# Patient Record
Sex: FEMALE | Race: OTHER | NOT HISPANIC OR LATINO | URBAN - METROPOLITAN AREA
[De-identification: names, ages, dates, MRNs, and addresses within clinical notes are randomized per-mention and may not be internally consistent; named-entity substitution may affect disease eponyms.]

---

## 2017-08-02 ENCOUNTER — HISTORICAL (OUTPATIENT)
Dept: ADMINISTRATIVE | Facility: HOSPITAL | Age: 62
End: 2017-08-02

## 2017-08-02 LAB
CHOLEST SERPL-MCNC: 248 MG/DL (ref 0–200)
CHOLEST/HDLC SERPL: 4.1 {RATIO} (ref 0–4)
DEPRECATED CALCIDIOL+CALCIFEROL SERPL-MC: 16.29 NG/ML (ref 30–80)
ERYTHROCYTE [DISTWIDTH] IN BLOOD BY AUTOMATED COUNT: 13.9 % (ref 11.5–17)
EST. AVERAGE GLUCOSE BLD GHB EST-MCNC: 169 MG/DL
HBA1C MFR BLD: 7.5 % (ref 4.2–6.3)
HCT VFR BLD AUTO: 49.4 % (ref 37–47)
HDLC SERPL-MCNC: 60 MG/DL (ref 35–60)
HGB BLD-MCNC: 15.4 GM/DL (ref 12–16)
LDLC SERPL CALC-MCNC: 161 MG/DL (ref 0–129)
MCH RBC QN AUTO: 28.5 PG (ref 27–31)
MCHC RBC AUTO-ENTMCNC: 31.2 GM/DL (ref 33–36)
MCV RBC AUTO: 91.3 FL (ref 80–94)
PLATELET # BLD AUTO: 295 X10(3)/MCL (ref 130–400)
PMV BLD AUTO: 9.1 FL (ref 9.4–12.4)
RBC # BLD AUTO: 5.41 X10(6)/MCL (ref 4.2–5.4)
TRIGL SERPL-MCNC: 137 MG/DL (ref 30–150)
TSH SERPL-ACNC: 0.91 MIU/ML (ref 0.36–3.74)
VLDLC SERPL CALC-MCNC: 27 MG/DL
WBC # SPEC AUTO: 6.2 X10(3)/MCL (ref 4.5–11.5)

## 2017-11-17 ENCOUNTER — HISTORICAL (OUTPATIENT)
Dept: RADIOLOGY | Facility: HOSPITAL | Age: 62
End: 2017-11-17

## 2018-10-04 ENCOUNTER — HISTORICAL (OUTPATIENT)
Dept: ADMINISTRATIVE | Facility: HOSPITAL | Age: 63
End: 2018-10-04

## 2018-10-04 LAB
ABS NEUT (OLG): 3.53 X10(3)/MCL (ref 2.1–9.2)
ALBUMIN SERPL-MCNC: 3.4 GM/DL (ref 3.4–5)
ALBUMIN/GLOB SERPL: 0.8 RATIO (ref 1.1–2)
ALP SERPL-CCNC: 71 UNIT/L (ref 38–126)
ALT SERPL-CCNC: 23 UNIT/L (ref 12–78)
APPEARANCE, UA: CLEAR
AST SERPL-CCNC: 16 UNIT/L (ref 15–37)
BACTERIA SPEC CULT: NORMAL /HPF
BASOPHILS # BLD AUTO: 0 X10(3)/MCL (ref 0–0.2)
BASOPHILS NFR BLD AUTO: 0 %
BILIRUB SERPL-MCNC: 0.2 MG/DL (ref 0.2–1)
BILIRUB UR QL STRIP: NEGATIVE
BILIRUBIN DIRECT+TOT PNL SERPL-MCNC: 0.1 MG/DL (ref 0–0.5)
BILIRUBIN DIRECT+TOT PNL SERPL-MCNC: 0.1 MG/DL (ref 0–0.8)
BUN SERPL-MCNC: 12 MG/DL (ref 7–18)
CALCIUM SERPL-MCNC: 9.4 MG/DL (ref 8.5–10.1)
CHLORIDE SERPL-SCNC: 107 MMOL/L (ref 98–107)
CHOLEST SERPL-MCNC: 203 MG/DL (ref 0–200)
CHOLEST/HDLC SERPL: 3.6 {RATIO} (ref 0–4)
CO2 SERPL-SCNC: 27 MMOL/L (ref 21–32)
COLOR UR: YELLOW
CREAT SERPL-MCNC: 0.81 MG/DL (ref 0.55–1.02)
EOSINOPHIL # BLD AUTO: 0.2 X10(3)/MCL (ref 0–0.9)
EOSINOPHIL NFR BLD AUTO: 2 %
ERYTHROCYTE [DISTWIDTH] IN BLOOD BY AUTOMATED COUNT: 13.8 % (ref 11.5–17)
FERRITIN SERPL-MCNC: 222 NG/ML (ref 8–388)
GLOBULIN SER-MCNC: 4 GM/DL (ref 2.4–3.5)
GLUCOSE (UA): NEGATIVE
GLUCOSE SERPL-MCNC: 97 MG/DL (ref 74–106)
HCT VFR BLD AUTO: 47.4 % (ref 37–47)
HDLC SERPL-MCNC: 57 MG/DL (ref 35–60)
HGB BLD-MCNC: 14.5 GM/DL (ref 12–16)
HGB UR QL STRIP: NEGATIVE
IRON SATN MFR SERPL: 25.3 % (ref 20–50)
IRON SERPL-MCNC: 74 MCG/DL (ref 50–175)
KETONES UR QL STRIP: NEGATIVE
LDLC SERPL CALC-MCNC: 125 MG/DL (ref 0–129)
LEUKOCYTE ESTERASE UR QL STRIP: NEGATIVE
LYMPHOCYTES # BLD AUTO: 2.4 X10(3)/MCL (ref 0.6–4.6)
LYMPHOCYTES NFR BLD AUTO: 36 %
MCH RBC QN AUTO: 28.7 PG (ref 27–31)
MCHC RBC AUTO-ENTMCNC: 30.6 GM/DL (ref 33–36)
MCV RBC AUTO: 93.9 FL (ref 80–94)
MONOCYTES # BLD AUTO: 0.5 X10(3)/MCL (ref 0.1–1.3)
MONOCYTES NFR BLD AUTO: 8 %
NEUTROPHILS # BLD AUTO: 3.53 X10(3)/MCL (ref 2.1–9.2)
NEUTROPHILS NFR BLD AUTO: 53 %
NITRITE UR QL STRIP: NEGATIVE
PH UR STRIP: 5.5 [PH] (ref 5–9)
PLATELET # BLD AUTO: 315 X10(3)/MCL (ref 130–400)
PMV BLD AUTO: 9.5 FL (ref 9.4–12.4)
POTASSIUM SERPL-SCNC: 4.6 MMOL/L (ref 3.5–5.1)
PROT SERPL-MCNC: 7.4 GM/DL (ref 6.4–8.2)
PROT UR QL STRIP: NEGATIVE
RBC # BLD AUTO: 5.05 X10(6)/MCL (ref 4.2–5.4)
RBC #/AREA URNS HPF: NORMAL /[HPF]
RHEUMATOID FACT SERPL-ACNC: <10 IU/ML (ref 0–15)
SODIUM SERPL-SCNC: 143 MMOL/L (ref 136–145)
SP GR UR STRIP: 1.02 (ref 1–1.03)
SQUAMOUS EPITHELIAL, UA: NORMAL
TIBC SERPL-MCNC: 293 MCG/DL (ref 250–450)
TRANSFERRIN SERPL-MCNC: 213 MG/DL (ref 200–360)
TRIGL SERPL-MCNC: 107 MG/DL (ref 30–150)
TSH SERPL-ACNC: 0.95 MIU/L (ref 0.36–3.74)
UROBILINOGEN UR STRIP-ACNC: 0.2
VLDLC SERPL CALC-MCNC: 21 MG/DL
WBC # SPEC AUTO: 6.7 X10(3)/MCL (ref 4.5–11.5)
WBC #/AREA URNS HPF: NORMAL /[HPF]

## 2019-04-05 ENCOUNTER — HISTORICAL (OUTPATIENT)
Dept: ADMINISTRATIVE | Facility: HOSPITAL | Age: 64
End: 2019-04-05

## 2019-04-05 LAB
ALBUMIN SERPL-MCNC: 3.4 GM/DL (ref 3.4–5)
ALBUMIN/GLOB SERPL: 0.8 {RATIO}
ALP SERPL-CCNC: 85 UNIT/L (ref 38–126)
ALT SERPL-CCNC: 17 UNIT/L (ref 12–78)
AST SERPL-CCNC: 13 UNIT/L (ref 15–37)
BILIRUB SERPL-MCNC: 0.2 MG/DL (ref 0.2–1)
BILIRUBIN DIRECT+TOT PNL SERPL-MCNC: 0.1 MG/DL (ref 0–0.2)
BILIRUBIN DIRECT+TOT PNL SERPL-MCNC: 0.1 MG/DL (ref 0–0.8)
BUN SERPL-MCNC: 8 MG/DL (ref 7–18)
CALCIUM SERPL-MCNC: 9.3 MG/DL (ref 8.5–10.1)
CHLORIDE SERPL-SCNC: 106 MMOL/L (ref 98–107)
CO2 SERPL-SCNC: 24 MMOL/L (ref 21–32)
CREAT SERPL-MCNC: 0.83 MG/DL (ref 0.55–1.02)
ERYTHROCYTE [DISTWIDTH] IN BLOOD BY AUTOMATED COUNT: 13.8 % (ref 11.5–17)
GLOBULIN SER-MCNC: 4 GM/DL (ref 2.4–3.5)
GLUCOSE SERPL-MCNC: 115 MG/DL (ref 74–106)
HCT VFR BLD AUTO: 47.4 % (ref 37–47)
HGB BLD-MCNC: 14.6 GM/DL (ref 12–16)
MCH RBC QN AUTO: 29 PG (ref 27–31)
MCHC RBC AUTO-ENTMCNC: 30.8 GM/DL (ref 33–36)
MCV RBC AUTO: 94 FL (ref 80–94)
PLATELET # BLD AUTO: 316 X10(3)/MCL (ref 130–400)
PMV BLD AUTO: 9.2 FL (ref 9.4–12.4)
POTASSIUM SERPL-SCNC: 4.2 MMOL/L (ref 3.5–5.1)
PROT SERPL-MCNC: 7.4 GM/DL (ref 6.4–8.2)
RBC # BLD AUTO: 5.04 X10(6)/MCL (ref 4.2–5.4)
SODIUM SERPL-SCNC: 140 MMOL/L (ref 136–145)
WBC # SPEC AUTO: 6 X10(3)/MCL (ref 4.5–11.5)

## 2019-04-30 ENCOUNTER — HISTORICAL (OUTPATIENT)
Dept: RADIOLOGY | Facility: HOSPITAL | Age: 64
End: 2019-04-30

## 2019-04-30 LAB
ABS NEUT (OLG): 4.65 X10(3)/MCL (ref 2.1–9.2)
ALBUMIN SERPL-MCNC: 3.1 GM/DL (ref 3.4–5)
ALBUMIN/GLOB SERPL: 0.8 RATIO (ref 1.1–2)
ALP SERPL-CCNC: 71 UNIT/L (ref 38–126)
ALT SERPL-CCNC: 16 UNIT/L (ref 12–78)
AST SERPL-CCNC: 10 UNIT/L (ref 15–37)
BASOPHILS # BLD AUTO: 0 X10(3)/MCL (ref 0–0.2)
BASOPHILS NFR BLD AUTO: 0 %
BILIRUB SERPL-MCNC: 0.3 MG/DL (ref 0.2–1)
BILIRUBIN DIRECT+TOT PNL SERPL-MCNC: 0.1 MG/DL (ref 0–0.5)
BILIRUBIN DIRECT+TOT PNL SERPL-MCNC: 0.2 MG/DL (ref 0–0.8)
BUN SERPL-MCNC: 8 MG/DL (ref 7–18)
CALCIUM SERPL-MCNC: 9.5 MG/DL (ref 8.5–10.1)
CHLORIDE SERPL-SCNC: 107 MMOL/L (ref 98–107)
CO2 SERPL-SCNC: 31 MMOL/L (ref 21–32)
CREAT SERPL-MCNC: 0.82 MG/DL (ref 0.55–1.02)
EOSINOPHIL # BLD AUTO: 0.2 X10(3)/MCL (ref 0–0.9)
EOSINOPHIL NFR BLD AUTO: 3 %
ERYTHROCYTE [DISTWIDTH] IN BLOOD BY AUTOMATED COUNT: 13.8 % (ref 11.5–17)
EST. AVERAGE GLUCOSE BLD GHB EST-MCNC: 157 MG/DL
GLOBULIN SER-MCNC: 3.9 GM/DL (ref 2.4–3.5)
GLUCOSE SERPL-MCNC: 113 MG/DL (ref 74–106)
HBA1C MFR BLD: 7.1 % (ref 4.2–6.3)
HCT VFR BLD AUTO: 44.8 % (ref 37–47)
HGB BLD-MCNC: 14 GM/DL (ref 12–16)
LYMPHOCYTES # BLD AUTO: 1.9 X10(3)/MCL (ref 0.6–4.6)
LYMPHOCYTES NFR BLD AUTO: 26 %
MCH RBC QN AUTO: 29 PG (ref 27–31)
MCHC RBC AUTO-ENTMCNC: 31.3 GM/DL (ref 33–36)
MCV RBC AUTO: 92.8 FL (ref 80–94)
MONOCYTES # BLD AUTO: 0.7 X10(3)/MCL (ref 0.1–1.3)
MONOCYTES NFR BLD AUTO: 9 %
NEUTROPHILS # BLD AUTO: 4.65 X10(3)/MCL (ref 2.1–9.2)
NEUTROPHILS NFR BLD AUTO: 62 %
PLATELET # BLD AUTO: 329 X10(3)/MCL (ref 130–400)
PMV BLD AUTO: 9.5 FL (ref 9.4–12.4)
POTASSIUM SERPL-SCNC: 4.5 MMOL/L (ref 3.5–5.1)
PROT SERPL-MCNC: 7 GM/DL (ref 6.4–8.2)
RBC # BLD AUTO: 4.83 X10(6)/MCL (ref 4.2–5.4)
SODIUM SERPL-SCNC: 141 MMOL/L (ref 136–145)
WBC # SPEC AUTO: 7.5 X10(3)/MCL (ref 4.5–11.5)

## 2019-07-05 ENCOUNTER — HISTORICAL (OUTPATIENT)
Dept: ADMINISTRATIVE | Facility: HOSPITAL | Age: 64
End: 2019-07-05

## 2019-07-05 LAB
ABS NEUT (OLG): 4.04 X10(3)/MCL (ref 2.1–9.2)
ALBUMIN SERPL-MCNC: 3.6 GM/DL (ref 3.4–5)
ALBUMIN/GLOB SERPL: 0.8 RATIO (ref 1.1–2)
ALP SERPL-CCNC: 84 UNIT/L (ref 45–117)
ALT SERPL-CCNC: 18 UNIT/L (ref 12–78)
AST SERPL-CCNC: 15 UNIT/L (ref 15–37)
BASOPHILS # BLD AUTO: 0.03 X10(3)/MCL
BASOPHILS NFR BLD AUTO: 0 %
BILIRUB SERPL-MCNC: 0.3 MG/DL (ref 0.2–1)
BILIRUBIN DIRECT+TOT PNL SERPL-MCNC: <0.1 MG/DL
BILIRUBIN DIRECT+TOT PNL SERPL-MCNC: ABNORMAL MG/DL
BUN SERPL-MCNC: 10 MG/DL (ref 7–18)
CALCIUM SERPL-MCNC: 9.7 MG/DL (ref 8.5–10.1)
CHLORIDE SERPL-SCNC: 108 MMOL/L (ref 98–107)
CO2 SERPL-SCNC: 32 MMOL/L (ref 21–32)
CREAT SERPL-MCNC: 0.9 MG/DL (ref 0.6–1.3)
EOSINOPHIL # BLD AUTO: 0.17 X10(3)/MCL
EOSINOPHIL NFR BLD AUTO: 2 %
ERYTHROCYTE [DISTWIDTH] IN BLOOD BY AUTOMATED COUNT: 14.2 % (ref 11.5–14.5)
GLOBULIN SER-MCNC: 4.3 GM/ML (ref 2.3–3.5)
GLUCOSE SERPL-MCNC: 100 MG/DL (ref 74–106)
HCT VFR BLD AUTO: 45.9 % (ref 35–46)
HGB BLD-MCNC: 14.4 GM/DL (ref 12–16)
IMM GRANULOCYTES # BLD AUTO: 0.02 10*3/UL
IMM GRANULOCYTES NFR BLD AUTO: 0 %
LYMPHOCYTES # BLD AUTO: 2.06 X10(3)/MCL
LYMPHOCYTES NFR BLD AUTO: 29 % (ref 13–40)
MCH RBC QN AUTO: 29 PG (ref 26–34)
MCHC RBC AUTO-ENTMCNC: 31.4 GM/DL (ref 31–37)
MCV RBC AUTO: 92.4 FL (ref 80–100)
MONOCYTES # BLD AUTO: 0.81 X10(3)/MCL
MONOCYTES NFR BLD AUTO: 11 % (ref 4–12)
NEUTROPHILS # BLD AUTO: 4.04 X10(3)/MCL
NEUTROPHILS NFR BLD AUTO: 57 X10(3)/MCL
PLATELET # BLD AUTO: 350 X10(3)/MCL (ref 130–400)
PMV BLD AUTO: 9.5 FL (ref 7.4–10.4)
POTASSIUM SERPL-SCNC: 3.9 MMOL/L (ref 3.5–5.1)
PROT SERPL-MCNC: 7.9 GM/DL (ref 6.4–8.2)
RBC # BLD AUTO: 4.97 X10(6)/MCL (ref 4–5.2)
SODIUM SERPL-SCNC: 141 MMOL/L (ref 136–145)
WBC # SPEC AUTO: 7.1 X10(3)/MCL (ref 4.5–11)

## 2019-07-29 ENCOUNTER — HISTORICAL (OUTPATIENT)
Dept: SPEECH THERAPY | Facility: HOSPITAL | Age: 64
End: 2019-07-29

## 2019-08-06 ENCOUNTER — HISTORICAL (OUTPATIENT)
Dept: ADMINISTRATIVE | Facility: HOSPITAL | Age: 64
End: 2019-08-06

## 2019-09-03 ENCOUNTER — HISTORICAL (OUTPATIENT)
Dept: SPEECH THERAPY | Facility: HOSPITAL | Age: 64
End: 2019-09-03

## 2019-10-01 ENCOUNTER — HISTORICAL (OUTPATIENT)
Dept: SPEECH THERAPY | Facility: HOSPITAL | Age: 64
End: 2019-10-01

## 2019-10-11 ENCOUNTER — HOSPITAL ENCOUNTER (OUTPATIENT)
Dept: TELEMEDICINE | Facility: HOSPITAL | Age: 64
Discharge: HOME | End: 2019-10-11
Payer: MEDICARE

## 2019-10-11 ENCOUNTER — HOSPITAL ENCOUNTER (OUTPATIENT)
Dept: MEDSURG UNIT | Facility: HOSPITAL | Age: 64
End: 2019-10-12
Attending: INTERNAL MEDICINE | Admitting: INTERNAL MEDICINE

## 2019-10-11 LAB
ABS NEUT (OLG): 2.62 X10(3)/MCL (ref 2.1–9.2)
ALBUMIN SERPL-MCNC: 2.6 GM/DL (ref 3.4–5)
ALBUMIN/GLOB SERPL: 0.6 RATIO (ref 1.1–2)
ALP SERPL-CCNC: 83 UNIT/L (ref 45–117)
ALT SERPL-CCNC: 13 UNIT/L (ref 12–78)
ANISOCYTOSIS BLD QL SMEAR: NORMAL
APTT PPP: 25.8 SECOND(S) (ref 23.3–37)
AST SERPL-CCNC: 17 UNIT/L (ref 15–37)
BASOPHILS # BLD AUTO: 0 X10(3)/MCL (ref 0–0.2)
BASOPHILS NFR BLD AUTO: 0 %
BILIRUB SERPL-MCNC: 0.2 MG/DL (ref 0.2–1)
BILIRUBIN DIRECT+TOT PNL SERPL-MCNC: <0.1 MG/DL (ref 0–0.2)
BILIRUBIN DIRECT+TOT PNL SERPL-MCNC: ABNORMAL MG/DL
BUN SERPL-MCNC: 10 MG/DL (ref 7–18)
CALCIUM SERPL-MCNC: 8.6 MG/DL (ref 8.5–10.1)
CHLORIDE SERPL-SCNC: 110 MMOL/L (ref 98–107)
CO2 SERPL-SCNC: 24 MMOL/L (ref 21–32)
CREAT SERPL-MCNC: 0.8 MG/DL (ref 0.6–1.3)
EOSINOPHIL # BLD AUTO: 0.2 X10(3)/MCL (ref 0–0.9)
EOSINOPHIL NFR BLD AUTO: 4 %
ERYTHROCYTE [DISTWIDTH] IN BLOOD BY AUTOMATED COUNT: 15.1 % (ref 11.5–14.5)
GLOBULIN SER-MCNC: 4.6 GM/ML (ref 2.3–3.5)
GLUCOSE SERPL-MCNC: 94 MG/DL (ref 74–106)
HCT VFR BLD AUTO: 36.9 % (ref 35–46)
HGB BLD-MCNC: 11.3 GM/DL (ref 12–16)
IMM GRANULOCYTES # BLD AUTO: 0.02 10*3/UL
IMM GRANULOCYTES NFR BLD AUTO: 0 %
LYMPHOCYTES # BLD AUTO: 1.2 X10(3)/MCL (ref 0.6–4.6)
LYMPHOCYTES NFR BLD AUTO: 26 %
MCH RBC QN AUTO: 27.4 PG (ref 26–34)
MCHC RBC AUTO-ENTMCNC: 30.6 GM/DL (ref 31–37)
MCV RBC AUTO: 89.6 FL (ref 80–100)
MONOCYTES # BLD AUTO: 0.6 X10(3)/MCL (ref 0.1–1.3)
MONOCYTES NFR BLD AUTO: 13 %
NEUTROPHILS # BLD AUTO: 2.62 X10(3)/MCL (ref 2.1–9.2)
NEUTROPHILS NFR BLD AUTO: 57 %
PLATELET # BLD AUTO: 338 X10(3)/MCL (ref 130–400)
PLATELET # BLD EST: ADEQUATE 10*3/UL
PMV BLD AUTO: 9.4 FL (ref 7.4–10.4)
POLYCHROMASIA BLD QL SMEAR: NORMAL
POTASSIUM SERPL-SCNC: 4.1 MMOL/L (ref 3.5–5.1)
PROT SERPL-MCNC: 7.2 GM/DL (ref 6.4–8.2)
RBC # BLD AUTO: 4.12 X10(6)/MCL (ref 4–5.2)
RBC MORPH BLD: NORMAL
SODIUM SERPL-SCNC: 141 MMOL/L (ref 136–145)
TROPONIN I SERPL-MCNC: <0.015 NG/ML (ref 0–0.05)
WBC # SPEC AUTO: 4.6 X10(3)/MCL (ref 4.5–11)

## 2019-10-11 PROCEDURE — G0425 PR INPT TELEHEALTH CONSULT 30M: ICD-10-PCS | Mod: GT,,, | Performed by: PSYCHIATRY & NEUROLOGY

## 2019-10-11 PROCEDURE — G0425 INPT/ED TELECONSULT30: HCPCS | Mod: GT,,, | Performed by: PSYCHIATRY & NEUROLOGY

## 2019-10-11 NOTE — SUBJECTIVE & OBJECTIVE
Woke up with symptoms?: no    Recent bleeding noted: no  Does the patient take any Blood Thinners? no  Medications: Antiplatelets:  aspirin      Past Medical History: hypertension, diabetes, hyperlipidemia, stroke, Cancer and remote seiizure    Past Surgical History: no major surgeries within the last 2 weeks    Family History: no relevant history    Social History: no smoking, no drinking, no drugs    Allergies: Allergies have not been reviewed No known drug allergies    Review of Systems   Unable to perform ROS: Other     Objective:   Vitals: There were no vitals taken for this visit. BP: 145/77, Respiratory Rate: 17 and Heart Rate: 82    CT READ: Yes  No hemmorhage. No mass effect. No early infarct signs.     Physical Exam   Constitutional: She is oriented to person, place, and time. She appears well-developed and well-nourished.   HENT:   Head: Normocephalic and atraumatic.   Eyes: Pupils are equal, round, and reactive to light. EOM are normal.   Neck: Normal range of motion. Neck supple.   Cardiovascular: Normal rate and regular rhythm.   Pulmonary/Chest: No respiratory distress.   Genitourinary:   Genitourinary Comments: No tested   Musculoskeletal: Normal range of motion. She exhibits no edema or deformity.   Neurological: She is alert and oriented to person, place, and time. No cranial nerve deficit or sensory deficit. Coordination normal.   Skin: No rash noted. She is not diaphoretic. No erythema.   Psychiatric: She has a normal mood and affect. Her behavior is normal.   Nursing note and vitals reviewed.

## 2019-10-11 NOTE — CONSULTS
[facility]  Vascular Neurology  Comprehensive Stroke Center  Tele-Consultation Note      Consults    Consulting Provider: CISCO SHAW  Current Providers  No providers found    Patient Location: [facility] - [facility] Emergency Department  Spoke hospital nurse at bedside with patient assisting consultant.     Patient information was obtained from patient and ED nurse.         Assessment/Plan:   65 y/o with HTN, DM, HLD, stroke without residual deficit, throat cancer s/p laryngectomy and trach, remote seizure, brought in due to L face weakness and seizures earlier today.  NIHSS 2, CTH without acute abnormality.  Has several risk factors for stroke, but unsure if current presentation is stroke related or result of seizures earlier today. However, minor no disabling deficit and thus no iv alteplase recommended.  MRI brain to better characterize her symptoms.          STROKE DOCUMENTATION     Acute Stroke Times:   Acute Stroke Times   Last Known Normal Date: 10/11/19  Last Known Normal Time: 1600  Symptom Onset Date: 10/11/19  Symptom Onset Time: 1600  Stroke Team Called Date: 10/11/19  Stroke Team Called Time: 1736  Stroke Team Arrival Date: 10/11/19  Stroke Team Arrival Time: 1742  CT Interpretation Time: 1742  Decision to Treat Time for Alteplase: (No iv alteplase)  Decision to Treat Time for IR: (No IR candidate)    NIH Scale:  Interval: baseline  1a. Level of Consciousness: 0-->Alert, keenly responsive  1b. LOC Questions: 0-->Answers both questions correctly  1c. LOC Commands: 0-->Performs both tasks correctly  2. Best Gaze: 0-->Normal  3. Visual: 0-->No visual loss  4. Facial Palsy: 2-->Partial paralysis (total or near-total paralysis of lower face)  5a. Motor Arm, Left: 0-->No drift, limb holds 90 (or 45) degrees for full 10 secs  5b. Motor Arm, Right: 0-->No drift, limb holds 90 (or 45) degrees for full 10 secs  6a. Motor Leg,  Left: 0-->No drift, leg holds 30 degree position for full 5 secs  6b. Motor Leg, Right: 0-->No drift, leg holds 30 degree position for full 5 secs  7. Limb Ataxia: 0-->Absent  8. Sensory: 0-->Normal, no sensory loss  9. Best Language: 0-->No aphasia, normal  10. Dysarthria: (UN) Intubated or other physical barrier  11. Extinction and Inattention (formerly Neglect): 0-->No abnormality  Total (NIH Stroke Scale): 2     Modified McLean Score: 2  [place] Coma Scale:15   ABCD2 Score:    [id number] Score:   HAS -BLED Score:   ICH Score:   Hunt & Moore Classification:       Diagnoses: L face weakness.  No new Assessment & Plan notes have been filed under this hospital service since the last note was generated.  Service: Vascular Neurology      There were no vitals taken for this visit.  Alteplase Eligible?: No  Alteplase Recommendation: Alteplase not recommended due to minimal deficit   Possible Interventional Revascularization Candidate? No; No large vessel occlusion    Disposition Recommendation: admit to inpatient    Subjective:     History of Present Illness: 65 y/o with HTN, DM, HLD, stroke without residual deficit, throat cancer s/p laryngectomy and trach, remote seizure, brought in due to L face weakness and seizures earlier today.  No other associated neurological symptoms.        No notes on file      Woke up with symptoms?: no    Recent bleeding noted: no  Does the patient take any Blood Thinners? no  Medications: Antiplatelets:  aspirin      Past Medical History: hypertension, diabetes, hyperlipidemia, stroke, Cancer and remote seiizure    Past Surgical History: no major surgeries within the last 2 weeks    Family History: no relevant history    Social History: no smoking, no drinking, no drugs    Allergies: Allergies have not been reviewed No known drug allergies    Review of Systems   Unable to perform ROS: Other     Objective:   Vitals: There were no vitals taken for this visit. BP: 145/77, Respiratory Rate:  17 and Heart Rate: 82    CT READ: Yes  No hemmorhage. No mass effect. No early infarct signs.     Physical Exam   Constitutional: She is oriented to person, place, and time. She appears well-developed and well-nourished.   HENT:   Head: Normocephalic and atraumatic.   Eyes: Pupils are equal, round, and reactive to light. EOM are normal.   Neck: Normal range of motion. Neck supple.   Cardiovascular: Normal rate and regular rhythm.   Pulmonary/Chest: No respiratory distress.   Genitourinary:   Genitourinary Comments: No tested   Musculoskeletal: Normal range of motion. She exhibits no edema or deformity.   Neurological: She is alert and oriented to person, place, and time. No cranial nerve deficit or sensory deficit. Coordination normal.   Skin: No rash noted. She is not diaphoretic. No erythema.   Psychiatric: She has a normal mood and affect. Her behavior is normal.   Nursing note and vitals reviewed.            Recommended the emergency room physician to have a brief discussion with the patient and/or family if available regarding the risks and benefits of treatment, and to briefly document the occurrence of that discussion in his clinical encounter note.     The attending portion of this evaluation, treatment, and documentation was performed per Noel Ventura MD via audiovisual.    Billing code:  (non-intervention mild to moderate stroke, TIA, some mimics)    · This patient has a critical neurological condition/illness, with some potential for high morbidity and mortality.  · There is a moderate probability for acute neurological change leading to clinical and possibly life-threatening deterioration requiring highest level of physician preparedness for urgent intervention.  · Care was coordinated with other physicians involved in the patient's care.  · Radiologic studies and laboratory data were reviewed and interpreted, and plan of care was re-assessed based on the results.  · Diagnosis, treatment  options and prognosis may have been discussed with the patient and/or family members or caregiver.      In your opinion, this was a: Tier 1 Van Negative    Consult End Time: 550 pm    Noel Ventura MD  Comprehensive Stroke Center  Vascular Neurology   [facility]

## 2019-10-12 LAB
ABS NEUT (OLG): 2.47 X10(3)/MCL (ref 2.1–9.2)
ALBUMIN SERPL-MCNC: 2.6 GM/DL (ref 3.4–5)
ALBUMIN/GLOB SERPL: 0.6 RATIO (ref 1.1–2)
ALP SERPL-CCNC: 81 UNIT/L (ref 45–117)
ALT SERPL-CCNC: 14 UNIT/L (ref 12–78)
ANISOCYTOSIS BLD QL SMEAR: NORMAL
AST SERPL-CCNC: 12 UNIT/L (ref 15–37)
BASOPHILS NFR BLD MANUAL: 0 %
BILIRUB SERPL-MCNC: 0.2 MG/DL (ref 0.2–1)
BILIRUBIN DIRECT+TOT PNL SERPL-MCNC: <0.1 MG/DL (ref 0–0.2)
BILIRUBIN DIRECT+TOT PNL SERPL-MCNC: ABNORMAL MG/DL
BUN SERPL-MCNC: 9 MG/DL (ref 7–18)
CALCIUM SERPL-MCNC: 8.4 MG/DL (ref 8.5–10.1)
CHLORIDE SERPL-SCNC: 109 MMOL/L (ref 98–107)
CHOLEST SERPL-MCNC: 158 MG/DL
CHOLEST/HDLC SERPL: 2.8 {RATIO} (ref 0–4.4)
CO2 SERPL-SCNC: 26 MMOL/L (ref 21–32)
CREAT SERPL-MCNC: 0.7 MG/DL (ref 0.6–1.3)
CRP SERPL-MCNC: 6.6 MG/DL
EOSINOPHIL NFR BLD MANUAL: 3 %
ERYTHROCYTE [DISTWIDTH] IN BLOOD BY AUTOMATED COUNT: 15.2 % (ref 11.5–14.5)
ERYTHROCYTE [SEDIMENTATION RATE] IN BLOOD: 82 MM/HR (ref 0–20)
EST. AVERAGE GLUCOSE BLD GHB EST-MCNC: 126 MG/DL
GLOBULIN SER-MCNC: 4.6 GM/ML (ref 2.3–3.5)
GLUCOSE SERPL-MCNC: 92 MG/DL (ref 74–106)
GRANULOCYTES NFR BLD MANUAL: 65 % (ref 43–75)
HBA1C MFR BLD: 6 % (ref 4.2–6.3)
HCT VFR BLD AUTO: 33.3 % (ref 35–46)
HDLC SERPL-MCNC: 56 MG/DL (ref 40–59)
HGB BLD-MCNC: 10.3 GM/DL (ref 12–16)
LDLC SERPL CALC-MCNC: 80 MG/DL
LYMPHOCYTES NFR BLD MANUAL: 25 % (ref 20.5–51.1)
MCH RBC QN AUTO: 27.3 PG (ref 26–34)
MCHC RBC AUTO-ENTMCNC: 30.9 GM/DL (ref 31–37)
MCV RBC AUTO: 88.3 FL (ref 80–100)
MONOCYTES NFR BLD MANUAL: 7 % (ref 2–9)
PLATELET # BLD AUTO: 344 X10(3)/MCL (ref 130–400)
PLATELET # BLD EST: NORMAL 10*3/UL
PMV BLD AUTO: 8.9 FL (ref 7.4–10.4)
POLYCHROMASIA BLD QL SMEAR: NORMAL
POTASSIUM SERPL-SCNC: 3.9 MMOL/L (ref 3.5–5.1)
PROT SERPL-MCNC: 7.2 GM/DL (ref 6.4–8.2)
RBC # BLD AUTO: 3.77 X10(6)/MCL (ref 4–5.2)
RBC MORPH BLD: NORMAL
SODIUM SERPL-SCNC: 142 MMOL/L (ref 136–145)
T4 FREE SERPL-MCNC: 0.76 NG/DL (ref 0.76–1.46)
TRIGL SERPL-MCNC: 112 MG/DL
TSH SERPL-ACNC: 1.91 MIU/L (ref 0.36–3.74)
VLDLC SERPL CALC-MCNC: 22 MG/DL
WBC # SPEC AUTO: 4.2 X10(3)/MCL (ref 4.5–11)

## 2019-10-14 ENCOUNTER — HISTORICAL (OUTPATIENT)
Dept: SPEECH THERAPY | Facility: HOSPITAL | Age: 64
End: 2019-10-14

## 2019-11-05 ENCOUNTER — HISTORICAL (OUTPATIENT)
Dept: SPEECH THERAPY | Facility: HOSPITAL | Age: 64
End: 2019-11-05

## 2019-11-21 ENCOUNTER — HISTORICAL (OUTPATIENT)
Dept: SPEECH THERAPY | Facility: HOSPITAL | Age: 64
End: 2019-11-21

## 2019-12-19 ENCOUNTER — HISTORICAL (OUTPATIENT)
Dept: SPEECH THERAPY | Facility: HOSPITAL | Age: 64
End: 2019-12-19

## 2019-12-20 ENCOUNTER — HISTORICAL (OUTPATIENT)
Dept: RADIOLOGY | Facility: HOSPITAL | Age: 64
End: 2019-12-20

## 2020-01-09 ENCOUNTER — HISTORICAL (OUTPATIENT)
Dept: SPEECH THERAPY | Facility: HOSPITAL | Age: 65
End: 2020-01-09

## 2020-01-14 ENCOUNTER — HISTORICAL (OUTPATIENT)
Dept: LAB | Facility: HOSPITAL | Age: 65
End: 2020-01-14

## 2020-01-14 LAB
ABS NEUT (OLG): 2.26 X10(3)/MCL (ref 2.1–9.2)
ALBUMIN SERPL-MCNC: 3.4 GM/DL (ref 3.4–5)
ALBUMIN/GLOB SERPL: 0.8 RATIO (ref 1.1–2)
ALP SERPL-CCNC: 78 UNIT/L (ref 45–117)
ALT SERPL-CCNC: 19 UNIT/L (ref 12–78)
AST SERPL-CCNC: 14 UNIT/L (ref 15–37)
BASOPHILS # BLD AUTO: 0 X10(3)/MCL (ref 0–0.2)
BASOPHILS NFR BLD AUTO: 0 %
BILIRUB SERPL-MCNC: 0.2 MG/DL (ref 0.2–1)
BILIRUBIN DIRECT+TOT PNL SERPL-MCNC: <0.1 MG/DL (ref 0–0.2)
BILIRUBIN DIRECT+TOT PNL SERPL-MCNC: ABNORMAL MG/DL
BUN SERPL-MCNC: 11 MG/DL (ref 7–18)
CALCIUM SERPL-MCNC: 9.1 MG/DL (ref 8.5–10.1)
CHLORIDE SERPL-SCNC: 109 MMOL/L (ref 98–107)
CO2 SERPL-SCNC: 28 MMOL/L (ref 21–32)
CREAT SERPL-MCNC: 0.9 MG/DL (ref 0.6–1.3)
EOSINOPHIL # BLD AUTO: 0.2 X10(3)/MCL (ref 0–0.9)
EOSINOPHIL NFR BLD AUTO: 4 %
ERYTHROCYTE [DISTWIDTH] IN BLOOD BY AUTOMATED COUNT: 16.3 % (ref 11.5–14.5)
FERRITIN SERPL-MCNC: 151.7 NG/ML (ref 8–388)
GLOBULIN SER-MCNC: 4 GM/ML (ref 2.3–3.5)
GLUCOSE SERPL-MCNC: 90 MG/DL (ref 74–106)
HAV AB SER QL IA: NONREACTIVE
HBV SURFACE AG SERPL QL IA: NEGATIVE
HCT VFR BLD AUTO: 41.3 % (ref 35–46)
HGB BLD-MCNC: 12.7 GM/DL (ref 12–16)
HIV 1+2 AB+HIV1 P24 AG SERPL QL IA: NONREACTIVE
IMM GRANULOCYTES # BLD AUTO: 0.01 10*3/UL
IMM GRANULOCYTES NFR BLD AUTO: 0 %
INR PPP: 0.89 (ref 0.9–1.2)
LYMPHOCYTES # BLD AUTO: 0.8 X10(3)/MCL (ref 0.6–4.6)
LYMPHOCYTES NFR BLD AUTO: 21 %
MCH RBC QN AUTO: 27.9 PG (ref 26–34)
MCHC RBC AUTO-ENTMCNC: 30.8 GM/DL (ref 31–37)
MCV RBC AUTO: 90.8 FL (ref 80–100)
MONOCYTES # BLD AUTO: 0.4 X10(3)/MCL (ref 0.1–1.3)
MONOCYTES NFR BLD AUTO: 12 %
NEUTROPHILS # BLD AUTO: 2.26 X10(3)/MCL (ref 2.1–9.2)
NEUTROPHILS NFR BLD AUTO: 62 %
PLATELET # BLD AUTO: 302 X10(3)/MCL (ref 130–400)
PMV BLD AUTO: 8.8 FL (ref 7.4–10.4)
POTASSIUM SERPL-SCNC: 4.4 MMOL/L (ref 3.5–5.1)
PROT SERPL-MCNC: 7.4 GM/DL (ref 6.4–8.2)
PROTHROMBIN TIME: 11.9 SECOND(S) (ref 11.9–14.4)
RBC # BLD AUTO: 4.55 X10(6)/MCL (ref 4–5.2)
RPR SER QL: NORMAL
SODIUM SERPL-SCNC: 141 MMOL/L (ref 136–145)
T PALLIDUM AB SER QL: REACTIVE
WBC # SPEC AUTO: 3.6 X10(3)/MCL (ref 4.5–11)

## 2020-02-06 ENCOUNTER — HISTORICAL (OUTPATIENT)
Dept: RADIOLOGY | Facility: HOSPITAL | Age: 65
End: 2020-02-06

## 2020-03-03 ENCOUNTER — HISTORICAL (OUTPATIENT)
Dept: INTERNAL MEDICINE | Facility: CLINIC | Age: 65
End: 2020-03-03

## 2020-03-05 ENCOUNTER — HISTORICAL (OUTPATIENT)
Dept: RADIOLOGY | Facility: HOSPITAL | Age: 65
End: 2020-03-05

## 2020-03-12 ENCOUNTER — HISTORICAL (OUTPATIENT)
Dept: SPEECH THERAPY | Facility: HOSPITAL | Age: 65
End: 2020-03-12

## 2020-06-15 ENCOUNTER — HISTORICAL (OUTPATIENT)
Dept: ADMINISTRATIVE | Facility: HOSPITAL | Age: 65
End: 2020-06-15

## 2020-06-15 LAB
ABS NEUT (OLG): 2.09 X10(3)/MCL (ref 2.1–9.2)
ALBUMIN SERPL-MCNC: 3.5 GM/DL (ref 3.4–5)
ALBUMIN/GLOB SERPL: 0.8 RATIO (ref 1.1–2)
ALP SERPL-CCNC: 68 UNIT/L (ref 45–117)
ALT SERPL-CCNC: 18 UNIT/L (ref 12–78)
APPEARANCE, UA: CLEAR
AST SERPL-CCNC: 17 UNIT/L (ref 15–37)
BACTERIA #/AREA URNS AUTO: ABNORMAL /HPF
BASOPHILS # BLD AUTO: 0 X10(3)/MCL (ref 0–0.2)
BASOPHILS NFR BLD AUTO: 1 %
BILIRUB SERPL-MCNC: 0.2 MG/DL (ref 0.2–1)
BILIRUB UR QL STRIP: NEGATIVE
BILIRUBIN DIRECT+TOT PNL SERPL-MCNC: <0.1 MG/DL (ref 0–0.2)
BILIRUBIN DIRECT+TOT PNL SERPL-MCNC: ABNORMAL MG/DL
BUN SERPL-MCNC: 13 MG/DL (ref 7–18)
CALCIUM SERPL-MCNC: 9.4 MG/DL (ref 8.5–10.1)
CHLORIDE SERPL-SCNC: 110 MMOL/L (ref 98–107)
CO2 SERPL-SCNC: 23 MMOL/L (ref 21–32)
COLOR UR: NORMAL
CREAT SERPL-MCNC: 0.8 MG/DL (ref 0.6–1.3)
EOSINOPHIL # BLD AUTO: 0.1 X10(3)/MCL (ref 0–0.9)
EOSINOPHIL NFR BLD AUTO: 3 %
ERYTHROCYTE [DISTWIDTH] IN BLOOD BY AUTOMATED COUNT: 13.7 % (ref 11.5–14.5)
EST. AVERAGE GLUCOSE BLD GHB EST-MCNC: 128 MG/DL
GLOBULIN SER-MCNC: 4.3 GM/ML (ref 2.3–3.5)
GLUCOSE (UA): NEGATIVE
GLUCOSE SERPL-MCNC: 100 MG/DL (ref 74–106)
HBA1C MFR BLD: 6.1 % (ref 4.2–6.3)
HCT VFR BLD AUTO: 42 % (ref 35–46)
HGB BLD-MCNC: 13.1 GM/DL (ref 12–16)
HGB UR QL STRIP: NEGATIVE
HYALINE CASTS #/AREA URNS LPF: ABNORMAL /LPF
KETONES UR QL STRIP: NEGATIVE
LEUKOCYTE ESTERASE UR QL STRIP: NEGATIVE
LYMPHOCYTES # BLD AUTO: 0.8 X10(3)/MCL (ref 0.6–4.6)
LYMPHOCYTES NFR BLD AUTO: 23 %
MCH RBC QN AUTO: 28.7 PG (ref 26–34)
MCHC RBC AUTO-ENTMCNC: 31.2 GM/DL (ref 31–37)
MCV RBC AUTO: 92.1 FL (ref 80–100)
MONOCYTES # BLD AUTO: 0.4 X10(3)/MCL (ref 0.1–1.3)
MONOCYTES NFR BLD AUTO: 12 %
NEUTROPHILS # BLD AUTO: 2.09 X10(3)/MCL (ref 2.1–9.2)
NEUTROPHILS NFR BLD AUTO: 61 %
NITRITE UR QL STRIP: NEGATIVE
PH UR STRIP: 6 [PH] (ref 4.5–8)
PLATELET # BLD AUTO: 314 X10(3)/MCL (ref 130–400)
PMV BLD AUTO: 9.1 FL (ref 7.4–10.4)
POTASSIUM SERPL-SCNC: 4.4 MMOL/L (ref 3.5–5.1)
PROT SERPL-MCNC: 7.8 GM/DL (ref 6.4–8.2)
PROT UR QL STRIP: NEGATIVE
RBC # BLD AUTO: 4.56 X10(6)/MCL (ref 4–5.2)
RBC #/AREA URNS AUTO: ABNORMAL /HPF
SODIUM SERPL-SCNC: 140 MMOL/L (ref 136–145)
SP GR UR STRIP: 1.02 (ref 1–1.03)
SQUAMOUS #/AREA URNS LPF: ABNORMAL /LPF
UROBILINOGEN UR STRIP-ACNC: NORMAL
WBC # SPEC AUTO: 3.4 X10(3)/MCL (ref 4.5–11)
WBC #/AREA URNS AUTO: ABNORMAL /HPF

## 2020-06-16 ENCOUNTER — HISTORICAL (OUTPATIENT)
Dept: ADMINISTRATIVE | Facility: HOSPITAL | Age: 65
End: 2020-06-16

## 2020-07-28 ENCOUNTER — HISTORICAL (OUTPATIENT)
Dept: ADMINISTRATIVE | Facility: HOSPITAL | Age: 65
End: 2020-07-28

## 2020-08-11 ENCOUNTER — HISTORICAL (OUTPATIENT)
Dept: SPEECH THERAPY | Facility: HOSPITAL | Age: 65
End: 2020-08-11

## 2020-08-20 ENCOUNTER — HISTORICAL (OUTPATIENT)
Dept: SPEECH THERAPY | Facility: HOSPITAL | Age: 65
End: 2020-08-20

## 2020-09-29 ENCOUNTER — HISTORICAL (OUTPATIENT)
Dept: ADMINISTRATIVE | Facility: HOSPITAL | Age: 65
End: 2020-09-29

## 2020-09-29 LAB
BUN SERPL-MCNC: 14 MG/DL (ref 7–18)
CALCIUM SERPL-MCNC: 9.5 MG/DL (ref 8.5–10.1)
CHLORIDE SERPL-SCNC: 107 MMOL/L (ref 98–107)
CO2 SERPL-SCNC: 26 MMOL/L (ref 21–32)
CREAT SERPL-MCNC: 1.1 MG/DL (ref 0.6–1.3)
CREAT/UREA NIT SERPL: 13 MG/DL (ref 12–14)
GLUCOSE SERPL-MCNC: 112 MG/DL (ref 74–106)
POTASSIUM SERPL-SCNC: 4.7 MMOL/L (ref 3.5–5.1)
SODIUM SERPL-SCNC: 138 MMOL/L (ref 136–145)
T4 FREE SERPL-MCNC: 0.76 NG/DL (ref 0.76–1.46)
TSH SERPL-ACNC: 15.19 MIU/L (ref 0.36–3.74)

## 2020-10-19 ENCOUNTER — HISTORICAL (OUTPATIENT)
Dept: RADIOLOGY | Facility: HOSPITAL | Age: 65
End: 2020-10-19

## 2020-10-30 ENCOUNTER — HISTORICAL (OUTPATIENT)
Dept: ADMINISTRATIVE | Facility: HOSPITAL | Age: 65
End: 2020-10-30

## 2020-10-30 LAB
CRP SERPL-MCNC: 0.83 MG/DL
ERYTHROCYTE [SEDIMENTATION RATE] IN BLOOD: 23 MM/HR (ref 0–20)

## 2020-12-03 ENCOUNTER — HISTORICAL (OUTPATIENT)
Dept: SPEECH THERAPY | Facility: HOSPITAL | Age: 65
End: 2020-12-03

## 2021-01-05 ENCOUNTER — HISTORICAL (OUTPATIENT)
Dept: CARDIOLOGY | Facility: HOSPITAL | Age: 66
End: 2021-01-05

## 2021-01-05 LAB
T4 FREE SERPL-MCNC: 0.95 NG/DL (ref 0.7–1.48)
TSH SERPL-ACNC: 5.1 UIU/ML (ref 0.35–4.94)

## 2021-03-16 ENCOUNTER — HISTORICAL (OUTPATIENT)
Dept: SPEECH THERAPY | Facility: HOSPITAL | Age: 66
End: 2021-03-16

## 2021-03-18 ENCOUNTER — HISTORICAL (OUTPATIENT)
Dept: ADMINISTRATIVE | Facility: HOSPITAL | Age: 66
End: 2021-03-18

## 2021-03-18 LAB
ABS NEUT (OLG): 2.22 X10(3)/MCL (ref 2.1–9.2)
ALBUMIN SERPL-MCNC: 3.9 GM/DL (ref 3.4–4.8)
ALBUMIN/GLOB SERPL: 1.1 RATIO (ref 1.1–2)
ALP SERPL-CCNC: 69 UNIT/L (ref 40–150)
ALT SERPL-CCNC: 10 UNIT/L (ref 0–55)
AMPHET UR QL SCN: NEGATIVE
APPEARANCE, UA: CLEAR
AST SERPL-CCNC: 13 UNIT/L (ref 5–34)
BACTERIA #/AREA URNS AUTO: ABNORMAL /HPF
BARBITURATE SCN PRESENT UR: NEGATIVE
BASOPHILS # BLD AUTO: 0 X10(3)/MCL (ref 0–0.2)
BASOPHILS NFR BLD AUTO: 1 %
BENZODIAZ UR QL SCN: NEGATIVE
BILIRUB SERPL-MCNC: 0.4 MG/DL
BILIRUB UR QL STRIP: NEGATIVE
BILIRUBIN DIRECT+TOT PNL SERPL-MCNC: 0.2 MG/DL (ref 0–0.5)
BILIRUBIN DIRECT+TOT PNL SERPL-MCNC: 0.2 MG/DL (ref 0–0.8)
BUN SERPL-MCNC: 11.6 MG/DL (ref 9.8–20.1)
CALCIUM SERPL-MCNC: 9.1 MG/DL (ref 8.4–10.2)
CANNABINOIDS UR QL SCN: POSITIVE
CHLORIDE SERPL-SCNC: 105 MMOL/L (ref 98–107)
CO2 SERPL-SCNC: 29 MMOL/L (ref 23–31)
COCAINE UR QL SCN: NEGATIVE
COLOR UR: COLORLESS
CREAT SERPL-MCNC: 0.84 MG/DL (ref 0.55–1.02)
DEPRECATED CALCIDIOL+CALCIFEROL SERPL-MC: 18.9 NG/ML (ref 30–80)
EOSINOPHIL # BLD AUTO: 0.1 X10(3)/MCL (ref 0–0.9)
EOSINOPHIL NFR BLD AUTO: 3 %
ERYTHROCYTE [DISTWIDTH] IN BLOOD BY AUTOMATED COUNT: 14.2 % (ref 11.5–14.5)
EST. AVERAGE GLUCOSE BLD GHB EST-MCNC: 116.9 MG/DL
FOLATE SERPL-MCNC: 7.9 NG/ML (ref 7–31.4)
GLOBULIN SER-MCNC: 3.7 GM/DL (ref 2.4–3.5)
GLUCOSE (UA): NEGATIVE
GLUCOSE SERPL-MCNC: 96 MG/DL (ref 82–115)
HBA1C MFR BLD: 5.7 %
HCT VFR BLD AUTO: 40.2 % (ref 35–46)
HGB BLD-MCNC: 12.5 GM/DL (ref 12–16)
HGB UR QL STRIP: NEGATIVE
HIV 1+2 AB+HIV1 P24 AG SERPL QL IA: NONREACTIVE
HYALINE CASTS #/AREA URNS LPF: ABNORMAL /LPF
IMM GRANULOCYTES # BLD AUTO: 0.01 10*3/UL
IMM GRANULOCYTES NFR BLD AUTO: 0 %
KETONES UR QL STRIP: NEGATIVE
LEUKOCYTE ESTERASE UR QL STRIP: NEGATIVE
LYMPHOCYTES # BLD AUTO: 0.7 X10(3)/MCL (ref 0.6–4.6)
LYMPHOCYTES NFR BLD AUTO: 20 %
MCH RBC QN AUTO: 28.9 PG (ref 26–34)
MCHC RBC AUTO-ENTMCNC: 31.1 GM/DL (ref 31–37)
MCV RBC AUTO: 92.8 FL (ref 80–100)
MONOCYTES # BLD AUTO: 0.4 X10(3)/MCL (ref 0.1–1.3)
MONOCYTES NFR BLD AUTO: 12 %
NEUTROPHILS # BLD AUTO: 2.22 X10(3)/MCL (ref 2.1–9.2)
NEUTROPHILS NFR BLD AUTO: 64 %
NITRITE UR QL STRIP: NEGATIVE
OPIATES UR QL SCN: NEGATIVE
PCP UR QL: NEGATIVE
PH UR STRIP.AUTO: 6.5 [PH] (ref 3–11)
PH UR STRIP: 6.5 [PH] (ref 4.5–8)
PLATELET # BLD AUTO: 274 X10(3)/MCL (ref 130–400)
PMV BLD AUTO: 9.3 FL (ref 7.4–10.4)
POTASSIUM SERPL-SCNC: 4.2 MMOL/L (ref 3.5–5.1)
PROT SERPL-MCNC: 7.6 GM/DL (ref 5.8–7.6)
PROT UR QL STRIP: NEGATIVE
RBC # BLD AUTO: 4.33 X10(6)/MCL (ref 4–5.2)
RBC #/AREA URNS AUTO: ABNORMAL /HPF
RPR SER QL: NORMAL
SODIUM SERPL-SCNC: 142 MMOL/L (ref 136–145)
SP GR UR STRIP: 1 (ref 1–1.03)
SQUAMOUS #/AREA URNS LPF: ABNORMAL /LPF
T PALLIDUM AB SER QL: REACTIVE
T4 FREE SERPL-MCNC: 0.88 NG/DL (ref 0.7–1.48)
TSH SERPL-ACNC: 2.79 UIU/ML (ref 0.35–4.94)
UROBILINOGEN UR STRIP-ACNC: NORMAL
VIT B12 SERPL-MCNC: 609 PG/ML (ref 213–816)
WBC # SPEC AUTO: 3.5 X10(3)/MCL (ref 4.5–11)
WBC #/AREA URNS AUTO: ABNORMAL /HPF

## 2021-03-22 ENCOUNTER — HISTORICAL (OUTPATIENT)
Dept: SPEECH THERAPY | Facility: HOSPITAL | Age: 66
End: 2021-03-22

## 2021-04-20 ENCOUNTER — HISTORICAL (OUTPATIENT)
Dept: RADIOLOGY | Facility: HOSPITAL | Age: 66
End: 2021-04-20

## 2021-04-22 ENCOUNTER — HISTORICAL (OUTPATIENT)
Dept: CARDIOLOGY | Facility: HOSPITAL | Age: 66
End: 2021-04-22

## 2021-04-26 ENCOUNTER — HISTORICAL (OUTPATIENT)
Dept: RADIOLOGY | Facility: HOSPITAL | Age: 66
End: 2021-04-26

## 2021-05-24 ENCOUNTER — HISTORICAL (OUTPATIENT)
Dept: SPEECH THERAPY | Facility: HOSPITAL | Age: 66
End: 2021-05-24

## 2021-09-20 ENCOUNTER — HISTORICAL (OUTPATIENT)
Dept: SPEECH THERAPY | Facility: HOSPITAL | Age: 66
End: 2021-09-20

## 2021-10-06 ENCOUNTER — HISTORICAL (OUTPATIENT)
Dept: RADIOLOGY | Facility: HOSPITAL | Age: 66
End: 2021-10-06

## 2021-11-03 ENCOUNTER — HISTORICAL (OUTPATIENT)
Dept: RADIOLOGY | Facility: HOSPITAL | Age: 66
End: 2021-11-03

## 2021-11-03 LAB — CREAT SERPL-MCNC: 0.98 MG/DL (ref 0.55–1.02)

## 2021-12-14 ENCOUNTER — HISTORICAL (OUTPATIENT)
Dept: ADMINISTRATIVE | Facility: HOSPITAL | Age: 66
End: 2021-12-14

## 2021-12-14 LAB
DEPRECATED CALCIDIOL+CALCIFEROL SERPL-MC: 59.3 NG/ML (ref 30–80)
TSH SERPL-ACNC: 3.26 UIU/ML (ref 0.35–4.94)

## 2021-12-21 ENCOUNTER — HISTORICAL (OUTPATIENT)
Dept: ADMINISTRATIVE | Facility: HOSPITAL | Age: 66
End: 2021-12-21

## 2022-01-24 ENCOUNTER — HISTORICAL (OUTPATIENT)
Dept: SPEECH THERAPY | Facility: HOSPITAL | Age: 67
End: 2022-01-24

## 2022-02-11 ENCOUNTER — HISTORICAL (OUTPATIENT)
Dept: SPEECH THERAPY | Facility: HOSPITAL | Age: 67
End: 2022-02-11

## 2022-03-11 ENCOUNTER — HISTORICAL (OUTPATIENT)
Dept: ADMINISTRATIVE | Facility: HOSPITAL | Age: 67
End: 2022-03-11

## 2022-03-11 LAB — TSH SERPL-ACNC: 2.08 M[IU]/L (ref 0.35–4.94)

## 2022-03-22 ENCOUNTER — HISTORICAL (OUTPATIENT)
Dept: ADMINISTRATIVE | Facility: HOSPITAL | Age: 67
End: 2022-03-22

## 2022-04-10 ENCOUNTER — HISTORICAL (OUTPATIENT)
Dept: ADMINISTRATIVE | Facility: HOSPITAL | Age: 67
End: 2022-04-10
Payer: MEDICARE

## 2022-04-11 ENCOUNTER — HISTORICAL (OUTPATIENT)
Dept: ADMINISTRATIVE | Facility: HOSPITAL | Age: 67
End: 2022-04-11

## 2022-04-11 ENCOUNTER — HISTORICAL (OUTPATIENT)
Dept: RADIOLOGY | Facility: HOSPITAL | Age: 67
End: 2022-04-11

## 2022-04-21 ENCOUNTER — HISTORICAL (OUTPATIENT)
Dept: ADMINISTRATIVE | Facility: HOSPITAL | Age: 67
End: 2022-04-21
Payer: MEDICARE

## 2022-04-21 LAB — TSH SERPL-ACNC: 0.17 M[IU]/L (ref 0.35–4.94)

## 2022-04-26 VITALS
OXYGEN SATURATION: 98 % | HEIGHT: 67 IN | BODY MASS INDEX: 32.18 KG/M2 | SYSTOLIC BLOOD PRESSURE: 138 MMHG | DIASTOLIC BLOOD PRESSURE: 78 MMHG | WEIGHT: 205 LBS

## 2022-04-30 NOTE — ED PROVIDER NOTES
Patient:   Laura Freeman            MRN: [id number]            FIN: [id number]               Age:   64 years     Sex:  Female     :  1955   Associated Diagnoses:   Transient ischemic attack   Author:   Jay Coates MD      Basic Information   Time seen: Date & time 10/11/2019 16:25:00.   History source: Patient, daughter.   Arrival mode: Private vehicle.   History limitation: Clinical condition.   Additional information: Chief Complaint from Nursing Triage Note : Chief Complaint   10/11/2019 16:27 CDT     Chief Complaint           Pt had an episode of unresponsiveness with some shaking that lasted approximately x2 minutes at about 4:06pm. . Left facial droop noted. Md notified.  .   Provider/Visit info:   Time Seen:  Jay Coates MD / 10/11/2019 16:23  .   History of Present Illness   The patient presents with facial droop.  The onset was 20  minutes ago.  The course/duration of symptoms is constant.  Location: Left face. The character of symptoms is paralyzed.  The degree at onset was moderate.  The degree at maximum was moderate.  The degree at present is moderate.  Risk factors consist of diabetes mellitus, hypertension, obesity and hx of CVA, tracheostomy, undergoing radiatin therapy currently.  .  The patient's dominant hand is the right hand.  Prior episodes: rare.  Therapy today: none.  Associated symptoms: none.  Additional history: none.        Review of Systems   Constitutional symptoms:  No fever, no chills, no sweats.    Skin symptoms:  No rash,    Eye symptoms:  No recent vision problems,    ENMT symptoms:  No sore throat,    Respiratory symptoms:  No shortness of breath, no cough.    Cardiovascular symptoms:  No chest pain, no tachycardia.    Gastrointestinal symptoms:  No abdominal pain, no nausea, no vomiting.    Genitourinary symptoms:  No dysuria,    Musculoskeletal symptoms:  No back pain, no Muscle pain.    Neurologic symptoms:  Negative except as documented in HPI.              Additional review of systems information: All other systems reviewed and otherwise negative.      Health Status   Allergies:    Allergic Reactions (Selected)  No Known Allergies,    Allergies (1) Active Reaction  No Known Allergies None Documented  .   Medications:  (Selected)   Inpatient Medications  Ordered  IVF Normal Saline NS Bolus 250ml 250 mL: 250 mL, 250 mL, IV, 250 mL/hr, start date 10/11/19 16:43:00 CDT, bolus dose: 250 mL, 2.09, m2  IVF Normal Saline NS Infusion 1,000 mL: 1,000 mL, 1,000 mL, IV, 75 mL/hr, start date 10/11/19 16:43:00 CDT, 2.09, m2  Prescriptions  Prescribed  Colace 100 mg oral capsule: 100 mg = 1 cap(s), PEG Tube, BID, # 60 cap(s), 0 Refill(s), Pharmacy: [id number]  DME: DME, See Instructions, Please dispense one rolling walker for home use, # 1 units, 0 Refill(s)  Suction supplies: Suction supplies, See Instructions, Please provide patient with suction supplies:   1) suction machine  2) suction tubing  3) suction filter, # 1 units, 0 Refill(s)  Trach ties: Trach ties, See Instructions, Please provide patient with trach ties 2x week, # 5 units, 0 Refill(s)  nebulizer: nebulizer, See Instructions, please dispense one nebulizer machine, # 1 EA, 0 Refill(s)  Documented Medications  Documented  DULoxetine 60 mg oral delayed release capsule: 60 mg = 1 cap(s), Oral, Daily  albuterol-ipratropium 2.5 mg-0.5 mg/3 mL inhalation solution: 3 mL, NEB, q6hr  atorvastatin 10 mg oral tablet: 10 mg = 1 tab(s), Oral, Daily  carvedilol 3.125 mg oral tablet: 3.125 mg = 1 tab(s), Oral, BID  lisinopril 40 mg oral tablet: 40 mg = 1 tab(s), Oral, Daily  metformin 500 mg oral tablet: Daily, 0 Refill(s)  mirtazapine 45 mg oral tablet: 45 mg = 1 tab(s), Oral, qPM  nicotine 4 mg oral transmucosal lozenge: 4 mg = 1 lozenge(s), Transmucosal, q1hr  oxcarbazepine 300 mg oral tablet: 300 mg = 1 tab(s), Oral, BID  traZODone 100 mg oral tablet: 100 mg = 1 tab(s), Oral, TID.      Past Medical/  Family/ Social History   Medical history:    Resolved  Hypertension (401.9):  Resolved.  Smoker ([id number]):  Resolved.  Back pain ([id number]):  Resolved.  Neuropathy ([id number]):  Resolved.  Sleep apnea (300091530):  Resolved.  UTI (urinary tract infection) ([id number]):  Resolved.  Reflux ([id number]):  Resolved.  Arthritis (7350324):  Resolved.  Fibromyalgia ([id number]):  Resolved.  Depression (603521895):  Resolved.  Diabetes ([id number]):  Resolved.  Comments:  5/31/2015 CDT 22:56 Bob Bach RN  not taking insulin.  Stroke ([id number]):  Resolved.  Comments:  5/31/2015 CDT 22:56 Bob Bach RN  2012  Urine incontinence ([id number]):  Resolved..   Surgical history:    Dental Extraction (None) on 9/6/2019 at 64 Years.  Comments:  9/6/2019 10:01 Verónica Melgar RN  auto-populated from documented surgical case  Neck Dissection (None) on 8/12/2019 at 64 Years.  Comments:  8/12/2019 16:20 Sugar Simons  auto-populated from documented surgical case  Laryngoscopy (None) on 8/12/2019 at 64 Years.  Comments:  8/12/2019 16:20 Sugar Simons  auto-populated from documented surgical case  Laryngectomy (None) on 8/12/2019 at 64 Years.  Comments:  8/12/2019 16:20 Sugar Simons  auto-populated from documented surgical case  Transesophageal Puncture (None) on 8/12/2019 at 64 Years.  Comments:  8/12/2019 16:20 Sugar Simons  auto-populated from documented surgical case  Thyroidectomy (ENT) (None) on 8/12/2019 at 64 Years.  Comments:  8/12/2019 16:20 Sugar Simons  auto-populated from documented surgical case  PEC Flap (None) on 8/12/2019 at 64 Years.  Comments:  8/12/2019 16:20 Sugar Simons  auto-populated from documented  surgical case  Tracheostomy Tube Change on 7/14/2019 at 64 Years.  Comments:  7/14/2019 18:56 CDT - Bandar RN, Ness GAUATM  auto-populated from documented surgical case  PEG Tube Insertion Initial on 7/12/2019 at 64 Years.  Comments:  7/17/2019 8:47 CDT - Maikol PIÑA, Christelle CAMARILLO  auto-populated from documented surgical case  Panendoscopy (None) on 7/8/2019 at 64 Years.  Comments:  7/8/2019 13:50 CDT - Casandra Nunez  auto-populated from documented surgical case  Tracheostomy (None) on 7/8/2019 at 64 Years.  Comments:  7/8/2019 13:50 JERAMIET Casandra Morillo  auto-populated from documented surgical case  Excision of papilloma ([id number]).  CEIOL - Cataract extraction and insertion of intraocular lens ([id number]).  Comments:  7/5/2019 10:55 CDT - Sukh PIÑA, Judy London  both eyes.   Family history:    Heart disease  Mother  Alcohol user  Sister  Diabetes mellitus type 2  Mother  Sister  Tobacco user  Sister  .      Physical Examination               Vital Signs   Vital Signs   10/11/2019 16:27 CDT     Temperature Oral          36.6 DegC                             Temperature Oral (calculated)             97.88 DegF                             Peripheral Pulse Rate     79 bpm                             Respiratory Rate          18 br/min                             SpO2                      97 %                             Oxygen Therapy            Room air                             Systolic Blood Pressure   150 mmHg  HI                             Diastolic Blood Pressure  102 mmHg  HI  .   General:  Alert, no acute distress.    Skin:  Warm, dry.    Head:  Normocephalic.   Neck:  Supple, trachea midline.    Eye:  Pupils are equal, round and reactive to light, extraocular movements are intact.    Ears, nose, mouth and throat:  Oral mucosa moist.   Cardiovascular:  Regular rate and rhythm, No murmur, Normal peripheral perfusion, No edema.    Respiratory:  Lungs are clear to auscultation, respirations are  non-labored, breath sounds are equal, Symmetrical chest wall expansion.    Gastrointestinal:  Soft, Nontender, Non distended, Normal bowel sounds, No organomegaly.    Back:  Normal range of motion.   Musculoskeletal:  Normal ROM, normal strength, no tenderness, no swelling, no deformity.    Neurological:  Alert and oriented to person, place, time, and situation, No focal neurological deficit observed, normal sensory observed, normal coordination observed, Cranial nerves II - XII: Facial droop (left, without forehead involved), Motor strength: Equal bilaterally, Speech: unable to assess 2/2 tracheostomy.    Psychiatric:  Cooperative.      Medical Decision Making   Documents reviewed:  Emergency department nurses' notes, emergency department records.    Electrocardiogram:  Time 10/11/2019 18:19:00, rate 71, normal sinus rhythm, No ST-T changes, no ectopy, normal GA & QRS intervals, EP Interp.    Results review:  Lab results : Lab View   10/11/2019 17:31 CDT     POC PT                    11.2 second(s)                             POC INR                   0.9    10/11/2019 16:33 CDT     Troponin-I                <0.015 ng/mL                             PTT                       25.8 second(s)                             WBC                       4.6 x10(3)/mcL                             RBC                       4.12 x10(6)/mcL                             Hgb                       11.3 gm/dL  LOW                             Hct                       36.9 %                             MCV                       89.6 fL                             MCH                       27.4 pg                             MCHC                      30.6 gm/dL  LOW                             RDW                       15.1 %  HI                             Abs Neut                  2.62 x10(3)/mcL                             Neutro Auto               57 %  NA                             Lymph Auto                26 %  NA                              Mono Auto                 13 %  NA                             Eos Auto                  4 %  NA                             Abs Eos                   0.2 x10(3)/mcL                             Basophil Auto             0 %  NA                             Abs Neutro                2.62 x10(3)/mcL                             Abs Lymph                 1.2 x10(3)/mcL                             Abs Mono                  0.6 x10(3)/mcL                             Abs Baso                  0.0 x10(3)/mcL                             IG%                       0 %  NA                             IG#                       0.0200  NA  .   Radiology results:  Rad Results (ST)  < 12 hrs   Accession: [id number]  Order: XR Chest 1 View  Report Dt/Tm: 10/11/2019 17:29  Report:   EXAMINATION  XR Chest 1 View     INDICATION  Acute stroke symptoms     Comparison: Chest radiograph dated 9/3/2019     FINDINGS  The lung volumes are low with crowding of the mediastinum and lung  markings. There is an opacity over the right lung apex. There is  linear atelectasis in the left lung base. There is no pleural effusion  or definite pneumothorax. There is no acute bony abnormality.        IMPRESSION:  Opacity over the right lung apex may represent infection or  aspiration.      Accession: [id number]  Order: CT Head W/O Contrast  Report Dt/Tm: 10/11/2019 17:24  Report:   EXAM: CT HEAD WITHOUT CONTRAST     History: Acute stroke symptoms.     Comparison studies: CT head dated 3/20/2013     Technique:   Axial scans were obtained from skull base to the vertex.  Coronal and sagittal reconstructions obtained from the axial data.   Automatic exposure control was utilized to limit radiation dose.  Contrast: None     DISCUSSION:     There is no acute intracranial hemorrhage or edema. The gray-white  matter differentiation is preserved.     There is no mass effect or midline shift. The ventricles and sulci are  normal in size. The basal  cisterns are patent. There is no abnormal  extra-axial fluid collection.     The calvarium and skull base are intact. The visualized paranasal  sinuses and the mastoid air cells are clear.        IMPRESSION:  No acute intracranial abnormality.      .      Reexamination/ Reevaluation   Time: 10/11/2019 17:30:00 .   Assessment: exam improved.   Notes: patient reports return to baseline, facial droop resolved.      Impression and Plan   Diagnosis   Transient ischemic attack (BUQ87-ZC G45.9)      Calls-Consults   -  10/11/2019 17:53:00 , telehealth neurology, consult, recommends recommend no intervention.    -  10/11/2019 18:20:00 , IM, consult.    Plan   Condition: Stable.    Disposition: Admit time  10/11/2019 18:20:00, Place in Observation Telemetry Unit.

## 2022-05-03 NOTE — HISTORICAL [facility]ERNER
This is a historical note converted from [employer]. Formatting and pictures may have been removed.  Please reference Cerdiane for original formatting and attached multimedia. 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  ?  Patient is complaining of bilateral hand pain, worse in AM. Stiffness lasts for about an hour. Improved throughout the day and with warm water. Has similar stiffness/pain in shoulders and back. Takes gabapentin and Motrin with somewhat improvement. Also uses Diclofenac gel. Previously used Norco for severe pain episodes. Requesting refill.  Follows with ENT s/p laryngectomy. Recentyl started on thyroid medication. Has biopsy done recently for thyroid nodule.  No other complaints of concerns  Review of Systems  CONSTITUTIONAL: (-) fevers, chills,? night sweats, weight loss, fatigue  EYES: (-) changes in vision, blurry vision, diplopia, wears glasses, runny eyes  ENT: (+) as per HPI  CARDIOVASCULAR: (-) CP, SOB, palpitations, orthopnea, claudications, varicosities  RESPIRATORY:?(-) SOB, AQUINO, cough, chest congestion  SKIN: (-) jaundice, pruritus, change in skin, hair or nails  NEUROLOGIC:?(-) paresthesias, fasciculations, seizures or weakness  PSYCHIATRIC: (-) mood swings, low mood, irrational behavior, SI, HI, hallucinations  ENDOCRINE:(-) heat or cold intolerance, polyuria, polydipsia, change in appetite, weight change  HEMATOLOGICAL:?(-) easy bruising or bleeding.?  Physical Exam  Vitals & Measurements  T:?36.4? ?C (Oral)? HR:?66(Peripheral)? RR:?19? BP:?129/84? SpO2:?97%?  HT:?176.00?cm? WT:?97.300?kg? BMI:?31.41?  General: AAO, NAD  Neuro: CN II - XII grossly intact  General: Alert, well appearing, in no acute distress  Eye: PERRLA, EOMI, clear conjunctiva, No pallor  HENT:  wearing mask  Neck: wearing mask over stoma  Respiratory: clear to auscultation bilaterally, no wheezes, no crackles  Cardiovascular: regular rate and rhythm without murmurs, gallops or rubs  Gastrointestinal: soft, non-tender, non-distended with active bowel sounds  Genitourinary: no CVA tenderness to palpation,  Musculoskeletal: Able to ambulate to exam table without difficulty, 2+DP, no edema or erythema of hands appreciated  Integumentary: no rashes or skin lesions present  Neurologic: no signs of peripheral neurological deficit, motor/sensory function intact  ?  Assessment/Plan  Chronic pain?G89.29  ?Information given for pain management  ?Will do short course of Norco  ?Aware of side effects/risks  Ordered:  Office/Outpatient Visit Level 4 Established [id number] PC, Morning stiffness of joints  Hypothermia  Thyroid nodule  Chronic pain  History of malignant neoplasm of larynx, 10/30/20 13:25:00 CDT  ?  History of malignant neoplasm of larynx?Z85.21  ?Stable  ?Followed by ENT  Ordered:  Office/Outpatient Visit Level 4 Established [id number] PC, Morning stiffness of joints  Hypothermia  Thyroid nodule  Chronic pain  History of malignant neoplasm of larynx, 10/30/20 13:25:00 CDT  ?  Morning stiffness of joints?M25.60  ?ESR, CRP, RA panel  ?Continue current pain regimen with Gabapentin, Motrin, Diclofenac topica  Will refill [place]  Ordered:  CRP, Routine collect, 10/30/20 13:24:00 CDT, Blood, Order for future visit, Stop date 10/30/20 13:24:00 CDT, Lab Collect, Morning stiffness of joints, 10/30/20 13:24:00 CDT  Office/Outpatient Visit Level 4 Established [id number] PC, Morning stiffness of joints  Hypothermia  Thyroid nodule  Chronic pain  History of malignant neoplasm of larynx, 10/30/20 13:25:00 CDT  Rheumatoid Arthritis Panel, Routine collect, 10/30/20 13:24:00 CDT, Blood, Order for future visit, Stop date 10/30/20 13:24:00 CDT, Lab Collect, Morning stiffness of joints, 10/30/20 13:24:00 CDT  Sedimentation Rate,  Routine collect, 10/30/20 13:24:00 CDT, Blood, Order for future visit, Stop date 10/30/20 13:24:00 CDT, Lab Collect, Morning stiffness of joints, 10/30/20 13:24:00 CDT  ?  Need for influenza vaccination?Z23  ?  Thyroid nodule?E04.1  ?s/p FNA  ?Keep follow up with ENT  ?  Components of this note were documented using voice recognition systems and are subject to errors not corrected at proof reading. ?Please contact the author for any clarifications.. ?  Ordered:  Office/Outpatient Visit Level 4 Established [id number] PC, Morning stiffness of joints  Hypothermia  Thyroid nodule  Chronic pain  History of malignant neoplasm of larynx, 10/30/20 13:25:00 CDT  ?  Orders:  acetaminophen-HYDROcodone, 1 tab(s), Oral, q4hr, # 30 tab(s), 0 Refill(s), Pharmacy: [id number], 176, cm, Height/Length Dosing, 10/30/20 12:43:00 CDT, 97.3, kg, Weight Dosing, 10/30/20 12:43:00 CDT   Problem List/Past Medical History  Ongoing  Airway compromise  Cough(  Confirmed  )  DM (diabetes mellitus)(  Confirmed  )  Goiter(  Confirmed  )  Hepatitis C(  Confirmed  )  HTN (hypertension)(  Confirmed  )  Laryngeal cancer  Myofascial pain  Radiation therapy complication  Swelling of head or neck(  Confirmed  )  Tobacco user  Tobacco user(  Probable Diagnosis  )  Historical  Arthritis  Back pain  Depression  Diabetes  Fibromyalgia  Hypertension  Laryngeal mass  Laryngeal Mass  Neuropathy  Pregnant  Pregnant  Pregnant  Pregnant  Pregnant  Reflux  Sleep apnea  Smoker  Stroke  Urine incontinence  UTI (urinary tract infection)  Procedure/Surgical History  Dental Extraction (None) (09/06/2019)  Extraction of Lower Tooth, All, External Approach (09/06/2019)  Extraction of Upper Tooth, All, External Approach (09/06/2019)  Drainage of Right Neck Subcutaneous Tissue and Fascia, Open Approach (09/04/2019)  Insertion of Infusion Device into Superior Vena Cava, Percutaneous Approach (09/04/2019)  Bypass Trachea to Esophagus with  Intraluminal Device, Open Approach (08/12/2019)  Excision of Right Thyroid Gland Lobe, Open Approach (08/12/2019)  Laryngectomy (None) (08/12/2019)  Laryngoscopy (None) (08/12/2019)  Neck Dissection (None) (08/12/2019)  PEC Flap (None) (08/12/2019)  Resection of Larynx, Open Approach (08/12/2019)  Resection of Left Neck Lymphatic, Open Approach (08/12/2019)  Resection of Right Neck Lymphatic, Open Approach (08/12/2019)  Thyroidectomy (ENT) (None) (08/12/2019)  Transesophageal Puncture (None) (08/12/2019)  Change Tracheostomy Device in Trachea, External Approach (07/14/2019)  Control Bleeding in Oral Cavity and Throat, Via Natural or Artificial Opening Endoscopic (07/14/2019)  Inspection of Larynx, Via Natural or Artificial Opening Endoscopic (07/14/2019)  Tracheostomy Tube Change (07/14/2019)  Insertion of Feeding Device into Stomach, Via Natural or Artificial Opening Endoscopic (07/12/2019)  PEG Tube Insertion Initial (07/12/2019)  Bypass Trachea to Cutaneous with Tracheostomy Device, Open Approach (07/08/2019)  Excision of Right Vocal Cord, Via Natural or Artificial Opening Endoscopic, Diagnostic (07/08/2019)  Inspection of Tracheobronchial Tree, Via Natural or Artificial Opening Endoscopic (07/08/2019)  Inspection of Upper Intestinal Tract, Via Natural or Artificial Opening Endoscopic (07/08/2019)  Panendoscopy (None) (07/08/2019)  Tracheostomy (None) (07/08/2019)  Other incision with drainage of skin and subcutaneous tissue (05/31/2015)  CEIOL - Cataract extraction and insertion of intraocular lens  Excision of papilloma   Medications  acetaminophen-hydrocodone 325 mg-5 mg oral tablet, 1 tab(s), Oral, q4hr  albuterol-ipratropium 100 mcg-20 mcg/inh inhalation aerosol, 3 mL, NEB, TID, 6 refills  aspirin 81 mg oral tablet, CHEWABLE, 81 mg= 1 tab(s), Oral, Daily, 3 refills  atorvastatin 40 mg oral tablet, 40 mg= 1 tab(s), Oral, Daily, 5 refills  carvedilol 3.125 mg oral tablet, See Instructions  DME, See  Instructions  famotidine 40 mg oral tablet, 40 mg= 1 tab(s), Oral, BID  gabapentin 300 mg oral capsule, 300 mg= 1 cap(s), Oral, TID, 2 refills  Glucometer, See Instructions  ibuprofen 800 mg oral tablet, 800 mg= 1 tab(s), Oral, BID, PRN, 1 refills  Lancets and Test Strips (Accucheck), See Instructions, 11 refills  Lasix 40 mg oral tablet, mo, Oral, Daily  lisinopril 40 mg oral tablet, See Instructions  metformin 500 mg oral tablet, 500 mg= 1 tab(s), Oral, BID, 5 refills  nebulizer tubing/mask, See Instructions  oxcarbazepine 300 mg oral tablet, 300 mg= 1 tab(s), Oral, BID  Suction supplies, See Instructions  Synthroid 75 mcg (0.075 mg) oral tablet, 75 mcg= 1 tab(s), Oral, Daily  Trach ties, See Instructions  Voltaren 1% topical gel, 1 silvana, TOP, QID, PRN, 5 refills  Allergies  No Known Allergies  Social History  Abuse/Neglect  Yes, No, No, 10/29/2020  Alcohol - Denies Alcohol Use, 10/06/2012  Current, 1-2 times per year, 10/20/2020  Employment/School  Retired, 09/04/2019  Exercise  Exercise duration: 60. Exercise frequency: 3-4 times/week. Exercise type: Walking., 09/04/2019  Home/Environment  Lives with Significant other. Living situation: Home/Independent. Home equipment: na. Alcohol abuse in household: No. Substance abuse in household: No. Smoker in household: No. Injuries/Abuse/Neglect in household: No. Feels unsafe at home: No. Safe place to go: No. Agency(s)/Others notified: No. Family/Friends available for support: No. Concern for family members at home: No. Major illness in household: No. Financial concerns: No., 02/02/2013  Nutrition/Health  Diabetic, Fair, 01/28/2020  Sexual  Spiritual/Cultural  Orthodox, 01/28/2020  Substance Use - Denies Substance Abuse, 10/06/2012  Past, Marijuana, 1-2 times per week, 10/20/2020  Tobacco - Medium Risk, 10/06/2012  Former smoker, quit more than 30 days ago, N/A, 10/29/2020  Family History  Alcohol user: Sister.  Diabetes mellitus type 2: Mother and Sister.  Heart disease:  Mother.  Tobacco user: Sister.  Immunizations  Vaccine Date Status   influenza virus vaccine, inactivated 10/30/2020 Given   pneumococcal 13-valent conjugate vaccine 02/07/2020 Given   influenza virus vaccine, inactivated 01/28/2020 Given   tetanus/diphtheria/pertussis, acel(Tdap) 05/31/2015 Given   Health Maintenance  Health Maintenance  ???Pending?(in the next year)  ??? ??OverDue  ??? ? ? ?Breast Cancer Screening due??11/17/19??and every 2??year(s)  ??? ? ? ?ADL Screening due??10/28/20??and every 1??year(s)  ??? ??Due?  ??? ? ? ?Cervical Cancer Screening due??10/30/20??Unknown Frequency  ??? ? ? ?Diabetes Maintenance-Foot Exam due??10/30/20??Unknown Frequency  ??? ? ? ?Hypertension Management-Education due??10/30/20??and every 1??year(s)  ??? ? ? ?Lung Cancer Screening due??10/30/20??and every 1??year(s)  ??? ? ? ?Medicare Annual Wellness Exam due??10/30/20??and every 1??year(s)  ??? ? ? ?Zoster Vaccine due??10/30/20??Unknown Frequency  ??? ??Refused?  ??? ? ? ?Smoking Cessation due??01/01/20??and every 1??year(s)  ??? ??Due In Future?  ??? ? ? ?Aspirin Therapy for CVD Prevention not due until??12/23/20??and every 1??year(s)  ??? ? ? ?Obesity Screening not due until??01/01/21??and every 1??year(s)  ??? ? ? ?Alcohol Misuse Screening not due until??01/02/21??and every 1??year(s)  ??? ? ? ?Diabetes Maintenance-Fasting Lipid Profile not due until??01/14/21??and every 1??year(s)  ??? ? ? ?Colorectal Screening not due until??03/03/21??and every 1??year(s)  ??? ? ? ?Diabetes Maintenance-HgbA1c not due until??06/15/21??and every 1??year(s)  ??? ? ? ?Hypertension Management-BMP not due until??09/29/21??and every 1??year(s)  ??? ? ? ?Diabetes Maintenance-Serum Creatinine not due until??09/29/21??and every 1??year(s)  ??? ? ? ?Influenza Vaccine not due until??10/01/21??and every 1??day(s)  ???Satisfied?(in the past 1 year)  ??? ??Satisfied?  ??? ? ? ?Alcohol Misuse Screening on??01/28/20.??Satisfied by Olga Larson LPN  [place]  ??? ? ? ?Aspirin Therapy for CVD Prevention on??12/23/19.??Satisfied by Vicenta Acevedo MD  ??? ? ? ?Blood Pressure Screening on??10/30/20.??Satisfied by [place] DE LE[place]N  ??? ? ? ?Body Mass Index Check on??10/30/20.??Satisfied by [place] DE LE[place]N  ??? ? ? ?Colorectal Screening on??03/03/20.??Satisfied by Arin Moe  ??? ? ? ?Coronary Artery Disease Maintenance-Lipid Lowering Therapy on??12/23/19.??Satisfied by Vicenta Acevedo MD  ??? ? ? ?Depression Screening on??03/05/20.??Satisfied by Dariana Enriquez LPN  ??? ? ? ?Diabetes Maintenance-Serum Creatinine on??09/29/20.??Satisfied by Rowdy Thomas  ??? ? ? ?Diabetes Screening on??09/29/20.??Satisfied by Rowdy Thomas  ??? ? ? ?Hypertension Management-Blood Pressure on??10/30/20.??Satisfied by [place]N  ??? ? ? ?Influenza Vaccine on??10/30/20.??Satisfied by Marisa Haynes  ??? ? ? ?Lipid Screening on??01/14/20.??Satisfied by [facility]  ??? ? ? ?Obesity Screening on??10/30/20.??Satisfied by [place]N  ??? ??Refused?  ??? ? ? ?Smoking Cessation on??02/07/20.??Recorded by Marisa Haynes  ?      offer statin at next visit

## 2022-05-03 NOTE — HISTORICAL [facility] CERNER
This is a historical note converted from Cerd[employer]. Formatting and pictures may have been removed.  Please reference Cerdiane for original formatting and attached multimedia. Chief Complaint  Seizure-like Episode  Left Sided Facial Droop  History of Present Illness  Mrs Laura Freeman is a 64 year old lady with PMH of Diabetes Mellitus Type 2, Hypertension, prior h/o?Laryngeal Cancer (s/p Total Laryngectomy with Tracheostomy, currently undergoing Radiation therapy), prior CVA, prior h/o Seizures; she presented to the [facility] ER after having 2 consecutive episodes of jerking of?both her upper extremities lasting for a few minutes,?with associated unresponsiveness, tongue protrusion, left-sided facial?weakness?and generalized fatigue be. Mrs Freeman was accompanied by her daughter who denied witnessing any tongue-biting, frothing at the mouth?while Mrs Freeman?reported urinary incontinence, weakness of both?her arms?and legs for an hour and feeling confused in the immediate aftermath of the two episodes. She denied having experienced any fever, headache, dizziness, visual changes, loss of consciousness but described numbness and tingling of her hands (left more than right) after the episodes. Mrs Freeman denied having had any chest pain, shortness of breath, abdominal pain, nausea/vomiting, diarrhea, constipation, dysuria, hematuria or melena but admitted to having had a persistent dry cough. She professed compliance with home medications and had no other problems.  ?  Mrs Freeman is being followed by ENT in clinic for her laryngeal cancer for which she has undergone total laryngectomy and has?recently?commenced?radiation therapy. She was also noted to have a tender, nodular swelling near her right sternoclavicular joint for which she underwent I & D as well as dental extractions?during an?admission to [facility] from 09/04 to 09/06. Her swelling resolved during the hospital stay, her would culture came back positive for rare Pseudomonas Aeruginosa.  She received 4 hours of Antibiotics with Vancomycin and Zosyn before they were discontinued due to cultures not being positive at the time.  ?  PMH: DM Type 2, Hypertension, CVA, Seizures, h/o Laryngeal Cancer  ?  FH:?  Mother: DM Type 2, Heart Disease  ?  SH:  Tobacco Smoking: none  Alcohol Use: occasional use of wine  Illicit Drug Use: Marijuana use, last use 2 days ago  Review of Systems  Constitutional: no fever, chills, weight loss, night sweats, fatigue and weakness present for the last few hours  ENMT: no ear pain or sore throat, no nasal congestion, no nasal discharge, no sinus pain; tracheostomy present; hoarseness of voice secondary to total laryngectomy  Respiratory: no cough, shortness of breath or wheezing, no orthopnea  Cardiovascular: no chest pain, palpitations or syncope  Gastrointestinal: no abdominal pain, no nausea/vomiting, diarrhea or constipation  Genitourinary: no dysuria, urinary urgency or hematuria  Musculoskeletal: no myalgias or back pain, no joint pain, no joint swelling  Neurological: left sided facial weakness, jerking of b/l upper extremities with numbness of both hands; no headache, dizziness, visual changes, no sensory deficits, no loss of consciousness  Physical Exam  Vitals & Measurements  T:?36.9? ?C (Oral)? TMIN:?36.5? ?C (Oral)? TMAX:?37.2? ?C (Oral)? HR:?55(Peripheral)? RR:?20? BP:?174/70? SpO2:?95%? WT:?96.8?kg? BMI:?32.72?  General: Alert?lady sitting comfortably in bed, in no acute distress; tracheostomy tube?in place, patient unable to speak  HENT:?oral and oropharyngeal mucosa moist, pink, with no erythema or exudates, no ear pain or discharge  Neck: tracheostomy tube in place, patient unable to speak; small, right-sided,?upper chest?wound?discharging scant, bloody-serous fluid; normal neck movement,?no lymph nodes or swellings palpable  Respiratory: clear breathing sounds bilaterally, no rales, rhonchi or wheezes  Cardiovascular: clear S1 and S2, no murmurs, rubs or  gallops  Peripheral Vascular: no lesions, ulcers or erosions, normal peripheral pulses and no pedal edema  Gastrointestinal: soft, non-tender, non-distended abdomen, normal bowel sounds  Integumentary: normal skin color, no rashes or lesions  Neurological: left sided facial droop with decreased power of left side of face (on closing eyelids, grimacing and puffing out cheeks), normal sensation, otherwise CNs II-XII intact; motor strength 5/5 in bilateral upper and lower extremities; normal sensation to gross and fine touch in upper and lower extremities;  Assessment/Plan  Orders:  albuterol-ipratropium, 3 mL, form: Soln-Inh, NEB, q6hr Resp, first dose 10/11/19 22:33:00 CDT  docusate, 100 mg, form: Cap, PEG Tube, BID, first dose 10/12/19 9:00:00 CDT, Routine  DULoxetine, 60 mg, form: Cap-DR, Oral, Daily, first dose 10/12/19 9:00:00 CDT, Routine  LORAzepam, 2 mg, form: Injection, IV Push, q1hr PRN for seizure activity, first dose 10/11/19 20:57:00 CDT, Rate of injection should not exceed 2 mg/minute  B-Type Natriuretic Peptide Pro Brain, AM Next collect, 10/12/19 5:00:00 CDT, Blood, Stop date 10/12/19 5:00:00 CDT, Lab Collect, 10/12/19 5:00:00 CDT  Echocardiogram Complete Adult, 10/12/19 5:00:00 CDT, Timed, transient ischemic attack (TIA), Stop date 10/12/19 5:00:00 CDT, [facility]  Hemoglobin A1C [facility], AM Next collect, 10/12/19 5:00:00 CDT, Blood, Stop date 10/12/19 5:00:00 CDT, Lab Collect, 10/12/19 5:00:00 CDT  Lipid Panel, AM Next collect, 10/12/19 5:00:00 CDT, Blood, Stop date 10/12/19 5:00:00 CDT, Lab Collect, 10/12/19 5:00:00 CDT  MRI Brain W W/O Contrast, Routine, 10/12/19 5:00:00 CDT, Other (please specify), Seizure-like episode, evaluation of any possible lesions, None, Stretcher, Rad Type, 10/12/19 5:00:00 CDT  Seizure Precautions, 10/11/19 20:56:00 CDT, Constant Order  Thyroid Stimulating Hormone, AM Next collect, 10/12/19 5:00:00 CDT, Blood, Stop date 10/12/19 5:00:00 CDT, Lab Collect,  10/12/19 5:00:00 CDT  US Thyroid, Routine, 10/12/19 5:00:00 CDT, Nodules, None, Stretcher, Rad Type, Schedule this test, 10/12/19 5:00:00 CDT  ?  Left Sided Facial Weakness secondary to Seizure-Like Episode vs TIA  Patient has a prior h/o CVA as well as Seizures  Patient previously took Phenytoin and Phenobarbital 30 years ago; has not been taking any anticonvulsants recently  Patients CMP was unremarkable except for Cl of 110 and Albumin of 2.6  EKG showed NSR with no abnormalities  CT head showed no abnormalities  CT Angio Head and Neck reported no large vessel occlusion, mild multifocal irregularity of the proximal right M2 branches and bilateral posterior cerebral arteries; no stenosis of right and left common carotid arteries with calcified plaque in right carotid bulb  Ordered pro-BNP, Lipid Panel,?Echocardiogram, MRI Brain with and without Contrast; follow up  Patient made NPO until Bedside Swallowing Evaluation can be performed  Patient started on Aspirin 81 mg  Patient started on Stroke Precautions and kept on Ativan 2 mg q1hour PRN for seizures  Continue monitoring patient for worsening symptoms  ?  Right Sternoclavicular Wound  Patient underwent I & D of right sternoclavicular nodular swelling during admission from 09/04 to 09/06, currently has a wound draining bloody-serous fluid but no pus  Wound Culture grew rare Pseudomonas Aeruginosa on 09/05  CXR on admission showed right upper lobe opacification but no other abnormalities  CT Angio Head and Neck reported progression of destructive changes at the right sternoclavicular joint with increasing soft tissue in adjacent softtissue component measuring 3 x 4.1 cm, but no drainable fluid collection  Ordered ESR/CRP; follow up  Biopsy of right sternal head deferred due to currently ongoing?Radiation therapy in order to prevent wound complications  Patient started on Ciprofloxacin 400 mg IV BID  ?  Thyroid Nodules on Imaging  Patient not complaining of any  symptoms of hypo- or hyperthyroidism  CT Angio Head and Neck reported multiple hypodense nodules in the left thyroid gland measuring up to 1.3 cm  Ordered TSH and Free T4, follow up  Ordered Ultrasound Thyroid Gland, follow up  ?  Diabetes Mellitus Type 2  Patient had a CBG of 94 on admission  Repeated HbA1c, last HbA1c was 7.1 in 04/2019  Patient started on Low Dose ISS  Continue monitoring patients blood glucose  ?  Hypertension  Patient had a BP of 150/102 mm Hg on admission  Patient discontinued from home antihypertensive medications: Carvedilol 3.125 mg, Lisinopril 40 mg in order to induce permissive hypertension owing to symptoms of left sided facial droop possibly caused by ischemic neurologic insult?  Ordered Hydralazine 10 mg PRN?in case of?extremely high BP of >?220/110 mm Hg  Continue monitoring patients BP  ?  Hyperlipidemia  Patients last Lipid Panel in 04/2019 showed cholesterol of 203, triglycerides of 107, LDL of 125  Patient currently on Atorvastatin 40 mg  Follow up on repeat Lipid Panel  ?   Laryngeal Cancer (s/p Total Laryngectomy with Tracheostomy Tube in place,?and currently undergoing Radiation therapy)  Patient is scheduled to receive Radiation therapy on Monday, 10/14  Patient being followed by ENT who are requesting timely discharge of patient so as to ensure that the patient makes her Radiation therapy appointment  ?   Prophylaxis: Enoxaparin 40 mg  Disposition: Follow up on pro-BNP, TSH, Free?T4,?Lipid Panel,?Echocardiogram, MRI Brain with and without Contrast, U/S Thyroid; continue monitoring patients symptoms. Coordinate with ENT for patients upcoming Radiation therapy appointment.   Problem List/Past Medical History  Ongoing  Airway compromise  Cough(  Confirmed  )  DM (diabetes mellitus)(  Confirmed  )  Goiter(  Confirmed  )  Hepatitis C(  Confirmed  )  HTN (hypertension)(  Confirmed  )  Laryngeal mass  Laryngeal Mass  Swelling of head or neck(  Confirmed  )  Tobacco  user  Tobacco user(  Probable Diagnosis  )  Historical  Arthritis  Back pain  Depression  Diabetes  Fibromyalgia  Hypertension  Neuropathy  Reflux  Sleep apnea  Smoker  Stroke  Urine incontinence  UTI (urinary tract infection)  Procedure/Surgical History  Dental Extraction (None) (09/06/2019)  Extraction of Lower Tooth, All, External Approach (09/06/2019)  Extraction of Upper Tooth, All, External Approach (09/06/2019)  Drainage of Right Neck Subcutaneous Tissue and Fascia, Open Approach (09/04/2019)  Insertion of Infusion Device into Superior Vena Cava, Percutaneous Approach (09/04/2019)  Bypass Trachea to Esophagus with Intraluminal Device, Open Approach (08/12/2019)  Excision of Right Thyroid Gland Lobe, Open Approach (08/12/2019)  Laryngectomy (None) (08/12/2019)  Laryngoscopy (None) (08/12/2019)  Neck Dissection (None) (08/12/2019)  PEC Flap (None) (08/12/2019)  Resection of Larynx, Open Approach (08/12/2019)  Resection of Left Neck Lymphatic, Open Approach (08/12/2019)  Resection of Right Neck Lymphatic, Open Approach (08/12/2019)  Thyroidectomy (ENT) (None) (08/12/2019)  Transesophageal Puncture (None) (08/12/2019)  Change Tracheostomy Device in Trachea, External Approach (07/14/2019)  Control Bleeding in Oral Cavity and Throat, Via Natural or Artificial Opening Endoscopic (07/14/2019)  Inspection of Larynx, Via Natural or Artificial Opening Endoscopic (07/14/2019)  Tracheostomy Tube Change (07/14/2019)  Insertion of Feeding Device into Stomach, Via Natural or Artificial Opening Endoscopic (07/12/2019)  PEG Tube Insertion Initial (07/12/2019)  Bypass Trachea to Cutaneous with Tracheostomy Device, Open Approach (07/08/2019)  Excision of Right Vocal Cord, Via Natural or Artificial Opening Endoscopic, Diagnostic (07/08/2019)  Inspection of Tracheobronchial Tree, Via Natural or Artificial Opening Endoscopic (07/08/2019)  Inspection of Upper Intestinal Tract, Via Natural or Artificial Opening Endoscopic  (07/08/2019)  Panendoscopy (None) (07/08/2019)  Tracheostomy (None) (07/08/2019)  Other incision with drainage of skin and subcutaneous tissue (05/31/2015)  CEIOL - Cataract extraction and insertion of intraocular lens  Excision of papilloma   Medications  Inpatient  aspirin 81 mg oral tablet, CHEWABLE, 81 mg= 1 tab(s), Oral, Daily  Ativan 2 mg/mL injectable solution, 2 mg= 1 mL, IV Push, q1hr, PRN  atorvastatin 40 mg oral tablet, 40 mg= 1 tab(s), Oral, Daily  ciprofloxacin, 400 mg= 200 mL, IV Piggyback, BID  Colace 100 mg oral capsule, 100 mg= 1 cap(s), PEG Tube, BID  Dextrose 50% and Water (50 mL vial/syringe), 12.5 gm= 25 mL, IV Push, Once, PRN  Dextrose 50% and Water (50 mL vial/syringe), 12.5 gm= 25 mL, IV Push, As Directed, PRN  Dextrose 50% and Water (50 mL vial/syringe), 25 gm= 50 mL, IV Push, As Directed, PRN  Dextrose 50% in Water intravenous solution, 12.5 gm= 25 mL, IV Push, As Directed, PRN  DULoxetine 30 mg oral delayed release capsule, 60 mg= 2 cap(s), Oral, Daily  DuoNeb 0.5 mg-2.5 mg/3 mL inhalation solution, 3 mL, NEB, q6hr Resp  glucagon recombinant 1 mg injection, 1 mg= 1 EA, IM, q10min, PRN  glucagon recombinant 1 mg injection, 1 mg= 1 EA, IM, q10min, PRN  glucose 40% oral gel, 15 gm= 0.5 tube(s), Oral, As Directed, PRN  insulin lispro 100 units/mL subcutaneous injection, 2-14 units, Subcutaneous, As Directed, PRN  IVF Normal Saline NS Bolus 250ml 250 mL, 250 mL, IV  IVF Normal Saline NS Infusion 1,000 mL, 1000 mL, IV  Lovenox, 40 mg= 0.4 mL, Subcutaneous, Daily  Zofran 2 mg/mL injectable solution, 4 mg= 2 mL, IV Push, q4hr, PRN  Home  albuterol-ipratropium 2.5 mg-0.5 mg/3 mL inhalation solution, 3 mL, NEB, q6hr  atorvastatin 10 mg oral tablet, 10 mg= 1 tab(s), Oral, Daily  carvedilol 3.125 mg oral tablet, 3.125 mg= 1 tab(s), Oral, BID  Colace 100 mg oral capsule, 100 mg= 1 cap(s), PEG Tube, BID  DME, See Instructions  DULoxetine 60 mg oral delayed release capsule, 60 mg= 1 cap(s), Oral,  Daily  lisinopril 40 mg oral tablet, 40 mg= 1 tab(s), Oral, Daily  metformin 500 mg oral tablet, Daily  mirtazapine 45 mg oral tablet, 45 mg= 1 tab(s), Oral, qPM  nebulizer, See Instructions  nicotine 4 mg oral transmucosal lozenge, 4 mg= 1 lozenge(s), Transmucosal, q1hr,? ?Not Taking, Completed Rx  Suction supplies, See Instructions  Trach ties, See Instructions  traZODone 100 mg oral tablet, 100 mg= 1 tab(s), Oral, TID  Allergies  No Known Allergies  Social History  Abuse/Neglect  No, 10/11/2019  No, 09/04/2019  No, 09/03/2019  No, 08/15/2019  No, No, Yes, 08/12/2019  Alcohol - Denies Alcohol Use, 10/06/2012  Current, Wine, 1-2 times per year, Alcohol use interferes with work or home: No. Drinks more than intended: No. Others hurt by drinking: No. Ready to change: No. Household alcohol concerns: No., 04/23/2015  Employment/School  Retired, 09/04/2019  Exercise  Exercise duration: 60. Exercise frequency: 3-4 times/week. Exercise type: Walking., 09/04/2019  Home/Environment  Lives with Significant other. Living situation: Home/Independent. Home equipment: na. Alcohol abuse in household: No. Substance abuse in household: No. Smoker in household: No. Injuries/Abuse/Neglect in household: No. Feels unsafe at home: No. Safe place to go: No. Agency(s)/Others notified: No. Family/Friends available for support: No. Concern for family members at home: No. Major illness in household: No. Financial concerns: No., 02/02/2013  Nutrition/Health  per peg, 07/24/2019  Sexual  Spiritual/Cultural  [place], 09/04/2019  Substance Use - Denies Substance Abuse, 10/06/2012  Marijuana, 1-2 times per week, 10/13/2017  Tobacco - Medium Risk, 10/06/2012  Former smoker, quit more than 30 days ago, N/A, 10/11/2019  Former smoker, quit more than 30 days ago, Cigarettes, N/A, 09/11/2019  Former smoker, quit more than 30 days ago, Cigarettes, No, 09/04/2019  Family History  Alcohol user: Sister.  Diabetes mellitus type 2: Mother and Sister.  Heart  disease: Mother.  Tobacco user: Sister.  Immunizations  Vaccine Date Status   tetanus/diphtheria/pertussis, acel(Tdap) 05/31/2015 Given   Lab Results  Labs Last 24 Hours?  ?Chemistry? Hematology/Coagulation?   Sodium Lvl: 141 mmol/L (10/11/19 22:30:00) POC PT: 11.2 second(s) (10/11/19 17:31:00)   Potassium Lvl: 4.1 mmol/L (10/11/19 22:30:00) POC INR: 0.9 (10/11/19 17:31:00)   Chloride:?110 mmol/L?High (10/11/19 22:30:00) PTT: 25.8 second(s) (10/11/19 16:33:00)   CO2: 24 mmol/L (10/11/19 22:30:00) WBC: 4.6 x10(3)/mcL (10/11/19 16:33:00)   Calcium Lvl: 8.6 mg/dL (10/11/19 22:30:00) RBC: 4.12 x10(6)/mcL (10/11/19 16:33:00)   Glucose Lvl: 94 mg/dL (10/11/19 22:30:00) Hgb:?11.3 gm/dL?Low (10/11/19 16:33:00)   BUN: 10 mg/dL (10/11/19 22:30:00) Hct: 36.9 % (10/11/19 16:33:00)   Creatinine: 0.8 mg/dL (10/11/19 22:30:00) Platelet: 338 x10(3)/mcL (10/11/19 16:33:00)   eGFR-AA: 93 mL/min (10/11/19 22:30:00) MCV: 89.6 fL (10/11/19 16:33:00)   eGFR-VIOLETTA:?77 mL/min?Low (10/11/19 22:30:00) MCH: 27.4 pg (10/11/19 16:33:00)   Bili Total: 0.2 mg/dL (10/11/19 22:30:00) MCHC:?30.6 gm/dL?Low (10/11/19 16:33:00)   Bili Direct: <0.1 (10/11/19 22:30:00) RDW:?15.1 %?High (10/11/19 16:33:00)   Bili Indirect: unable to calc (10/11/19 22:30:00) MPV: 9.4 fL (10/11/19 16:33:00)   AST: 17 unit/L (10/11/19 22:30:00) Abs Neut: 2.62 x10(3)/mcL (10/11/19 16:33:00)   ALT: 13 unit/L (10/11/19 22:30:00) Neutro Auto: 57 % (10/11/19 16:33:00)   Alk Phos: 83 unit/L (10/11/19 22:30:00) Lymph Auto: 26 % (10/11/19 16:33:00)   Total Protein: 7.2 gm/dL (10/11/19 22:30:00) Mono Auto: 13 % (10/11/19 16:33:00)   Albumin Lvl:?2.6 gm/dL?Low (10/11/19 22:30:00) Eos Auto: 4 % (10/11/19 16:33:00)   Globulin:?4.6 gm/mL?High (10/11/19 22:30:00) Abs Eos: 0.2 x10(3)/mcL (10/11/19 16:33:00)   A/G Ratio:?0.6 ratio?Low (10/11/19 22:30:00) Basophil Auto: 0 % (10/11/19 16:33:00)   Troponin-I: <0.015 (10/11/19 16:33:00) Abs Neutro: 2.62 x10(3)/mcL (10/11/19 16:33:00)    Abs  Lymph: 1.2 x10(3)/mcL (10/11/19 16:33:00)    Abs Mono: 0.6 x10(3)/mcL (10/11/19 16:33:00)    Abs Baso: 0 x10(3)/mcL (10/11/19 16:33:00)    IG%: 0 % (10/11/19 16:33:00)    IG#: 0.02 (10/11/19 16:33:00)    Platelet Est: Adequate (10/11/19 16:33:00)    Anisocyte: 1+ (10/11/19 16:33:00)    Polychrom: 1+ (10/11/19 16:33:00)    RBC Morph: Abnormal (10/11/19 16:33:00)   Diagnostic Results  ?  Head CT with contrast:?  No interval changes when compared to the previous CT.  No enhancing abnormalities.  ?  If present, stenosis of the carotid bulbs is measured based on NASCET  criteria,?  i.e. area of maximal stenosis compared to the cervical ICA distal to  the bulb.  ?  ?   CT Angio Head and Neck  Cervical CTA:?  ?  There are mild calcifications at the origins of the great vessels  remain patent.?  ?  The left common carotid artery is normal in caliber. There are mild  calcifications at the left carotid bulb without significant stenosis.  The left internal carotid artery is normal in caliber.  ?  The right common carotid artery is patent and normal in caliber. There  is soft and calcified and soft plaque at the right carotid bulb  without hemodynamic significant stenosis. The right internal carotid  artery is normal in caliber.  ?  The vertebral arteries are patent and normal caliber.  ?  Intracranial CTA:?  ?  There are calcifications in the carotid siphons without hemodialysis  significant stenosis.  ?  The anterior cerebral arteries and middle cerebral arteries are  patent. There mild multifocal irregularity of the proximal right M2  branches.  ?  The vertebral arteries and basilar artery is patent and normal  caliber. There is mild irregularity of the bilateral posterior  cerebral arteries.  ?  The dural venous sinus.  ?  Additional findings:  * ?Postsurgical changes of total laryngectomy with tracheostomy place.  * ?Multiple hypodense nodules in the left thyroid gland measuring up  to 1.3 cm (series 7, image 44).  *  ?There is progression of destructive changes at the right  sternoclavicular joint with increasing soft tissue in adjacent soft  tissue component measuring 3 x 4.1 cm (series 7, image 32 and 27).  * ?No drainable fluid collection.  * ?Mucosal edema in the oropharynx and hypopharynx.  * ?4 mm hypodensity in the right adenoid tonsils.  ?  ?  IMPRESSION:?  1. ?No large vessel occlusion. Patent vessels in the neck.  2. ?Mild multifocal irregularity of the proximal right M2 branches and  bilateral posterior cerebral arteries.  3. ?Progressive destructive changes at the right sternoclavicular  joint could represent infection. No drainable fluid collection  identified.      PGY 2 addendum: Patient was seen and examined with Dr. Obrien. ?Agree with assessment and plan as above.  ?  Essentially 64-year-old -American female with significant past medical history of type 2 diabetes non-insulin-dependent, hypertension, history of laryngeal cancer status post total laryngectomy with tracheostomy currently undergoing radiation therapy, history of CVA in 2014 without any residual deficits, history of seizures approximately 30 years ago currently on no anticonvulsant medication, and hepatitis C presented to the ED after having 2 consecutive episodes of seizure-like activity lasting approximately few minutes just prior to arrival. ?After this activity patient was relatively unresponsive, with tongue protrusion and left-sided facial weakness as well as generalized weakness of both upper and lower extremity for approximately 1 hour. ?Per daughter he was at the patients bedside stated that she was talking to her mother he was sitting on the porch when her whole body started shaking uncontrollably, her tongue protruded and her left side of her lip drooped down. ?During these episodes patient reported urinary incontinence and postictal state. ?By the time she had arrived to the ED she was alert and oriented x3, the generalized  weakness in her bilateral upper extremities and lower extremities had resolved and tongue protrusion. ?The only remaining symptom was left-sided facial droop. ?Further investigation revealed that patient had seizures approximately 30 years ago and was originally on phenobarbital but was eventually weaned off and has not had a seizure since. ?She also states that in 2014 she had a CVA with no residual deficits. ?Denies any fever, headache, dizziness, visual changes, loss of consciousness, chest pain, shortness of breath, abnormal gait, diarrhea, dysuria, nausea or vomiting. ?Review of systems is positive for persistent dry nonproductive cough. ?Of note patient is being followed in ENT for laryngeal cancer as well as a recent right sternoclavicular wound infection after undergoing multiple radiation treatments. ?A few weeks ago patient underwent an I&D for possible abscess however nothing was expelled. ?She received 1 dose of Vanco and Zosyn and prescribed Augmentin. ?Few days later wound cultures grew back Pseudomonas that was pansensitive.  ?  Physical exam: Alert and oriented x3 appears to be no acute acute distress, tracheostomy tube in place at baseline patient is unable to speak but cannot communicate by mouthing words or writing. ?Small right-sided upper chest wound discharging scant serosanguineous fluid with small hard mobile nodule nontender. ?Cranial nerves II-XI intact, strength 5/5 BUE/BLE, normal sensation, left sided facial droop with decreased facial strength, normal romberg, normal heel-shin & finger-nose  ?  Assessment & Plan:?  Seizure with neurological symptoms vs. CVA  Right Sternoclavicular Wound  Asymptomatic Thyroid Nodules  Type 2 DM?  Hypertension?  Hyperlipidemia?  History of Laryngeal Cancer s/p total laryngectomy with tracheostomy, undergoing radiation therapy?  Based on HPI, appears to be more consistent with seizures than CVA, however increased risk for stroke given ongoing carcinoma.  Will workup up for both. Seizures precuations, PRN ativan, if continues consider daily therapy. Will obtain MRI for lesion evaluation as well as past CVAs. CTA head and neck negative, ordered lipid panel, a1c, echocardiogram, pro-BNP. NPO. ASA, statin started. Allow for permissive HTN for 24hrs. Consulted wound care, ciprofloxacin for shoulder wound. Ordered ESR/CRP for bony involvement. Ordered TSH/free T4 and US thyroid for nodules found on imaging.  Of note, spoke with ENT patient must be discharged by Monday morning to not miss next radiation therapy.   Patient seen and examined by myself this morning. No focal neuro deficits noted on exam. Patients presenting symptoms discussed with staff with TIA most?likely diagnosis. Discussed outpatient work-up involving US thyroid, echocardiogram, and MRI brain.   I have performed a history and physical examination of the patient and discussed the management with the resident.  ???  [x ] I reviewed the residents note and agree with the documented findings and plan of care.  [ ] I reviewed the residents note and agree with the documented findings and plan of care except:  ?   Please see addendum to Dr. Cueto DC summary dated today.? Agree with above, planned d/c home today, details in DC summary addendum with testing per Dr. Cueto addendum.  ?   MD Kvng  ID Staff

## 2022-05-03 NOTE — HISTORICAL [facility] CERNER
This is a historical note converted from [facility]. Formatting and pictures may have been removed.  Please reference Abdullahi for original formatting and attached multimedia. Chief Complaint  cancer surveillance  History of Present Illness  This is a 64-year-old female presenting from Dr. Dennis for evaluation of laryngeal mass. ?Patient notes she has had hoarseness starting?shortly before [place],?dysphagia?since?evaluated with an EGD and MBSS (showing aspirations),?1 month of sore throat that has been treated with antibiotics. ?She endorses some weight loss?however, denies?any dyspnea. ?She has not noted any lymphadenopathy, otalgia, or?odynophagia.  ?   7/24/19: Here for first follow up after admission for awake trach, panendoscopy. 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  Recommendation:  - TB cites likely?inferior outcomes (functional and oncologic) with CRT vs TL, thus TL is best option  - TB recommends continued education to pt and family regarding life after laryngectomy, as well as likely poor functional outcome with CRT (aspiration, trach and peg dependence, npo status)  - However if pt is completely against surgery, should offer CRT for treatment  ?   Has been doing ok at home. Had one day she went to ER with plugged inner cannula.  ?   7/31/19: Here after meeting with ST and a laryngectomy patient, would like to proceed with surgery. No issues at home since last visit. No changes in medical history.  ?   8/12/19: Underwent TL, BND, CP myotomy, R thyroid lobectomy and TEP.  ?   8/27/19: Here for first follow up since discharge. Still taking liquids PO, no issues at home. Has ST appointment scheduled for Sept 3rd. Has right UE weakness and pain but mobility is ok. Doing well with stoma care.  [facility] TB Recs:  Recommendations: NCCN guidelines state consider adjuvant XRT  - Will plan to discuss risks/benefits of radiation with patient in ENT follow  - Will do same in Radiation Oncology clinic  ?   9/4/19:  Here for follow up and also for follow-up after presenting to ED last night with concerns for nodule below stoma. ?Has 1 pill of her antibiotic given at last visit left. ?She reports that?the swelling appeared suddenly?yesterday.? Is very tender on palpation.? causes pain extending toward her right shoulder [1]  ?   9/11/19: hospitalization on 9/4 s/p I&D of right clavicular head and teeth extraction. Starting radiation on Thursday.? Otherwise no issues. [1]  ?  10/16/2019: Recent hospitalization for TIA sx w/o any sequela. MRI, echo pending. Discharged on levo and doxy for right clavicular head drainage. patient hasnt taken any. Notes still have lots of blood secretions but not any now. Eating per mouth also supplementing thru G-tube [1]  ?  11/19/19: Pt here for follow up. She has completed CRT 2 weeks ago. Eating and drinking well. Maintaining weight.? Feels her right sternoclavicular joint is unchanged. Finished antibiotics over 2 weeks ago.? Feels it has not changed since finishing antibiotics, no drainage.?Per patient finished all chemo and radiation treatments.  ?  12/24/19: No c/o today. Sternoclavicular joint has resolved. Tolerating PO intake and maintaining weight. She would like to have PEG removed, states she has not used it in over 2 months. [1]  ?  1/28/20: Here for follow up. PET ordered today by Dr. Kim. No dyspnea, dysphagia. noting shoulder stiffness/pain. Doing home PT exercises. Notes some numbness underneath chin. Got PEG removed by GS today. [1]  ?  3/5/20: Here for cancer surveaillcen. Post-treatment PET done 2/2020 with some hypermetabolic activity in nasopharynx, no eviednce of persistence/recurrence in neck. No distant mets. Denies dysphagia. Weight stable. Denies dyspnea. Voicing well with TEP.  ?  5/26/20: Here for cancer surveillance. No weight loss, dysphagia, odynophagia. Reports that the lymphedema machine was denied per a letter she received. She is reporting severe pain to  bilateral cheeks and upper neck? Voicing with TEP, no issues.  ?  7/28/20: Denies dysphagia/odyophagia/otalgia. Reports weight loss ~ 10lbs since last visit bc she does not have much?of an appetite,?but?has begun drinking ensure to keep calories up. Frustrated that her ear lobes have paresthesias and she?ripped her earrings out. Requesting additional norco.?C/o?intermittent tight pulling sensation in bilateral neck muscles that makes her feel as though she?is choking.?Reports taking a few days of leftover abx rx 2 weeks ago bc her secretions had turned green and she was?afraid to go to the ED.?Denies having fever, but reports she got a little SOB. Secretions are now clear. [1]  ?  9/29/2020: Presenting in follow-up for cancer surveillance.? She denies any dysphasia, odynophagia, otalgia or unexpected weight loss at this time.? She notes she has a knot on her left neck?close to her stoma that is nontender. ?She noticed this about?month and a half ago. ?Denies all other issues  Review of Systems  CONSTITUTIONAL: (-) fevers, chills,? night sweats, weight loss, fatigue  EYES: (-) changes in vision, blurry vision, diplopia, wears glasses, runny eyes  ENT: (+) as per HPI  CARDIOVASCULAR: (-) CP, SOB, palpitations, orthopnea, claudications, varicosities  RESPIRATORY:?(-) SOB, AQUINO, cough, chest congestion  GASTROINTESTINAL: (-) n/v/d, constipation, hematochezia, melena, hematemesis  GENITOURINARY: (-) dysuria, hematuria,?discharge, odor, anorexia  MUSCULOSKELETAL: (-) ROM restriction, joint pain  SKIN: (-) jaundice, pruritus, change in skin, hair or nails  NEUROLOGIC:?(-) paresthesias, fasciculations, seizures or weakness  PSYCHIATRIC: (-) mood swings, low mood, irrational behavior, SI, HI, hallucinations  ENDOCRINE:(-) heat or cold intolerance, polyuria, polydipsia, change in appetite, weight change  HEMATOLOGICAL:?(-) easy bruising or bleeding.?  Physical Exam  Vitals & Measurements  T:?37.1? ?C (Oral)?  HR:?103(Peripheral)? RR:?18? BP:?140/82?  HT:?176.00?cm? WT:?96.700?kg? BMI:?31.22?  General: AAO, NAD  Neuro: CN II - XII grossly intact  Head/ Face: NCAT, symmetric, sensations intact bilaterally  Eyes: EOMI, PERRLA, arcus senilis  Ears: externally normal with grossly normal hearing  AD: EAC patent, TM intact, no middle ear effusion, no retractions  AS:?EAC patent, TM intact, no middle ear effusion, no retractions  Nose: bilateral nares patent, midline septum,?trace clear?rhinorrhea, no external deformity, no turbinate hypertrophy  OC/OP: MMM, no intraoral lesions, FOM/BOT/ tongue soft, no trismus, edentulous, no uvular deviation, bilaterally symmetric soft palate elevation, palatoglossus and palatopharyngeal fold wnl  Neck:?mild XRT skin changes,? normal ROM, no thyromegaly. Incisions well headed.??Patient has a?3 cm soft?subcutaneous mass?at?left?anterior neck?at the level of her stoma under her?SCM, no signs of erythema or edema around  Respiratory: nonlabored, no wheezing, bilateral chest rise  Cardiovascular: RRR  Gastrointestinal: S NT ND  Skin: warm, no lesions  ?   FLEXIBLE FIBEROPTIC LARYNGOSCOPY  Verbal consent was obtained from patient. Phenylephrine and tetracaine were applied to bilateral nasal mucosa for decongestion and local anesthesia. Flexible laryngoscope was passed into the left nasal cavity fully examining the nasal cavity. The scope was then passed posterior to the nasal choana, nasopharynx, oropharynx and neopharynx  ?   Findings include:  NC/ NP: wnl, no masses or lesions, no drainage  Soft palate/ OP: wnl  BOT/?neopharynx: wnl  ?   tracheoscopy: clear to adrianna  ?  Assessment/Plan  65yoF with T3N0M0 SCCa of the supraglottis s/p?TL, BND, CP myotomy, TEP and R lobectomy (incidentally found 0.2 cm PTC) s/p XRT (11/2019). Post treatment PET with no evidence of disease in neck/distant mets. Scope exam ARCHIE however on physical exam left neck 3 cm soft?lesion LAD vs. SCM torticolli  - CT neck soft  tissue. May need FNA base don CT findings.  - TSH T4 ordered today  - Continue PO intake  -?Continue SLP  ?  RTC 3-4 weeks to review CT findings/ surveillance  Stacey Wyatt MD  [employer] Department of Otolaryngology  PGY IV   Problem List/Past Medical History  Ongoing  Airway compromise  Cough(  Confirmed  )  DM (diabetes mellitus)(  Confirmed  )  Goiter(  Confirmed  )  Hepatitis C(  Confirmed  )  HTN (hypertension)(  Confirmed  )  Laryngeal cancer  Myofascial pain  Radiation therapy complication  Swelling of head or neck(  Confirmed  )  Tobacco user  Tobacco user(  Probable Diagnosis  )  Historical  Arthritis  Back pain  Depression  Diabetes  Fibromyalgia  Hypertension  Laryngeal mass  Laryngeal Mass  Neuropathy  Pregnant  Pregnant  Pregnant  Pregnant  Pregnant  Reflux  Sleep apnea  Smoker  Stroke  Urine incontinence  UTI (urinary tract infection)  Procedure/Surgical History  Dental Extraction (None) (09/06/2019)  Extraction of Lower Tooth, All, External Approach (09/06/2019)  Extraction of Upper Tooth, All, External Approach (09/06/2019)  Drainage of Right Neck Subcutaneous Tissue and Fascia, Open Approach (09/04/2019)  Insertion of Infusion Device into Superior Vena Cava, Percutaneous Approach (09/04/2019)  Bypass Trachea to Esophagus with Intraluminal Device, Open Approach (08/12/2019)  Excision of Right Thyroid Gland Lobe, Open Approach (08/12/2019)  Laryngectomy (None) (08/12/2019)  Laryngoscopy (None) (08/12/2019)  Neck Dissection (None) (08/12/2019)  PEC Flap (None) (08/12/2019)  Resection of Larynx, Open Approach (08/12/2019)  Resection of Left Neck Lymphatic, Open Approach (08/12/2019)  Resection of Right Neck Lymphatic, Open Approach (08/12/2019)  Thyroidectomy (ENT) (None) (08/12/2019)  Transesophageal Puncture (None) (08/12/2019)  Change Tracheostomy Device in Trachea, External Approach (07/14/2019)  Control Bleeding in Oral Cavity and Throat, Via Natural or Artificial Opening Endoscopic  (07/14/2019)  Inspection of Larynx, Via Natural or Artificial Opening Endoscopic (07/14/2019)  Tracheostomy Tube Change (07/14/2019)  Insertion of Feeding Device into Stomach, Via Natural or Artificial Opening Endoscopic (07/12/2019)  PEG Tube Insertion Initial (07/12/2019)  Bypass Trachea to Cutaneous with Tracheostomy Device, Open Approach (07/08/2019)  Excision of Right Vocal Cord, Via Natural or Artificial Opening Endoscopic, Diagnostic (07/08/2019)  Inspection of Tracheobronchial Tree, Via Natural or Artificial Opening Endoscopic (07/08/2019)  Inspection of Upper Intestinal Tract, Via Natural or Artificial Opening Endoscopic (07/08/2019)  Panendoscopy (None) (07/08/2019)  Tracheostomy (None) (07/08/2019)  Other incision with drainage of skin and subcutaneous tissue (05/31/2015)  CEIOL - Cataract extraction and insertion of intraocular lens  Excision of papilloma   Medications  acetaminophen-hydrocodone 325 mg-7.5 mg oral tablet, 1 tab(s), Oral, q6hr  albuterol-ipratropium 100 mcg-20 mcg/inh inhalation aerosol, 3 mL, NEB, TID, 6 refills  albuterol-ipratropium 2.5 mg-0.5 mg/3 mL inhalation solution, 3 mL, NEB, TID  aspirin 81 mg oral tablet, CHEWABLE, 81 mg= 1 tab(s), Oral, Daily, 3 refills  atorvastatin 40 mg oral tablet, 40 mg= 1 tab(s), Oral, Daily, 5 refills  carvedilol 3.125 mg oral tablet, See Instructions  DME, See Instructions  gabapentin 300 mg oral capsule, 300 mg= 1 cap(s), Oral, TID, 2 refills  Glucometer, See Instructions  ibuprofen 800 mg oral tablet, 800 mg= 1 tab(s), Oral, BID  ibuprofen 800 mg oral tablet, 800 mg= 1 tab(s), Oral, BID, PRN, 1 refills  Lancets and Test Strips (Accucheck), See Instructions, 11 refills  Lasix 40 mg oral tablet, mo, Oral, Daily  lisinopril 40 mg oral tablet, See Instructions  metformin 500 mg oral tablet, 500 mg= 1 tab(s), Oral, BID, 5 refills  nebulizer tubing/mask, See Instructions  oxcarbazepine 300 mg oral tablet, 300 mg= 1 tab(s), Oral, BID  Suction supplies, See  Instructions  Trach ties, See Instructions  Voltaren 1% topical gel, 1 silvana, TOP, QID, PRN, 5 refills  Allergies  No Known Allergies  Social History  Abuse/Neglect  Yes, No, No, 09/29/2020  Alcohol - Denies Alcohol Use, 10/06/2012  Current, 1-2 times per year, 01/28/2020  Employment/School  Retired, 09/04/2019  Exercise  Exercise duration: 60. Exercise frequency: 3-4 times/week. Exercise type: Walking., 09/04/2019  Home/Environment  Lives with Significant other. Living situation: Home/Independent. Home equipment: na. Alcohol abuse in household: No. Substance abuse in household: No. Smoker in household: No. Injuries/Abuse/Neglect in household: No. Feels unsafe at home: No. Safe place to go: No. Agency(s)/Others notified: No. Family/Friends available for support: No. Concern for family members at home: No. Major illness in household: No. Financial concerns: No., 02/02/2013  Nutrition/Health  Diabetic, Fair, 01/28/2020  Sexual  Spiritual/Cultural  Voodoo, 01/28/2020  Substance Use - Denies Substance Abuse, 10/06/2012  Current, Marijuana, 1-2 times per week, 01/28/2020  Tobacco - Medium Risk, 10/06/2012  Former smoker, quit more than 30 days ago, N/A, 09/29/2020  Family History  Alcohol user: Sister.  Diabetes mellitus type 2: Mother and Sister.  Heart disease: Mother.  Tobacco user: Sister.  Immunizations  Vaccine Date Status   pneumococcal 13-valent conjugate vaccine 02/07/2020 Given   influenza virus vaccine, inactivated 01/28/2020 Given   tetanus/diphtheria/pertussis, acel(Tdap) 05/31/2015 Given   Health Maintenance  Health Maintenance  ???Pending?(in the next year)  ??? ??OverDue  ??? ? ? ?Diabetes Maintenance-Microalbumin due??and every?  ??? ? ? ?Influenza Vaccine due??and every?  ??? ? ? ?Breast Cancer Screening due??11/17/19??and every 2??year(s)  ??? ??Due?  ??? ? ? ?Cervical Cancer Screening due??09/29/20??and every?  ??? ? ? ?Diabetes Maintenance-Foot Exam due??09/29/20??and every?  ??? ? ? ?Hypertension  Management-Education due??09/29/20??and every 1??year(s)  ??? ? ? ?Lung Cancer Screening due??09/29/20??and every 1??year(s)  ??? ? ? ?Medicare Annual Wellness Exam due??09/29/20??and every 1??year(s)  ??? ? ? ?Zoster Vaccine due??09/29/20??and every?  ??? ??Refused?  ??? ? ? ?Smoking Cessation due??01/01/20??and every 1??year(s)  ??? ??Due In Future?  ??? ? ? ?ADL Screening not due until??10/28/20??and every 1??year(s)  ??? ? ? ?Aspirin Therapy for CVD Prevention not due until??12/23/20??and every 1??year(s)  ??? ? ? ?Obesity Screening not due until??01/01/21??and every 1??year(s)  ??? ? ? ?Alcohol Misuse Screening not due until??01/02/21??and every 1??year(s)  ??? ? ? ?Diabetes Maintenance-Fasting Lipid Profile not due until??01/14/21??and every 1??year(s)  ??? ? ? ?Colorectal Screening not due until??03/03/21??and every 1??year(s)  ??? ? ? ?Diabetes Maintenance-HgbA1c not due until??06/15/21??and every 1??year(s)  ??? ? ? ?Hypertension Management-BMP not due until??06/15/21??and every 1??year(s)  ??? ? ? ?Diabetes Maintenance-Serum Creatinine not due until??06/15/21??and every 1??year(s)  ???Satisfied?(in the past 1 year)  ??? ??Satisfied?  ??? ? ? ?ADL Screening on??10/28/19.??Satisfied by Sydnee Burns LPN  ??? ? ? ?Alcohol Misuse Screening on??01/28/20.??Satisfied by Neo LPN, Olga Mary Kate  ??? ? ? ?Aspirin Therapy for CVD Prevention on??12/23/19.??Satisfied by Vicenta Acevedo MD  ??? ? ? ?Blood Pressure Screening on??09/29/20.??Satisfied by Katarina Shen LPN.  ??? ? ? ?Body Mass Index Check on??09/29/20.??Satisfied by Katarina Shen LPN.  ??? ? ? ?Colorectal Screening on??03/03/20.??Satisfied by Arin Moe  ??? ? ? ?Coronary Artery Disease Maintenance-Lipid Lowering Therapy on??12/23/19.??Satisfied by Vicenta Acevedo MD  ??? ? ? ?Depression Screening on??03/05/20.??Satisfied by Dariana Enriquez LPN  ??? ? ? ?Diabetes Maintenance-Serum Creatinine on??06/15/20.??Satisfied by Judy Zimmer  M  ??? ? ? ?Diabetes Screening on??06/15/20.??Satisfied by Judy Zimmer  ??? ? ? ?Hypertension Management-Blood Pressure on??09/29/20.??Satisfied by Katarina Shen LPN.  ??? ? ? ?Influenza Vaccine on??01/28/20.??Satisfied by Chandni Olivares  ??? ? ? ?Lipid Screening on??01/14/20.??Satisfied by Contributor[facility]system, [facility]  ??? ? ? ?Obesity Screening on??09/29/20.??Satisfied by Katarina Shen LPN.  ??? ??Refused?  ??? ? ? ?Smoking Cessation on??02/07/20.??Recorded by Marisa Haynes  ?     [1]?ENT Office Visit Note; Lorena ORELLANA, Ivana LEMON 07/28/2020 12:30 CDT

## 2022-05-03 NOTE — HISTORICAL [facility] CERNER
This is a historical note converted from [employer]. Formatting and pictures may have been removed.  Please reference [employer] for original formatting and attached multimedia. Admit and Discharge Dates  Admit Date: 10/11/2019  Discharge Date: 10/12/2019  Physicians  Attending Physician - Veronica AVILA, Carson Granger  Admitting Physician - Veronica AVILA, Carson Granger  Primary Care Physician - PCP, Clinic  Discharge Diagnosis  Altered mental status?[id number]  Facial droop?[id number]  Transient ischemic attack?G45.9  Surgical Procedures  No procedures recorded for this visit.  Immunizations  No immunizations recorded for this visit.  Admission Information  Mrs Laura Freeman is a 64 year old lady with PMH of Diabetes Mellitus Type 2, Hypertension, prior h/o?Laryngeal Cancer (s/p Total Laryngectomy with Tracheostomy, currently undergoing Radiation therapy), prior CVA, prior h/o Seizures; she presented to the [facility] ER after having 2 consecutive episodes of jerking of?both her upper extremities lasting for a few minutes,?with associated unresponsiveness, tongue protrusion, left-sided facial?weakness?and generalized fatigue be. Mrs Freeman was accompanied by her daughter who denied witnessing any tongue-biting, frothing at the mouth?while Mrs Freeman?reported urinary incontinence, weakness of both?her arms?and legs for an hour and feeling confused in the immediate aftermath of the two episodes. She denied having experienced any fever, headache, dizziness, visual changes, loss of consciousness but described numbness and tingling of her hands (left more than right) after the episodes. Mrs Freeman denied having had any chest pain, shortness of breath, abdominal pain, nausea/vomiting, diarrhea, constipation, dysuria, hematuria or melena but admitted to having had a persistent dry cough. She professed compliance with home medications and had no other problems. Mrs Freeman is being followed by ENT in clinic for  her laryngeal cancer for which she has undergone total laryngectomy and has?recently?commenced?radiation therapy. She was also noted to have a tender, nodular swelling near her right sternoclavicular joint for which she underwent I & D as well as dental extractions?during an?admission to [facility] from 09/04 to 09/06. Her swelling resolved during the hospital stay, her would culture came back positive for rare Pseudomonas Aeruginosa. She received 4 hours of Antibiotics with Vancomycin and Zosyn before they were discontinued due to cultures not being positive at the time.  Significant Findings  ?  -CT head without contrast performed - no acute intracranial abnormality  -Symptoms of facial droop resolved while patient in ED  -ENT consulted for evaluation, importance of continued XRT was emphasized  -Patient started on aspirin and 40 mg atorvastatin  -Chest X-ray showed opacity over right lung apex which was noted to possibly represent infection or aspiration  -CT head and neck angiography were performed with impression:  ??? 1. ?No large vessel occlusion. Patent vessels in the neck.  ??? 2. ?Mild multifocal irregularity of the proximal right M2 branches and bilateral posterior cerebral arteries.  ??? 3. ?Progressive destructive changes at the right sternoclavicular joint could represent infection. No drainable fluid collection identified.  -Patient initially started on ciprofloxacin for right sternoclavicular wound, switched to doxycycline and levofloxacin?as coverage?based on prior culture results and based on chest X-ray read  -Outpatient work-up planned with ordered echocardiogram and MRI and possible plan for thyroid US  ?  Time Spent on discharge  >30 min  Objective  Vitals & Measurements  T:?37.0? ?C (Oral)? TMIN:?36.5? ?C (Oral)? TMAX:?37.2? ?C (Oral)? HR:?75(Monitored)? RR:?16? BP:?126/84? SpO2:?93%? WT:?96.8?kg? BMI:?32.72?  Physical Exam  General:?no acute distress, tracheostomy tube in place, non-vocal  Eye:  PERRLA, EOMI, clear conjunctiva  HENT:?oropharynx moist and?without erythema/exudate  Neck: tracheostomy tube in place, right sided chest wound with scant discharge  Respiratory:?clear to auscultation bilaterally  Cardiovascular:?regular rate and rhythm without murmurs, gallops or rubs  Gastrointestinal:?soft, non-tender, non-distended with normal bowel sounds, without masses to palpation  Musculoskeletal:?full range of motion of all extremities/spine  Integumentary: skin lesion previously noted on right upper chest (sternoclavicular region)  Neurologic: cranial nerves intact, no signs of peripheral neurological deficit, motor/sensory function intact  Patient Discharge Condition  Stable and improved  Discharge Disposition  Patient discharged to home with instructions to follow up for radiation treatment. She will also follow up with ENT and Dr. Huey Zarate for primary care. An outpatient work-up will be performed for brain MRI, echocardiogram, and potentially thyroid ultrasound. Patient prescribed daily ASA and atorvastatin 40 mg along with a 10 day course of doxycycline and levofloxacin.   Discharge Medication Reconciliation  Prescribed  aspirin (aspirin 81 mg oral tablet, CHEWABLE)?81 mg, Oral, Daily  atorvastatin (atorvastatin 40 mg oral tablet)?40 mg, Oral, Daily  doxycycline (doxycycline hyclate 100 mg oral capsule)?100 mg, Oral, Daily  levoFLOXacin (levofloxacin 750 mg oral tablet)?750 mg, Oral, q24hr  Continue  DULoxetine (DULoxetine 60 mg oral delayed release capsule)?60 mg, Oral, Daily  Misc Prescription (DME)?See Instructions  Misc Prescription (Suction supplies)?See Instructions  Misc Prescription (Trach ties)?See Instructions  Misc Prescription (nebulizer)?See Instructions  OXcarbazepine (oxcarbazepine 300 mg oral tablet)?300 mg, Oral, BID  albuterol-ipratropium (albuterol-ipratropium 2.5 mg-0.5 mg/3 mL inhalation solution)?3 mL, NEB, q6hr  carvedilol (carvedilol 3.125 mg oral tablet)?3.125 mg,  Oral, BID  docusate (Colace 100 mg oral capsule)?100 mg, PEG Tube, BID  lisinopril (lisinopril 40 mg oral tablet)?40 mg, Oral, Daily  metFORMIN (metformin 500 mg oral tablet), Daily  mirtazapine (mirtazapine 45 mg oral tablet)?45 mg, Oral, qPM  traZODone (traZODone 100 mg oral tablet)?100 mg, Oral, TID  Discontinue  atorvastatin (atorvastatin 10 mg oral tablet)?10 mg, Oral, Daily  nicotine (nicotine 4 mg oral transmucosal lozenge)?4 mg, Transmucosal, q1hr  Education and Orders Provided  Transient Ischemic Attack, Easy-to-Read  Cellulitis, Adult, Easy-to-Read  Discharge - 10/12/19 11:43:00 CDT, Home?  Follow up  Huey Zarate, on 10/28/2019  ????Post-Wards Visit on afternoon of 10/28 with Dr. FINN Zarate (Firm 2)  Report to Emergency Department if symptoms return or worsen  Keep scheduled appointment with ENT  Keep scheduled appointment for Radiation Treatment  Needs MRI and Echo as outpatient (orders placed)      I was present with the Resident during discharge day management.  ???  [ x] I discussed the case with the Resident and agree with the discharge plan as above.  [ ] I discussed the case with the Resident and agree with the discharge plan as above except:  ???  Time spent on discharge [ 40] minutes  ?   Ms. Freeman was discussed personally with me this morning with the ENT service as well as the primary resident service listed above.? Paola had a long discussion as well with both she and her daughter and sister and explained in detail the current findings and the available information at hand which have come to aid in forming her diagnostic and followup plan.? In viewing her entire picture, her most pressing matter at hand is the fact that she needs to continue her plan of care with her radiation therapy on Monday and ENT has stressed the importance of this as well.? I agree with this and given that her symptoms have resolved and she has no focal neurologic deficits at this time, coupled with the fact that she  has had no alarming telemetry findings overnight and MRI and Echo will not be available until Monday, I see no reason to keep her until Monday, missing her radiation appt for these tests given such.? Paloa discussed the potential risks and benefits of discharge with her today, she has voiced understanding and wants to be discharged home today if possible.? I will let her followup in IM post wards clinic in 1-2 weeks and order outpatient MRI and echocardiogram to complete her CVA/TIA workup for completeness and obtain this workup in the next week or so those results are available to be given to her at her post wards followup visit.? She is in agreement with this plan and knows, should her symptoms return she should report to the ER immediately.? In terms of her right SC area with cellulitis, cultures with pseudomonas in September, has yet to be treated and remains draining.? Home with levofloxacin (documented as sensitive) plus doxycycline (to cover skin duke) x 10 days, should this fail to improve symptoms, will likely need MRI chest with SC joint evaluation/detail at that time.?  ?   MD Kvng  ID Staff

## 2022-05-03 NOTE — HISTORICAL [facility] CERNER
This is a historical note converted from [employer]. Formatting and pictures may have been removed.  Please reference [employer] for original formatting and attached multimedia. Chief Complaint  Back pains  History of Present Illness  Patient is 65-year-old female with a past medical history of diabetes, hypertension, CVA,? squamous cell carcinoma of larynx status post laryngectomy, radiation, depression, syphilis treated, Hep C treated?that presents for follow-up.  ?   Memory loss: Patient is complaining of?recent memory loss.? Describes symptoms as in where she?puts things. ?Has no trouble with facial recognition.? Able to still pay her own bills.?  Depression: Does endorse?depression symptoms. ?Using several friends to Covid. ?Also?recent? anniversary of son in?grandson?passing away. ?Longer follows with?outside psychiatrist.? Was seeing in Dr. Chamberlain. Has not seen since pandemic. Wants to find new provider.  Neuropathy: No improvement with Gabapentin. Reports taking 300mg two to three times daily  Back Pain: Reports having to walk bent over. Does at home exercises. Has appt with Rheum in May. Takes Norco prn for pain  Recently falls: Reports having falls 1-2 per week. Unsure of why. Denied feeling dizzy or like she is going to pass out. Does not feel like legs are giving out.  ?  ?   Of note patient was seen in ENT on 3/11 for possible FNA, however no concerning nodule on US at time of visit  Review of Systems  A complete 14 point ROS was performed in full with pertinent positives described above Remainder of ROS done in full and are negative.?  Physical Exam  Vitals & Measurements  T:?37.0? ?C (Oral)? HR:?72(Peripheral)? RR:?18? BP:?144/86? SpO2:?97%?  HT:?176.00?cm? WT:?97.400?kg? BMI:?31.44?  General: Alert, well appearing, in no acute distress,  Eye: ?EOMI, clear conjunctiva, No pallor, no icterus  HENT: TMs/ear canals clear, using voice box  Neck: full range of motion, no thyromegaly or lymphadenopathy  appreciated  Respiratory: clear to auscultation bilaterally, no wheezes, no crackles, breathing unlabored  Cardiovascular: regular rate and rhythm without murmurs, gallops or rubs  Gastrointestinal: soft, non-tender, non-distended  Musculoskeletal: Able to ambulate to exam table without difficulty, 2+DP, no lower extremity edema  Integumentary: no rashes or skin lesions present,No open wounds, no ulcerations, sensations grossly intact  Neurologic: no signs of peripheral neurological deficit, motor/sensory function intact  Psych: logical thought, good eye contact  Assessment/Plan  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  Folate Level, Routine collect, 03/18/21 10:49:00 CDT, Blood, Order for future visit, Stop date 03/18/21 10:49:00 CDT, Lab Collect, Memory loss  Neuropathy  Disc degeneration, lumbar  Cancer of supraglottis  Hypothyroid  Supraglottitis  History of CVA in adult...  Hemoglobin A1C [facility], Routine collect, 03/18/21 10:54:00 CDT, Blood, Order for future visit, Stop date 03/18/21 10:54:00 CDT, Lab Collect, Memory loss  Neuropathy  Hypothyroid  Cancer of supraglottis  Disc degeneration, lumbar  Supraglottitis  History of CVA in adult...  HIV 1 and 2, Routine collect, 03/18/21 10:50:00 CDT, Blood, Order for future visit, Stop date 03/18/21 10:50:00 CDT, Lab Collect, Memory loss  Neuropathy  Disc degeneration, lumbar  Cancer of supraglottis  Hypothyroid  Supraglottitis  History of CVA in adult...  MRI Brain W W/O Contrast, Routine, 03/18/21 10:50:00 CDT, None, Stretcher, Creatinine if needed per protocol, Rad Type, Order for future visit, Memory loss  Neuropathy  Disc degeneration, lumbar  Cancer of supraglottis   Hypothyroid  Supraglottitis  History of CVA in adul...  Office/Outpatient Visit Level 4 Established [id number] PC, Memory loss  Neuropathy  Disc degeneration, lumbar  Cancer of supraglottis  Supraglottitis  Hypothyroid  History of CVA in adulthood  Gait instability  Depression, 03/18/21 11:00:00 CDT  Syphilis Antibody (with Reflex RPR), Now collect, 03/18/21 10:49:00 CDT, Blood, Order for future visit, Stop date 03/18/21 10:49:00 CDT, Lab Collect, Memory loss  Neuropathy  Disc degeneration, lumbar  Cancer of supraglottis  Hypothyroid  Supraglottitis  History of CVA in adulthood...  Urinalysis with Microscopic if Indicated, Routine collect, Urine, Order for future visit, 03/18/21 10:50:00 CDT, Stop date 03/18/21 10:50:00 CDT, Nurse collect, Memory loss  Neuropathy  Disc degeneration, lumbar  Cancer of supraglottis  Hypothyroid  Supraglottitis  History of CVA in zo[place]...  Vitamin B12 Level, Routine collect, 03/18/21 10:49:00 CDT, Blood, Order for future visit, Stop date 03/18/21 10:49:00 CDT, Lab Collect, Memory loss  Neuropathy  Disc degeneration, lumbar  Cancer of supraglottis  Hypothyroid  Supraglottitis  History of CVA in adult...  Vitamin D, 25-Hydroxy Level, Routine collect, 03/18/21 10:49:00 CDT, Blood, Order for future visit, Stop date 03/18/21 10:49:00 CDT, Lab Collect, Memory loss  Neuropathy  Disc degeneration, lumbar  Cancer of supraglottis  Hypothyroid  Supraglottitis  History of CVA in adult...  ?  Depression?F32.9  Ordered:  Office/Outpatient Visit Level 4 Established [id number] PC, Memory loss  Neuropathy  Disc degeneration, lumbar  Cancer of supraglottis  Supraglottitis  Hypothyroid  History of CVA in adulthood  Gait instability  Depression, 03/18/21 11:00:00 CDT  ?  Disc degeneration, lumbar?M51.36  Ordered:  CBC w/ Auto Diff, Routine collect, 03/18/21 10:54:00 CDT, Blood, Order for future visit, Stop date 03/18/21 10:54:00 CDT, Lab Collect, Memory loss  Neuropathy   Disc degeneration, lumbar, 03/18/21 10:54:00 CDT  Comprehensive Metabolic Panel, Routine collect, 03/18/21 10:54:00 CDT, Blood, Order for future visit, Stop date 03/18/21 10:54:00 CDT, Lab Collect, Memory loss  Neuropathy  Disc degeneration, lumbar, 03/18/21 10:54:00 CDT  Drug Screen Urine [facility], Routine collect, Urine, Order for future visit, 03/18/21 10:49:00 CDT, Stop date 03/18/21 10:49:00 CDT, Nurse collect, Memory loss  Neuropathy  Disc degeneration, lumbar  Cancer of supraglottis  Hypothyroid  Supraglottitis  History of CVA in zo[place]...  Folate Level, Routine collect, 03/18/21 10:49:00 CDT, Blood, Order for future visit, Stop date 03/18/21 10:49:00 CDT, Lab Collect, Memory loss  Neuropathy  Disc degeneration, lumbar  Cancer of supraglottis  Hypothyroid  Supraglottitis  History of CVA in adult...  Hemoglobin A1C [facility], Routine collect, 03/18/21 10:54:00 CDT, Blood, Order for future visit, Stop date 03/18/21 10:54:00 CDT, Lab Collect, Memory loss  Neuropathy  Hypothyroid  Cancer of supraglottis  Disc degeneration, lumbar  Supraglottitis  History of CVA in adult...  HIV 1 and 2, Routine collect, 03/18/21 10:50:00 CDT, Blood, Order for future visit, Stop date 03/18/21 10:50:00 CDT, Lab Collect, Memory loss  Neuropathy  Disc degeneration, lumbar  Cancer of supraglottis  Hypothyroid  Supraglottitis  History of CVA in adult...  MRI Brain W W/O Contrast, Routine, 03/18/21 10:50:00 CDT, None, Stretcher, Creatinine if needed per protocol, Rad Type, Order for future visit, Memory loss  Neuropathy  Disc degeneration, lumbar  Cancer of supraglottis  Hypothyroid  Supraglottitis  History of CVA in adul...  Office/Outpatient Visit Level 4 Established [id number] PC, Memory loss  Neuropathy  Disc degeneration, lumbar  Cancer of supraglottis  Supraglottitis  Hypothyroid  History of CVA in adulthood  Gait instability  Depression, 03/18/21 11:00:00 CDT  Syphilis Antibody (with Reflex RPR), Now collect,  03/18/21 10:49:00 CDT, Blood, Order for future visit, Stop date 03/18/21 10:49:00 CDT, Lab Collect, Memory loss  Neuropathy  Disc degeneration, lumbar  Cancer of supraglottis  Hypothyroid  Supraglottitis  History of CVA in adulthood...  Urinalysis with Microscopic if Indicated, Routine collect, Urine, Order for future visit, 03/18/21 10:50:00 CDT, Stop date 03/18/21 10:50:00 CDT, Nurse collect, Memory loss  Neuropathy  Disc degeneration, lumbar  Cancer of supraglottis  Hypothyroid  Supraglottitis  History of CVA in zo[place]...  Vitamin B12 Level, Routine collect, 03/18/21 10:49:00 CDT, Blood, Order for future visit, Stop date 03/18/21 10:49:00 CDT, Lab Collect, Memory loss  Neuropathy  Disc degeneration, lumbar  Cancer of supraglottis  Hypothyroid  Supraglottitis  History of CVA in adult...  Vitamin D, 25-Hydroxy Level, Routine collect, 03/18/21 10:49:00 CDT, Blood, Order for future visit, Stop date 03/18/21 10:49:00 CDT, Lab Collect, Memory loss  Neuropathy  Disc degeneration, lumbar  Cancer of supraglottis  Hypothyroid  Supraglottitis  History of CVA in adult...  ?  Gait instability?R26.81  Ordered:  Drug Screen Urine [facility], Routine collect, Urine, Order for future visit, 03/18/21 10:49:00 CDT, Stop date 03/18/21 10:49:00 CDT, Nurse collect, Memory loss  Neuropathy  Disc degeneration, lumbar  Cancer of supraglottis  Hypothyroid  Supraglottitis  History of CVA in zo[place]...  Folate Level, Routine collect, 03/18/21 10:49:00 CDT, Blood, Order for future visit, Stop date 03/18/21 10:49:00 CDT, Lab Collect, Memory loss  Neuropathy  Disc degeneration, lumbar  Cancer of supraglottis  Hypothyroid  Supraglottitis  History of CVA in adult...  Hemoglobin A1C [facility], Routine collect, 03/18/21 10:54:00 CDT, Blood, Order for future visit, Stop date 03/18/21 10:54:00 CDT, Lab Collect, Memory loss  Neuropathy  Hypothyroid  Cancer of supraglottis  Disc degeneration, lumbar  Supraglottitis  History of  CVA in adult...  HIV 1 and 2, Routine collect, 03/18/21 10:50:00 CDT, Blood, Order for future visit, Stop date 03/18/21 10:50:00 CDT, Lab Collect, Memory loss  Neuropathy  Disc degeneration, lumbar  Cancer of supraglottis  Hypothyroid  Supraglottitis  History of CVA in adult...  MRI Brain W W/O Contrast, Routine, 03/18/21 10:50:00 CDT, None, Stretcher, Creatinine if needed per protocol, Rad Type, Order for future visit, Memory loss  Neuropathy  Disc degeneration, lumbar  Cancer of supraglottis  Hypothyroid  Supraglottitis  History of CVA in adul...  Office/Outpatient Visit Level 4 Established [id number] PC, Memory loss  Neuropathy  Disc degeneration, lumbar  Cancer of supraglottis  Supraglottitis  Hypothyroid  History of CVA in adulthood  Gait instability  Depression, 03/18/21 11:00:00 CDT  Syphilis Antibody (with Reflex RPR), Now collect, 03/18/21 10:49:00 CDT, Blood, Order for future visit, Stop date 03/18/21 10:49:00 CDT, Lab Collect, Memory loss  Neuropathy  Disc degeneration, lumbar  Cancer of supraglottis  Hypothyroid  Supraglottitis  History of CVA in adulthood...  Urinalysis with Microscopic if Indicated, Routine collect, Urine, Order for future visit, 03/18/21 10:50:00 CDT, Stop date 03/18/21 10:50:00 CDT, Nurse collect, Memory loss  Neuropathy  Disc degeneration, lumbar  Cancer of supraglottis  Hypothyroid  Supraglottitis  History of CVA in zo[place]...  Vitamin B12 Level, Routine collect, 03/18/21 10:49:00 CDT, Blood, Order for future visit, Stop date 03/18/21 10:49:00 CDT, Lab Collect, Memory loss  Neuropathy  Disc degeneration, lumbar  Cancer of supraglottis  Hypothyroid  Supraglottitis  History of CVA in adult...  Vitamin D, 25-Hydroxy Level, Routine collect, 03/18/21 10:49:00 CDT, Blood, Order for future visit, Stop date 03/18/21 10:49:00 CDT, Lab Collect, Memory loss  Neuropathy  Disc degeneration, lumbar  Cancer of supraglottis  Hypothyroid  Supraglottitis  History  of CVA in adult...  ?  History of CVA in adulthood?Z86.73  Ordered:  Drug Screen Urine [facility], Routine collect, Urine, Order for future visit, 03/18/21 10:49:00 CDT, Stop date 03/18/21 10:49:00 CDT, Nurse collect, Memory loss  Neuropathy  Disc degeneration, lumbar  Cancer of supraglottis  Hypothyroid  Supraglottitis  History of CVA in zo[place]...  Folate Level, Routine collect, 03/18/21 10:49:00 CDT, Blood, Order for future visit, Stop date 03/18/21 10:49:00 CDT, Lab Collect, Memory loss  Neuropathy  Disc degeneration, lumbar  Cancer of supraglottis  Hypothyroid  Supraglottitis  History of CVA in adult...  Hemoglobin A1C [facility], Routine collect, 03/18/21 10:54:00 CDT, Blood, Order for future visit, Stop date 03/18/21 10:54:00 CDT, Lab Collect, Memory loss  Neuropathy  Hypothyroid  Cancer of supraglottis  Disc degeneration, lumbar  Supraglottitis  History of CVA in adult...  HIV 1 and 2, Routine collect, 03/18/21 10:50:00 CDT, Blood, Order for future visit, Stop date 03/18/21 10:50:00 CDT, Lab Collect, Memory loss  Neuropathy  Disc degeneration, lumbar  Cancer of supraglottis  Hypothyroid  Supraglottitis  History of CVA in adult...  MRI Brain W W/O Contrast, Routine, 03/18/21 10:50:00 CDT, None, Stretcher, Creatinine if needed per protocol, Rad Type, Order for future visit, Memory loss  Neuropathy  Disc degeneration, lumbar  Cancer of supraglottis  Hypothyroid  Supraglottitis  History of CVA in adul...  Office/Outpatient Visit Level 4 Established [id number] PC, Memory loss  Neuropathy  Disc degeneration, lumbar  Cancer of supraglottis  Supraglottitis  Hypothyroid  History of CVA in adulthood  Gait instability  Depression, 03/18/21 11:00:00 CDT  Syphilis Antibody (with Reflex RPR), Now collect, 03/18/21 10:49:00 CDT, Blood, Order for future visit, Stop date 03/18/21 10:49:00 CDT, Lab Collect, Memory loss  Neuropathy  Disc degeneration, lumbar  Cancer of supraglottis  Hypothyroid   Supraglottitis  History of CVA in adulthood...  Urinalysis with Microscopic if Indicated, Routine collect, Urine, Order for future visit, 03/18/21 10:50:00 CDT, Stop date 03/18/21 10:50:00 CDT, Nurse collect, Memory loss  Neuropathy  Disc degeneration, lumbar  Cancer of supraglottis  Hypothyroid  Supraglottitis  History of CVA in zoë...  Vitamin B12 Level, Routine collect, 03/18/21 10:49:00 CDT, Blood, Order for future visit, Stop date 03/18/21 10:49:00 CDT, Lab Collect, Memory loss  Neuropathy  Disc degeneration, lumbar  Cancer of supraglottis  Hypothyroid  Supraglottitis  History of CVA in adult...  Vitamin D, 25-Hydroxy Level, Routine collect, 03/18/21 10:49:00 CDT, Blood, Order for future visit, Stop date 03/18/21 10:49:00 CDT, Lab Collect, Memory loss  Neuropathy  Disc degeneration, lumbar  Cancer of supraglottis  Hypothyroid  Supraglottitis  History of CVA in adult...  ?  Hypothyroid?E03.9  Ordered:  Drug Screen Urine [facility], Routine collect, Urine, Order for future visit, 03/18/21 10:49:00 CDT, Stop date 03/18/21 10:49:00 CDT, Nurse collect, Memory loss  Neuropathy  Disc degeneration, lumbar  Cancer of supraglottis  Hypothyroid  Supraglottitis  History of CVA in zoë...  Folate Level, Routine collect, 03/18/21 10:49:00 CDT, Blood, Order for future visit, Stop date 03/18/21 10:49:00 CDT, Lab Collect, Memory loss  Neuropathy  Disc degeneration, lumbar  Cancer of supraglottis  Hypothyroid  Supraglottitis  History of CVA in adult...  Hemoglobin A1C [facility], Routine collect, 03/18/21 10:54:00 CDT, Blood, Order for future visit, Stop date 03/18/21 10:54:00 CDT, Lab Collect, Memory loss  Neuropathy  Hypothyroid  Cancer of supraglottis  Disc degeneration, lumbar  Supraglottitis  History of CVA in adult...  HIV 1 and 2, Routine collect, 03/18/21 10:50:00 CDT, Blood, Order for future visit, Stop date 03/18/21 10:50:00 CDT, Lab Collect, Memory loss  Neuropathy  Disc degeneration, lumbar   Cancer of supraglottis  Hypothyroid  Supraglottitis  History of CVA in adult...  MRI Brain W W/O Contrast, Routine, 03/18/21 10:50:00 CDT, None, Stretcher, Creatinine if needed per protocol, Rad Type, Order for future visit, Memory loss  Neuropathy  Disc degeneration, lumbar  Cancer of supraglottis  Hypothyroid  Supraglottitis  History of CVA in adul...  Office/Outpatient Visit Level 4 Established [id number] PC, Memory loss  Neuropathy  Disc degeneration, lumbar  Cancer of supraglottis  Supraglottitis  Hypothyroid  History of CVA in adulthood  Gait instability  Depression, 03/18/21 11:00:00 CDT  Syphilis Antibody (with Reflex RPR), Now collect, 03/18/21 10:49:00 CDT, Blood, Order for future visit, Stop date 03/18/21 10:49:00 CDT, Lab Collect, Memory loss  Neuropathy  Disc degeneration, lumbar  Cancer of supraglottis  Hypothyroid  Supraglottitis  History of CVA in adulthood...  Urinalysis with Microscopic if Indicated, Routine collect, Urine, Order for future visit, 03/18/21 10:50:00 CDT, Stop date 03/18/21 10:50:00 CDT, Nurse collect, Memory loss  Neuropathy  Disc degeneration, lumbar  Cancer of supraglottis  Hypothyroid  Supraglottitis  History of CVA in zoë...  Vitamin B12 Level, Routine collect, 03/18/21 10:49:00 CDT, Blood, Order for future visit, Stop date 03/18/21 10:49:00 CDT, Lab Collect, Memory loss  Neuropathy  Disc degeneration, lumbar  Cancer of supraglottis  Hypothyroid  Supraglottitis  History of CVA in adult...  Vitamin D, 25-Hydroxy Level, Routine collect, 03/18/21 10:49:00 CDT, Blood, Order for future visit, Stop date 03/18/21 10:49:00 CDT, Lab Collect, Memory loss  Neuropathy  Disc degeneration, lumbar  Cancer of supraglottis  Hypothyroid  Supraglottitis  History of CVA in adult...  ?  Memory loss?R41.3  Ordered:  CBC w/ Auto Diff, Routine collect, 03/18/21 10:54:00 CDT, Blood, Order for future visit, Stop date 03/18/21 10:54:00 CDT, Lab Collect, Memory loss   Neuropathy  Disc degeneration, lumbar, 03/18/21 10:54:00 CDT  Comprehensive Metabolic Panel, Routine collect, 03/18/21 10:54:00 CDT, Blood, Order for future visit, Stop date 03/18/21 10:54:00 CDT, Lab Collect, Memory loss  Neuropathy  Disc degeneration, lumbar, 03/18/21 10:54:00 CDT  Drug Screen Urine [facility], Routine collect, Urine, Order for future visit, 03/18/21 10:49:00 CDT, Stop date 03/18/21 10:49:00 CDT, Nurse collect, Memory loss  Neuropathy  Disc degeneration, lumbar  Cancer of supraglottis  Hypothyroid  Supraglottitis  History of CVA in zoë...  Folate Level, Routine collect, 03/18/21 10:49:00 CDT, Blood, Order for future visit, Stop date 03/18/21 10:49:00 CDT, Lab Collect, Memory loss  Neuropathy  Disc degeneration, lumbar  Cancer of supraglottis  Hypothyroid  Supraglottitis  History of CVA in adult...  Hemoglobin A1C [facility], Routine collect, 03/18/21 10:54:00 CDT, Blood, Order for future visit, Stop date 03/18/21 10:54:00 CDT, Lab Collect, Memory loss  Neuropathy  Hypothyroid  Cancer of supraglottis  Disc degeneration, lumbar  Supraglottitis  History of CVA in adult...  HIV 1 and 2, Routine collect, 03/18/21 10:50:00 CDT, Blood, Order for future visit, Stop date 03/18/21 10:50:00 CDT, Lab Collect, Memory loss  Neuropathy  Disc degeneration, lumbar  Cancer of supraglottis  Hypothyroid  Supraglottitis  History of CVA in adult...  MRI Brain W W/O Contrast, Routine, 03/18/21 10:50:00 CDT, None, Stretcher, Creatinine if needed per protocol, Rad Type, Order for future visit, Memory loss  Neuropathy  Disc degeneration, lumbar  Cancer of supraglottis  Hypothyroid  Supraglottitis  History of CVA in adul...  Office/Outpatient Visit Level 4 Established [id number] PC, Memory loss  Neuropathy  Disc degeneration, lumbar  Cancer of supraglottis  Supraglottitis  Hypothyroid  History of CVA in adulthood  Gait instability  Depression, 03/18/21 11:00:00 CDT  Syphilis Antibody (with Reflex RPR),  Now collect, 03/18/21 10:49:00 CDT, Blood, Order for future visit, Stop date 03/18/21 10:49:00 CDT, Lab Collect, Memory loss  Neuropathy  Disc degeneration, lumbar  Cancer of supraglottis  Hypothyroid  Supraglottitis  History of CVA in adulthood...  Urinalysis with Microscopic if Indicated, Routine collect, Urine, Order for future visit, 03/18/21 10:50:00 CDT, Stop date 03/18/21 10:50:00 CDT, Nurse collect, Memory loss  Neuropathy  Disc degeneration, lumbar  Cancer of supraglottis  Hypothyroid  Supraglottitis  History of CVA in zoë...  Vitamin B12 Level, Routine collect, 03/18/21 10:49:00 CDT, Blood, Order for future visit, Stop date 03/18/21 10:49:00 CDT, Lab Collect, Memory loss  Neuropathy  Disc degeneration, lumbar  Cancer of supraglottis  Hypothyroid  Supraglottitis  History of CVA in adult...  Vitamin D, 25-Hydroxy Level, Routine collect, 03/18/21 10:49:00 CDT, Blood, Order for future visit, Stop date 03/18/21 10:49:00 CDT, Lab Collect, Memory loss  Neuropathy  Disc degeneration, lumbar  Cancer of supraglottis  Hypothyroid  Supraglottitis  History of CVA in adult...  ?  Neuropathy?G62.9  Ordered:  CBC w/ Auto Diff, Routine collect, 03/18/21 10:54:00 CDT, Blood, Order for future visit, Stop date 03/18/21 10:54:00 CDT, Lab Collect, Memory loss  Neuropathy  Disc degeneration, lumbar, 03/18/21 10:54:00 CDT  Comprehensive Metabolic Panel, Routine collect, 03/18/21 10:54:00 CDT, Blood, Order for future visit, Stop date 03/18/21 10:54:00 CDT, Lab Collect, Memory loss  Neuropathy  Disc degeneration, lumbar, 03/18/21 10:54:00 CDT  Drug Screen Urine [facility], Routine collect, Urine, Order for future visit, 03/18/21 10:49:00 CDT, Stop date 03/18/21 10:49:00 CDT, Nurse collect, Memory loss  Neuropathy  Disc degeneration, lumbar  Cancer of supraglottis  Hypothyroid  Supraglottitis  History of CVA in zoë...  Folate Level, Routine collect, 03/18/21 10:49:00 CDT, Blood, Order for future visit,  Stop date 03/18/21 10:49:00 CDT, Lab Collect, Memory loss  Neuropathy  Disc degeneration, lumbar  Cancer of supraglottis  Hypothyroid  Supraglottitis  History of CVA in adult...  Hemoglobin A1C [facility], Routine collect, 03/18/21 10:54:00 CDT, Blood, Order for future visit, Stop date 03/18/21 10:54:00 CDT, Lab Collect, Memory loss  Neuropathy  Hypothyroid  Cancer of supraglottis  Disc degeneration, lumbar  Supraglottitis  History of CVA in adult...  HIV 1 and 2, Routine collect, 03/18/21 10:50:00 CDT, Blood, Order for future visit, Stop date 03/18/21 10:50:00 CDT, Lab Collect, Memory loss  Neuropathy  Disc degeneration, lumbar  Cancer of supraglottis  Hypothyroid  Supraglottitis  History of CVA in adult...  MRI Brain W W/O Contrast, Routine, 03/18/21 10:50:00 CDT, None, Stretcher, Creatinine if needed per protocol, Rad Type, Order for future visit, Memory loss  Neuropathy  Disc degeneration, lumbar  Cancer of supraglottis  Hypothyroid  Supraglottitis  History of CVA in adul...  Office/Outpatient Visit Level 4 Established [id number] PC, Memory loss  Neuropathy  Disc degeneration, lumbar  Cancer of supraglottis  Supraglottitis  Hypothyroid  History of CVA in adulthood  Gait instability  Depression, 03/18/21 11:00:00 CDT  Syphilis Antibody (with Reflex RPR), Now collect, 03/18/21 10:49:00 CDT, Blood, Order for future visit, Stop date 03/18/21 10:49:00 CDT, Lab Collect, Memory loss  Neuropathy  Disc degeneration, lumbar  Cancer of supraglottis  Hypothyroid  Supraglottitis  History of CVA in adulthood...  Urinalysis with Microscopic if Indicated, Routine collect, Urine, Order for future visit, 03/18/21 10:50:00 CDT, Stop date 03/18/21 10:50:00 CDT, Nurse collect, Memory loss  Neuropathy  Disc degeneration, lumbar  Cancer of supraglottis  Hypothyroid  Supraglottitis  History of CVA in zoë...  Vitamin B12 Level, Routine collect, 03/18/21 10:49:00 CDT, Blood, Order for future visit, Stop  date 03/18/21 10:49:00 CDT, Lab Collect, Memory loss  Neuropathy  Disc degeneration, lumbar  Cancer of supraglottis  Hypothyroid  Supraglottitis  History of CVA in adult...  Vitamin D, 25-Hydroxy Level, Routine collect, 03/18/21 10:49:00 CDT, Blood, Order for future visit, Stop date 03/18/21 10:49:00 CDT, Lab Collect, Memory loss  Neuropathy  Disc degeneration, lumbar  Cancer of supraglottis  Hypothyroid  Supraglottitis  History of CVA in adult...  ?  Supraglottitis?J04.30  ?  ?  1. Will order routine labs  2. MRI Brain for recent memory loss and increase in falls  3. Refer to [facility] for depression  4. Refer [facility] for memory loss, falls  5. Referral for PT given  6. Discontinue Gabapentin. Will start lyrica 50mg. # 30 tablets of Norco given to patient  7. Keep follow up with Rheum  ?  RTC 3 months  Ordered:  Drug Screen Urine [facility], Routine collect, Urine, Order for future visit, 03/18/21 10:49:00 CDT, Stop date 03/18/21 10:49:00 CDT, Nurse collect, Memory loss  Neuropathy  Disc degeneration, lumbar  Cancer of supraglottis  Hypothyroid  Supraglottitis  History of CVA in zoë...  Folate Level, Routine collect, 03/18/21 10:49:00 CDT, Blood, Order for future visit, Stop date 03/18/21 10:49:00 CDT, Lab Collect, Memory loss  Neuropathy  Disc degeneration, lumbar  Cancer of supraglottis  Hypothyroid  Supraglottitis  History of CVA in adult...  Hemoglobin A1C [facility], Routine collect, 03/18/21 10:54:00 CDT, Blood, Order for future visit, Stop date 03/18/21 10:54:00 CDT, Lab Collect, Memory loss  Neuropathy  Hypothyroid  Cancer of supraglottis  Disc degeneration, lumbar  Supraglottitis  History of CVA in adult...  HIV 1 and 2, Routine collect, 03/18/21 10:50:00 CDT, Blood, Order for future visit, Stop date 03/18/21 10:50:00 CDT, Lab Collect, Memory loss  Neuropathy  Disc degeneration, lumbar  Cancer of supraglottis  Hypothyroid  Supraglottitis  History of CVA in adult...  MRI Brain  W W/O Contrast, Routine, 03/18/21 10:50:00 CDT, None, Stretcher, Creatinine if needed per protocol, Rad Type, Order for future visit, Memory loss  Neuropathy  Disc degeneration, lumbar  Cancer of supraglottis  Hypothyroid  Supraglottitis  History of CVA in adul...  Office/Outpatient Visit Level 4 Established [id number] PC, Memory loss  Neuropathy  Disc degeneration, lumbar  Cancer of supraglottis  Supraglottitis  Hypothyroid  History of CVA in adulthood  Gait instability  Depression, 03/18/21 11:00:00 CDT  Syphilis Antibody (with Reflex RPR), Now collect, 03/18/21 10:49:00 CDT, Blood, Order for future visit, Stop date 03/18/21 10:49:00 CDT, Lab Collect, Memory loss  Neuropathy  Disc degeneration, lumbar  Cancer of supraglottis  Hypothyroid  Supraglottitis  History of CVA in adulthood...  Urinalysis with Microscopic if Indicated, Routine collect, Urine, Order for future visit, 03/18/21 10:50:00 CDT, Stop date 03/18/21 10:50:00 CDT, Nurse collect, Memory loss  Neuropathy  Disc degeneration, lumbar  Cancer of supraglottis  Hypothyroid  Supraglottitis  History of CVA in zoë...  Vitamin B12 Level, Routine collect, 03/18/21 10:49:00 CDT, Blood, Order for future visit, Stop date 03/18/21 10:49:00 CDT, Lab Collect, Memory loss  Neuropathy  Disc degeneration, lumbar  Cancer of supraglottis  Hypothyroid  Supraglottitis  History of CVA in adult...  Vitamin D, 25-Hydroxy Level, Routine collect, 03/18/21 10:49:00 CDT, Blood, Order for future visit, Stop date 03/18/21 10:49:00 CDT, Lab Collect, Memory loss  Neuropathy  Disc degeneration, lumbar  Cancer of supraglottis  Hypothyroid  Supraglottitis  History of CVA in adult...  ?  Orders:  amLODIPine, 5 mg = 1 tab(s), Oral, Daily, # 30 tab(s), 0 Refill(s), Pharmacy: [facility] #[id number], 176, cm, Height/Length Dosing, 03/18/21 10:23:00 CDT, 97.4, kg, Weight Dosing, 03/18/21 10:23:00 CDT  pregabalin, 50 mg = 1 cap(s), Oral, Daily, # 30 cap(s),  0 Refill(s), Pharmacy: [facility] #[id number], 176, cm, Height/Length Dosing, 03/18/21 10:23:00 CDT, 97.4, kg, Weight Dosing, 03/18/21 10:23:00 CDT  Referrals  [facility] Internal Referral to  [facility], Specialty: Geriatrics, Start: 03/18/21 10:56:00 CDT  [facility] Internal Referral to  Psych Nurse, Specialty: Behavioral Health, Reason: depression, Start: 03/18/21 10:55:00 CDT   Problem List/Past Medical History  Ongoing  Airway compromise  Cough(  Confirmed  )  DM (diabetes mellitus)(  Confirmed  )  Goiter(  Confirmed  )  Hepatitis C(  Confirmed  )  HTN (hypertension)(  Confirmed  )  Hypothyroidism  Laryngeal cancer  Myofascial pain  Radiation therapy complication  Swelling of head or neck(  Confirmed  )  Thyroid nodule  Tobacco user  Tobacco user(  Probable Diagnosis  )  Historical  Arthritis  Back pain  Depression  Diabetes  Fibromyalgia  Hypertension  Laryngeal mass  Laryngeal Mass  Neuropathy  Pregnant  Pregnant  Pregnant  Pregnant  Pregnant  Reflux  Sleep apnea  Smoker  Stroke  Urine incontinence  UTI (urinary tract infection)  Procedure/Surgical History  Dental Extraction (None) (09/06/2019)  Extraction of Lower Tooth, All, External Approach (09/06/2019)  Extraction of Upper Tooth, All, External Approach (09/06/2019)  Drainage of Right Neck Subcutaneous Tissue and Fascia, Open Approach (09/04/2019)  Insertion of Infusion Device into Superior Vena Cava, Percutaneous Approach (09/04/2019)  Bypass Trachea to Esophagus with Intraluminal Device, Open Approach (08/12/2019)  Excision of Right Thyroid Gland Lobe, Open Approach (08/12/2019)  Laryngectomy (None) (08/12/2019)  Laryngoscopy (None) (08/12/2019)  Neck Dissection (None) (08/12/2019)  PEC Flap (None) (08/12/2019)  Resection of Larynx, Open Approach (08/12/2019)  Resection of Left Neck Lymphatic, Open Approach (08/12/2019)  Resection of Right Neck Lymphatic, Open Approach (08/12/2019)  Thyroidectomy (ENT) (None) (08/12/2019)  Transesophageal  Puncture (None) (08/12/2019)  Change Tracheostomy Device in Trachea, External Approach (07/14/2019)  Control Bleeding in Oral Cavity and Throat, Via Natural or Artificial Opening Endoscopic (07/14/2019)  Inspection of Larynx, Via Natural or Artificial Opening Endoscopic (07/14/2019)  Tracheostomy Tube Change (07/14/2019)  Insertion of Feeding Device into Stomach, Via Natural or Artificial Opening Endoscopic (07/12/2019)  PEG Tube Insertion Initial (07/12/2019)  Bypass Trachea to Cutaneous with Tracheostomy Device, Open Approach (07/08/2019)  Excision of Right Vocal Cord, Via Natural or Artificial Opening Endoscopic, Diagnostic (07/08/2019)  Inspection of Tracheobronchial Tree, Via Natural or Artificial Opening Endoscopic (07/08/2019)  Inspection of Upper Intestinal Tract, Via Natural or Artificial Opening Endoscopic (07/08/2019)  Panendoscopy (None) (07/08/2019)  Tracheostomy (None) (07/08/2019)  Other incision with drainage of skin and subcutaneous tissue (05/31/2015)  CEIOL - Cataract extraction and insertion of intraocular lens  Excision of papilloma   Medications  albuterol-ipratropium 100 mcg-20 mcg/inh inhalation aerosol, 3 mL, NEB, TID, 1 refills  aspirin 81 mg oral tablet, CHEWABLE, 81 mg= 1 tab(s), Oral, Daily, 3 refills  atorvastatin 40 mg oral tablet, See Instructions  carvedilol 3.125 mg oral tablet, See Instructions  DME, See Instructions  famotidine 40 mg oral tablet, 40 mg= 1 tab(s), Oral, BID, 11 refills  gabapentin 300 mg oral capsule, 300 mg= 1 cap(s), Oral, TID, 2 refills  Glucometer, See Instructions   mg oral tablet, 600 mg= 1 tab(s), Oral, q8hr  Lancets and Test Strips (Accucheck), See Instructions, 11 refills  Lasix 40 mg oral tablet, mo, Oral, Daily  lisinopril 40 mg oral tablet, See Instructions, 3 refills  Lyrica 50 mg oral capsule, 50 mg= 1 cap(s), Oral, Daily  metformin 500 mg oral tablet, See Instructions  Motrin 800 mg oral tablet, See Instructions  nebulizer tubing/mask, See  Instructions  Norvasc 5 mg oral tablet, 5 mg= 1 tab(s), Oral, Daily  oxcarbazepine 300 mg oral tablet, 300 mg= 1 tab(s), Oral, BID  Suction supplies, See Instructions  Synthroid 75 mcg (0.075 mg) oral tablet, 75 mcg= 1 tab(s), Oral, Daily  Trach ties, See Instructions  Voltaren 1% topical gel, 1 silvana, TOP, QID, PRN, 5 refills  Allergies  No Known Allergies  Social History  Abuse/Neglect  Yes, No, No, 03/11/2021  Alcohol - Denies Alcohol Use, 10/06/2012  Current, Beer, Wine, 1-2 times per year, 01/07/2021  Employment/School  Retired, 09/04/2019  Exercise  Exercise duration: 60. Exercise frequency: 3-4 times/week. Exercise type: Walking., 09/04/2019  Home/Environment  Lives with Significant other. Living situation: Home/Independent. Home equipment: na. Alcohol abuse in household: No. Substance abuse in household: No. Smoker in household: No. Injuries/Abuse/Neglect in household: No. Feels unsafe at home: No. Safe place to go: No. Agency(s)/Others notified: No. Family/Friends available for support: No. Concern for family members at home: No. Major illness in household: No. Financial concerns: No., 02/02/2013  Nutrition/Health  Diabetic, Fair, 01/28/2020  Sexual  Spiritual/Cultural  Anglican, 01/28/2020  Substance Use - Denies Substance Abuse, 10/06/2012  Past, Marijuana, 1-2 times per week, 10/20/2020  Tobacco - Medium Risk, 10/06/2012  Former smoker, quit more than 30 days ago, N/A, 03/11/2021  Family History  Alcohol user: Sister.  Diabetes mellitus type 2: Mother and Sister.  Heart disease: Mother.  Tobacco user: Sister.  Immunizations  Vaccine Date Status   influenza virus vaccine, inactivated 10/30/2020 Given   pneumococcal 13-valent conjugate vaccine 02/07/2020 Given   influenza virus vaccine, inactivated 01/28/2020 Given   tetanus/diphtheria/pertussis, acel(Tdap) 05/31/2015 Given   Health Maintenance  Health Maintenance  ???Pending?(in the next year)  ??? ??OverDue  ??? ? ? ?Breast Cancer Screening  due??11/17/19??and every 2??year(s)  ??? ? ? ?Aspirin Therapy for CVD Prevention due??12/23/20??and every 1??year(s)  ??? ? ? ?Alcohol Misuse Screening due??01/02/21??and every 1??year(s)  ??? ? ? ?Diabetes Maintenance-Fasting Lipid Profile due??01/14/21??and every 1??year(s)  ??? ??Due?  ??? ? ? ?Colorectal Screening due??03/03/21??and every 1??year(s)  ??? ? ? ?Cervical Cancer Screening due??03/18/21??Unknown Frequency  ??? ? ? ?Diabetes Maintenance-Foot Exam due??03/18/21??Unknown Frequency  ??? ? ? ?Hypertension Management-Education due??03/18/21??and every 1??year(s)  ??? ? ? ?Lung Cancer Screening due??03/18/21??and every 1??year(s)  ??? ? ? ?Medicare Annual Wellness Exam due??03/18/21??and every 1??year(s)  ??? ? ? ?Zoster Vaccine due??03/18/21??Unknown Frequency  ??? ??Due In Future?  ??? ? ? ?Diabetes Maintenance-HgbA1c not due until??06/15/21??and every 1??year(s)  ??? ? ? ?Hypertension Management-BMP not due until??09/29/21??and every 1??year(s)  ??? ? ? ?Diabetes Maintenance-Serum Creatinine not due until??09/29/21??and every 1??year(s)  ??? ? ? ?Influenza Vaccine not due until??10/01/21??and every 1??day(s)  ??? ? ? ?Obesity Screening not due until??01/01/22??and every 1??year(s)  ??? ? ? ?Smoking Cessation not due until??01/01/22??and every 1??year(s)  ??? ? ? ?Diabetes Maintenance-Eye Exam not due until??01/06/22??and every 2??year(s)  ???Satisfied?(in the past 1 year)  ??? ??Satisfied?  ??? ? ? ?ADL Screening on??03/18/21.??Satisfied by Marisa Haynes  ??? ? ? ?Blood Pressure Screening on??03/18/21.??Satisfied by Marisa Haynes  ??? ? ? ?Body Mass Index Check on??03/18/21.??Satisfied by Marisa Haynes  ??? ? ? ?Coronary Artery Disease Maintenance-Lipid Lowering Therapy on??01/13/21.??Satisfied by Vicenta Acevedo MD  ??? ? ? ?Depression Screening on??03/18/21.??Satisfied by Marisa Haynes  ??? ? ? ?Diabetes Maintenance-Serum Creatinine on??09/29/20.??Satisfied by William  Rowdy  ??? ? ? ?Diabetes Screening on??09/29/20.??Satisfied by Rowdy Thomas  ??? ? ? ?Hypertension Management-Blood Pressure on??03/18/21.??Satisfied by Mraisa Haynes  ??? ? ? ?Influenza Vaccine on??10/30/20.??Satisfied by Marisa Haynes  ??? ? ? ?Obesity Screening on??03/18/21.??Satisfied by Marisa Haynes  ??? ? ? ?Smoking Cessation on??03/18/21.??Satisfied by Marisa Haynes??Reason: Expectation Satisfied Elsewhere  ?

## 2022-05-03 NOTE — HISTORICAL [facility] CERNER
This is a historical note converted from Abdullahi. Formatting and pictures may have been removed.  Please reference Abdullahi for original formatting and attached multimedia. Chief Complaint  check up  History of Present Illness  This is a 64-year-old female presenting from Dr. Dennis for evaluation of laryngeal mass. ?Patient notes she has had hoarseness starting?shortly before Ever,?dysphagia?since?evaluated with an EGD and MBSS (showing aspirations),?1 month of sore throat that has been treated with antibiotics. ?She endorses some weight loss?however, denies?any dyspnea. ?She has not noted any lymphadenopathy, otalgia, or?odynophagia.  ?   7/24/19: Here for first follow up after admission for awake trach, panendoscopy. 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  Recommendation:  - TB cites likely?inferior outcomes (functional and oncologic) with CRT vs TL, thus TL is best option  - TB recommends continued education to pt and family regarding life after laryngectomy, as well as likely poor functional outcome with CRT (aspiration, trach and peg dependence, npo status)  - However if pt is completely against surgery, should offer CRT for treatment  ?   Has been doing ok at home. Had one day she went to ER with plugged inner cannula.  ?   7/31/19: Here after meeting with ST and a laryngectomy patient, would like to proceed with surgery. No issues at home since last visit. No changes in medical history.  ?   8/12/19: Underwent TL, BND, CP myotomy, R thyroid lobectomy and TEP.  ?   8/27/19: Here for first follow up since discharge. Still taking liquids PO, no issues at home. Has ST appointment scheduled for Sept 3rd. Has right UE weakness and pain but mobility is ok. Doing well with stoma care.  [facility] TB Recs:  Recommendations: NCCN guidelines state consider adjuvant XRT  - Will plan to discuss risks/benefits of radiation with patient in ENT follow  - Will do same in Radiation Oncology clinic  ?   9/4/19: Here for  follow up and also for follow-up after presenting to ED last night with concerns for nodule below stoma. ?Has 1 pill of her antibiotic given at last visit left. ?She reports that?the swelling appeared suddenly?yesterday.? Is very tender on palpation.? causes pain extending toward her right shoulder [1]  ?   9/11/19: hospitalization on 9/4 s/p I&D of right clavicular head and teeth extraction. Starting radiation on Thursday.? Otherwise no issues. [1]  ?  10/16/2019: Recent hospitalization for TIA sx w/o any sequela. MRI, echo pending. Discharged on levo and doxy for right clavicular head drainage. patient hasnt taken any. Notes still have lots of blood secretions but not any now. Eating per mouth also supplementing thru G-tube [1]  ?  11/19/19: Pt here for follow up. She has completed CRT 2 weeks ago. Eating and drinking well. Maintaining weight.? Feels her right sternoclavicular joint is unchanged. Finished antibiotics over 2 weeks ago.? Feels it has not changed since finishing antibiotics, no drainage.?Per patient finished all chemo and radiation treatments.  ?  12/24/19: No c/o today. Sternoclavicular joint has resolved. Tolerating PO intake and maintaining weight. She would like to have PEG removed, states she has not used it in over 2 months. [1]  ?  1/28/20: Here for follow up. PET ordered today by Dr. Kim. No dyspnea, dysphagia. noting shoulder stiffness/pain. Doing home PT exercises. Notes some numbness underneath chin. Got PEG removed by GS today. [1]  ?  3/5/20: Here for cancer surveaillcen. Post-treatment PET done 2/2020 with some hypermetabolic activity in nasopharynx, no eviednce of persistence/recurrence in neck. No distant mets. Denies dysphagia. Weight stable. Denies dyspnea. Voicing well with TEP.  ?  5/26/20: Here for cancer surveillance. No weight loss, dysphagia, odynophagia. Reports that the lymphedema machine was denied per a letter she received. She is reporting severe pain to bilateral  cheeks and upper neck? Voicing with TEP, no issues.  ?  7/28/20: Denies dysphagia/odyophagia/otalgia. Reports weight loss ~ 10lbs since last visit bc she does not have much?of an appetite,?but?has begun drinking ensure to keep calories up. Frustrated that her ear lobes have paresthesias and she?ripped her earrings out. Requesting additional norco.?C/o?intermittent tight pulling sensation in bilateral neck muscles that makes her feel as though she?is choking.?Reports taking a few days of leftover abx rx 2 weeks ago bc her secretions had turned green and she was?afraid to go to the ED.?Denies having fever, but reports she got a little SOB. Secretions are now clear. ?  Review of Systems  CONSTITUTIONAL: (-) fevers, chills, night sweats, weight loss, fatigue  EYES: (-)??discharge. + occasional blurry vision  ENT: (+) as per HPI  CARDIOVASCULAR: (-) CP, palpitations, orthopnea, claudications, varicosities  RESPIRATORY: (-) SOB, AQUINO, cough, chest congestion  GASTROINTESTINAL: (-) n/v/d, constipation, hematochezia, melena, hematemesis, anorexia  Physical Exam  Vitals & Measurements  T:?37.2? ?C (Oral)? HR:?84(Peripheral)? BP:?119/81?  HT:?176.00?cm? WT:?91.800?kg? BMI:?29.64?  General: AAO, NAD  Neuro: CN II - XII grossly intact  Head/ Face: NCAT, symmetric, sensations intact bilaterally  Eyes: EOMI, PERRLA, arcus senilis  Ears: externally normal with grossly normal hearing  AD: EAC patent, TM intact, no middle ear effusion, no retractions  AS:?EAC patent, TM intact, no middle ear effusion, no retractions  Nose: bilateral nares patent, midline septum,?trace clear?rhinorrhea, no external deformity, no turbinate hypertrophy  OC/OP: MMM, no intraoral lesions, FOM/BOT/ tongue soft, no trismus, edentulous, no uvular deviation, bilaterally symmetric soft palate elevation, palatoglossus and palatopharyngeal fold wnl  Neck:?woody fibrosis, no LAD, normal ROM, no thyromegaly. Incisions well headed.?Shannon tube in place. Tight  musculature  Respiratory: nonlabored, no wheezing, bilateral chest rise  Cardiovascular: RRR  Gastrointestinal: S NT ND  Skin: warm, no lesions  Musculoskeletal: 5/5 strength  Psych: Appropriate affect/mood  ?   Flexible Fiberoptic Laryngoscopy/Nasopharyngoscopy (through right nare) with Dr. Wu:  NP/OP: no masses/lesions of NC, eustachian tube, fossa of Rosenmuller, no adenoid hypertrophy  BOT/vallecula: no lingual hypertrophy, no masses/lesions, no secretions obscuring visualization  Neopharynx: secretions and collapse made the exam?difficult but no apparent masses  No aspiration, no pooled secretions  No sign of malignancy  Stoma pink and moist, clear to adrianna  Assessment/Plan  1.?Laryngeal cancer?C32.9  2.?Myofascial pain?M79.18  Orders:  XR Chest 2 Views, Routine, 07/28/20 12:02:00 CDT, None, Ambulatory, Rad Type, Order for future visit, Weight loss, unintentional  Carcinoma of supraglottis, Not Scheduled, 07/28/20 12:02:00 CDT  65yoF with T3N0M0 SCCa of the supraglottis s/p?TL, BND, CP myotomy, TEP and R lobectomy (incidentally found 0.2 cm PTC) s/p XRT (11/2019). Post treatment PET with no evidence of disease in neck/distant mets. Some hypermetabolic activity in left nasopharynx. Scope exam ARCHIE.  - Reviewed appropriate use of abx  - CXR to r/o malignancy r/t wt loss  - Labs?& CXR due in October  - Continue PO intake  -?Continue SLP  -?Reviewed neck stretching exercises. PT referral  - Will increase gabapentin to TID?  - RTC?Res template in 2 months   Problem List/Past Medical History  Ongoing  Airway compromise  Cough(  Confirmed  )  DM (diabetes mellitus)(  Confirmed  )  Goiter(  Confirmed  )  Hepatitis C(  Confirmed  )  HTN (hypertension)(  Confirmed  )  Laryngeal cancer  Myofascial pain  Radiation therapy complication  Swelling of head or neck(  Confirmed  )  Tobacco user  Tobacco user(  Probable Diagnosis  )  Historical  Arthritis  Back  pain  Depression  Diabetes  Fibromyalgia  Hypertension  Laryngeal mass  Laryngeal Mass  Neuropathy  Pregnant  Pregnant  Pregnant  Pregnant  Pregnant  Reflux  Sleep apnea  Smoker  Stroke  Urine incontinence  UTI (urinary tract infection)  Procedure/Surgical History  Dental Extraction (None) (09/06/2019)  Extraction of Lower Tooth, All, External Approach (09/06/2019)  Extraction of Upper Tooth, All, External Approach (09/06/2019)  Drainage of Right Neck Subcutaneous Tissue and Fascia, Open Approach (09/04/2019)  Insertion of Infusion Device into Superior Vena Cava, Percutaneous Approach (09/04/2019)  Bypass Trachea to Esophagus with Intraluminal Device, Open Approach (08/12/2019)  Excision of Right Thyroid Gland Lobe, Open Approach (08/12/2019)  Laryngectomy (None) (08/12/2019)  Laryngoscopy (None) (08/12/2019)  Neck Dissection (None) (08/12/2019)  PEC Flap (None) (08/12/2019)  Resection of Larynx, Open Approach (08/12/2019)  Resection of Left Neck Lymphatic, Open Approach (08/12/2019)  Resection of Right Neck Lymphatic, Open Approach (08/12/2019)  Thyroidectomy (ENT) (None) (08/12/2019)  Transesophageal Puncture (None) (08/12/2019)  Change Tracheostomy Device in Trachea, External Approach (07/14/2019)  Control Bleeding in Oral Cavity and Throat, Via Natural or Artificial Opening Endoscopic (07/14/2019)  Inspection of Larynx, Via Natural or Artificial Opening Endoscopic (07/14/2019)  Tracheostomy Tube Change (07/14/2019)  Insertion of Feeding Device into Stomach, Via Natural or Artificial Opening Endoscopic (07/12/2019)  PEG Tube Insertion Initial (07/12/2019)  Bypass Trachea to Cutaneous with Tracheostomy Device, Open Approach (07/08/2019)  Excision of Right Vocal Cord, Via Natural or Artificial Opening Endoscopic, Diagnostic (07/08/2019)  Inspection of Tracheobronchial Tree, Via Natural or Artificial Opening Endoscopic (07/08/2019)  Inspection of Upper Intestinal Tract, Via Natural or Artificial Opening Endoscopic  (07/08/2019)  Panendoscopy (None) (07/08/2019)  Tracheostomy (None) (07/08/2019)  Other incision with drainage of skin and subcutaneous tissue (05/31/2015)  CEIOL - Cataract extraction and insertion of intraocular lens  Excision of papilloma   Medications  acetaminophen-hydrocodone 325 mg-7.5 mg oral tablet, 1 tab(s), Oral, q6hr  albuterol-ipratropium 100 mcg-20 mcg/inh inhalation aerosol, 3 mL, NEB, TID, 6 refills  albuterol-ipratropium 2.5 mg-0.5 mg/3 mL inhalation solution, 3 mL, NEB, TID  aspirin 81 mg oral tablet, CHEWABLE, 81 mg= 1 tab(s), Oral, Daily, 3 refills  atorvastatin 40 mg oral tablet, 40 mg= 1 tab(s), Oral, Daily, 5 refills  carvedilol 3.125 mg oral tablet, 3.125 mg= 1 tab(s), Oral, BID, 5 refills  DME, See Instructions  gabapentin 300 mg oral capsule, 300 mg= 1 cap(s), Oral, BID, 5 refills  Glucometer, See Instructions  ibuprofen 800 mg oral tablet, 800 mg= 1 tab(s), Oral, BID  Lancets and Test Strips (Accucheck), See Instructions, 11 refills  Lasix 40 mg oral tablet, mo, Oral, Daily,? ?Investigating  lisinopril 40 mg oral tablet, 40 mg= 1 tab(s), Oral, Daily, 5 refills  metformin 500 mg oral tablet, 500 mg= 1 tab(s), Oral, BID, 5 refills  nebulizer tubing/mask, See Instructions  oxcarbazepine 300 mg oral tablet, 300 mg= 1 tab(s), Oral, BID  Suction supplies, See Instructions  Trach ties, See Instructions  Voltaren 1% topical gel, 1 silvana, TOP, QID, PRN, 5 refills  Allergies  No Known Allergies  Social History  Abuse/Neglect  Yes, No, No, 06/16/2020  Alcohol - Denies Alcohol Use, 10/06/2012  Current, 1-2 times per year, 01/28/2020  Employment/School  Retired, 09/04/2019  Exercise  Exercise duration: 60. Exercise frequency: 3-4 times/week. Exercise type: Walking., 09/04/2019  Home/Environment  Lives with Significant other. Living situation: Home/Independent. Home equipment: na. Alcohol abuse in household: No. Substance abuse in household: No. Smoker in household: No. Injuries/Abuse/Neglect in  household: No. Feels unsafe at home: No. Safe place to go: No. Agency(s)/Others notified: No. Family/Friends available for support: No. Concern for family members at home: No. Major illness in household: No. Financial concerns: No., 02/02/2013  Nutrition/Health  Diabetic, Fair, 01/28/2020  Sexual  Spiritual/Cultural  Mu-ism, 01/28/2020  Substance Use - Denies Substance Abuse, 10/06/2012  Current, Marijuana, 1-2 times per week, 01/28/2020  Tobacco - Medium Risk, 10/06/2012  Former smoker, quit more than 30 days ago, N/A, 06/16/2020  Family History  Alcohol user: Sister.  Diabetes mellitus type 2: Mother and Sister.  Heart disease: Mother.  Tobacco user: Sister.  Immunizations  Vaccine Date Status   pneumococcal 13-valent conjugate vaccine 02/07/2020 Given   influenza virus vaccine, inactivated 01/28/2020 Given   tetanus/diphtheria/pertussis, acel(Tdap) 05/31/2015 Given   Health Maintenance  Health Maintenance  ???Pending?(in the next year)  ??? ??OverDue  ??? ? ? ?Diabetes Maintenance-Microalbumin due??and every?  ??? ? ? ?Influenza Vaccine due??and every?  ??? ? ? ?Breast Cancer Screening due??11/17/19??and every 2??year(s)  ??? ??Due?  ??? ? ? ?Cervical Cancer Screening due??07/28/20??and every?  ??? ? ? ?Diabetes Maintenance-Foot Exam due??07/28/20??and every?  ??? ? ? ?Hypertension Management-Education due??07/28/20??and every 1??year(s)  ??? ? ? ?Lung Cancer Screening due??07/28/20??and every 1??year(s)  ??? ? ? ?Medicare Annual Wellness Exam due??07/28/20??and every 1??year(s)  ??? ? ? ?Zoster Vaccine due??07/28/20??and every?  ??? ??Refused?  ??? ? ? ?Smoking Cessation due??01/01/20??and every 1??year(s)  ??? ??Due In Future?  ??? ? ? ?ADL Screening not due until??10/28/20??and every 1??year(s)  ??? ? ? ?Aspirin Therapy for CVD Prevention not due until??12/23/20??and every 1??year(s)  ??? ? ? ?Obesity Screening not due until??01/01/21??and every 1??year(s)  ??? ? ? ?Alcohol Misuse Screening not due  until??01/02/21??and every 1??year(s)  ??? ? ? ?Diabetes Maintenance-Fasting Lipid Profile not due until??01/14/21??and every 1??year(s)  ??? ? ? ?Colorectal Screening not due until??03/03/21??and every 1??year(s)  ??? ? ? ?Diabetes Maintenance-HgbA1c not due until??06/15/21??and every 1??year(s)  ??? ? ? ?Hypertension Management-BMP not due until??06/15/21??and every 1??year(s)  ??? ? ? ?Diabetes Maintenance-Serum Creatinine not due until??06/15/21??and every 1??year(s)  ???Satisfied?(in the past 1 year)  ??? ??Satisfied?  ??? ? ? ?ADL Screening on??10/28/19.??Satisfied by Sydnee Burns LPN  ??? ? ? ?Alcohol Misuse Screening on??01/28/20.??Satisfied by Olga Larson LPN  ??? ? ? ?Aspirin Therapy for CVD Prevention on??12/23/19.??Satisfied by Vicenta Acevedo MD  ??? ? ? ?Blood Pressure Screening on??07/28/20.??Satisfied by Kat Amanda  ??? ? ? ?Body Mass Index Check on??07/28/20.??Satisfied by Kat Amanda  ??? ? ? ?Colorectal Screening on??03/03/20.??Satisfied by Arin Moe  ??? ? ? ?Coronary Artery Disease Maintenance-Lipid Lowering Therapy on??12/23/19.??Satisfied by Vicenta Acevedo MD  ??? ? ? ?Depression Screening on??03/05/20.??Satisfied by Dariana Enriquez LPN  ??? ? ? ?Diabetes Maintenance-Serum Creatinine on??06/15/20.??Satisfied by Judy Zimmer  ??? ? ? ?Diabetes Screening on??06/15/20.??Satisfied by Judy Zimmer  ??? ? ? ?Hypertension Management-Blood Pressure on??07/28/20.??Satisfied by Kat Amanda  ??? ? ? ?Influenza Vaccine on??01/28/20.??Satisfied by Chandni Olivares  ??? ? ? ?Lipid Screening on??01/14/20.??Satisfied by [facility]  ??? ? ? ?Obesity Screening on??07/28/20.??Satisfied by Kat Amanda  ??? ??Refused?  ??? ? ? ?Smoking Cessation on??02/07/20.??Recorded by Marisa Haynes  ?

## 2022-05-03 NOTE — HISTORICAL [facility] CERNER
This is a historical note converted from [employer]i. Formatting and pictures may have been removed.  Please reference Abdullahi for original formatting and attached multimedia. 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  ?  Neuropathy: Reports numbness/tingling in bilateral lower extremities. Reports running out of Gabapentin. Had been taking Gabapentin 300mg at bedtime without improvements  ?  DM: Compliant?with??Metformin 500mg BID.?Denied polyuria, polydypesia, polypghagia. Has been doing?better with diet.  ?  HTN: Compliant with Coreg, Lisinopril.?No refills needed.?Denied HA, dizziness, palpitations. Occasional lower extremity edema for which she takes Lasix prn  ?  CVA: Stroke 12 years ago. No residual deficits. Takes Aspirin ASA, Lipitor  ?  Seizure: 20 years ago, Compliant with Trileptal?  ?  Depression:?Follows with?Samara Chamberlain last visit 6 months ago. Denied SI/ HI.?  ?  SCC Larynx:?s/p laryngectomy.?Stable. Followed by ENT closely. Has upcoming appt. Currently doing SLP therapy.  ?   Back pain: Chronic libra. Was told she had disc problem in the past. Uses warm compress with some improvement. Not interested in PT. Does not wake up from sleep. No bowl/bladder incontinence.  Review of Systems  Constitutional: no weight gain,?no weight loss,?no fatigue, no fever,?no chills,?no weakness  Eye: no vision loss/changes,?no blurry or double vision,??no flashing lights  ENMT: No decreased hearing, no drainage, no nosebleeds, no dry mouth, no hoarseness  Hema/Lymph:No ease of bruising, no abnormal bleeding  Endocrine: No heat or cold intolerance, no sweating, no frequent urination, no thirst, no change in appetite  Integumentary: No rash, no blisters, no itching, no hair or nail  changes  Neurologic: No dizziness, no fainting, no seizures, no weakness  All Other ROS negative  Physical Exam  Vitals & Measurements  T:?36.8? ?C (Oral)? HR:?66(Peripheral)? RR:?18? BP:?130/84? SpO2:?98%?  HT:?170?cm? WT:?96.4?kg? BMI:?33.36?  GEN: Alert, well appearing, in no acute distress, pleasant  Eye: PERRLA, EOMI, clear conjunctiva, No pallor  HENT: soft, nontender, incisions healing well, stoma/larynx tube?c/d/I  Neck: full range of motion, Respiratory: clear to auscultation bilaterally, no wheezes, no crackles  Cardiovascular: regular rate and rhythm without murmurs, gallops or rubs  Gastrointestinal: soft, non-tender, non-distended with active bowel sounds  Musculoskeletal: Able to ambulate to exam table without difficulty, 2+DP,  Diabetic foot:?No open wounds, no ulcerations, sensations grossly intact?Monofilament exam WNL  Integumentary: no rashes or skin lesions present  Neurologic: no signs of peripheral neurological deficit, motor/sensory function intact  Assessment/Plan  Arthritis?M19.90  Okay to take Tylenol arthritis  Patient is aware of?Tylenol container Norco product/aware of limited  Voltaren gel sent to pharmacy  Okay to use?arthritis?gloves  Declined physical therapy  Ordered:  Office/Outpatient Visit Level 4 Established [id number] PC, Low back pain  Arthritis  Neuropathy  Diabetes  HTN (hypertension)  Depression  History of laryngeal cancer, 06/16/20 11:12:00 CDT  ?  Depression?F32.9  ?Stable  Denied SI, HI  Keep follow up with Dr. Chamberlain  Continue Trazodone, Temezepam, Trileptal  ?  Ordered:  Office/Outpatient Visit Level 4 Established [id number] PC, Low back pain  Arthritis  Neuropathy  Diabetes  HTN (hypertension)  Depression  History of laryngeal cancer, 06/16/20 11:12:00 CDT  ?  Diabetes?E11.9  ?Well controlled  A1c explaining 1  Continue diabetic diet  Metformin 500 mg twice a day  Physical exam within normal limits?performed today  Fundus?exam up-to-date  1/2020  Ordered:  Office/Outpatient Visit Level 4 Established [id number] PC, Low back pain  Arthritis  Neuropathy  Diabetes  HTN (hypertension)  Depression  History of laryngeal cancer, 06/16/20 11:12:00 CDT  ?  History of laryngeal cancer?Z85.21  ?Stable  Follow closely by ENT  Currently seeing SLP  Ordered:  Office/Outpatient Visit Level 4 Established [id number] PC, Low back pain  Arthritis  Neuropathy  Diabetes  HTN (hypertension)  Depression  History of laryngeal cancer, 06/16/20 11:12:00 CDT  ?  HTN (hypertension)?I10  ?Control, at goal today  And the lifestyle modifications  Coreg,?Lasix, lisinopril  Ordered:  Office/Outpatient Visit Level 4 Established 10606 PC, Low back pain  Arthritis  Neuropathy  Diabetes  HTN (hypertension)  Depression  History of laryngeal cancer, 06/16/20 11:12:00 CDT  ?  Low back pain?M54.5  ?Chronic issue  ?Was told she had disk problem in past  ?Tylenol/Norco for pain (outside provider)  ?Voltaren gel  ?Declined PT  ?No recent imaging - will start with Xray back  Ordered:  Office/Outpatient Visit Level 4 Established 94564 PC, Low back pain  Arthritis  Neuropathy  Diabetes  HTN (hypertension)  Depression  History of laryngeal cancer, 06/16/20 11:12:00 CDT  XR Spine Lumbar 2 or 3 Views, Routine, 06/16/20 11:11:00 CDT, None, Ambulatory, Rad Type, Order for future visit, Low back pain, Not Scheduled, 06/16/20 11:11:00 CDT  ?  Neuropathy?G62.9  ?Increase gabapentin to 300 mg twice a day  Continue glucose control  Ordered:  Office/Outpatient Visit Level 4 Established 32314 PC, Low back pain  Arthritis  Neuropathy  Diabetes  HTN (hypertension)  Depression  History of laryngeal cancer, 06/16/20 11:12:00 CDT  ?  Orders:  diclofenac topical, 1 silvana, TOP, QID, PRN PRN as needed for pain, # 100 gm, 5 Refill(s), Pharmacy: [facility] #[id number], 170, cm, Height/Length Dosing, 06/16/20 10:48:00 CDT, 96.4, kg, Weight Dosing, 06/16/20 10:48:00 CDT  gabapentin, 300 mg = 1  cap(s), Oral, BID, # 30 cap(s), 5 Refill(s), Pharmacy: [facility] #[id number], 170, cm, Height/Length Dosing, 06/16/20 10:48:00 CDT, 96.4, kg, Weight Dosing, 06/16/20 10:48:00 CDT   Problem List/Past Medical History  Ongoing  Airway compromise  Cough(  Confirmed  )  DM (diabetes mellitus)(  Confirmed  )  Goiter(  Confirmed  )  Hepatitis C(  Confirmed  )  HTN (hypertension)(  Confirmed  )  Laryngeal cancer  Radiation therapy complication  Swelling of head or neck(  Confirmed  )  Tobacco user  Tobacco user(  Probable Diagnosis  )  Historical  Arthritis  Back pain  Depression  Diabetes  Fibromyalgia  Hypertension  Laryngeal mass  Laryngeal Mass  Neuropathy  Pregnant  Pregnant  Pregnant  Pregnant  Pregnant  Reflux  Sleep apnea  Smoker  Stroke  Urine incontinence  UTI (urinary tract infection)  Procedure/Surgical History  Dental Extraction (None) (09/06/2019)  Extraction of Lower Tooth, All, External Approach (09/06/2019)  Extraction of Upper Tooth, All, External Approach (09/06/2019)  Drainage of Right Neck Subcutaneous Tissue and Fascia, Open Approach (09/04/2019)  Insertion of Infusion Device into Superior Vena Cava, Percutaneous Approach (09/04/2019)  Bypass Trachea to Esophagus with Intraluminal Device, Open Approach (08/12/2019)  Excision of Right Thyroid Gland Lobe, Open Approach (08/12/2019)  Laryngectomy (None) (08/12/2019)  Laryngoscopy (None) (08/12/2019)  Neck Dissection (None) (08/12/2019)  PEC Flap (None) (08/12/2019)  Resection of Larynx, Open Approach (08/12/2019)  Resection of Left Neck Lymphatic, Open Approach (08/12/2019)  Resection of Right Neck Lymphatic, Open Approach (08/12/2019)  Thyroidectomy (ENT) (None) (08/12/2019)  Transesophageal Puncture (None) (08/12/2019)  Change Tracheostomy Device in Trachea, External Approach (07/14/2019)  Control Bleeding in Oral Cavity and Throat, Via Natural or Artificial Opening Endoscopic (07/14/2019)  Inspection of Larynx, Via Natural or  Artificial Opening Endoscopic (07/14/2019)  Tracheostomy Tube Change (07/14/2019)  Insertion of Feeding Device into Stomach, Via Natural or Artificial Opening Endoscopic (07/12/2019)  PEG Tube Insertion Initial (07/12/2019)  Bypass Trachea to Cutaneous with Tracheostomy Device, Open Approach (07/08/2019)  Excision of Right Vocal Cord, Via Natural or Artificial Opening Endoscopic, Diagnostic (07/08/2019)  Inspection of Tracheobronchial Tree, Via Natural or Artificial Opening Endoscopic (07/08/2019)  Inspection of Upper Intestinal Tract, Via Natural or Artificial Opening Endoscopic (07/08/2019)  Panendoscopy (None) (07/08/2019)  Tracheostomy (None) (07/08/2019)  Other incision with drainage of skin and subcutaneous tissue (05/31/2015)  CEIOL - Cataract extraction and insertion of intraocular lens  Excision of papilloma   Medications  acetaminophen-hydrocodone 325 mg-7.5 mg oral tablet, 1 tab(s), Oral, q6hr  albuterol-ipratropium 100 mcg-20 mcg/inh inhalation aerosol, 3 mL, NEB, TID, 6 refills  albuterol-ipratropium 2.5 mg-0.5 mg/3 mL inhalation solution, 3 mL, NEB, TID  aspirin 81 mg oral tablet, CHEWABLE, 81 mg= 1 tab(s), Oral, Daily, 3 refills  atorvastatin 40 mg oral tablet, 40 mg= 1 tab(s), Oral, Daily, 5 refills  carvedilol 3.125 mg oral tablet, 3.125 mg= 1 tab(s), Oral, BID, 5 refills  DME, See Instructions  gabapentin 300 mg oral capsule, 300 mg= 1 cap(s), Oral, BID, 5 refills  Glucometer, See Instructions  ibuprofen 800 mg oral tablet, 800 mg= 1 tab(s), Oral, BID  Lancets and Test Strips (Accucheck), See Instructions, 11 refills  Lasix 40 mg oral tablet, mo, Oral, Daily,? ?Investigating  lisinopril 40 mg oral tablet, 40 mg= 1 tab(s), Oral, Daily, 5 refills  metformin 500 mg oral tablet, 500 mg= 1 tab(s), Oral, BID, 5 refills  nebulizer tubing/mask, See Instructions  oxcarbazepine 300 mg oral tablet, 300 mg= 1 tab(s), Oral, BID  Suction supplies, See Instructions  Trach ties, See Instructions  Voltaren 1%  topical gel, 1 silvana, TOP, QID, PRN, 5 refills  Allergies  No Known Allergies  Social History  Abuse/Neglect  Yes, No, No, 06/16/2020  Alcohol - Denies Alcohol Use, 10/06/2012  Current, 1-2 times per year, 01/28/2020  Employment/School  Retired, 09/04/2019  Exercise  Exercise duration: 60. Exercise frequency: 3-4 times/week. Exercise type: Walking., 09/04/2019  Home/Environment  Lives with Significant other. Living situation: Home/Independent. Home equipment: na. Alcohol abuse in household: No. Substance abuse in household: No. Smoker in household: No. Injuries/Abuse/Neglect in household: No. Feels unsafe at home: No. Safe place to go: No. Agency(s)/Others notified: No. Family/Friends available for support: No. Concern for family members at home: No. Major illness in household: No. Financial concerns: No., 02/02/2013  Nutrition/Health  Diabetic, Fair, 01/28/2020  Sexual  Spiritual/Cultural  Yazidism, 01/28/2020  Substance Use - Denies Substance Abuse, 10/06/2012  Current, Marijuana, 1-2 times per week, 01/28/2020  Tobacco - Medium Risk, 10/06/2012  Former smoker, quit more than 30 days ago, N/A, 06/16/2020  Family History  Alcohol user: Sister.  Diabetes mellitus type 2: Mother and Sister.  Heart disease: Mother.  Tobacco user: Sister.  Immunizations  Vaccine Date Status   pneumococcal 13-valent conjugate vaccine 02/07/2020 Given   influenza virus vaccine, inactivated 01/28/2020 Given   tetanus/diphtheria/pertussis, acel(Tdap) 05/31/2015 Given   Health Maintenance  Health Maintenance  ???Pending?(in the next year)  ??? ??OverDue  ??? ? ? ?Coronary Artery Disease Maintenance-Antiplatelet Agent Prescribed due??and every?  ??? ? ? ?Coronary Artery Disease Maintenance-Lipid Lowering Therapy due??and every?  ??? ? ? ?Breast Cancer Screening due??11/17/19??and every 2??year(s)  ??? ??Due?  ??? ? ? ?Cervical Cancer Screening due??06/16/20??and every?  ??? ? ? ?Diabetes Maintenance-Foot Exam due??06/16/20??and every?  ??? ? ?  ?Hypertension Management-Education due??06/16/20??and every 1??year(s)  ??? ? ? ?Lung Cancer Screening due??06/16/20??and every 1??year(s)  ??? ? ? ?Medicare Annual Wellness Exam due??06/16/20??and every 1??year(s)  ??? ? ? ?Zoster Vaccine due??06/16/20??and every 100??year(s)  ??? ??Refused?  ??? ? ? ?Smoking Cessation due??01/01/20??and every 1??year(s)  ??? ??Due In Future?  ??? ? ? ?Diabetes Maintenance-Fasting Lipid Profile not due until??10/11/20??and every 1??year(s)  ??? ? ? ?ADL Screening not due until??10/28/20??and every 1??year(s)  ??? ? ? ?Aspirin Therapy for CVD Prevention not due until??12/23/20??and every 1??year(s)  ??? ? ? ?Alcohol Misuse Screening not due until??01/01/21??and every 1??year(s)  ??? ? ? ?Obesity Screening not due until??01/01/21??and every 1??year(s)  ??? ? ? ?Diabetes Maintenance-Eye Exam not due until??01/06/21??and every 1??year(s)  ??? ? ? ?Colorectal Screening not due until??03/03/21??and every 1??year(s)  ??? ? ? ?Diabetes Maintenance-HgbA1c not due until??06/15/21??and every 1??year(s)  ??? ? ? ?Hypertension Management-BMP not due until??06/15/21??and every 1??year(s)  ??? ? ? ?Diabetes Maintenance-Urine Dipstick not due until??06/15/21??and every 1??year(s)  ??? ? ? ?Diabetes Maintenance-Serum Creatinine not due until??06/15/21??and every 1??year(s)  ???Satisfied?(in the past 1 year)  ??? ??Satisfied?  ??? ? ? ?ADL Screening on??10/28/19.??Satisfied by Sydnee Burns LPN  ??? ? ? ?Alcohol Misuse Screening on??01/28/20.??Satisfied by Olga Larson LPN  ??? ? ? ?Aspirin Therapy for CVD Prevention on??12/23/19.??Satisfied by Vicenta Acevdeo MD  ??? ? ? ?Blood Pressure Screening on??06/16/20.??Satisfied by Marisa Haynes  ??? ? ? ?Body Mass Index Check on??06/16/20.??Satisfied by Marisa Haynes  ??? ? ? ?Colorectal Screening on??03/03/20.??Satisfied by Arin Moe  ??? ? ? ?Coronary Artery Disease Maintenance-Lipid Lowering Therapy  on??12/23/19.??Satisfied by Curtis AVILA, Vicenta FERRARA  ??? ? ? ?Depression Screening on??03/05/20.??Satisfied by Dariana Enriquez LPN  ??? ? ? ?Diabetes Maintenance-Urine Dipstick on??06/15/20.??Satisfied by Hilario Frank  ??? ? ? ?Diabetes Screening on??06/15/20.??Satisfied by Judy Zimmer  ??? ? ? ?Hypertension Management-Blood Pressure on??06/16/20.??Satisfied by Marisa Haynes  ??? ? ? ?Influenza Vaccine on??01/28/20.??Satisfied by Chandni Olivares  ??? ? ? ?Lipid Screening on??01/14/20.??Satisfied by [facility], [facility]  ??? ? ? ?Obesity Screening on??06/16/20.??Satisfied by Marisa Haynes  ??? ? ? ?Smoking Cessation on??07/17/19.??Satisfied by Ab PIÑA, Génesis Sanchez  ??? ??Refused?  ??? ? ? ?Smoking Cessation on??02/07/20.??Recorded by Marisa Haynes  ?      Mild leukopenia  Hematuria  Repeat UA, CBC next visit

## 2022-05-03 NOTE — HISTORICAL [facility] CERNER
This is a historical note converted from [employer]. Formatting and pictures may have been removed.  Please reference [employer] for original formatting and attached multimedia. Chief Complaint  Pt had an episode of unresponsiveness with some shaking that lasted approximately x2 minutes at about 4:06pm. . Left facial droop noted. Md notified.  Reason for Consultation  neck mass  History of Present Illness  65 yo F with PMHx of DM, HTN, T3N0M0 laryngeal Scca s/p TL, BND, Right hemithyroidectomy presenting to ED for left sided facial droop that is now resolved. ENT consulted due to concern for left neck mass overlaying carotid on Ct angio (official read pending).  ?  Patient currently undergoing XRT for her laryngeal disease. right supraclavicular area still with some tenderness and non-healing. Cellulitic anterior chest/ neck, stoma intact with akhil tube. notes her initial sx of presentation are now resolved.  Review of Systems  CONSTITUTIONAL: (-) fevers, chills,? night sweats, weight loss, fatigue  EYES: (-) changes in vision, blurry vision, diplopia, wears glasses, runny eyes  ENT: (+) as per HPI  CARDIOVASCULAR: (-) CP, SOB, palpitations, orthopnea, claudications, varicosities  RESPIRATORY:?(-) SOB, AQUINO, cough, chest congestion  GASTROINTESTINAL: (-) n/v/d, constipation, hematochezia, melena, hematemesis  GENITOURINARY: (-) dysuria, hematuria,?discharge, odor, anorexia  MUSCULOSKELETAL: (-) ROM restriction, joint pain  SKIN: (-) jaundice, pruritus, change in skin, hair or nails  NEUROLOGIC:?(+) paresthesias, fasciculations, seizures or weakness  PSYCHIATRIC: (-) mood swings, low mood, irrational behavior, SI, HI, hallucinations  ENDOCRINE:(-) heat or cold intolerance, polyuria, polydipsia, change in appetite, weight change  HEMATOLOGICAL:?(-) easy bruising or bleeding.?  Physical Exam  Vitals & Measurements  T:?36.9? ?C (Oral)? TMIN:?36.5? ?C (Oral)? TMAX:?37.2? ?C (Oral)? HR:?55(Peripheral)? RR:?20? BP:?174/70?  SpO2:?95%? WT:?96.8?kg? BMI:?32.72?  General: AAO NAD voice normal, aphonic, mouthing words  Neuro: CN II - XII grossly intact  Head/ Face: AT NC, symmetric, sensations intact bilaterally  Eye: EOMI PERRLA  Ears: externally normal with grossly normal hearing  Nose: bilateral nares patent, midline septum, no rhinorrhea, no external deformity, no turbinate hypertrophy  OC/OP:MMM, no intraoral lesions, FOM/BOT/ tongue?soft, no trismus, edentulous, symmetric soft palate elevation, palatoglossus and palatopharyngeal fold wnl  Indirect laryngoscopy: deferred due to patient intolerance,  Neck: XRT changes to the skin with mild burns, right clavicular head are with skin desquamation (previous I&D site); akhil tube in secured with trach ties, non-obstructed, clear secretions  Respiratory: nonlabored, no wheezing, bilateral chest rise  Cardiovascular: RRR  Gastrointestinal: S NT ND  Skin: warm, no lesions  MSK: 5/5 strength, (+) pulses  Psych: no depression  ?  ?  (10/11/2019 18:01 CDT CT Angio Neck W W/O Contrast)  Head CT with contrast:?  No interval changes when compared to the previous CT.  No enhancing abnormalities.  ?  If present, stenosis of the carotid bulbs is measured based on NASCET  criteria,?  i.e. area of maximal stenosis compared to the cervical ICA distal to  the bulb.  ?  Cervical CTA:?  ?  There are mild calcifications at the origins of the great vessels  remain patent.?  ?  The left common carotid artery is normal in caliber. There are mild  calcifications at the left carotid bulb without significant stenosis.  The left internal carotid artery is normal in caliber.  ?  The right common carotid artery is patent and normal in caliber. There  is soft and calcified and soft plaque at the right carotid bulb  without hemodynamic significant stenosis. The right internal carotid  artery is normal in caliber.  ?  The vertebral arteries are patent and normal caliber.  ?  Intracranial CTA:?  ?  There are  calcifications in the carotid siphons without hemodialysis  significant stenosis.  ?  The anterior cerebral arteries and middle cerebral arteries are  patent. There mild multifocal irregularity of the proximal right M2  branches.  ?  The vertebral arteries and basilar artery is patent and normal  caliber. There is mild irregularity of the bilateral posterior  cerebral arteries.  ?  The dural venous sinus.  ?  Additional findings:  * ?Postsurgical changes of total laryngectomy with tracheostomy place.  * ?Multiple hypodense nodules in the left thyroid gland measuring up  to 1.3 cm (series 7, image 44).  * ?There is progression of destructive changes at the right  sternoclavicular joint with increasing soft tissue in adjacent soft  tissue component measuring 3 x 4.1 cm (series 7, image 32 and 27).  * ?No drainable fluid collection.  * ?Mucosal edema in the oropharynx and hypopharynx.  * ?4 mm hypodensity in the right adenoid tonsils.  ?  ?  IMPRESSION:?  1. ?No large vessel occlusion. Patent vessels in the neck.  2. ?Mild multifocal irregularity of the proximal right M2 branches and  bilateral posterior cerebral arteries.  3. ?Progressive destructive changes at the right sternoclavicular  joint could represent infection. No drainable fluid collection  identified. [1]  Assessment/Plan  65 yo F with h/o of T3N0M0 laryngeal Scca s/p total laryngectomy, BND, and right hemithyroidectomy on 8/12/19 here with TIA. Did have incidental 0.2cm PTC.?On review of CT no concern for new masses. Normal left sided thyroid gland with cyst. There are changes to the right sternal head ?XRT vs malignancy vs infectious (low suspicion based on exam/ presentation)  - Will defer biopsy of right sternal head since patient undergoing active XRT, risk of wound complications  - No workup for thyroid cyst  - Provide humidified trach collar over patient akhil tube, RT care PRN  - Wound care to anterior neck/ chest: clean with warm soapy water and  cover?with aquaphor ointment  - Neuro work-up as per primary  - Patient currently undergoing XRT for her laryngeal scca. It it crucial to her prognosis that patient not miss/ break her XRT treatment usually scheduled everyday of the week. Please make efforts to plan discharge accordingly.  - ENT available for any assistance. Call with questions/ concerns.  ?  Stacey Wyatt MD  [facility] of Otolaryngology  HO III   Problem List/Past Medical History  Ongoing  Airway compromise  Cough(  Confirmed  )  DM (diabetes mellitus)(  Confirmed  )  Goiter(  Confirmed  )  Hepatitis C(  Confirmed  )  HTN (hypertension)(  Confirmed  )  Laryngeal mass  Laryngeal Mass  Swelling of head or neck(  Confirmed  )  Tobacco user  Tobacco user(  Probable Diagnosis  )  Historical  Arthritis  Back pain  Depression  Diabetes  Fibromyalgia  Hypertension  Neuropathy  Reflux  Sleep apnea  Smoker  Stroke  Urine incontinence  UTI (urinary tract infection)  Procedure/Surgical History  Dental Extraction (None) (09/06/2019)  Extraction of Lower Tooth, All, External Approach (09/06/2019)  Extraction of Upper Tooth, All, External Approach (09/06/2019)  Drainage of Right Neck Subcutaneous Tissue and Fascia, Open Approach (09/04/2019)  Insertion of Infusion Device into Superior Vena Cava, Percutaneous Approach (09/04/2019)  Bypass Trachea to Esophagus with Intraluminal Device, Open Approach (08/12/2019)  Excision of Right Thyroid Gland Lobe, Open Approach (08/12/2019)  Laryngectomy (None) (08/12/2019)  Laryngoscopy (None) (08/12/2019)  Neck Dissection (None) (08/12/2019)  PEC Flap (None) (08/12/2019)  Resection of Larynx, Open Approach (08/12/2019)  Resection of Left Neck Lymphatic, Open Approach (08/12/2019)  Resection of Right Neck Lymphatic, Open Approach (08/12/2019)  Thyroidectomy (ENT) (None) (08/12/2019)  Transesophageal Puncture (None) (08/12/2019)  Change Tracheostomy Device in Trachea, External Approach (07/14/2019)  Control  Bleeding in Oral Cavity and Throat, Via Natural or Artificial Opening Endoscopic (07/14/2019)  Inspection of Larynx, Via Natural or Artificial Opening Endoscopic (07/14/2019)  Tracheostomy Tube Change (07/14/2019)  Insertion of Feeding Device into Stomach, Via Natural or Artificial Opening Endoscopic (07/12/2019)  PEG Tube Insertion Initial (07/12/2019)  Bypass Trachea to Cutaneous with Tracheostomy Device, Open Approach (07/08/2019)  Excision of Right Vocal Cord, Via Natural or Artificial Opening Endoscopic, Diagnostic (07/08/2019)  Inspection of Tracheobronchial Tree, Via Natural or Artificial Opening Endoscopic (07/08/2019)  Inspection of Upper Intestinal Tract, Via Natural or Artificial Opening Endoscopic (07/08/2019)  Panendoscopy (None) (07/08/2019)  Tracheostomy (None) (07/08/2019)  Other incision with drainage of skin and subcutaneous tissue (05/31/2015)  CEIOL - Cataract extraction and insertion of intraocular lens  Excision of papilloma   Medications  Inpatient  Ativan 2 mg/mL injectable solution, 2 mg= 1 mL, IV Push, q1hr, PRN  atorvastatin 20 mg oral tablet, 10 mg= 0.5 tab(s), Oral, Daily  Colace 100 mg oral capsule, 100 mg= 1 cap(s), PEG Tube, BID  Dextrose 50% and Water (50 mL vial/syringe), 12.5 gm= 25 mL, IV Push, Once, PRN  Dextrose 50% and Water (50 mL vial/syringe), 12.5 gm= 25 mL, IV Push, As Directed, PRN  Dextrose 50% and Water (50 mL vial/syringe), 25 gm= 50 mL, IV Push, As Directed, PRN  Dextrose 50% in Water intravenous solution, 12.5 gm= 25 mL, IV Push, As Directed, PRN  DULoxetine 30 mg oral delayed release capsule, 60 mg= 2 cap(s), Oral, Daily  DuoNeb 0.5 mg-2.5 mg/3 mL inhalation solution, 3 mL, NEB, q6hr Resp  glucagon recombinant 1 mg injection, 1 mg= 1 EA, IM, q10min, PRN  glucagon recombinant 1 mg injection, 1 mg= 1 EA, IM, q10min, PRN  glucose 40% oral gel, 15 gm= 0.5 tube(s), Oral, As Directed, PRN  insulin lispro 100 units/mL subcutaneous injection, 2-14 units, Subcutaneous, As  Directed, PRN  IVF Normal Saline NS Bolus 250ml 250 mL, 250 mL, IV  IVF Normal Saline NS Infusion 1,000 mL, 1000 mL, IV  Lovenox, 40 mg= 0.4 mL, Subcutaneous, Daily  Home  albuterol-ipratropium 2.5 mg-0.5 mg/3 mL inhalation solution, 3 mL, NEB, q6hr  atorvastatin 10 mg oral tablet, 10 mg= 1 tab(s), Oral, Daily  carvedilol 3.125 mg oral tablet, 3.125 mg= 1 tab(s), Oral, BID  Colace 100 mg oral capsule, 100 mg= 1 cap(s), PEG Tube, BID  DME, See Instructions  DULoxetine 60 mg oral delayed release capsule, 60 mg= 1 cap(s), Oral, Daily  lisinopril 40 mg oral tablet, 40 mg= 1 tab(s), Oral, Daily  metformin 500 mg oral tablet, Daily  mirtazapine 45 mg oral tablet, 45 mg= 1 tab(s), Oral, qPM  nebulizer, See Instructions  nicotine 4 mg oral transmucosal lozenge, 4 mg= 1 lozenge(s), Transmucosal, q1hr,? ?Not Taking, Completed Rx  oxcarbazepine 300 mg oral tablet, 300 mg= 1 tab(s), Oral, BID  Suction supplies, See Instructions  Trach ties, See Instructions  traZODone 100 mg oral tablet, 100 mg= 1 tab(s), Oral, TID  Allergies  No Known Allergies  Social History  Abuse/Neglect  No, 10/11/2019  No, 09/04/2019  No, 09/03/2019  No, 08/15/2019  No, No, Yes, 08/12/2019  Alcohol - Denies Alcohol Use, 10/06/2012  Current, Wine, 1-2 times per year, Alcohol use interferes with work or home: No. Drinks more than intended: No. Others hurt by drinking: No. Ready to change: No. Household alcohol concerns: No., 04/23/2015  Employment/School  Retired, 09/04/2019  Exercise  Exercise duration: 60. Exercise frequency: 3-4 times/week. Exercise type: Walking., 09/04/2019  Home/Environment  Lives with Significant other. Living situation: Home/Independent. Home equipment: na. Alcohol abuse in household: No. Substance abuse in household: No. Smoker in household: No. Injuries/Abuse/Neglect in household: No. Feels unsafe at home: No. Safe place to go: No. Agency(s)/Others notified: No. Family/Friends available for support: No. Concern for family members  at home: No. Major illness in household: No. Financial concerns: No., 02/02/2013  Nutrition/Health  per peg, 07/24/2019  Sexual  Spiritual/Cultural  Oriental orthodox, 09/04/2019  Substance Use - Denies Substance Abuse, 10/06/2012  Marijuana, 1-2 times per week, 10/13/2017  Tobacco - Medium Risk, 10/06/2012  Former smoker, quit more than 30 days ago, N/A, 10/11/2019  Former smoker, quit more than 30 days ago, Cigarettes, N/A, 09/11/2019  Former smoker, quit more than 30 days ago, Cigarettes, No, 09/04/2019  Family History  Alcohol user: Sister.  Diabetes mellitus type 2: Mother and Sister.  Heart disease: Mother.  Tobacco user: Sister.  Immunizations  Vaccine Date Status   tetanus/diphtheria/pertussis, acel(Tdap) 05/31/2015 Given      [1]?CT Angio Neck W W/O Contrast; Ayse Leal MD 10/11/2019 18:01 CDT   I reviewed the history, exam, assessment, and plan in the resident?s note?and agree. ?I actively participated in this patient?s care and helped to formulate the plan of care.  ?

## 2022-05-12 DIAGNOSIS — C73 THYROID CANCER: Primary | ICD-10-CM

## 2022-05-19 NOTE — PROGRESS NOTES
Chief Complaint: No chief complaint on file.      HPI:  Patient is a 67-year-old female one-month follow-up for mixed urinary incontinence placed on oxybutynin 5 mg p.o. daily.  Today patient presents with mild relief of urinary incontinence patient is still waking up with saturated diapers.  Patient denies dysuria,  retention, gross hematuria, nocturia.      Plan:  Instructed patient to increase oxybutynin XL to 10 mg q.a.m. x2 months.  Instructed patient on timed voiding every 2-3 hours, double voiding, avoidance of bladder irritants such as alcohol citrus foods & drinks, chocolates, caffeinated drinks, energy drinks, spicey foods, sodas, increase water intake.    Allergies:  Review of patient's allergies indicates:  Not on File    Medications:  No current outpatient medications on file.     No current facility-administered medications for this visit.       Review of Systems:  General: No fever, chills, fatigability, or weight loss.  Skin: No rashes, itching, or changes in color or texture of skin.  Chest: Denies AQUINO, cyanosis, wheezing, cough, and sputum production.  Abdomen: Appetite fine. No weight loss. Denies diarrhea, abdominal pain, hematemesis, or blood in stool.  Musculoskeletal: No joint stiffness or swelling. Denies back pain.  : As above.  All other review of systems negative.    PMH:  No past medical history on file.    PSH:  No past surgical history on file.    FamHx:  No family history on file.    SocHx:  Social History     Social History Narrative    ** Merged History Encounter **            Physical Exam:  There were no vitals filed for this visit.  General: A&Ox3, no apparent distress, no deformities  Neck: No masses, normal thyroid  Lungs: CTA vlad, no use of accessory muscles  Heart: RRR, no arrhythmias  Abdomen: Soft, NT, ND, no masses, no hernias, no hepatosplenomegaly  Lymphatic: Neck and groin nodes negative  Skin: The skin is warm and dry. No jaundice.  Ext: No c/c/e.    Labs:  None      Imaging: None      Impression: Mixed incontinence      Plan:  Instructed patient to increase oxybutynin XL to 10 mg q.a.m. x2 months.  Instructed patient on timed voiding every 2-3 hours, double voiding, avoidance of bladder irritants such as alcohol citrus foods & drinks, chocolates, caffeinated drinks, energy drinks, spicey foods, sodas, increase water intake.

## 2022-05-20 ENCOUNTER — OFFICE VISIT (OUTPATIENT)
Dept: UROLOGY | Facility: CLINIC | Age: 67
End: 2022-05-20
Payer: MEDICARE

## 2022-05-20 VITALS
OXYGEN SATURATION: 95 % | SYSTOLIC BLOOD PRESSURE: 127 MMHG | RESPIRATION RATE: 18 BRPM | HEART RATE: 72 BPM | DIASTOLIC BLOOD PRESSURE: 84 MMHG | BODY MASS INDEX: 30.51 KG/M2 | TEMPERATURE: 98 F | HEIGHT: 67 IN | WEIGHT: 194.38 LBS

## 2022-05-20 DIAGNOSIS — N39.46 MIXED INCONTINENCE: Primary | ICD-10-CM

## 2022-05-20 PROCEDURE — 3074F PR MOST RECENT SYSTOLIC BLOOD PRESSURE < 130 MM HG: ICD-10-PCS | Mod: CPTII,,, | Performed by: NURSE PRACTITIONER

## 2022-05-20 PROCEDURE — 1160F RVW MEDS BY RX/DR IN RCRD: CPT | Mod: CPTII,,, | Performed by: NURSE PRACTITIONER

## 2022-05-20 PROCEDURE — 3288F PR FALLS RISK ASSESSMENT DOCUMENTED: ICD-10-PCS | Mod: CPTII,,, | Performed by: NURSE PRACTITIONER

## 2022-05-20 PROCEDURE — 3079F DIAST BP 80-89 MM HG: CPT | Mod: CPTII,,, | Performed by: NURSE PRACTITIONER

## 2022-05-20 PROCEDURE — 1159F MED LIST DOCD IN RCRD: CPT | Mod: CPTII,,, | Performed by: NURSE PRACTITIONER

## 2022-05-20 PROCEDURE — 3288F FALL RISK ASSESSMENT DOCD: CPT | Mod: CPTII,,, | Performed by: NURSE PRACTITIONER

## 2022-05-20 PROCEDURE — 1100F PR PT FALLS ASSESS DOC 2+ FALLS/FALL W/INJURY/YR: ICD-10-PCS | Mod: CPTII,,, | Performed by: NURSE PRACTITIONER

## 2022-05-20 PROCEDURE — 3008F PR BODY MASS INDEX (BMI) DOCUMENTED: ICD-10-PCS | Mod: CPTII,,, | Performed by: NURSE PRACTITIONER

## 2022-05-20 PROCEDURE — 3079F PR MOST RECENT DIASTOLIC BLOOD PRESSURE 80-89 MM HG: ICD-10-PCS | Mod: CPTII,,, | Performed by: NURSE PRACTITIONER

## 2022-05-20 PROCEDURE — 1160F PR REVIEW ALL MEDS BY PRESCRIBER/CLIN PHARMACIST DOCUMENTED: ICD-10-PCS | Mod: CPTII,,, | Performed by: NURSE PRACTITIONER

## 2022-05-20 PROCEDURE — 3008F BODY MASS INDEX DOCD: CPT | Mod: CPTII,,, | Performed by: NURSE PRACTITIONER

## 2022-05-20 PROCEDURE — 1100F PTFALLS ASSESS-DOCD GE2>/YR: CPT | Mod: CPTII,,, | Performed by: NURSE PRACTITIONER

## 2022-05-20 PROCEDURE — 3074F SYST BP LT 130 MM HG: CPT | Mod: CPTII,,, | Performed by: NURSE PRACTITIONER

## 2022-05-20 PROCEDURE — 99213 PR OFFICE/OUTPT VISIT, EST, LEVL III, 20-29 MIN: ICD-10-PCS | Mod: S$PBB,,, | Performed by: NURSE PRACTITIONER

## 2022-05-20 PROCEDURE — 99213 OFFICE O/P EST LOW 20 MIN: CPT | Mod: S$PBB,,, | Performed by: NURSE PRACTITIONER

## 2022-05-20 PROCEDURE — 99215 OFFICE O/P EST HI 40 MIN: CPT | Mod: PBBFAC | Performed by: NURSE PRACTITIONER

## 2022-05-20 PROCEDURE — 1159F PR MEDICATION LIST DOCUMENTED IN MEDICAL RECORD: ICD-10-PCS | Mod: CPTII,,, | Performed by: NURSE PRACTITIONER

## 2022-05-20 RX ORDER — AMLODIPINE BESYLATE 10 MG/1
10 TABLET ORAL DAILY
COMMUNITY
End: 2022-06-16 | Stop reason: SDUPTHER

## 2022-05-20 RX ORDER — NITROGLYCERIN 0.4 MG/1
0.4 TABLET SUBLINGUAL
COMMUNITY
Start: 2021-09-20

## 2022-05-20 RX ORDER — CARVEDILOL 3.12 MG/1
3.12 TABLET ORAL 2 TIMES DAILY
COMMUNITY
Start: 2022-03-30 | End: 2022-05-30

## 2022-05-20 RX ORDER — MECLIZINE HYDROCHLORIDE 25 MG/1
25 TABLET ORAL
COMMUNITY
Start: 2022-03-31

## 2022-05-20 RX ORDER — METFORMIN HYDROCHLORIDE 500 MG/1
500 TABLET ORAL 2 TIMES DAILY
COMMUNITY
Start: 2022-03-24 | End: 2022-09-26 | Stop reason: SDUPTHER

## 2022-05-20 RX ORDER — OXYBUTYNIN CHLORIDE 5 MG/1
5 TABLET, EXTENDED RELEASE ORAL DAILY
COMMUNITY
Start: 2022-04-08 | End: 2022-05-20 | Stop reason: DRUGHIGH

## 2022-05-20 RX ORDER — GABAPENTIN 600 MG/1
600 TABLET ORAL 3 TIMES DAILY
COMMUNITY
End: 2022-09-06 | Stop reason: SDUPTHER

## 2022-05-20 RX ORDER — LISINOPRIL 40 MG/1
40 TABLET ORAL DAILY
COMMUNITY
End: 2022-07-15

## 2022-05-20 RX ORDER — ONDANSETRON 4 MG/1
4 TABLET, ORALLY DISINTEGRATING ORAL 3 TIMES DAILY
COMMUNITY
Start: 2022-03-31

## 2022-05-20 RX ORDER — LEVOTHYROXINE SODIUM 100 UG/1
100 TABLET ORAL DAILY
COMMUNITY
Start: 2022-03-16 | End: 2022-07-13

## 2022-05-20 RX ORDER — FUROSEMIDE 40 MG/1
40 TABLET ORAL DAILY PRN
COMMUNITY
Start: 2021-11-15 | End: 2022-10-05

## 2022-05-20 RX ORDER — FAMOTIDINE 40 MG/1
40 TABLET, FILM COATED ORAL 2 TIMES DAILY
COMMUNITY
Start: 2022-03-10 | End: 2022-12-22 | Stop reason: SDUPTHER

## 2022-05-20 RX ORDER — ESCITALOPRAM OXALATE 10 MG/1
10 TABLET ORAL DAILY
COMMUNITY
Start: 2022-03-31 | End: 2023-01-17 | Stop reason: SDUPTHER

## 2022-05-20 RX ORDER — NAPROXEN SODIUM 220 MG/1
81 TABLET, FILM COATED ORAL DAILY
COMMUNITY

## 2022-05-20 RX ORDER — ISOSORBIDE MONONITRATE 30 MG/1
30 TABLET, EXTENDED RELEASE ORAL EVERY MORNING
COMMUNITY
Start: 2022-04-12 | End: 2023-08-18

## 2022-05-20 RX ORDER — FLUOXETINE HYDROCHLORIDE 40 MG/1
40 CAPSULE ORAL DAILY
COMMUNITY
End: 2023-01-17

## 2022-05-20 RX ORDER — OXYBUTYNIN CHLORIDE 10 MG/1
10 TABLET, EXTENDED RELEASE ORAL DAILY
Qty: 30 TABLET | Refills: 11 | Status: SHIPPED | OUTPATIENT
Start: 2022-05-20 | End: 2022-08-03 | Stop reason: DRUGHIGH

## 2022-05-20 RX ORDER — OXCARBAZEPINE 300 MG/1
300 TABLET, FILM COATED ORAL 2 TIMES DAILY
COMMUNITY
Start: 2022-03-08 | End: 2022-05-30

## 2022-05-20 NOTE — PROGRESS NOTES
Patient seen by ESTEBAN Laird. Increased patient oxybutynin prescription. RTC 2 months. Discharge instructions given verbal and written.

## 2022-05-23 ENCOUNTER — OFFICE VISIT (OUTPATIENT)
Dept: RHEUMATOLOGY | Facility: CLINIC | Age: 67
End: 2022-05-23
Payer: MEDICARE

## 2022-05-23 VITALS
TEMPERATURE: 97 F | OXYGEN SATURATION: 98 % | HEIGHT: 67 IN | WEIGHT: 199.38 LBS | RESPIRATION RATE: 16 BRPM | BODY MASS INDEX: 31.29 KG/M2 | HEART RATE: 63 BPM | SYSTOLIC BLOOD PRESSURE: 154 MMHG | DIASTOLIC BLOOD PRESSURE: 85 MMHG

## 2022-05-23 DIAGNOSIS — B18.2 HEPATITIS C VIRUS CARRIER STATE: ICD-10-CM

## 2022-05-23 DIAGNOSIS — M19.90 INFLAMMATORY ARTHRITIS: Primary | ICD-10-CM

## 2022-05-23 DIAGNOSIS — M79.7 FIBROMYALGIA SYNDROME: ICD-10-CM

## 2022-05-23 DIAGNOSIS — M54.40 LOW BACK PAIN WITH SCIATICA, SCIATICA LATERALITY UNSPECIFIED, UNSPECIFIED BACK PAIN LATERALITY, UNSPECIFIED CHRONICITY: ICD-10-CM

## 2022-05-23 DIAGNOSIS — N39.46 MIXED INCONTINENCE: ICD-10-CM

## 2022-05-23 PROBLEM — B19.20 HEPATITIS C VIRUS INFECTION: Status: ACTIVE | Noted: 2022-05-23

## 2022-05-23 PROCEDURE — 3288F PR FALLS RISK ASSESSMENT DOCUMENTED: ICD-10-PCS | Mod: CPTII,S$GLB,, | Performed by: INTERNAL MEDICINE

## 2022-05-23 PROCEDURE — 1159F PR MEDICATION LIST DOCUMENTED IN MEDICAL RECORD: ICD-10-PCS | Mod: CPTII,S$GLB,, | Performed by: INTERNAL MEDICINE

## 2022-05-23 PROCEDURE — 1100F PR PT FALLS ASSESS DOC 2+ FALLS/FALL W/INJURY/YR: ICD-10-PCS | Mod: CPTII,S$GLB,, | Performed by: INTERNAL MEDICINE

## 2022-05-23 PROCEDURE — 99204 OFFICE O/P NEW MOD 45 MIN: CPT | Mod: S$GLB,,, | Performed by: INTERNAL MEDICINE

## 2022-05-23 PROCEDURE — 3008F PR BODY MASS INDEX (BMI) DOCUMENTED: ICD-10-PCS | Mod: CPTII,S$GLB,, | Performed by: INTERNAL MEDICINE

## 2022-05-23 PROCEDURE — 99999 PR PBB SHADOW E&M-EST. PATIENT-LVL IV: CPT | Mod: PBBFAC,,, | Performed by: INTERNAL MEDICINE

## 2022-05-23 PROCEDURE — 99999 PR PBB SHADOW E&M-EST. PATIENT-LVL IV: ICD-10-PCS | Mod: PBBFAC,,, | Performed by: INTERNAL MEDICINE

## 2022-05-23 PROCEDURE — 3008F BODY MASS INDEX DOCD: CPT | Mod: CPTII,S$GLB,, | Performed by: INTERNAL MEDICINE

## 2022-05-23 PROCEDURE — 1100F PTFALLS ASSESS-DOCD GE2>/YR: CPT | Mod: CPTII,S$GLB,, | Performed by: INTERNAL MEDICINE

## 2022-05-23 PROCEDURE — 99204 PR OFFICE/OUTPT VISIT, NEW, LEVL IV, 45-59 MIN: ICD-10-PCS | Mod: S$GLB,,, | Performed by: INTERNAL MEDICINE

## 2022-05-23 PROCEDURE — 3288F FALL RISK ASSESSMENT DOCD: CPT | Mod: CPTII,S$GLB,, | Performed by: INTERNAL MEDICINE

## 2022-05-23 PROCEDURE — 1159F MED LIST DOCD IN RCRD: CPT | Mod: CPTII,S$GLB,, | Performed by: INTERNAL MEDICINE

## 2022-05-23 RX ORDER — OMEPRAZOLE 40 MG/1
40 CAPSULE, DELAYED RELEASE ORAL DAILY
Qty: 30 CAPSULE | Refills: 11 | Status: SHIPPED | OUTPATIENT
Start: 2022-05-23 | End: 2022-09-06 | Stop reason: SDUPTHER

## 2022-05-23 RX ORDER — HYDROXYCHLOROQUINE SULFATE 200 MG/1
200 TABLET, FILM COATED ORAL 2 TIMES DAILY
Qty: 60 TABLET | Refills: 5 | Status: SHIPPED | OUTPATIENT
Start: 2022-05-23 | End: 2022-09-06 | Stop reason: SDUPTHER

## 2022-05-23 RX ORDER — CYCLOBENZAPRINE HCL 10 MG
10 TABLET ORAL NIGHTLY
Qty: 30 TABLET | Refills: 5 | Status: SHIPPED | OUTPATIENT
Start: 2022-05-23 | End: 2022-09-06 | Stop reason: SDUPTHER

## 2022-05-23 RX ORDER — HYDROCODONE BITARTRATE AND ACETAMINOPHEN 10; 325 MG/1; MG/1
1 TABLET ORAL 2 TIMES DAILY PRN
Qty: 60 TABLET | Refills: 0 | Status: SHIPPED | OUTPATIENT
Start: 2022-05-23 | End: 2022-06-22

## 2022-05-23 NOTE — PROGRESS NOTES
"Subjective:       Patient ID: Laura Freeman is a 67 y.o. female. Referred by her PMD    Chief Complaint: New Patient (C/o pain in her lower back and lateral side of her Lt knee. Patient was referred by Vicenta Acevedo MD. )    Pt  is complaining of joint pain involving his MCP PIP wrist elbow shoulders hips knees and ankles bilaterally.  Is 10/10 in intensity dull in quality and continuous.  It is associated with a morning stiffness lasting for more than 60 minutes. Pt reports having difficulty maintaining a good night of sleep and this has been associated with myalgia of 10/10 in intensity.  This pain is dull continuous and gets worse mainly at night.  It is associated with fatigue.  No fever no chills no others.      Review of Systems   Constitutional: Negative for appetite change, chills and fever.   HENT: Negative for congestion, ear pain, mouth sores, nosebleeds and trouble swallowing.    Eyes: Negative for photophobia and discharge.   Respiratory: Negative for chest tightness and shortness of breath.    Cardiovascular: Negative for chest pain.   Gastrointestinal: Negative for abdominal pain and vomiting.   Endocrine: Negative.    Genitourinary: Negative for hematuria.   Musculoskeletal:        As per HPI   Skin: Negative for rash.   Neurological: Negative for weakness.         Objective:   BP (!) 154/85 (BP Location: Right arm, Patient Position: Sitting, BP Method: Large (Automatic))   Pulse 63   Temp 97.2 °F (36.2 °C) (Temporal)   Resp 16   Ht 5' 7" (1.702 m)   Wt 90.4 kg (199 lb 6.4 oz)   LMP  (LMP Unknown)   SpO2 98%   BMI 31.23 kg/m²      Physical Exam   Constitutional: She is oriented to person, place, and time. She appears well-developed and well-nourished. No distress.   HENT:   Head: Normocephalic and atraumatic.   Right Ear: External ear normal.   Left Ear: External ear normal.   Eyes: Pupils are equal, round, and reactive to light.   Neck:   TRACHEOSTOMY IN PLACE    Cardiovascular: Normal " rate, regular rhythm and normal heart sounds.   Pulmonary/Chest: Breath sounds normal.   Abdominal: Soft. There is no abdominal tenderness.   Musculoskeletal:      Right shoulder: Tenderness present.      Left shoulder: Tenderness present.      Right elbow: Tenderness present.      Left elbow: Tenderness present.      Right wrist: Tenderness present.      Left wrist: Tenderness present.      Cervical back: Neck supple.      Right hip: Tenderness present.      Left hip: Tenderness present.      Right knee: Tenderness present.      Left knee: Tenderness present.      Right ankle: Tenderness present.      Left ankle: Tenderness present.   Lymphadenopathy:     She has no cervical adenopathy.   Neurological: She is alert and oriented to person, place, and time. She displays normal reflexes. No cranial nerve deficit or sensory deficit. She exhibits normal muscle tone. Coordination normal.   Skin: No rash noted. No erythema.   Vitals reviewed.      Right Side Rheumatological Exam     The patient is tender to palpation of the shoulder, elbow, wrist, knee, 1st PIP, 1st MCP, 2nd PIP, 2nd MCP, 3rd PIP, 3rd MCP, 4th PIP, 4th MCP, 5th PIP, hip, ankle, 1st MTP, 2nd MTP, 3rd MTP, 4th MTP, 5th MTP, 1st toe IP, 2nd toe IP, 3rd toe IP, 4th toe IP and 5th toe IP    Left Side Rheumatological Exam     The patient is tender to palpation of the shoulder, elbow, wrist, knee, 1st PIP, 1st MCP, 2nd PIP, 2nd MCP, 3rd PIP, 3rd MCP, 4th PIP, 4th MCP, 5th PIP, 5th MCP, hip, ankle, 1st MTP, 2nd MTP, 3rd MTP, 4th MTP, 5th MTP, 1st toe IP, 2nd toe IP, 3rd toe IP, 4th toe IP and 5th toe IP.         Completed Fibromyalgia exam 18/18 tender points.  No data to display     Assessment:       1. Inflammatory arthritis    2. Fibromyalgia syndrome    3. Mixed incontinence    4. Hepatitis C virus carrier state    5. Low back pain with sciatica, sciatica laterality unspecified, unspecified back pain laterality, unspecified chronicity            Plan:        Problem List Items Addressed This Visit        Renal/    Mixed incontinence    Relevant Medications    hydrOXYchloroQUINE (PLAQUENIL) 200 mg tablet    cyclobenzaprine (FLEXERIL) 10 MG tablet    HYDROcodone-acetaminophen (NORCO)  mg per tablet    omeprazole (PRILOSEC) 40 MG capsule    Other Relevant Orders    CBC Auto Differential    Comprehensive Metabolic Panel    CRP, High Sensitivity    Vitamin D    Rheumatoid Quantitative    Cyclic Citrullinated Peptide Antibody, IgG    Antinuclear Ab, HEp-2 Substrate    Hepatitis B Surface Antigen    Hepatitis C Antibody    TSH    T4, Free       GI    Hepatitis C virus infection    Relevant Medications    hydrOXYchloroQUINE (PLAQUENIL) 200 mg tablet    cyclobenzaprine (FLEXERIL) 10 MG tablet    HYDROcodone-acetaminophen (NORCO)  mg per tablet    omeprazole (PRILOSEC) 40 MG capsule    Other Relevant Orders    CBC Auto Differential    Comprehensive Metabolic Panel    CRP, High Sensitivity    Vitamin D    Rheumatoid Quantitative    Cyclic Citrullinated Peptide Antibody, IgG    Antinuclear Ab, HEp-2 Substrate    Hepatitis B Surface Antigen    Hepatitis C Antibody    TSH    T4, Free       Orthopedic    Low back pain    Relevant Medications    hydrOXYchloroQUINE (PLAQUENIL) 200 mg tablet    cyclobenzaprine (FLEXERIL) 10 MG tablet    HYDROcodone-acetaminophen (NORCO)  mg per tablet    omeprazole (PRILOSEC) 40 MG capsule    Other Relevant Orders    CBC Auto Differential    Comprehensive Metabolic Panel    CRP, High Sensitivity    Vitamin D    Rheumatoid Quantitative    Cyclic Citrullinated Peptide Antibody, IgG    Antinuclear Ab, HEp-2 Substrate    Hepatitis B Surface Antigen    Hepatitis C Antibody    TSH    T4, Free      Other Visit Diagnoses     Inflammatory arthritis    -  Primary    Relevant Medications    hydrOXYchloroQUINE (PLAQUENIL) 200 mg tablet    cyclobenzaprine (FLEXERIL) 10 MG tablet    HYDROcodone-acetaminophen (NORCO)  mg per tablet     omeprazole (PRILOSEC) 40 MG capsule    Other Relevant Orders    CBC Auto Differential    Comprehensive Metabolic Panel    CRP, High Sensitivity    Vitamin D    Rheumatoid Quantitative    Cyclic Citrullinated Peptide Antibody, IgG    Antinuclear Ab, HEp-2 Substrate    Hepatitis B Surface Antigen    Hepatitis C Antibody    TSH    T4, Free    Fibromyalgia syndrome        Relevant Medications    hydrOXYchloroQUINE (PLAQUENIL) 200 mg tablet    cyclobenzaprine (FLEXERIL) 10 MG tablet    HYDROcodone-acetaminophen (NORCO)  mg per tablet    omeprazole (PRILOSEC) 40 MG capsule    Other Relevant Orders    CBC Auto Differential    Comprehensive Metabolic Panel    CRP, High Sensitivity    Vitamin D    Rheumatoid Quantitative    Cyclic Citrullinated Peptide Antibody, IgG    Antinuclear Ab, HEp-2 Substrate    Hepatitis B Surface Antigen    Hepatitis C Antibody    TSH    T4, Free

## 2022-05-24 ENCOUNTER — TELEPHONE (OUTPATIENT)
Dept: GYNECOLOGY | Facility: CLINIC | Age: 67
End: 2022-05-24
Payer: MEDICARE

## 2022-05-24 DIAGNOSIS — E13.9 OTHER SPECIFIED DIABETES MELLITUS WITHOUT COMPLICATION, WITHOUT LONG-TERM CURRENT USE OF INSULIN: Primary | ICD-10-CM

## 2022-05-24 RX ORDER — LANCETS
EACH MISCELLANEOUS
Qty: 100 EACH | Refills: 3 | Status: SHIPPED | OUTPATIENT
Start: 2022-05-24 | End: 2023-02-20 | Stop reason: SDUPTHER

## 2022-05-24 NOTE — TELEPHONE ENCOUNTER
Patient advised she needs face to face before we fill out order on DME supplies. Patient states she is out and having to use other peoples stuff. Her appt is on 6/16, please advise

## 2022-05-24 NOTE — TELEPHONE ENCOUNTER
Patient advised appt is on 6/16 and we will fill out paperwork at that time. Verbalized understanding

## 2022-05-26 ENCOUNTER — LAB VISIT (OUTPATIENT)
Dept: LAB | Facility: HOSPITAL | Age: 67
End: 2022-05-26
Attending: INTERNAL MEDICINE
Payer: MEDICARE

## 2022-05-26 DIAGNOSIS — M79.7 FIBROMYALGIA SYNDROME: ICD-10-CM

## 2022-05-26 DIAGNOSIS — Z79.899 ENCOUNTER FOR LONG-TERM (CURRENT) USE OF OTHER MEDICATIONS: ICD-10-CM

## 2022-05-26 DIAGNOSIS — M54.40 LOW BACK PAIN WITH SCIATICA, SCIATICA LATERALITY UNSPECIFIED, UNSPECIFIED BACK PAIN LATERALITY, UNSPECIFIED CHRONICITY: ICD-10-CM

## 2022-05-26 DIAGNOSIS — B18.2 HEPATITIS C VIRUS CARRIER STATE: ICD-10-CM

## 2022-05-26 DIAGNOSIS — B19.20 HEPATITIS C VIRUS INFECTION WITHOUT HEPATIC COMA, UNSPECIFIED CHRONICITY: ICD-10-CM

## 2022-05-26 DIAGNOSIS — M19.90 INFLAMMATORY ARTHRITIS: Primary | ICD-10-CM

## 2022-05-26 DIAGNOSIS — Z11.59 NEED FOR HEPATITIS B SCREENING TEST: ICD-10-CM

## 2022-05-26 DIAGNOSIS — M19.90 INFLAMMATORY ARTHRITIS: ICD-10-CM

## 2022-05-26 DIAGNOSIS — N39.46 MIXED INCONTINENCE: ICD-10-CM

## 2022-05-26 LAB
ALBUMIN SERPL-MCNC: 3.6 GM/DL (ref 3.4–4.8)
ALBUMIN/GLOB SERPL: 1.1 RATIO (ref 1.1–2)
ALP SERPL-CCNC: 66 UNIT/L (ref 40–150)
ALT SERPL-CCNC: 13 UNIT/L (ref 0–55)
AST SERPL-CCNC: 17 UNIT/L (ref 5–34)
BILIRUBIN DIRECT+TOT PNL SERPL-MCNC: 0.3 MG/DL
BUN SERPL-MCNC: 11.4 MG/DL (ref 9.8–20.1)
CALCIUM SERPL-MCNC: 9.2 MG/DL (ref 8.4–10.2)
CHLORIDE SERPL-SCNC: 108 MMOL/L (ref 98–107)
CO2 SERPL-SCNC: 28 MMOL/L (ref 23–31)
CREAT SERPL-MCNC: 0.78 MG/DL (ref 0.55–1.02)
CRP SERPL HS-MCNC: 2.34 MG/L
DEPRECATED CALCIDIOL+CALCIFEROL SERPL-MC: 45.8 NG/ML (ref 30–80)
GLOBULIN SER-MCNC: 3.2 GM/DL (ref 2.4–3.5)
GLUCOSE SERPL-MCNC: 75 MG/DL (ref 82–115)
POTASSIUM SERPL-SCNC: 4.9 MMOL/L (ref 3.5–5.1)
PROT SERPL-MCNC: 6.8 GM/DL (ref 5.8–7.6)
RHEUMATOID FACT SERPL-ACNC: <13 IU/ML
SODIUM SERPL-SCNC: 141 MMOL/L (ref 136–145)
T4 FREE SERPL-MCNC: 1.08 NG/DL (ref 0.7–1.48)
TSH SERPL-ACNC: 0.29 UIU/ML (ref 0.35–4.94)

## 2022-05-26 PROCEDURE — 80053 COMPREHEN METABOLIC PANEL: CPT

## 2022-05-26 PROCEDURE — 36415 COLL VENOUS BLD VENIPUNCTURE: CPT

## 2022-05-26 PROCEDURE — 82306 VITAMIN D 25 HYDROXY: CPT

## 2022-05-26 PROCEDURE — 86141 C-REACTIVE PROTEIN HS: CPT

## 2022-05-26 PROCEDURE — 86200 CCP ANTIBODY: CPT

## 2022-05-26 PROCEDURE — 84439 ASSAY OF FREE THYROXINE: CPT

## 2022-05-26 PROCEDURE — 86431 RHEUMATOID FACTOR QUANT: CPT

## 2022-05-26 PROCEDURE — 84443 ASSAY THYROID STIM HORMONE: CPT

## 2022-05-28 LAB — CCP IGG SERPL-ACNC: 3 UNITS

## 2022-06-08 ENCOUNTER — CLINICAL SUPPORT (OUTPATIENT)
Dept: REHABILITATION | Facility: HOSPITAL | Age: 67
End: 2022-06-08
Attending: OTOLARYNGOLOGY
Payer: MEDICARE

## 2022-06-08 DIAGNOSIS — R49.1 APHONIA: ICD-10-CM

## 2022-06-08 DIAGNOSIS — C32.9 LARYNGEAL CANCER: Primary | ICD-10-CM

## 2022-06-08 DIAGNOSIS — C32.9 LARYNGEAL CANCER: ICD-10-CM

## 2022-06-08 PROCEDURE — L8509 TRACH-ESOPH VOICE PROS MD IN: HCPCS

## 2022-06-08 PROCEDURE — A7520 TRACH/LARYN TUBE NON-CUFFED: HCPCS

## 2022-06-08 PROCEDURE — 92597 ORAL SPEECH DEVICE EVAL: CPT

## 2022-06-08 NOTE — PROGRESS NOTES
[facility] AND [facility]  Speech Therapy Evaluation   Laryngectomy  Date: 6/8/2022     Name: Laura Freeman   MRN: [id number]    Therapy Diagnosis: S/P total laryngectomy  Physician: Rowdy Wu MD    Time In:  1300   Time Out:  1325     Procedure Min.   Prosthesis Evaluation  25     Total Untimed Units: 25  Charges Billed/# of units: 1    Precautions: Standard  Subjective    Chief Complaint: It gets stopped up and I can't talk    AFFECTnormal affect    Pain:   0/10  Pain Location / Description: NA  Current Medical History: Laura Freeman is a 67 y.o. female referred by Dr. Wu for TEP management to maximize alaryngeal communication without respiratory compromise.    Past Medical History:   Past Medical History:   Diagnosis Date    Airway compromise     Hypertension     Laryngeal cancer     Thyroid cancer     Type 2 diabetes mellitus with unspecified diabetic retinopathy without macular edema     Laura Freemna  has a past surgical history that includes dental extraction (09/06/2019); Multiple tooth extractions (Bilateral, 09/06/2019); excision of papilloma; ceiol - cataract extraction and insertion of introcular lens; I&D of skin and subcutaneous tissue (05/31/2015); Tracheostomy (07/08/2019); Panendoscopy (07/08/2019); excision of right vocal cord (07/08/2019); bypass trachea to cutaneous with tracheostomy device (07/08/2019); PEG tube insertion initial (07/12/2019); insertion of feeding device into stomach (07/12/2019); Tracheostomy tube change (07/14/2019); Thyroidectomy (08/12/2019); excision of right thryoid gland lobe (08/12/2019); Laryngectomy (08/12/2019); Laryngoscopy (08/12/2019); Dissection of neck (08/12/2019); PEC flap (08/12/2019); and resection of larynx (08/12/2019).     Additionally history: T3N0M0 Stage III Supraglottis and T1N0M0 Stage 1 Thyroid. Patient has 2 synchronous primaries.     Medical Hx and Allergies: Laura has a current medication list which includes the following  "prescription(s): amlodipine, aspirin, blood sugar diagnostic, carvedilol, cyclobenzaprine, escitalopram oxalate, famotidine, fluoxetine, furosemide, gabapentin, hydrocodone-acetaminophen, hydroxychloroquine, isosorbide mononitrate, lancets, levothyroxine, lisinopril, meclizine, metformin, nitroglycerin, omeprazole, ondansetron, oxcarbazepine, oxcarbazepine, and oxybutynin. Review of patient's allergies indicates:  No Known Allergies    Current Voice Function: alaryngeal communication via TEP   Current Level of Swallow Function/Complaints:  No complaints of dysphagia  Prior Therapy:  02/11/2022    TEP ASSESSMENT   Initial Fitting: no    Re-fitting:no    TEP Initial Fitting Date: 08/12/2019    TEP Current Status: Ms Freeman is currently wearing a 17FR, 6mm Provox Johnson placed on 02/11/2022. She additionally wears a 10/36 fenestrated akhil tube and push to talk HME system    TEP Patient Complaints:strained voicing and "clogged" prosthesis    TEP Accessories: 0/36 fenestrated akhil tube and push to talk HME system    Stoma Size:  adequated     Lymphedema:  no    TEP Fitting/Re-sizing:     Removed current TEP: yes moderate biofilm noted on esophageal phalange and valve    TEP Removed catheter : no     TEP Sizing:yes 6mm    Puncture Dilation: no     TEP Prosthesis Placed:yes 17FR, 6mm Provox Johnson    TEP Voicing with Open Tract:no     TEP Voicing with Prosthesis:yes good voicing noted immediately    TEP Leaking through Prothesis:no     TEP Leaking around Prosthesis:no       Treatment   Total Treatment Time Separate from Evaluation: n/a   no treatment performed 2/2 time to complete evaluation.    Education: Plan of Care, role of SLP in care and TEP, stoma care and RRC provided in case of prosthesis dislodgement were discussed with pt. Patient expressed understanding.     Assessment       LongTerm Goals:  Current Progress:   1.  Patient will utilize alaryngeal communication via TEP to express needs and desires without respiratory " compromise >90% of the time.  Initial       Short Term Goals:  Current Progress:   1. Patient will demonstrate care and use of HME system for maximum pulmonary benefit >90% of the time. Initial   2.  Patient will demonstrate adequate care and use of TEP to maintain TEP voicing >90% of the time.  Initial   3.  Patient will demonstrate adequate occlusion and cleaning of TEP to maintain voicing >90% of the time.  Initial   4. Patient will increase laryngectomy tube usage daily or PRN to maintain and increase stomal patency.  Initial     Ms. 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  She is currently utilizing esophageal speech via a voice prosthesis for alaryngeal communication for functional communication with her family, friends, medical providers, and others to perform her daily life activities.  Ms. Freeman requires the use of a laryngectomy tube at all times to keep the airway patent and prevent stomal stenosis. HMEs are required daily for mucus management and pulmonary optimization .     Plan     SLP intervention is warranted 1-2 times a month or PRN for communication needs for a minimum of 1 year due to the chronic nature of the impairment.     Additional Information     Lexy Price MS, CCC-SLP  6/8/2022

## 2022-06-16 ENCOUNTER — LAB VISIT (OUTPATIENT)
Dept: LAB | Facility: HOSPITAL | Age: 67
End: 2022-06-16
Attending: INTERNAL MEDICINE
Payer: MEDICARE

## 2022-06-16 ENCOUNTER — OFFICE VISIT (OUTPATIENT)
Dept: GYNECOLOGY | Facility: CLINIC | Age: 67
End: 2022-06-16
Payer: MEDICARE

## 2022-06-16 VITALS
RESPIRATION RATE: 18 BRPM | HEIGHT: 67 IN | WEIGHT: 201.5 LBS | HEART RATE: 78 BPM | TEMPERATURE: 98 F | DIASTOLIC BLOOD PRESSURE: 68 MMHG | BODY MASS INDEX: 31.63 KG/M2 | SYSTOLIC BLOOD PRESSURE: 104 MMHG

## 2022-06-16 DIAGNOSIS — Z11.59 NEED FOR HEPATITIS B SCREENING TEST: ICD-10-CM

## 2022-06-16 DIAGNOSIS — I10 HYPERTENSION, UNSPECIFIED TYPE: ICD-10-CM

## 2022-06-16 DIAGNOSIS — M54.9 BACK PAIN, UNSPECIFIED BACK LOCATION, UNSPECIFIED BACK PAIN LATERALITY, UNSPECIFIED CHRONICITY: ICD-10-CM

## 2022-06-16 DIAGNOSIS — M79.7 FIBROMYALGIA SYNDROME: ICD-10-CM

## 2022-06-16 DIAGNOSIS — E11.69 TYPE 2 DIABETES MELLITUS WITH OTHER SPECIFIED COMPLICATION, UNSPECIFIED WHETHER LONG TERM INSULIN USE: ICD-10-CM

## 2022-06-16 DIAGNOSIS — M19.90 ARTHRITIS: Primary | ICD-10-CM

## 2022-06-16 DIAGNOSIS — F41.9 ANXIETY: ICD-10-CM

## 2022-06-16 DIAGNOSIS — B18.2 HEPATITIS C VIRUS CARRIER STATE: ICD-10-CM

## 2022-06-16 DIAGNOSIS — Z79.899 ENCOUNTER FOR LONG-TERM (CURRENT) USE OF OTHER MEDICATIONS: ICD-10-CM

## 2022-06-16 DIAGNOSIS — M19.90 INFLAMMATORY ARTHRITIS: ICD-10-CM

## 2022-06-16 DIAGNOSIS — E13.9 OTHER SPECIFIED DIABETES MELLITUS WITHOUT COMPLICATION, WITHOUT LONG-TERM CURRENT USE OF INSULIN: ICD-10-CM

## 2022-06-16 DIAGNOSIS — B19.20 HEPATITIS C VIRUS INFECTION WITHOUT HEPATIC COMA, UNSPECIFIED CHRONICITY: ICD-10-CM

## 2022-06-16 LAB
BASOPHILS # BLD AUTO: 0.02 X10(3)/MCL (ref 0–0.2)
BASOPHILS NFR BLD AUTO: 0.6 %
EOSINOPHIL # BLD AUTO: 0.14 X10(3)/MCL (ref 0–0.9)
EOSINOPHIL NFR BLD AUTO: 4.3 %
ERYTHROCYTE [DISTWIDTH] IN BLOOD BY AUTOMATED COUNT: 13.9 % (ref 11.5–17)
HCT VFR BLD AUTO: 38.5 % (ref 37–47)
HGB BLD-MCNC: 12.3 GM/DL (ref 12–16)
IMM GRANULOCYTES # BLD AUTO: 0 X10(3)/MCL (ref 0–0.02)
IMM GRANULOCYTES NFR BLD AUTO: 0 % (ref 0–0.43)
LYMPHOCYTES # BLD AUTO: 1.13 X10(3)/MCL (ref 0.6–4.6)
LYMPHOCYTES NFR BLD AUTO: 34.3 %
MCH RBC QN AUTO: 28.6 PG (ref 27–31)
MCHC RBC AUTO-ENTMCNC: 31.9 MG/DL (ref 33–36)
MCV RBC AUTO: 89.5 FL (ref 80–94)
MONOCYTES # BLD AUTO: 0.42 X10(3)/MCL (ref 0.1–1.3)
MONOCYTES NFR BLD AUTO: 12.8 %
NEUTROPHILS # BLD AUTO: 1.6 X10(3)/MCL (ref 2.1–9.2)
NEUTROPHILS NFR BLD AUTO: 48 %
NRBC BLD AUTO-RTO: 0 %
PLATELET # BLD AUTO: 319 X10(3)/MCL (ref 130–400)
PMV BLD AUTO: 9.2 FL (ref 9.4–12.4)
RBC # BLD AUTO: 4.3 X10(6)/MCL (ref 4.2–5.4)
WBC # SPEC AUTO: 3.3 X10(3)/MCL (ref 4.5–11.5)

## 2022-06-16 PROCEDURE — 3008F PR BODY MASS INDEX (BMI) DOCUMENTED: ICD-10-PCS | Mod: CPTII,,, | Performed by: FAMILY MEDICINE

## 2022-06-16 PROCEDURE — 1101F PR PT FALLS ASSESS DOC 0-1 FALLS W/OUT INJ PAST YR: ICD-10-PCS | Mod: CPTII,,, | Performed by: FAMILY MEDICINE

## 2022-06-16 PROCEDURE — 3008F BODY MASS INDEX DOCD: CPT | Mod: CPTII,,, | Performed by: FAMILY MEDICINE

## 2022-06-16 PROCEDURE — 1159F MED LIST DOCD IN RCRD: CPT | Mod: CPTII,,, | Performed by: FAMILY MEDICINE

## 2022-06-16 PROCEDURE — 4010F ACE/ARB THERAPY RXD/TAKEN: CPT | Mod: CPTII,,, | Performed by: FAMILY MEDICINE

## 2022-06-16 PROCEDURE — 3074F PR MOST RECENT SYSTOLIC BLOOD PRESSURE < 130 MM HG: ICD-10-PCS | Mod: CPTII,,, | Performed by: FAMILY MEDICINE

## 2022-06-16 PROCEDURE — 99214 PR OFFICE/OUTPT VISIT, EST, LEVL IV, 30-39 MIN: ICD-10-PCS | Mod: S$PBB,,, | Performed by: FAMILY MEDICINE

## 2022-06-16 PROCEDURE — 4010F PR ACE/ARB THEARPY RXD/TAKEN: ICD-10-PCS | Mod: CPTII,,, | Performed by: FAMILY MEDICINE

## 2022-06-16 PROCEDURE — 1101F PT FALLS ASSESS-DOCD LE1/YR: CPT | Mod: CPTII,,, | Performed by: FAMILY MEDICINE

## 2022-06-16 PROCEDURE — 3288F PR FALLS RISK ASSESSMENT DOCUMENTED: ICD-10-PCS | Mod: CPTII,,, | Performed by: FAMILY MEDICINE

## 2022-06-16 PROCEDURE — 99214 OFFICE O/P EST MOD 30 MIN: CPT | Mod: S$PBB,,, | Performed by: FAMILY MEDICINE

## 2022-06-16 PROCEDURE — 36415 COLL VENOUS BLD VENIPUNCTURE: CPT

## 2022-06-16 PROCEDURE — 3288F FALL RISK ASSESSMENT DOCD: CPT | Mod: CPTII,,, | Performed by: FAMILY MEDICINE

## 2022-06-16 PROCEDURE — 3078F DIAST BP <80 MM HG: CPT | Mod: CPTII,,, | Performed by: FAMILY MEDICINE

## 2022-06-16 PROCEDURE — 3074F SYST BP LT 130 MM HG: CPT | Mod: CPTII,,, | Performed by: FAMILY MEDICINE

## 2022-06-16 PROCEDURE — 85025 COMPLETE CBC W/AUTO DIFF WBC: CPT

## 2022-06-16 PROCEDURE — 86803 HEPATITIS C AB TEST: CPT

## 2022-06-16 PROCEDURE — 1159F PR MEDICATION LIST DOCUMENTED IN MEDICAL RECORD: ICD-10-PCS | Mod: CPTII,,, | Performed by: FAMILY MEDICINE

## 2022-06-16 PROCEDURE — 3078F PR MOST RECENT DIASTOLIC BLOOD PRESSURE < 80 MM HG: ICD-10-PCS | Mod: CPTII,,, | Performed by: FAMILY MEDICINE

## 2022-06-16 PROCEDURE — 87340 HEPATITIS B SURFACE AG IA: CPT

## 2022-06-16 PROCEDURE — 99215 OFFICE O/P EST HI 40 MIN: CPT | Mod: PBBFAC | Performed by: FAMILY MEDICINE

## 2022-06-16 RX ORDER — LANCETS
EACH MISCELLANEOUS
Qty: 100 EACH | Refills: 3 | Status: SHIPPED | OUTPATIENT
Start: 2022-06-16 | End: 2023-01-17

## 2022-06-16 RX ORDER — INSULIN PUMP SYRINGE, 3 ML
EACH MISCELLANEOUS
Qty: 1 EACH | Refills: 1 | Status: SHIPPED | OUTPATIENT
Start: 2022-06-16 | End: 2022-06-16 | Stop reason: SDUPTHER

## 2022-06-16 RX ORDER — AMLODIPINE BESYLATE 10 MG/1
10 TABLET ORAL DAILY
Qty: 90 TABLET | Refills: 1 | Status: SHIPPED | OUTPATIENT
Start: 2022-06-16 | End: 2023-01-17 | Stop reason: SDUPTHER

## 2022-06-16 RX ORDER — LANCETS
EACH MISCELLANEOUS
Qty: 100 EACH | Refills: 1 | Status: SHIPPED | OUTPATIENT
Start: 2022-06-16 | End: 2023-01-17

## 2022-06-16 RX ORDER — INSULIN PUMP SYRINGE, 3 ML
EACH MISCELLANEOUS
Qty: 1 EACH | Refills: 1 | Status: SHIPPED | OUTPATIENT
Start: 2022-06-16 | End: 2023-02-20 | Stop reason: SDUPTHER

## 2022-06-16 NOTE — PROGRESS NOTES
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  Needs refills of BP medication  Needs testing supplies for glucose machine  Had recent visits with Rheum, ENT and endocrinology  Was able to reestablish care with psych  Follows with cardiology    History:  Past Medical History:   Diagnosis Date    Airway compromise     Hypertension     Laryngeal cancer     Thyroid cancer     Type 2 diabetes mellitus with unspecified diabetic retinopathy without macular edema      Past Surgical History:   Procedure Laterality Date    bypass trachea to cutaneous with tracheostomy device  07/08/2019    ceiol - cataract extraction and insertion of introcular lens      dental extraction  09/06/2019    DISSECTION OF NECK  08/12/2019    excision of papilloma      excision of right thryoid gland lobe  08/12/2019    excision of right vocal cord  07/08/2019    I&D of skin and subcutaneous tissue  05/31/2015    insertion of feeding device into stomach  07/12/2019    LARYNGECTOMY  08/12/2019    LARYNGOSCOPY  08/12/2019    MULTIPLE TOOTH EXTRACTIONS Bilateral 09/06/2019    upper and lower teeth extraction    PANENDOSCOPY  07/08/2019    PEC flap  08/12/2019    PEG tube insertion initial  07/12/2019    resection of larynx  08/12/2019    THYROIDECTOMY  08/12/2019    TRACHEOSTOMY  07/08/2019    TRACHEOSTOMY TUBE CHANGE  07/14/2019     Family History   Problem Relation Age of Onset    Hypertension Mother     Diabetes Mellitus Mother     Heart disease Mother     Diabetes Mellitus Sister     Hypertension Sister      Social History     Socioeconomic History    Marital status:  Other   Tobacco Use    Smoking status: Former Smoker    Smokeless tobacco: Never Used   Substance and Sexual Activity    Alcohol use: Not Currently    Drug use: Never    Sexual activity: Not Currently   Social History Narrative    ** Merged History Encounter **          Social Determinants of Health     Financial Resource Strain: Low Risk     Difficulty of Paying Living Expenses: Not hard at all   Food Insecurity: No Food Insecurity    Worried About Running Out of Food in the Last Year: Never true    Ran Out of Food in the Last Year: Never true   Transportation Needs: No Transportation Needs    Lack of Transportation (Medical): No    Lack of Transportation (Non-Medical): No   Physical Activity: Insufficiently Active    Days of Exercise per Week: 3 days    Minutes of Exercise per Session: 30 min   Stress: No Stress Concern Present    Feeling of Stress : Not at all   Social Connections: Moderately Integrated    Frequency of Communication with Friends and Family: Three times a week    Frequency of Social Gatherings with Friends and Family: Three times a week    Attends [employer] Services: More than 4 times per year    Active Member of Clubs or Organizations: Yes    Attends Club or Organization Meetings: 1 to 4 times per year    Marital Status:    Housing Stability: Low Risk     Unable to Pay for Housing in the Last Year: No    Number of Places Lived in the Last Year: 1    Unstable Housing in the Last Year: No     Patient Active Problem List   Diagnosis    Mixed incontinence    Hepatitis C virus infection    Low back pain     Review of patient's allergies indicates:  No Known Allergies      Medications:  Current Outpatient Medications on File Prior to Visit   Medication Sig Dispense Refill    amLODIPine (NORVASC) 10 MG tablet Take 10 mg by mouth Daily.      aspirin 81 MG Chew Take 81 mg by mouth once daily.      blood sugar diagnostic Strp Use qd 100 each 11    carvediloL (COREG)  3.125 MG tablet TAKE 1 TABLET BY MOUTH TWICE DAILY 60 tablet 6    cyclobenzaprine (FLEXERIL) 10 MG tablet Take 1 tablet (10 mg total) by mouth every evening. 30 tablet 5    EScitalopram oxalate (LEXAPRO) 10 MG tablet Take 10 mg by mouth Daily.      famotidine (PEPCID) 40 MG tablet Take 40 mg by mouth 2 (two) times daily.      FLUoxetine 40 MG capsule Take 40 mg by mouth Daily.      furosemide (LASIX) 40 MG tablet Take 40 mg by mouth daily as needed.      gabapentin (NEURONTIN) 600 MG tablet Take 600 mg by mouth 3 (three) times daily.      HYDROcodone-acetaminophen (NORCO)  mg per tablet Take 1 tablet by mouth 2 (two) times daily as needed for Pain. 60 tablet 0    hydrOXYchloroQUINE (PLAQUENIL) 200 mg tablet Take 1 tablet (200 mg total) by mouth 2 (two) times daily. After food 60 tablet 5    isosorbide mononitrate (IMDUR) 30 MG 24 hr tablet Take 30 mg by mouth every morning.      lancets Misc Use qd 100 each 3    levothyroxine (SYNTHROID) 100 MCG tablet Take 100 mcg by mouth Daily.      lisinopriL (PRINIVIL,ZESTRIL) 40 MG tablet Take 40 mg by mouth Daily.      meclizine (ANTIVERT) 25 mg tablet Take 25 mg by mouth 3 (three) times daily.      metFORMIN (GLUCOPHAGE) 500 MG tablet Take 500 mg by mouth 2 (two) times daily.      nitroGLYCERIN (NITROSTAT) 0.4 MG SL tablet Place 0.4 mg under the tongue as needed.      ondansetron (ZOFRAN-ODT) 4 MG TbDL Take 4 mg by mouth 3 (three) times daily.      OXcarbazepine (TRILEPTAL) 300 MG Tab TAKE 1 TABLET BY MOUTH TWICE DAILY 60 tablet 6    oxybutynin (DITROPAN-XL) 10 MG 24 hr tablet Take 1 tablet (10 mg total) by mouth once daily. 30 tablet 11    omeprazole (PRILOSEC) 40 MG capsule Take 1 capsule (40 mg total) by mouth once daily. Before lunch (Patient not taking: Reported on 6/16/2022) 30 capsule 11    OXcarbazepine (TRILEPTAL) 300 MG Tab TAKE 1 TABLET BY MOUTH TWICE DAILY (Patient not taking: Reported on 6/16/2022) 60 tablet 6     No current  facility-administered medications on file prior to visit.       Medications have been reviewed and reconciled with patient at this visit.    Adverse reactions to current medications reviewed with patient..      Exam:  Wt Readings from Last 3 Encounters:   06/16/22 91.4 kg (201 lb 8 oz)   05/23/22 90.4 kg (199 lb 6.4 oz)   05/20/22 88.2 kg (194 lb 6.4 oz)     Temp Readings from Last 3 Encounters:   06/16/22 97.9 °F (36.6 °C)   05/23/22 97.2 °F (36.2 °C) (Temporal)   05/20/22 98.4 °F (36.9 °C) (Oral)     BP Readings from Last 3 Encounters:   06/16/22 104/68   05/23/22 (!) 154/85   05/20/22 127/84     Pulse Readings from Last 3 Encounters:   06/16/22 78   05/23/22 63   05/20/22 72     Body mass index is 31.56 kg/m².      ROS:      All Other ROS: Negative except as stated in HPI.    PE:  General: Alert and oriented, No acute distress.  Head: Normocephalic, Atraumatic.  Eye: t, Extraocular movements are intact, Sclera non-icteric.  Ears/Nose/Throat: Normal, Mucosa moist,Clear.  Neck/Thyroid: Supple, Non-tender, No carotid bruit, radiation fibrosis present, tracheal stoma widely patent, edges are clean  Respiratory: Clear to auscultation bilaterally; No wheezes, rales or rhonchi,Non-labored respirations, Symmetrical chest wall expansion.  Cardiovascular: Regular rate and rhythm, S1/S2 normal, No murmurs, rubs or gallops.  Gastrointestinal: Soft, Non-tender, Non-distended, Normal bowel sounds, No palpable organomegaly.  Musculoskeletal: Normal range of motion.  Integumentary: Warm, Dry, Intact, No suspicious lesions or rashes.  Extremities: No clubbing, cyanosis or edema  Neurologic:  Motor strength normal upper and lower extremities, Sensory exam intact.  Psychiatric: Normal interaction, Coherent speech, Euthymic mood, Appropriate affect    Laboratory Reviewed ({Yes)  Lab Results   Component Value Date    WBC 3.3 (L) 06/16/2022    HGB 12.3 06/16/2022    HCT 38.5 06/16/2022     06/16/2022    CHOL 158 10/12/2019     TRIG 112 10/12/2019    HDL 56 10/12/2019    ALT 13 05/26/2022    AST 17 05/26/2022     05/26/2022    K 4.9 05/26/2022    CREATININE 0.78 05/26/2022    BUN 11.4 05/26/2022    CO2 28 05/26/2022    TSH 0.2929 (L) 05/26/2022    INR 0.94 04/07/2021    HGBA1C 5.7 03/18/2021       Laura was seen today for follow-up.    Diagnoses and all orders for this visit:    Arthritis    Back pain, unspecified back location, unspecified back pain laterality, unspecified chronicity    Anxiety    Hypertension, unspecified type    Type 2 diabetes mellitus with other specified complication, unspecified whether long term insulin use                Care Plan/Goals: Reviewed    Goals    None         Follow up: No follow-ups on file.

## 2022-06-17 LAB
HBV SURFACE AG SERPL QL IA: NONREACTIVE
HCV AB SERPL QL IA: REACTIVE

## 2022-06-28 ENCOUNTER — OFFICE VISIT (OUTPATIENT)
Dept: OTOLARYNGOLOGY | Facility: CLINIC | Age: 67
End: 2022-06-28
Payer: MEDICARE

## 2022-06-28 VITALS
HEIGHT: 67 IN | BODY MASS INDEX: 31.71 KG/M2 | WEIGHT: 202 LBS | HEART RATE: 74 BPM | TEMPERATURE: 98 F | DIASTOLIC BLOOD PRESSURE: 82 MMHG | SYSTOLIC BLOOD PRESSURE: 120 MMHG | RESPIRATION RATE: 18 BRPM

## 2022-06-28 DIAGNOSIS — C32.9 LARYNGEAL SQUAMOUS CELL CARCINOMA: Primary | ICD-10-CM

## 2022-06-28 DIAGNOSIS — C73 PAPILLARY MICROCARCINOMA OF THYROID: ICD-10-CM

## 2022-06-28 PROCEDURE — 31575 DIAGNOSTIC LARYNGOSCOPY: CPT | Mod: PBBFAC | Performed by: OTOLARYNGOLOGY

## 2022-06-28 PROCEDURE — 99213 OFFICE O/P EST LOW 20 MIN: CPT | Mod: PBBFAC,25 | Performed by: OTOLARYNGOLOGY

## 2022-06-28 RX ORDER — LIDOCAINE HYDROCHLORIDE 40 MG/ML
1 INJECTION, SOLUTION RETROBULBAR
Status: DISCONTINUED | OUTPATIENT
Start: 2022-06-28 | End: 2023-08-18

## 2022-06-28 NOTE — PROGRESS NOTES
The scope used for the exam was:  Flexible scope ENF-P4  Serial Number:  1)    [id number]    []   2)    [id number]    []   3)    [id number]    [x]   4)    [id number]    []   5)    [id number]    []   6)    [id number]    []       The scope used for the exam was:  Rigid scope   Serial Number:  1)   [id number]    []   2)   [id number]    []   3)   7330    []   4)    3384   []   5)    0824   []   6)    5554   []     7)   7425    []   8)   2240    []   9)   1109    []

## 2022-06-28 NOTE — PROGRESS NOTES
Patient Name: Laura Freeman   YOB: 1955     Chief Complaint:   Chief Complaint   Patient presents with    Follow-up        History of Present Illness:  This is a 64-year-old female presenting from Dr. Dennis for evaluation of laryngeal mass. Patient notes she has had hoarseness starting shortly before [place], dysphagia since evaluated with an EGD and MBSS (showing aspirations), 1 month of sore throat that has been treated with antibiotics. She endorses some weight loss however, denies any dyspnea. She has not noted any lymphadenopathy, otalgia, or odynophagia.    7/24/19: Here for first follow up after admission for awake trach, panendoscopy. 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  Recommendation:  - TB cites likely inferior outcomes (functional and oncologic) with CRT vs TL, thus TL is best option  - TB recommends continued education to pt and family regarding life after laryngectomy, as well as likely poor functional outcome with CRT (aspiration, trach and peg dependence, npo status)  - However if pt is completely against surgery, should offer CRT for treatment    Has been doing ok at home. Had one day she went to ER with plugged inner cannula.    7/31/19: Here after meeting with ST and a laryngectomy patient, would like to proceed with surgery. No issues at home since last visit. No changes in medical history.    8/12/19: Underwent TL, BND, CP myotomy, R thyroid lobectomy and TEP.    8/27/19: Here for first follow up since discharge. Still taking liquids PO, no issues at home. Has ST appointment scheduled for Sept 3rd. Has right UE weakness and pain but mobility is ok. Doing well with stoma care.  [facility] TB Recs:  Recommendations: NCCN guidelines state consider adjuvant XRT  - Will plan to discuss risks/benefits of radiation with patient in ENT follow  - Will do same in Radiation Oncology clinic    9/4/19: Here for follow up and also for follow-up after presenting to ED last night with  concerns for nodule below stoma. Has 1 pill of her antibiotic given at last visit left. She reports that the swelling appeared suddenly yesterday. Is very tender on palpation. causes pain extending toward her right shoulder [1]    9/11/19: hospitalization on 9/4 s/p I&D of right clavicular head and teeth extraction. Starting radiation on Thursday. Otherwise no issues. [1]    10/16/2019: Recent hospitalization for TIA sx w/o any sequela. MRI, echo pending. Discharged on levo and doxy for right clavicular head drainage. patient hasn't taken any. Notes still have lots of blood secretions but not any now. Eating per mouth also supplementing thru G-tube [1]    11/19/19: Pt here for follow up. She has completed CRT 2 weeks ago. Eating and drinking well. Maintaining weight. Feels her right sternoclavicular joint is unchanged. Finished antibiotics over 2 weeks ago. Feels it has not changed since finishing antibiotics, no drainage. Per patient finished all chemo and radiation treatments.    12/24/19: No c/o today. Sternoclavicular joint has resolved. Tolerating PO intake and maintaining weight. She would like to have PEG removed, states she has not used it in over 2 months. [1]    1/28/20: Here for follow up. PET ordered today by Dr. Kim. No dyspnea, dysphagia. noting shoulder stiffness/pain. Doing home PT exercises. Notes some numbness underneath chin. Got PEG removed by GS today. [1]    3/5/20: Here for cancer surveaillcen. Post-treatment PET done 2/2020 with some hypermetabolic activity in nasopharynx, no eviednce of persistence/recurrence in neck. No distant mets. Denies dysphagia. Weight stable. Denies dyspnea. Voicing well with TEP.    5/26/20: Here for cancer surveillance. No weight loss, dysphagia, odynophagia. Reports that the lymphedema machine was denied per a letter she received. She is reporting severe pain to bilateral cheeks and upper neck? Voicing with TEP, no issues.    7/28/20: Denies  "dysphagia/odyophagia/otalgia. Reports weight loss ~ 10lbs since last visit bc she does not have much of an appetite, but has begun drinking ensure to keep calories up. Frustrated that her ear lobes have paresthesias and she ripped her earrings out. Requesting additional norco. C/o intermittent tight pulling sensation in bilateral neck muscles that makes her feel as though she is choking. Reports taking a few days of leftover abx rx 2 weeks ago bc her secretions had turned green and she was afraid to go to the ED. Denies having fever, but reports she got a little SOB. Secretions are now clear. [1]    9/29/2020: Presenting in follow-up for cancer surveillance. She denies any dysphasia, odynophagia, otalgia or unexpected weight loss at this time. She notes she has a knot on her left neck close to her stoma that is nontender. She noticed this about month and a half ago. 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  A CT scan of the neck showed the presence of a 1.9 cm left thyroid nodule with an interval increase in size. No cervical adenopathy was noted. Patient is to undergo needle biopsy of this lesion today.  Thyroid function studies done on September 29, 2020, showed her to be hypothyroid with a TSH level of 15.190 and a normal free T4 0.76. Previously the patient had been given a prescription for levothyroxine 75 mcg daily but this had never been filled and the patient had not been taking that medication. Patient does admit to some fatigue but she does not feel "bad".  She does not have any other new complaints today.    11/11/20: Doing well. S/p FNA of left thyroid nodule 10/29/20. Path came back that sample was insufficient for diagnostic " evaluation. Reports compliance with levothyroxine.  This is a Telehealth visit that was performed with the originating site at patient's home and the distant site, [facility]. I have reviewed the patient name and date of birth.  I have verified that this patient is in the state of [place]. Verbal consent to participate in interactive audio & video (unavailable) visit was obtained. This particular visit occurred during 2020 COVID19 outbreak. I discussed with the patient regarding the nature of our telehealth visits, that:   - Our sessions are not being recorded and that personal health information is protected   - I would evaluate the patient and recommend diagnostics and treatments based on my assessment  - The team will provide follow up care in person if/when the patient needs it.   Time spent on patient counseling, care coordination and treatment plan development: 7 min    1/7/21: Doing well. No complaints at this time. No weight changes, dysphagia, odynophagia, otalgia, new neck masses.    3/11/21: Here today for possible FNA of nodule, however when using ultrasound no concerning nodules seen to biopsy. Patient had adjustment of Synthroid at last visit, TSH elevated in January. Due for surveillance appointment.    5/6/21: Here today for cancer surveillance. Reports significant tenderness to right neck starting 1-2 weeks ago but no associated mass or swelling. She has tried her lortab but this has not helped. No issues with PO intake. Weight is stable. No dysphagia, odynophagia, otalgia.  6-16-21: Follow-up for cancer surveillance, no current complaints including no palpable neck mass or tenderness on either side, no blood from the trachea or blood from the mouth. Her weight is remaining stable and she is tolerating p.o. well. She overall functioning well and appears happy.    8/18/21: here today for surveillance. No complaints overall; a little bit of generic fullness over the left angle of mandible but nothing  discrete. Tolerating PO well. No odynophagia, dysphagia, weight changes, otalgia, difficulty speaking via alaryngeal voice.    10/19/2021: Pt is here for surveillance with hx of right thyroid lobectomy and FNA of 2.0 cm left thyroid nodule. Path came back that sample was insufficient for diagnostic evaluation 10/2020. She states that she has been doing well. Pt says that her only complaint is inability to taste food every once and a while. No current complaints    1/26/2022: Follow-up for cancer surveillance, currently tolerating diet well, without neck swelling, pain, hemoptysis from the trach or new areas of swelling or tenderness in her neck or in her throat. Denies otalgia. CT of chest and neck unremarkable for recurrence or metastatic disease. Makes note of diminished thyroid nodule. Patient followed by Dr. Golden in endocrine for monitoring thyroid function and markers.     4/26/2022: Patient presenting in follow-up. She is doing well with her diet. She continues to note some tightness around her neck for which she is doing stretches and she does find these helpful. She denies any odynophagia, dysphagia, otalgia. She notes her weight does tend to waver and endocrinology has been fixing her thyroid medication dosage recently. Most recently she did have some lab work done to measure her thyroid function.    June 28, 2022:  67-year-old female presents today for follow-up of her supraglottic laryngeal squamous cell carcinoma and her thyroid papillary microcarcinoma.  Patient is doing well at this time.  She has no complaints of any difficulty swallowing, neck pain, ear pain, or hemoptysis.  The area of swelling on the left side of her neck has not changed.  Endocrinology is following her for management of her postoperative hypothyroidism and thyroid carcinoma.  She has no other new complaints today.  Of note she is scheduled to have a follow-up thyroid ultrasound and ultrasound of the neck in October 2022.      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    T3N0M0 supraglottic laryngeal squamous cell carcinoma 07/08/2019    T3 N0 M0 squamous cell carcinoma of the supraglottic larynx diagnosed initially on July 8, 2019. Treated with total laryngectomy, bilateral selective neck dissections (levels 2 through 4), right hemithyroidectomy (with incidental finding papillary thyroid microcarcinoma), primary TEP puncture with cricopharyngeal myotomy on August 12, 2019.  Treated with postoperative radiation therapy.    Type 2 diabetes mellitus with unspecified diabetic retinopathy without macular edema      Past Surgical History:   Procedure Laterality Date    ceiol - cataract extraction and insertion of introcular lens      excision of papilloma      I&D of skin and subcutaneous tissue  05/31/2015    MULTIPLE TOOTH EXTRACTIONS Bilateral 09/06/2019    Full mouth extraction in preparation for radiation therapy.    PANENDOSCOPY N/A 07/08/2019    Initial diagnosis laryngeal squamous cell carcinoma.  Done in conjunction with an awake tracheostomy.    PERCUTANEOUS INSERTION OF GASTROSTOMY TUBE N/A 07/12/2019    SELECTIVE NECK DISSECTION Bilateral 08/12/2019    Done in conjunction with total laryngectomy for laryngeal squamous cell carcinoma.    THYROID LOBECTOMY Right 08/12/2019    Done in conjunction with total laryngectomy with incidental finding thyroid papillary microcarcinoma.    TOTAL LARYNGECTOMY N/A 08/12/2019    Total laryngectomy with bilateral selective neck dissections (levels 2 through 4) right hemithyroidectomy and primary tracheoesophageal puncture cricopharyngeal myotomy for treatment laryngeal squamous cell carcinoma.    TRACHEOSTOMY  07/08/2019    Done in conjunction  with panendoscopy at initial diagnosis of laryngeal squamous cell carcinoma.    TRACHEOSTOMY TUBE CHANGE  07/14/2019    Done in conjunction with control of post-operative tracheostomy wound hemorrhage.       Review of Systems:  Unremarkable except as mentioned above.    Current Medications:  Current Outpatient Medications   Medication Sig    amLODIPine (NORVASC) 10 MG tablet Take 1 tablet (10 mg total) by mouth Daily.    aspirin 81 MG Chew Take 81 mg by mouth once daily.    blood sugar diagnostic Strp Use qd    blood sugar diagnostic Strp To check BG 1 times daily, to use with insurance preferred meter    blood-glucose meter kit To check BG 1 times daily, to use with insurance preferred meter    carvediloL (COREG) 3.125 MG tablet TAKE 1 TABLET BY MOUTH TWICE DAILY    cyclobenzaprine (FLEXERIL) 10 MG tablet Take 1 tablet (10 mg total) by mouth every evening.    EScitalopram oxalate (LEXAPRO) 10 MG tablet Take 10 mg by mouth Daily.    famotidine (PEPCID) 40 MG tablet Take 40 mg by mouth 2 (two) times daily.    gabapentin (NEURONTIN) 600 MG tablet Take 600 mg by mouth 3 (three) times daily.    hydrOXYchloroQUINE (PLAQUENIL) 200 mg tablet Take 1 tablet (200 mg total) by mouth 2 (two) times daily. After food    isosorbide mononitrate (IMDUR) 30 MG 24 hr tablet Take 30 mg by mouth every morning.    lancets Misc Use qd    lancets Misc To check BG 1 times daily, to use with insurance preferred meter    lancets Misc Use to test 1 qd    levothyroxine (SYNTHROID) 100 MCG tablet Take 100 mcg by mouth Daily.    lisinopriL (PRINIVIL,ZESTRIL) 40 MG tablet Take 40 mg by mouth Daily.    meclizine (ANTIVERT) 25 mg tablet Take 25 mg by mouth 3 (three) times daily.    metFORMIN (GLUCOPHAGE) 500 MG tablet Take 500 mg by mouth 2 (two) times daily.    ondansetron (ZOFRAN-ODT) 4 MG TbDL Take 4 mg by mouth 3 (three) times daily.    OXcarbazepine (TRILEPTAL) 300 MG Tab TAKE 1 TABLET BY MOUTH TWICE DAILY     "OXcarbazepine (TRILEPTAL) 300 MG Tab TAKE 1 TABLET BY MOUTH TWICE DAILY    oxybutynin (DITROPAN-XL) 10 MG 24 hr tablet Take 1 tablet (10 mg total) by mouth once daily.    FLUoxetine 40 MG capsule Take 40 mg by mouth Daily.    furosemide (LASIX) 40 MG tablet Take 40 mg by mouth daily as needed.    nitroGLYCERIN (NITROSTAT) 0.4 MG SL tablet Place 0.4 mg under the tongue as needed.    omeprazole (PRILOSEC) 40 MG capsule Take 1 capsule (40 mg total) by mouth once daily. Before lunch (Patient not taking: No sig reported)     Current Facility-Administered Medications   Medication    LIDOcaine (PF) 40 mg/mL (4 %) injection 1 mL    phenylephrine HCL 1% nasal spray 1 spray        Allergies:  Review of patient's allergies indicates:  No Known Allergies     Physical Exam:  Vital signs:   Vitals:    06/28/22 0904   BP: 120/82   BP Location: Right arm   Patient Position: Sitting   BP Method: Large (Automatic)   Pulse: 74   Resp: 18   Temp: 98.4 °F (36.9 °C)   TempSrc: Oral   Weight: 91.6 kg (202 lb)   Height: 5' 7" (1.702 m)   General:  Well-developed well-nourished female in no acute distress.  Patient voices well with her TEP.  Head and face:  Normocephalic.  No facial lesions.  No temporomandibular joint tenderness or click.  Ears:  Right ear-auricle is normally developed.  External auditory canal is clear.  Tympanic membrane is nonerythematous.  No middle ear effusion.  Left ear-auricle is normally developed.  External auditory canal is clear.  Tympanic membrane is nonerythematous.  No middle ear effusion.  Nose:  Nasal dorsum is unremarkable.  No significant septal deviation.  No significant intranasal congestion.  Secretions are clear.  Oral cavity and oropharynx:  Tongue and floor mouth are without lesions.  Mucosa is moist.  No pharyngeal erythema or exudates.  No oropharyngeal masses.  No tonsillar hypertrophy.  Neck:  Supple without of palpable adenopathy.  She still does have palpable subcutaneous nodules in " the left neck just posterior to the incision line in the midportion of the neck.  These are mobile and nontender.  There appeared to be 2-3 which are palpable.  No other palpable masses.  No masses in the thyroid bed or in the peristomal region.  Parotid and submandibular glands are normal to palpation.  Stoma is without granulation.  TEP is in place.  Eyes:  Extraocular muscles intact.  No nystagmus.  No exophthalmos or enophthalmos.  Neurologic:  Alert and oriented.  Cranial nerves 2-12 are grossly normal.    Procedure note: Flexible laryngoscopy.  After informed consent was obtained and the nose was prepped with 1% phenyleprine nasal spray and tetracaine topically. The flexible fiberoptic laryngoscope was introduced into the right nostril and advanced. Examination of the nose showed the above mentioned findings. Examination of the nasopharynx showed no nasopharyngeal masses or eustachian tube obstruction. Examination of the base of tongue showed no masses. Neopharynx is without lesions or obstruction.  Examination of the trachea through the trachea stoma showed no masses or lesions down to the level of the adrianna.    Assessment/Plan:   67-year-old female with T3 N0 M0 squamous cell carcinoma of the supraglottic larynx status post total laryngectomy with bilateral selective neck dissections (levels 2 through 4), primary TEP puncture with cricopharyngeal myotomy, and right hemithyroidectomy with an incidental finding of a papillary microcarcinoma (T1a N0 M0) on August 12, 2019, with postoperative radiation therapy completed on November 5, 2019. The    Plan:  Follow up in 3 months for routine surveillance.  We will schedule repeat thyroid ultrasound and ultrasound of the neck for October 2022.  Continue follow-up with Endocrinology..        Ray Austin M.D.

## 2022-07-21 RX ORDER — HYDROCODONE BITARTRATE AND ACETAMINOPHEN 10; 325 MG/1; MG/1
1 TABLET ORAL
COMMUNITY
End: 2022-07-21 | Stop reason: SDUPTHER

## 2022-07-21 RX ORDER — HYDROCODONE BITARTRATE AND ACETAMINOPHEN 10; 325 MG/1; MG/1
1 TABLET ORAL EVERY 12 HOURS PRN
Qty: 60 TABLET | Refills: 0 | Status: SHIPPED | OUTPATIENT
Start: 2022-07-21 | End: 2022-09-01 | Stop reason: SDUPTHER

## 2022-07-21 NOTE — TELEPHONE ENCOUNTER
----- Message from Marcy Gates sent at 7/21/2022  2:01 PM CDT -----  Regarding: medication refill  Patient came in requesting refill for Hydrocodone. Andrés diaz Falun

## 2022-08-02 NOTE — PROGRESS NOTES
Chief Complaint: No chief complaint on file.      HPI: Patient is a 67-year-old female two-month follow-up for mixed urinary incontinence Increased oxybutynin ER 10 mg p.o. daily.  Today patient presents with mild relief of urinary incontinence patient is still waking up with saturated diapers.  Patient denies dysuria,  retention, gross hematuria, nocturia.  Patient requested [employer] system for nighttime use only, discussed with Dr. Simmons, and we concluded patient's go through the insurance companies to get that system.         Allergies:  Review of patient's allergies indicates:  No Known Allergies    Medications:  Current Outpatient Medications   Medication Sig Dispense Refill    amLODIPine (NORVASC) 10 MG tablet Take 1 tablet (10 mg total) by mouth Daily. 90 tablet 1    aspirin 81 MG Chew Take 81 mg by mouth once daily.      blood sugar diagnostic Strp Use qd 100 each 11    blood sugar diagnostic Strp To check BG 1 times daily, to use with insurance preferred meter 100 strip 1    blood-glucose meter kit To check BG 1 times daily, to use with insurance preferred meter 1 each 1    carvediloL (COREG) 3.125 MG tablet TAKE 1 TABLET BY MOUTH TWICE DAILY 60 tablet 6    cyclobenzaprine (FLEXERIL) 10 MG tablet Take 1 tablet (10 mg total) by mouth every evening. 30 tablet 5    EScitalopram oxalate (LEXAPRO) 10 MG tablet Take 10 mg by mouth Daily.      famotidine (PEPCID) 40 MG tablet Take 40 mg by mouth 2 (two) times daily.      FLUoxetine 40 MG capsule Take 40 mg by mouth Daily.      furosemide (LASIX) 40 MG tablet Take 40 mg by mouth daily as needed.      gabapentin (NEURONTIN) 600 MG tablet Take 600 mg by mouth 3 (three) times daily.      HYDROcodone-acetaminophen (NORCO)  mg per tablet Take 1 tablet by mouth every 12 (twelve) hours as needed for Pain. 60 tablet 0    hydrOXYchloroQUINE (PLAQUENIL) 200 mg tablet Take 1 tablet (200 mg total) by mouth 2 (two) times daily. After food 60 tablet 5     isosorbide mononitrate (IMDUR) 30 MG 24 hr tablet Take 30 mg by mouth every morning.      lancets Misc Use qd 100 each 3    lancets Misc To check BG 1 times daily, to use with insurance preferred meter 100 each 1    lancets Misc Use to test 1 qd 100 each 3    levothyroxine (SYNTHROID) 100 MCG tablet TAKE 1 TABLET BY MOUTH DAILY 90 tablet 1    lisinopriL (PRINIVIL,ZESTRIL) 40 MG tablet TAKE 1 TABLET BY MOUTH DAILY 90 tablet 1    meclizine (ANTIVERT) 25 mg tablet Take 25 mg by mouth 3 (three) times daily.      metFORMIN (GLUCOPHAGE) 500 MG tablet Take 500 mg by mouth 2 (two) times daily.      nitroGLYCERIN (NITROSTAT) 0.4 MG SL tablet Place 0.4 mg under the tongue as needed.      omeprazole (PRILOSEC) 40 MG capsule Take 1 capsule (40 mg total) by mouth once daily. Before lunch (Patient not taking: No sig reported) 30 capsule 11    ondansetron (ZOFRAN-ODT) 4 MG TbDL Take 4 mg by mouth 3 (three) times daily.      OXcarbazepine (TRILEPTAL) 300 MG Tab TAKE 1 TABLET BY MOUTH TWICE DAILY 60 tablet 6    OXcarbazepine (TRILEPTAL) 300 MG Tab TAKE 1 TABLET BY MOUTH TWICE DAILY 60 tablet 6    oxybutynin (DITROPAN-XL) 10 MG 24 hr tablet Take 1 tablet (10 mg total) by mouth once daily. 30 tablet 11     Current Facility-Administered Medications   Medication Dose Route Frequency Provider Last Rate Last Admin    LIDOcaine (PF) 40 mg/mL (4 %) injection 1 mL  1 mL Other 1 time in Clinic/HOD Ray Austin MD        phenylephrine HCL 1% nasal spray 1 spray  1 spray Each Nostril 1 time in Clinic/HOD Ray Austin MD           Review of Systems:  General: No fever, chills, fatigability, or weight loss.  Skin: No rashes, itching, or changes in color or texture of skin.  Chest: Denies AQUINO, cyanosis, wheezing, cough, and sputum production.  Abdomen: Appetite fine. No weight loss. Denies diarrhea, abdominal pain, hematemesis, or blood in stool.  Musculoskeletal: No joint stiffness or swelling. Denies back pain.  : As  above.  All other review of systems negative.    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    T3N0M0 supraglottic laryngeal squamous cell carcinoma 07/08/2019    T3 N0 M0 squamous cell carcinoma of the supraglottic larynx diagnosed initially on July 8, 2019. Treated with total laryngectomy, bilateral selective neck dissections (levels 2 through 4), right hemithyroidectomy (with incidental finding papillary thyroid microcarcinoma), primary TEP puncture with cricopharyngeal myotomy on August 12, 2019.  Treated with postoperative radiation therapy.    Type 2 diabetes mellitus with unspecified diabetic retinopathy without macular edema        PSH:  Past Surgical History:   Procedure Laterality Date    ceiol - cataract extraction and insertion of introcular lens      excision of papilloma      I&D of skin and subcutaneous tissue  05/31/2015    MULTIPLE TOOTH EXTRACTIONS Bilateral 09/06/2019    Full mouth extraction in preparation for radiation therapy.    PANENDOSCOPY N/A 07/08/2019    Initial diagnosis laryngeal squamous cell carcinoma.  Done in conjunction with an awake tracheostomy.    PERCUTANEOUS INSERTION OF GASTROSTOMY TUBE N/A 07/12/2019    SELECTIVE NECK DISSECTION Bilateral 08/12/2019    Done in conjunction with total laryngectomy for laryngeal squamous cell carcinoma.    THYROID LOBECTOMY Right 08/12/2019    Done in conjunction with total laryngectomy with incidental finding thyroid papillary microcarcinoma.    TOTAL LARYNGECTOMY N/A 08/12/2019    Total laryngectomy with bilateral selective neck dissections (levels 2 through 4) right hemithyroidectomy and primary tracheoesophageal puncture cricopharyngeal myotomy for treatment laryngeal squamous cell carcinoma.     TRACHEOSTOMY  07/08/2019    Done in conjunction with panendoscopy at initial diagnosis of laryngeal squamous cell carcinoma.    TRACHEOSTOMY TUBE CHANGE  07/14/2019    Done in conjunction with control of post-operative tracheostomy wound hemorrhage.       FamHx:  Family History   Problem Relation Age of Onset    Hypertension Mother     Diabetes Mellitus Mother     Heart disease Mother     Diabetes Mellitus Sister     Hypertension Sister        SocHx:  Social History     Socioeconomic History    Marital status: Other   Tobacco Use    Smoking status: Former Smoker    Smokeless tobacco: Never Used   Substance and Sexual Activity    Alcohol use: Not Currently    Drug use: Never    Sexual activity: Not Currently   Social History Narrative    ** Merged History Encounter **          Social Determinants of Health     Financial Resource Strain: Low Risk     Difficulty of Paying Living Expenses: Not hard at all   Food Insecurity: No Food Insecurity    Worried About Running Out of Food in the Last Year: Never true    Ran Out of Food in the Last Year: Never true   Transportation Needs: No Transportation Needs    Lack of Transportation (Medical): No    Lack of Transportation (Non-Medical): No   Physical Activity: Insufficiently Active    Days of Exercise per Week: 3 days    Minutes of Exercise per Session: 30 min   Stress: No Stress Concern Present    Feeling of Stress : Not at all   Social Connections: Moderately Integrated    Frequency of Communication with Friends and Family: Three times a week    Frequency of Social Gatherings with Friends and Family: Three times a week    Attends Protestant Services: More than 4 times per year    Active Member of Clubs or Organizations: Yes    Attends Club or Organization Meetings: 1 to 4 times per year    Marital Status:    Housing Stability: Low Risk     Unable to Pay for Housing in the Last Year: No    Number of Places Lived in the Last Year: 1     Unstable Housing in the Last Year: No       Physical Exam:  There were no vitals filed for this visit.  General: A&Ox3, no apparent distress, no deformities  Neck: No masses, normal thyroid  Lungs: CTA vlad, no use of accessory muscles  Heart: RRR, no arrhythmias  Abdomen: Soft, NT, ND, no masses, no hernias, no hepatosplenomegaly  Lymphatic: Neck and groin nodes negative  Skin: The skin is warm and dry. No jaundice.  Ext: No c/c/e.      Imaging:  None      Impression:  Mixed incontinence, nocturia      Plan:  Instructed patient to decrease oxygen ER to 5 mg p.o. q.h.s. and Myrbetriq 50 mg p.o. daily follow-up in 2 months.

## 2022-08-03 ENCOUNTER — TELEPHONE (OUTPATIENT)
Dept: FAMILY MEDICINE | Facility: CLINIC | Age: 67
End: 2022-08-03
Payer: MEDICARE

## 2022-08-03 ENCOUNTER — OFFICE VISIT (OUTPATIENT)
Dept: UROLOGY | Facility: CLINIC | Age: 67
End: 2022-08-03
Payer: MEDICARE

## 2022-08-03 VITALS
BODY MASS INDEX: 32.96 KG/M2 | WEIGHT: 210 LBS | HEART RATE: 70 BPM | TEMPERATURE: 98 F | OXYGEN SATURATION: 96 % | DIASTOLIC BLOOD PRESSURE: 87 MMHG | HEIGHT: 67 IN | RESPIRATION RATE: 20 BRPM | SYSTOLIC BLOOD PRESSURE: 155 MMHG

## 2022-08-03 DIAGNOSIS — R35.1 NOCTURIA: Primary | ICD-10-CM

## 2022-08-03 PROCEDURE — 1159F PR MEDICATION LIST DOCUMENTED IN MEDICAL RECORD: ICD-10-PCS | Mod: CPTII,,, | Performed by: NURSE PRACTITIONER

## 2022-08-03 PROCEDURE — 3079F PR MOST RECENT DIASTOLIC BLOOD PRESSURE 80-89 MM HG: ICD-10-PCS | Mod: CPTII,,, | Performed by: NURSE PRACTITIONER

## 2022-08-03 PROCEDURE — 4010F PR ACE/ARB THEARPY RXD/TAKEN: ICD-10-PCS | Mod: CPTII,,, | Performed by: NURSE PRACTITIONER

## 2022-08-03 PROCEDURE — 3077F SYST BP >= 140 MM HG: CPT | Mod: CPTII,,, | Performed by: NURSE PRACTITIONER

## 2022-08-03 PROCEDURE — 3008F BODY MASS INDEX DOCD: CPT | Mod: CPTII,,, | Performed by: NURSE PRACTITIONER

## 2022-08-03 PROCEDURE — 1159F MED LIST DOCD IN RCRD: CPT | Mod: CPTII,,, | Performed by: NURSE PRACTITIONER

## 2022-08-03 PROCEDURE — 3077F PR MOST RECENT SYSTOLIC BLOOD PRESSURE >= 140 MM HG: ICD-10-PCS | Mod: CPTII,,, | Performed by: NURSE PRACTITIONER

## 2022-08-03 PROCEDURE — 4010F ACE/ARB THERAPY RXD/TAKEN: CPT | Mod: CPTII,,, | Performed by: NURSE PRACTITIONER

## 2022-08-03 PROCEDURE — 1160F RVW MEDS BY RX/DR IN RCRD: CPT | Mod: CPTII,,, | Performed by: NURSE PRACTITIONER

## 2022-08-03 PROCEDURE — 99213 OFFICE O/P EST LOW 20 MIN: CPT | Mod: S$PBB,,, | Performed by: NURSE PRACTITIONER

## 2022-08-03 PROCEDURE — 3008F PR BODY MASS INDEX (BMI) DOCUMENTED: ICD-10-PCS | Mod: CPTII,,, | Performed by: NURSE PRACTITIONER

## 2022-08-03 PROCEDURE — 3288F PR FALLS RISK ASSESSMENT DOCUMENTED: ICD-10-PCS | Mod: CPTII,,, | Performed by: NURSE PRACTITIONER

## 2022-08-03 PROCEDURE — 3079F DIAST BP 80-89 MM HG: CPT | Mod: CPTII,,, | Performed by: NURSE PRACTITIONER

## 2022-08-03 PROCEDURE — 1101F PT FALLS ASSESS-DOCD LE1/YR: CPT | Mod: CPTII,,, | Performed by: NURSE PRACTITIONER

## 2022-08-03 PROCEDURE — 3288F FALL RISK ASSESSMENT DOCD: CPT | Mod: CPTII,,, | Performed by: NURSE PRACTITIONER

## 2022-08-03 PROCEDURE — 99213 PR OFFICE/OUTPT VISIT, EST, LEVL III, 20-29 MIN: ICD-10-PCS | Mod: S$PBB,,, | Performed by: NURSE PRACTITIONER

## 2022-08-03 PROCEDURE — 1101F PR PT FALLS ASSESS DOC 0-1 FALLS W/OUT INJ PAST YR: ICD-10-PCS | Mod: CPTII,,, | Performed by: NURSE PRACTITIONER

## 2022-08-03 PROCEDURE — 99213 OFFICE O/P EST LOW 20 MIN: CPT | Mod: PBBFAC | Performed by: NURSE PRACTITIONER

## 2022-08-03 PROCEDURE — 1160F PR REVIEW ALL MEDS BY PRESCRIBER/CLIN PHARMACIST DOCUMENTED: ICD-10-PCS | Mod: CPTII,,, | Performed by: NURSE PRACTITIONER

## 2022-08-03 RX ORDER — OXYBUTYNIN CHLORIDE 5 MG/1
5 TABLET, EXTENDED RELEASE ORAL DAILY
Qty: 30 TABLET | Refills: 11 | Status: SHIPPED | OUTPATIENT
Start: 2022-08-03 | End: 2022-10-05 | Stop reason: ALTCHOICE

## 2022-08-03 NOTE — TELEPHONE ENCOUNTER
----- Message from Kiera Rojas sent at 8/3/2022  9:38 AM CDT -----  Regarding: New Script  General Phone Message    Caller is:  ( x) Patient  (  ) Family  (  ) Pharmacy    (  ) Home Health  (  ) Other     Provider: Dr. Acevedo  Last Visit:    Next Visit: 12/16/2022    Reason for Call: Patient is asking if Dr. Acevedo can do a script for BD PureWick System-PureWick Female External Catheter. She states she was told her insurance would cover it. She states it is for urinary incontinence.                    Best Time to Contact:    Preferred Phone Number:

## 2022-08-03 NOTE — TELEPHONE ENCOUNTER
Please have patient schedule an appointment with me to discuss incontinence. She was seen by urology and medication changes were made today.

## 2022-09-01 RX ORDER — HYDROCODONE BITARTRATE AND ACETAMINOPHEN 10; 325 MG/1; MG/1
1 TABLET ORAL EVERY 12 HOURS PRN
Qty: 60 TABLET | Refills: 0 | Status: SHIPPED | OUTPATIENT
Start: 2022-09-01 | End: 2022-10-07 | Stop reason: SDUPTHER

## 2022-09-01 NOTE — TELEPHONE ENCOUNTER
----- Message from Anibal Che MA sent at 9/1/2022 11:12 AM CDT -----  Regarding: Medication Refill  Pt came in office wanting refill on her pain medications she states she has been out for a week, she was trying to wait until her next appt but she said she can not wait. Call back number [phone]

## 2022-09-06 ENCOUNTER — OFFICE VISIT (OUTPATIENT)
Dept: RHEUMATOLOGY | Facility: CLINIC | Age: 67
End: 2022-09-06
Payer: MEDICARE

## 2022-09-06 VITALS
HEART RATE: 70 BPM | WEIGHT: 212 LBS | DIASTOLIC BLOOD PRESSURE: 84 MMHG | RESPIRATION RATE: 18 BRPM | OXYGEN SATURATION: 94 % | HEIGHT: 67 IN | BODY MASS INDEX: 33.27 KG/M2 | TEMPERATURE: 98 F | SYSTOLIC BLOOD PRESSURE: 142 MMHG

## 2022-09-06 DIAGNOSIS — N39.46 MIXED INCONTINENCE: ICD-10-CM

## 2022-09-06 DIAGNOSIS — M54.40 LOW BACK PAIN WITH SCIATICA, SCIATICA LATERALITY UNSPECIFIED, UNSPECIFIED BACK PAIN LATERALITY, UNSPECIFIED CHRONICITY: ICD-10-CM

## 2022-09-06 DIAGNOSIS — M19.90 INFLAMMATORY ARTHRITIS: ICD-10-CM

## 2022-09-06 DIAGNOSIS — E03.9 HYPOTHYROIDISM, UNSPECIFIED TYPE: ICD-10-CM

## 2022-09-06 DIAGNOSIS — M06.00 SERONEGATIVE RHEUMATOID ARTHRITIS: Primary | ICD-10-CM

## 2022-09-06 DIAGNOSIS — B18.2 HEPATITIS C VIRUS CARRIER STATE: ICD-10-CM

## 2022-09-06 DIAGNOSIS — I10 HYPERTENSION, UNSPECIFIED TYPE: ICD-10-CM

## 2022-09-06 DIAGNOSIS — M79.7 FIBROMYALGIA SYNDROME: ICD-10-CM

## 2022-09-06 DIAGNOSIS — F51.01 PRIMARY INSOMNIA: ICD-10-CM

## 2022-09-06 PROCEDURE — 99999 PR PBB SHADOW E&M-EST. PATIENT-LVL V: ICD-10-PCS | Mod: PBBFAC,,, | Performed by: INTERNAL MEDICINE

## 2022-09-06 PROCEDURE — 1159F PR MEDICATION LIST DOCUMENTED IN MEDICAL RECORD: ICD-10-PCS | Mod: CPTII,S$GLB,, | Performed by: INTERNAL MEDICINE

## 2022-09-06 PROCEDURE — 1100F PTFALLS ASSESS-DOCD GE2>/YR: CPT | Mod: CPTII,S$GLB,, | Performed by: INTERNAL MEDICINE

## 2022-09-06 PROCEDURE — 1159F MED LIST DOCD IN RCRD: CPT | Mod: CPTII,S$GLB,, | Performed by: INTERNAL MEDICINE

## 2022-09-06 PROCEDURE — 3288F FALL RISK ASSESSMENT DOCD: CPT | Mod: CPTII,S$GLB,, | Performed by: INTERNAL MEDICINE

## 2022-09-06 PROCEDURE — 1160F RVW MEDS BY RX/DR IN RCRD: CPT | Mod: CPTII,S$GLB,, | Performed by: INTERNAL MEDICINE

## 2022-09-06 PROCEDURE — 3008F PR BODY MASS INDEX (BMI) DOCUMENTED: ICD-10-PCS | Mod: CPTII,S$GLB,, | Performed by: INTERNAL MEDICINE

## 2022-09-06 PROCEDURE — 99213 PR OFFICE/OUTPT VISIT, EST, LEVL III, 20-29 MIN: ICD-10-PCS | Mod: S$GLB,,, | Performed by: INTERNAL MEDICINE

## 2022-09-06 PROCEDURE — 99213 OFFICE O/P EST LOW 20 MIN: CPT | Mod: S$GLB,,, | Performed by: INTERNAL MEDICINE

## 2022-09-06 PROCEDURE — 3008F BODY MASS INDEX DOCD: CPT | Mod: CPTII,S$GLB,, | Performed by: INTERNAL MEDICINE

## 2022-09-06 PROCEDURE — 3288F PR FALLS RISK ASSESSMENT DOCUMENTED: ICD-10-PCS | Mod: CPTII,S$GLB,, | Performed by: INTERNAL MEDICINE

## 2022-09-06 PROCEDURE — 1100F PR PT FALLS ASSESS DOC 2+ FALLS/FALL W/INJURY/YR: ICD-10-PCS | Mod: CPTII,S$GLB,, | Performed by: INTERNAL MEDICINE

## 2022-09-06 PROCEDURE — 4010F PR ACE/ARB THEARPY RXD/TAKEN: ICD-10-PCS | Mod: CPTII,S$GLB,, | Performed by: INTERNAL MEDICINE

## 2022-09-06 PROCEDURE — 99999 PR PBB SHADOW E&M-EST. PATIENT-LVL V: CPT | Mod: PBBFAC,,, | Performed by: INTERNAL MEDICINE

## 2022-09-06 PROCEDURE — 4010F ACE/ARB THERAPY RXD/TAKEN: CPT | Mod: CPTII,S$GLB,, | Performed by: INTERNAL MEDICINE

## 2022-09-06 PROCEDURE — 1160F PR REVIEW ALL MEDS BY PRESCRIBER/CLIN PHARMACIST DOCUMENTED: ICD-10-PCS | Mod: CPTII,S$GLB,, | Performed by: INTERNAL MEDICINE

## 2022-09-06 RX ORDER — OMEPRAZOLE 40 MG/1
40 CAPSULE, DELAYED RELEASE ORAL DAILY
Qty: 30 CAPSULE | Refills: 11 | Status: SHIPPED | OUTPATIENT
Start: 2022-09-06 | End: 2023-01-17 | Stop reason: SDUPTHER

## 2022-09-06 RX ORDER — CYCLOBENZAPRINE HCL 10 MG
10 TABLET ORAL NIGHTLY
Qty: 30 TABLET | Refills: 5 | Status: SHIPPED | OUTPATIENT
Start: 2022-09-06 | End: 2023-01-17 | Stop reason: SDUPTHER

## 2022-09-06 RX ORDER — HYDROXYCHLOROQUINE SULFATE 200 MG/1
200 TABLET, FILM COATED ORAL 2 TIMES DAILY
Qty: 60 TABLET | Refills: 5 | Status: SHIPPED | OUTPATIENT
Start: 2022-09-06 | End: 2023-01-17 | Stop reason: SDUPTHER

## 2022-09-06 RX ORDER — GABAPENTIN 600 MG/1
600 TABLET ORAL 3 TIMES DAILY
Qty: 90 TABLET | Refills: 5 | Status: SHIPPED | OUTPATIENT
Start: 2022-09-06 | End: 2022-10-07

## 2022-09-06 NOTE — PROGRESS NOTES
"Subjective:       Patient ID: Laura Freeman is a 67 y.o. female.    Chief Complaint: follow up  (Pt is doing well no questions or concerns )    The patient is complaining of joint pain involving the MCP PIP wrist elbow shoulders hips knees and ankles bilaterally.  The pain is 2/10 in intensity dull in quality and continuous.  That is associated with a morning stiffness lasting for more than 60 minutes.  Is also having difficulty maintaining a good night of sleep.  This has been associated with myalgias.  Muscle aches are 2/10 in intensity dull in quality and continuous.  They are associated with fatigue.  No fever no chills no others.      Review of Systems   Constitutional:  Negative for appetite change, chills and fever.   HENT:  Negative for congestion, ear pain, mouth sores, nosebleeds and trouble swallowing.    Eyes:  Negative for photophobia and discharge.   Respiratory:  Negative for chest tightness and shortness of breath.    Cardiovascular:  Negative for chest pain.   Gastrointestinal:  Negative for abdominal pain and vomiting.   Endocrine: Negative.    Genitourinary:  Negative for hematuria.   Musculoskeletal:         As per HPI   Skin:  Negative for rash.   Neurological:  Negative for weakness.       Objective:   BP (!) 142/84 (BP Location: Right arm, Patient Position: Sitting, BP Method: Large (Automatic))   Pulse 70   Temp 97.8 °F (36.6 °C) (Temporal)   Resp 18   Ht 5' 7" (1.702 m)   Wt 96.2 kg (212 lb)   LMP  (LMP Unknown)   SpO2 (!) 94%   BMI 33.20 kg/m²      Physical Exam   Constitutional: She is oriented to person, place, and time. She appears well-developed and well-nourished. No distress.   HENT:   Head: Normocephalic and atraumatic.   Right Ear: External ear normal.   Left Ear: External ear normal.   Eyes: Pupils are equal, round, and reactive to light.   Cardiovascular: Normal rate, regular rhythm and normal heart sounds.   Pulmonary/Chest: Breath sounds normal.   Abdominal: Soft. There " is no abdominal tenderness.   Musculoskeletal:      Right shoulder: Normal.      Left shoulder: Normal.      Right elbow: Normal.      Left elbow: Normal.      Right wrist: Normal.      Left wrist: Normal.      Cervical back: Neck supple.      Right hip: Normal.      Left hip: Normal.      Right knee: Normal.      Left knee: Normal.   Lymphadenopathy:     She has no cervical adenopathy.   Neurological: She is alert and oriented to person, place, and time. She displays normal reflexes. No cranial nerve deficit or sensory deficit. She exhibits normal muscle tone. Coordination normal.   Skin: No rash noted. No erythema.   Vitals reviewed.      Right Side Rheumatological Exam     Examination finds the shoulder, elbow, wrist, knee, 1st PIP, 1st MCP, 2nd PIP, 2nd MCP, 3rd PIP, 3rd MCP, 4th PIP, 4th MCP, 5th PIP, 5th MCP, temporomandibular, hip, ankle, 1st MTP, 2nd MTP, 3rd MTP, 4th MTP and 5th MTP normal.    Left Side Rheumatological Exam     Examination finds the shoulder, elbow, wrist, knee, 1st PIP, 1st MCP, 2nd PIP, 2nd MCP, 3rd PIP, 3rd MCP, 4th PIP, 4th MCP, 5th PIP, 5th MCP, temporomandibular, hip, ankle, 1st MTP, 2nd MTP, 3rd MTP, 4th MTP and 5th MTP normal.       Completed Fibromyalgia exam 11/18 tender points.  No data to display     Assessment:       1. Seronegative rheumatoid arthritis    2. Fibromyalgia syndrome    3. Primary insomnia    4. Hepatitis C virus carrier state    5. Low back pain with sciatica, sciatica laterality unspecified, unspecified back pain laterality, unspecified chronicity    6. Hypothyroidism, unspecified type    7. Hypertension, unspecified type    8. Mixed incontinence    9. Inflammatory arthritis              Plan:       Problem List Items Addressed This Visit          Cardiac/Vascular    Hypertension    Relevant Medications    gabapentin (NEURONTIN) 600 MG tablet    omeprazole (PRILOSEC) 40 MG capsule    cyclobenzaprine (FLEXERIL) 10 MG tablet    hydrOXYchloroQUINE (PLAQUENIL) 200  mg tablet       Renal/    Mixed incontinence    Relevant Medications    gabapentin (NEURONTIN) 600 MG tablet    omeprazole (PRILOSEC) 40 MG capsule    cyclobenzaprine (FLEXERIL) 10 MG tablet    hydrOXYchloroQUINE (PLAQUENIL) 200 mg tablet       Endocrine    Hypothyroidism    Relevant Medications    gabapentin (NEURONTIN) 600 MG tablet    omeprazole (PRILOSEC) 40 MG capsule    cyclobenzaprine (FLEXERIL) 10 MG tablet    hydrOXYchloroQUINE (PLAQUENIL) 200 mg tablet       GI    Hepatitis C virus infection    Relevant Medications    gabapentin (NEURONTIN) 600 MG tablet    omeprazole (PRILOSEC) 40 MG capsule    cyclobenzaprine (FLEXERIL) 10 MG tablet    hydrOXYchloroQUINE (PLAQUENIL) 200 mg tablet       Orthopedic    Low back pain    Relevant Medications    gabapentin (NEURONTIN) 600 MG tablet    omeprazole (PRILOSEC) 40 MG capsule    cyclobenzaprine (FLEXERIL) 10 MG tablet    hydrOXYchloroQUINE (PLAQUENIL) 200 mg tablet     Other Visit Diagnoses       Seronegative rheumatoid arthritis    -  Primary    Relevant Medications    gabapentin (NEURONTIN) 600 MG tablet    omeprazole (PRILOSEC) 40 MG capsule    cyclobenzaprine (FLEXERIL) 10 MG tablet    hydrOXYchloroQUINE (PLAQUENIL) 200 mg tablet    Fibromyalgia syndrome        Relevant Medications    gabapentin (NEURONTIN) 600 MG tablet    omeprazole (PRILOSEC) 40 MG capsule    cyclobenzaprine (FLEXERIL) 10 MG tablet    hydrOXYchloroQUINE (PLAQUENIL) 200 mg tablet    Primary insomnia        Relevant Medications    gabapentin (NEURONTIN) 600 MG tablet    omeprazole (PRILOSEC) 40 MG capsule    cyclobenzaprine (FLEXERIL) 10 MG tablet    hydrOXYchloroQUINE (PLAQUENIL) 200 mg tablet    Inflammatory arthritis        Relevant Medications    gabapentin (NEURONTIN) 600 MG tablet    omeprazole (PRILOSEC) 40 MG capsule    cyclobenzaprine (FLEXERIL) 10 MG tablet    hydrOXYchloroQUINE (PLAQUENIL) 200 mg tablet

## 2022-09-09 ENCOUNTER — CLINICAL SUPPORT (OUTPATIENT)
Dept: REHABILITATION | Facility: HOSPITAL | Age: 67
End: 2022-09-09
Payer: MEDICARE

## 2022-09-09 DIAGNOSIS — Z90.02 H/O LARYNGECTOMY: Primary | ICD-10-CM

## 2022-09-09 PROCEDURE — L8509 TRACH-ESOPH VOICE PROS MD IN: HCPCS

## 2022-09-09 PROCEDURE — 92507 TX SP LANG VOICE COMM INDIV: CPT

## 2022-09-09 NOTE — PROGRESS NOTES
04/22/22    Team Meeting   Meeting Type Daily Rounds   Team Members Present   Team Members Present Physician;Nurse;   Physician Team Member Dr Navarro Saravia MD; LISY Nye   Nursing Team Member Ghazala Vasquez, [facility]   Care Management Team Member Gordo Anguiano [place], [facility] [place]   Patient/Family Present   Patient Present No   Patient's Family Present No   Attention seeking at times, irritable edge, triggered by calls, frequent redirection  [facility]  Speech Therapy Treatment Note  Laryngectomy  Date:  9/9/2022     PATIENT IS A NECK-BREATHER - DO NOT ORALLY INTUBATE    Name: Laura Freeman   MRN: [id number]   Therapy Diagnosis:   Encounter Diagnosis   Name Primary?    H/O laryngectomy Yes     Physician: Rowdy Wu MD    Time In:  0850   Time Out:  0910  Total Billable Time: 20     Precautions: Standard  Subjective:   Pt reports: intermittent aphonia and strained voice     Pain Scale:  0/10 on VAS currently.   Pain Location: NA    SURGICAL HISTORY  Past Surgical History:   Procedure Laterality Date    ceiol - cataract extraction and insertion of introcular lens      excision of papilloma      I&D of skin and subcutaneous tissue  05/31/2015    MULTIPLE TOOTH EXTRACTIONS Bilateral 09/06/2019    Full mouth extraction in preparation for radiation therapy.    PANENDOSCOPY N/A 07/08/2019    Initial diagnosis laryngeal squamous cell carcinoma.  Done in conjunction with an awake tracheostomy.    PERCUTANEOUS INSERTION OF GASTROSTOMY TUBE N/A 07/12/2019    SELECTIVE NECK DISSECTION Bilateral 08/12/2019    Done in conjunction with total laryngectomy for laryngeal squamous cell carcinoma.    THYROID LOBECTOMY Right 08/12/2019    Done in conjunction with total laryngectomy with incidental finding thyroid papillary microcarcinoma.    TOTAL LARYNGECTOMY N/A 08/12/2019    Total laryngectomy with bilateral selective neck dissections (levels 2 through 4) right hemithyroidectomy and primary tracheoesophageal puncture cricopharyngeal myotomy for treatment laryngeal squamous cell carcinoma.    TRACHEOSTOMY  07/08/2019    Done in conjunction with panendoscopy at initial diagnosis of laryngeal squamous cell carcinoma.    TRACHEOSTOMY TUBE CHANGE  07/14/2019    Done in conjunction with control of post-operative tracheostomy wound hemorrhage.        DYSPHAGIA:   No reported dysphagia    Objective:        UNTIMED  Procedure Min.   Speech-  Language- Voice Therapy  20     Total Untimed Units: 20  Charges Billed/# of units: 20/1      Long Term Goals:  Current Progress:   1.  Patient will utilize alaryngeal communication via TEP to express needs and desires without respiratory compromise >90% of the time.  Progressing towards goal         Short Term Goals:  Current Progress:   1. Patient will demonstrate care and use of HME system for maximum pulmonary benefit >90% of the time. Progressing towards goal   2.  Patient will demonstrate adequate care and use of TEP to maintain TEP voicing >90% of the time.  Progressing towards goal   3.  Patient will demonstrate adequate occlusion and cleaning of TEP to maintain voicing >90% of the time.  Progressing towards goal   4. Patient will increase laryngectomy tube usage daily or PRN to maintain and increase stomal patency.  Progressing towards goal     Patient Education/Response:   Education completed: yes Plan of Care, role of SLP in care, and TEP and stomal care      Barriers to Learning:  NA    Teaching Method: Verbal    Assessment:   Laura is progressing well towards her goals. Current goals remain appropriate. Goals to be updated as necessary.     TEP Assessment:    Initial Fitting: no    Re-fitting:yes    TEP Initial Fitting Date: 08/12/2019    TEP Current Status:Ms. Freeman is currently a 17FR, 6mm Provox Johnson placed on 06/08/2022. Additionally she wears a 10/36 fenestrated akhil tube with push to talk HMEs.     TEP Patient Complaints:aphonia and strained voicing    TEP Accessories: 0/36 fenestrated akhil tube with push to talk HMEs.    Stoma Size:  adequate     Lymphedema:  no    TEP Fitting/Re-sizing:     Removed current TEP: yes mild biofilm noted on esophageal phalange    TEP Removed catheter : no     TEP Sizing:yes 6mm    Puncture Dilation: no     TEP Prosthesis Placed:yes 17FR, 6mm Provox Johnson    TEP Voicing with Open Tract:no     TEP Voicing with Prosthesis:yes     TEP Leaking through Prothesis:no     TEP  Leaking around Prosthesis:no     Ms. 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  She is currently utilizing esophageal speech via a voice prosthesis for alaryngeal communication for functional communication with her family, friends, medical providers, and others to perform her daily life activities.  Ms. Freeman requires the use of a laryngectomy tube at all times to keep the airway patent and prevent stomal stenosis. HMEs are required daily for pulmonary optimization     Medical necessity is demonstrated by the following IMPAIRMENTS:  Total laryngectomy  Barriers to Therapy: None  Pt's spiritual, cultural and educational needs considered and pt agreeable to plan of care and goals.Yes    Additional Information:     Ms. Freeman entered session aphonia this date. She reported no leaking through TEP. SLP assessed and noted no leaking through or around prosthesis. Sizing remains adequate at this time. TEP replaced without incident this date. Pt noted to have good voicing immediately after replacement. SLP to follow up as warranted.     Plan:   Continue POC with focus on alaryngeal communication and patient, family education.     Lexy Price MS, CCC-SLP  9/9/2022

## 2022-09-21 ENCOUNTER — HOSPITAL ENCOUNTER (OUTPATIENT)
Dept: RADIOLOGY | Facility: HOSPITAL | Age: 67
Discharge: HOME | End: 2022-09-21
Attending: INTERNAL MEDICINE
Payer: MEDICARE

## 2022-09-21 DIAGNOSIS — C73 THYROID CANCER: ICD-10-CM

## 2022-09-21 PROCEDURE — 76536 US EXAM OF HEAD AND NECK: CPT | Mod: TC

## 2022-09-27 ENCOUNTER — TELEPHONE (OUTPATIENT)
Dept: ENDOCRINOLOGY | Facility: CLINIC | Age: 67
End: 2022-09-27
Payer: MEDICARE

## 2022-09-27 RX ORDER — METFORMIN HYDROCHLORIDE 500 MG/1
500 TABLET ORAL 2 TIMES DAILY
Qty: 60 TABLET | Refills: 11 | Status: SHIPPED | OUTPATIENT
Start: 2022-09-27

## 2022-09-27 NOTE — TELEPHONE ENCOUNTER
----- Message from Dottie Yo sent at 9/27/2022  1:04 PM CDT -----  Regarding: General Message  #Stout#    Patient wants to know if the ultrasound results are in? [phone]

## 2022-09-28 ENCOUNTER — OFFICE VISIT (OUTPATIENT)
Dept: OTOLARYNGOLOGY | Facility: CLINIC | Age: 67
End: 2022-09-28
Payer: MEDICARE

## 2022-09-28 VITALS
RESPIRATION RATE: 16 BRPM | TEMPERATURE: 99 F | HEIGHT: 67 IN | BODY MASS INDEX: 36.26 KG/M2 | SYSTOLIC BLOOD PRESSURE: 122 MMHG | HEART RATE: 65 BPM | WEIGHT: 231 LBS | DIASTOLIC BLOOD PRESSURE: 73 MMHG

## 2022-09-28 DIAGNOSIS — C32.9 LARYNGEAL SQUAMOUS CELL CARCINOMA: Primary | ICD-10-CM

## 2022-09-28 DIAGNOSIS — Z90.02 H/O LARYNGECTOMY: ICD-10-CM

## 2022-09-28 DIAGNOSIS — C73 PAPILLARY MICROCARCINOMA OF THYROID: ICD-10-CM

## 2022-09-28 PROCEDURE — 99215 OFFICE O/P EST HI 40 MIN: CPT | Mod: PBBFAC | Performed by: OTOLARYNGOLOGY

## 2022-09-28 NOTE — PROGRESS NOTES
The scope used for the exam was:  Flexible scope ENF-P4  Serial Number:  1)    [id number]    []   2)    [id number]    []   3)    [id number]    []   4)    [id number]    []   5)    [id number]    [x]   6)    [id number]    []       The scope used for the exam was:  Rigid scope   Serial Number:  1)   [id number]    []   2)   [id number]    []   3)   [id number]    []   4)    3384   []   5)    0824   []   6)    5554   []     7)   7425    []   8)   2240    []   9)   1109    []

## 2022-09-28 NOTE — PROGRESS NOTES
Patient Name: Laura Freeman   YOB: 1955     Chief Complaint:   Chief Complaint   Patient presents with    Follow-up        History of Present Illness:  This is a 64-year-old female presenting from Dr. Dennis for evaluation of laryngeal mass. Patient notes she has had hoarseness starting shortly before Ever, dysphagia since evaluated with an EGD and MBSS (showing aspirations), 1 month of sore throat that has been treated with antibiotics. She endorses some weight loss however, denies any dyspnea. She has not noted any lymphadenopathy, otalgia, or odynophagia.    7/24/19: Here for first follow up after admission for awake trach, panendoscopy. 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  Recommendation:  - TB cites likely inferior outcomes (functional and oncologic) with CRT vs TL, thus TL is best option  - TB recommends continued education to pt and family regarding life after laryngectomy, as well as likely poor functional outcome with CRT (aspiration, trach and peg dependence, npo status)  - However if pt is completely against surgery, should offer CRT for treatment    Has been doing ok at home. Had one day she went to ER with plugged inner cannula.    7/31/19: Here after meeting with ST and a laryngectomy patient, would like to proceed with surgery. No issues at home since last visit. No changes in medical history.    8/12/19: Underwent TL, BND, CP myotomy, R thyroid lobectomy and TEP.    8/27/19: Here for first follow up since discharge. Still taking liquids PO, no issues at home. Has ST appointment scheduled for Sept 3rd. Has right UE weakness and pain but mobility is ok. Doing well with stoma care.  [facility] TB Recs:  Recommendations: NCCN guidelines state consider adjuvant XRT  - Will plan to discuss risks/benefits of radiation with patient in ENT follow  - Will do same in Radiation Oncology clinic    9/4/19: Here for follow up and also for follow-up after presenting to ED last night with  concerns for nodule below stoma. Has 1 pill of her antibiotic given at last visit left. She reports that the swelling appeared suddenly yesterday. Is very tender on palpation. causes pain extending toward her right shoulder [1]    9/11/19: hospitalization on 9/4 s/p I&D of right clavicular head and teeth extraction. Starting radiation on Thursday. Otherwise no issues. [1]    10/16/2019: Recent hospitalization for TIA sx w/o any sequela. MRI, echo pending. Discharged on levo and doxy for right clavicular head drainage. patient hasn't taken any. Notes still have lots of blood secretions but not any now. Eating per mouth also supplementing thru G-tube [1]    11/19/19: Pt here for follow up. She has completed CRT 2 weeks ago. Eating and drinking well. Maintaining weight. Feels her right sternoclavicular joint is unchanged. Finished antibiotics over 2 weeks ago. Feels it has not changed since finishing antibiotics, no drainage. Per patient finished all chemo and radiation treatments.    12/24/19: No c/o today. Sternoclavicular joint has resolved. Tolerating PO intake and maintaining weight. She would like to have PEG removed, states she has not used it in over 2 months. [1]    1/28/20: Here for follow up. PET ordered today by Dr. Kim. No dyspnea, dysphagia. noting shoulder stiffness/pain. Doing home PT exercises. Notes some numbness underneath chin. Got PEG removed by GS today. [1]    3/5/20: Here for cancer surveaillcen. Post-treatment PET done 2/2020 with some hypermetabolic activity in nasopharynx, no eviednce of persistence/recurrence in neck. No distant mets. Denies dysphagia. Weight stable. Denies dyspnea. Voicing well with TEP.    5/26/20: Here for cancer surveillance. No weight loss, dysphagia, odynophagia. Reports that the lymphedema machine was denied per a letter she received. She is reporting severe pain to bilateral cheeks and upper neck? Voicing with TEP, no issues.    7/28/20: Denies  "dysphagia/odyophagia/otalgia. Reports weight loss ~ 10lbs since last visit bc she does not have much of an appetite, but has begun drinking ensure to keep calories up. Frustrated that her ear lobes have paresthesias and she ripped her earrings out. Requesting additional norco. C/o intermittent tight pulling sensation in bilateral neck muscles that makes her feel as though she is choking. Reports taking a few days of leftover abx rx 2 weeks ago bc her secretions had turned green and she was afraid to go to the ED. Denies having fever, but reports she got a little SOB. Secretions are now clear. [1]    9/29/2020: Presenting in follow-up for cancer surveillance. She denies any dysphasia, odynophagia, otalgia or unexpected weight loss at this time. She notes she has a knot on her left neck close to her stoma that is nontender. She noticed this about month and a half ago. 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  A CT scan of the neck showed the presence of a 1.9 cm left thyroid nodule with an interval increase in size. No cervical adenopathy was noted. Patient is to undergo needle biopsy of this lesion today.  Thyroid function studies done on September 29, 2020, showed her to be hypothyroid with a TSH level of 15.190 and a normal free T4 0.76. Previously the patient had been given a prescription for levothyroxine 75 mcg daily but this had never been filled and the patient had not been taking that medication. Patient does admit to some fatigue but she does not feel "bad".  She does not have any other new complaints today.    11/11/20: Doing well. S/p FNA of left thyroid nodule 10/29/20. Path came back that sample was insufficient for diagnostic " evaluation. Reports compliance with levothyroxine.  This is a Telehealth visit that was performed with the originating site at patient's home and the distant site, [facility]. I have reviewed the patient name and date of birth.  I have verified that this patient is in the state of [place]. Verbal consent to participate in interactive audio & video (unavailable) visit was obtained. This particular visit occurred during 2020 COVID19 outbreak. I discussed with the patient regarding the nature of our telehealth visits, that:   - Our sessions are not being recorded and that personal health information is protected   - I would evaluate the patient and recommend diagnostics and treatments based on my assessment  - The team will provide follow up care in person if/when the patient needs it.   Time spent on patient counseling, care coordination and treatment plan development: 7 min    1/7/21: Doing well. No complaints at this time. No weight changes, dysphagia, odynophagia, otalgia, new neck masses.    3/11/21: Here today for possible FNA of nodule, however when using ultrasound no concerning nodules seen to biopsy. Patient had adjustment of Synthroid at last visit, TSH elevated in January. Due for surveillance appointment.    5/6/21: Here today for cancer surveillance. Reports significant tenderness to right neck starting 1-2 weeks ago but no associated mass or swelling. She has tried her lortab but this has not helped. No issues with PO intake. Weight is stable. No dysphagia, odynophagia, otalgia.  6-16-21: Follow-up for cancer surveillance, no current complaints including no palpable neck mass or tenderness on either side, no blood from the trachea or blood from the mouth. Her weight is remaining stable and she is tolerating p.o. well. She overall functioning well and appears happy.    8/18/21: here today for surveillance. No complaints overall; a little bit of generic fullness over the left angle of mandible but nothing  discrete. Tolerating PO well. No odynophagia, dysphagia, weight changes, otalgia, difficulty speaking via alaryngeal voice.    10/19/2021: Pt is here for surveillance with hx of right thyroid lobectomy and FNA of 2.0 cm left thyroid nodule. Path came back that sample was insufficient for diagnostic evaluation 10/2020. She states that she has been doing well. Pt says that her only complaint is inability to taste food every once and a while. No current complaints    1/26/2022: Follow-up for cancer surveillance, currently tolerating diet well, without neck swelling, pain, hemoptysis from the trach or new areas of swelling or tenderness in her neck or in her throat. Denies otalgia. CT of chest and neck unremarkable for recurrence or metastatic disease. Makes note of diminished thyroid nodule. Patient followed by Dr. Golden in endocrine for monitoring thyroid function and markers.     4/26/2022: Patient presenting in follow-up. She is doing well with her diet. She continues to note some tightness around her neck for which she is doing stretches and she does find these helpful. She denies any odynophagia, dysphagia, otalgia. She notes her weight does tend to waver and endocrinology has been fixing her thyroid medication dosage recently. Most recently she did have some lab work done to measure her thyroid function.    June 28, 2022:  67-year-old female presents today for follow-up of her supraglottic laryngeal squamous cell carcinoma and her thyroid papillary microcarcinoma.  Patient is doing well at this time.  She has no complaints of any difficulty swallowing, neck pain, ear pain, or hemoptysis.  The area of swelling on the left side of her neck has not changed.  Endocrinology is following her for management of her postoperative hypothyroidism and thyroid carcinoma.  She has no other new complaints today.  Of note she is scheduled to have a follow-up thyroid ultrasound and ultrasound of the neck in October  2022.    9/28/22: Here for follow up. Patient reports no issues. Denies ear pain. Stable neck stiffness. No new lumps/bumps, swelling on neck is stable. Swallowing fine, no weight loss. Recent thyroid US unremarkable.  No complaints.      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    T3N0M0 supraglottic laryngeal squamous cell carcinoma 07/08/2019    T3 N0 M0 squamous cell carcinoma of the supraglottic larynx diagnosed initially on July 8, 2019. Treated with total laryngectomy, bilateral selective neck dissections (levels 2 through 4), right hemithyroidectomy (with incidental finding papillary thyroid microcarcinoma), primary TEP puncture with cricopharyngeal myotomy on August 12, 2019.  Treated with postoperative radiation therapy.    Type 2 diabetes mellitus with unspecified diabetic retinopathy without macular edema      Past Surgical History:   Procedure Laterality Date    ceiol - cataract extraction and insertion of introcular lens      excision of papilloma      I&D of skin and subcutaneous tissue  05/31/2015    MULTIPLE TOOTH EXTRACTIONS Bilateral 09/06/2019    Full mouth extraction in preparation for radiation therapy.    PANENDOSCOPY N/A 07/08/2019    Initial diagnosis laryngeal squamous cell carcinoma.  Done in conjunction with an awake tracheostomy.    PERCUTANEOUS INSERTION OF GASTROSTOMY TUBE N/A 07/12/2019    SELECTIVE NECK DISSECTION Bilateral 08/12/2019    Done in conjunction with total laryngectomy for laryngeal squamous cell carcinoma.    THYROID LOBECTOMY Right 08/12/2019    Done in conjunction with total laryngectomy with incidental finding thyroid papillary microcarcinoma.    TOTAL LARYNGECTOMY N/A 08/12/2019    Total laryngectomy with bilateral selective  neck dissections (levels 2 through 4) right hemithyroidectomy and primary tracheoesophageal puncture cricopharyngeal myotomy for treatment laryngeal squamous cell carcinoma.    TRACHEOSTOMY  07/08/2019    Done in conjunction with panendoscopy at initial diagnosis of laryngeal squamous cell carcinoma.    TRACHEOSTOMY TUBE CHANGE  07/14/2019    Done in conjunction with control of post-operative tracheostomy wound hemorrhage.       Review of Systems:  Unremarkable except as mentioned above.    Current Medications:  Current Outpatient Medications   Medication Sig    amLODIPine (NORVASC) 10 MG tablet Take 1 tablet (10 mg total) by mouth Daily.    aspirin 81 MG Chew Take 81 mg by mouth once daily.    blood sugar diagnostic Strp Use qd    blood sugar diagnostic Strp To check BG 1 times daily, to use with insurance preferred meter    blood-glucose meter kit To check BG 1 times daily, to use with insurance preferred meter    carvediloL (COREG) 3.125 MG tablet TAKE 1 TABLET BY MOUTH TWICE DAILY    cyclobenzaprine (FLEXERIL) 10 MG tablet Take 1 tablet (10 mg total) by mouth every evening.    EScitalopram oxalate (LEXAPRO) 10 MG tablet Take 10 mg by mouth Daily.    famotidine (PEPCID) 40 MG tablet Take 40 mg by mouth 2 (two) times daily.    FLUoxetine 40 MG capsule Take 40 mg by mouth Daily.    HYDROcodone-acetaminophen (NORCO)  mg per tablet Take 1 tablet by mouth every 12 (twelve) hours as needed for Pain.    hydrOXYchloroQUINE (PLAQUENIL) 200 mg tablet Take 1 tablet (200 mg total) by mouth 2 (two) times daily. After food    isosorbide mononitrate (IMDUR) 30 MG 24 hr tablet Take 30 mg by mouth every morning.    lancets Misc Use qd    lancets Misc To check BG 1 times daily, to use with insurance preferred meter    lancets Misc Use to test 1 qd    levothyroxine (SYNTHROID) 100 MCG tablet TAKE 1 TABLET BY MOUTH DAILY    lisinopriL (PRINIVIL,ZESTRIL) 40 MG tablet TAKE 1 TABLET BY MOUTH DAILY    meclizine (ANTIVERT) 25 mg  tablet Take 25 mg by mouth 3 (three) times daily.    metFORMIN (GLUCOPHAGE) 500 MG tablet Take 1 tablet (500 mg total) by mouth 2 (two) times daily.    mirabegron (MYRBETRIQ) 50 mg Tb24 Take 1 tablet (50 mg total) by mouth once daily at 6am.    nitroGLYCERIN (NITROSTAT) 0.4 MG SL tablet Place 0.4 mg under the tongue as needed.    omeprazole (PRILOSEC) 40 MG capsule Take 1 capsule (40 mg total) by mouth once daily. Before lunch    ondansetron (ZOFRAN-ODT) 4 MG TbDL Take 4 mg by mouth 3 (three) times daily.    OXcarbazepine (TRILEPTAL) 300 MG Tab TAKE 1 TABLET BY MOUTH TWICE DAILY    OXcarbazepine (TRILEPTAL) 300 MG Tab TAKE 1 TABLET BY MOUTH TWICE DAILY    furosemide (LASIX) 40 MG tablet Take 40 mg by mouth daily as needed.    gabapentin (NEURONTIN) 600 MG tablet Take 1 tablet (600 mg total) by mouth 3 (three) times daily.    oxybutynin (DITROPAN-XL) 5 MG TR24 Take 1 tablet (5 mg total) by mouth once daily. (Patient not taking: Reported on 9/28/2022)     Current Facility-Administered Medications   Medication    LIDOcaine (PF) 40 mg/mL (4 %) injection 1 mL    phenylephrine HCL 1% nasal spray 1 spray        Allergies:  Review of patient's allergies indicates:  No Known Allergies     Physical Exam:  Vital signs:   There were no vitals filed for this visit.  General:  Well-developed well-nourished female in no acute distress.  Patient voices well with her TEP.  Head and face:  Normocephalic.  No facial lesions.  No temporomandibular joint tenderness or click.  Ears:  Right ear-auricle is normally developed.  External auditory canal is clear.  Tympanic membrane is nonerythematous.  No middle ear effusion.  Left ear-auricle is normally developed.  External auditory canal is clear.  Tympanic membrane is nonerythematous.  No middle ear effusion.  Nose:  Nasal dorsum is unremarkable.  No significant septal deviation.  No significant intranasal congestion.  Secretions are clear.  Oral cavity and oropharynx:  Tongue and floor  mouth are without lesions.  Mucosa is moist.  No pharyngeal erythema or exudates.  No oropharyngeal masses.  No tonsillar hypertrophy.  Neck:  Supple without of palpable adenopathy.  She still does have palpable subcutaneous nodules in the left neck just posterior to the incision line in the midportion of the neck.  These are mobile and nontender.  There appeared to be 2-3 which are palpable.  Soft superficial swelling over right inferior neck .  No masses in the thyroid bed or in the peristomal region.  Parotid and submandibular glands are normal to palpation.  Stoma is without granulation.  TEP is in place.  Eyes:  Extraocular muscles intact.  No nystagmus.  No exophthalmos or enophthalmos.  Neurologic:  Alert and oriented.  Cranial nerves 2-12 are grossly normal.    Procedure note: Flexible laryngoscopy.  After informed consent was obtained and the nose was prepped with 1% phenyleprine nasal spray and tetracaine topically. The flexible fiberoptic laryngoscope was introduced into the right nostril and advanced. Examination of the nose showed the above mentioned findings. Examination of the nasopharynx showed no nasopharyngeal masses or eustachian tube obstruction. Examination of the base of tongue showed no masses. Neopharynx is without lesions or obstruction.  Examination of the trachea through the trachea stoma showed no masses or lesions down to the level of the adrianna.    Assessment/Plan:   67-year-old female with T3 N0 M0 squamous cell carcinoma of the supraglottic larynx status post total laryngectomy with bilateral selective neck dissections (levels 2 through 4), primary TEP puncture with cricopharyngeal myotomy, and right hemithyroidectomy with an incidental finding of a papillary microcarcinoma (T1a N0 M0) on August 12, 2019, with postoperative radiation therapy completed on November 5, 2019.     Plan:    Patient is now around 3 years out with [facility]; will extend surveillance to 4 month intervals. She knows  to call with any changes/issues for sooner appointment.   Will order yearly CT neck, CXR, TSH/T4 for November.   Continue follow-up with Endocrinology.    Ivana Mora MD  \[facility] [place] [facility]\"" Otolaryngology [facility]

## 2022-09-30 NOTE — TELEPHONE ENCOUNTER
Notes show plan was for Endo f/u in the next month w/ TSH, tg w/ tg ab, and u/s prior.  Pt did u/s but not labs and appears f/u has been canceled.  Ok to read her report to her or facilitate her looking it up in Epic.  Otherwise also recommend adding her to the waiting list to be seen and getting the labs prior and will discuss everything together then.

## 2022-10-05 ENCOUNTER — OFFICE VISIT (OUTPATIENT)
Dept: UROLOGY | Facility: CLINIC | Age: 67
End: 2022-10-05
Payer: MEDICARE

## 2022-10-05 VITALS
RESPIRATION RATE: 20 BRPM | HEART RATE: 65 BPM | WEIGHT: 207.44 LBS | TEMPERATURE: 99 F | DIASTOLIC BLOOD PRESSURE: 83 MMHG | BODY MASS INDEX: 32.56 KG/M2 | OXYGEN SATURATION: 96 % | SYSTOLIC BLOOD PRESSURE: 140 MMHG | HEIGHT: 67 IN

## 2022-10-05 DIAGNOSIS — N81.10 BLADDER PROLAPSE, FEMALE, ACQUIRED: Primary | ICD-10-CM

## 2022-10-05 PROCEDURE — 1101F PR PT FALLS ASSESS DOC 0-1 FALLS W/OUT INJ PAST YR: ICD-10-PCS | Mod: CPTII,,, | Performed by: NURSE PRACTITIONER

## 2022-10-05 PROCEDURE — 1101F PT FALLS ASSESS-DOCD LE1/YR: CPT | Mod: CPTII,,, | Performed by: NURSE PRACTITIONER

## 2022-10-05 PROCEDURE — 3288F PR FALLS RISK ASSESSMENT DOCUMENTED: ICD-10-PCS | Mod: CPTII,,, | Performed by: NURSE PRACTITIONER

## 2022-10-05 PROCEDURE — 4010F PR ACE/ARB THEARPY RXD/TAKEN: ICD-10-PCS | Mod: CPTII,,, | Performed by: NURSE PRACTITIONER

## 2022-10-05 PROCEDURE — 3008F BODY MASS INDEX DOCD: CPT | Mod: CPTII,,, | Performed by: NURSE PRACTITIONER

## 2022-10-05 PROCEDURE — 4010F ACE/ARB THERAPY RXD/TAKEN: CPT | Mod: CPTII,,, | Performed by: NURSE PRACTITIONER

## 2022-10-05 PROCEDURE — 99213 OFFICE O/P EST LOW 20 MIN: CPT | Mod: PBBFAC | Performed by: NURSE PRACTITIONER

## 2022-10-05 PROCEDURE — 3288F FALL RISK ASSESSMENT DOCD: CPT | Mod: CPTII,,, | Performed by: NURSE PRACTITIONER

## 2022-10-05 PROCEDURE — 99213 PR OFFICE/OUTPT VISIT, EST, LEVL III, 20-29 MIN: ICD-10-PCS | Mod: S$PBB,,, | Performed by: NURSE PRACTITIONER

## 2022-10-05 PROCEDURE — 3079F PR MOST RECENT DIASTOLIC BLOOD PRESSURE 80-89 MM HG: ICD-10-PCS | Mod: CPTII,,, | Performed by: NURSE PRACTITIONER

## 2022-10-05 PROCEDURE — 3077F PR MOST RECENT SYSTOLIC BLOOD PRESSURE >= 140 MM HG: ICD-10-PCS | Mod: CPTII,,, | Performed by: NURSE PRACTITIONER

## 2022-10-05 PROCEDURE — 3008F PR BODY MASS INDEX (BMI) DOCUMENTED: ICD-10-PCS | Mod: CPTII,,, | Performed by: NURSE PRACTITIONER

## 2022-10-05 PROCEDURE — 3079F DIAST BP 80-89 MM HG: CPT | Mod: CPTII,,, | Performed by: NURSE PRACTITIONER

## 2022-10-05 PROCEDURE — 99213 OFFICE O/P EST LOW 20 MIN: CPT | Mod: S$PBB,,, | Performed by: NURSE PRACTITIONER

## 2022-10-05 PROCEDURE — 3077F SYST BP >= 140 MM HG: CPT | Mod: CPTII,,, | Performed by: NURSE PRACTITIONER

## 2022-10-05 NOTE — PROGRESS NOTES
Chief Complaint:   Chief Complaint   Patient presents with    mixed incotinence       HPI:  Patient is 67-year-old female here for follow-up for mixed incontinence.  Patient trial of oxybutynin ER 5 mg which she failed, patient increased to 10 mg then 15 mg, she failed treatment then was switched to Myrbetriq 25 mg, then 50 mg, did not help her symptoms therefore she stopped medicine. Today patient presents with urinary urgency, frequency, incontinence, patient denies dysuria, retention, gross hematuria, nocturia.  Patient states she saturates 2-3 depends per day. Vaginal exam as noted below.      Allergies:  Review of patient's allergies indicates:  No Known Allergies    Medications:  Current Outpatient Medications   Medication Sig Dispense Refill    amLODIPine (NORVASC) 10 MG tablet Take 1 tablet (10 mg total) by mouth Daily. 90 tablet 1    aspirin 81 MG Chew Take 81 mg by mouth once daily.      blood sugar diagnostic Strp Use qd 100 each 11    blood sugar diagnostic Strp To check BG 1 times daily, to use with insurance preferred meter 100 strip 1    blood-glucose meter kit To check BG 1 times daily, to use with insurance preferred meter 1 each 1    carvediloL (COREG) 3.125 MG tablet TAKE 1 TABLET BY MOUTH TWICE DAILY 60 tablet 6    cyclobenzaprine (FLEXERIL) 10 MG tablet Take 1 tablet (10 mg total) by mouth every evening. 30 tablet 5    EScitalopram oxalate (LEXAPRO) 10 MG tablet Take 10 mg by mouth Daily.      famotidine (PEPCID) 40 MG tablet Take 40 mg by mouth 2 (two) times daily.      FLUoxetine 40 MG capsule Take 40 mg by mouth Daily.      furosemide (LASIX) 40 MG tablet Take 40 mg by mouth daily as needed.      gabapentin (NEURONTIN) 600 MG tablet Take 1 tablet (600 mg total) by mouth 3 (three) times daily. 90 tablet 5    HYDROcodone-acetaminophen (NORCO)  mg per tablet Take 1 tablet by mouth every 12 (twelve) hours as needed for Pain. 60 tablet 0    hydrOXYchloroQUINE (PLAQUENIL) 200 mg tablet Take 1  tablet (200 mg total) by mouth 2 (two) times daily. After food 60 tablet 5    isosorbide mononitrate (IMDUR) 30 MG 24 hr tablet Take 30 mg by mouth every morning.      lancets Misc Use qd 100 each 3    lancets Misc To check BG 1 times daily, to use with insurance preferred meter 100 each 1    lancets Misc Use to test 1 qd 100 each 3    levothyroxine (SYNTHROID) 100 MCG tablet TAKE 1 TABLET BY MOUTH DAILY 90 tablet 1    lisinopriL (PRINIVIL,ZESTRIL) 40 MG tablet TAKE 1 TABLET BY MOUTH DAILY 90 tablet 1    meclizine (ANTIVERT) 25 mg tablet Take 25 mg by mouth 3 (three) times daily.      metFORMIN (GLUCOPHAGE) 500 MG tablet Take 1 tablet (500 mg total) by mouth 2 (two) times daily. 60 tablet 11    mirabegron (MYRBETRIQ) 50 mg Tb24 Take 1 tablet (50 mg total) by mouth once daily at 6am. 30 tablet 11    nitroGLYCERIN (NITROSTAT) 0.4 MG SL tablet Place 0.4 mg under the tongue as needed.      omeprazole (PRILOSEC) 40 MG capsule Take 1 capsule (40 mg total) by mouth once daily. Before lunch 30 capsule 11    ondansetron (ZOFRAN-ODT) 4 MG TbDL Take 4 mg by mouth 3 (three) times daily.      OXcarbazepine (TRILEPTAL) 300 MG Tab TAKE 1 TABLET BY MOUTH TWICE DAILY 60 tablet 6    OXcarbazepine (TRILEPTAL) 300 MG Tab TAKE 1 TABLET BY MOUTH TWICE DAILY 60 tablet 6    oxybutynin (DITROPAN-XL) 5 MG TR24 Take 1 tablet (5 mg total) by mouth once daily. (Patient not taking: No sig reported) 30 tablet 11     Current Facility-Administered Medications   Medication Dose Route Frequency Provider Last Rate Last Admin    LIDOcaine (PF) 40 mg/mL (4 %) injection 1 mL  1 mL Other 1 time in Clinic/HOD Ray Austin MD        phenylephrine HCL 1% nasal spray 1 spray  1 spray Each Nostril 1 time in Clinic/HOD Ray Austin MD           Review of Systems:  General: No fever, chills, fatigability, or weight loss.  Skin: No rashes, itching, or changes in color or texture of skin.  Chest: Denies AQUINO, cyanosis, wheezing, cough, and sputum  production.  Abdomen: Appetite fine. No weight loss. Denies diarrhea, abdominal pain, hematemesis, or blood in stool.  Musculoskeletal: No joint stiffness or swelling. Denies back pain.  : As above.  All other review of systems negative.    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    T3N0M0 supraglottic laryngeal squamous cell carcinoma 07/08/2019    T3 N0 M0 squamous cell carcinoma of the supraglottic larynx diagnosed initially on July 8, 2019. Treated with total laryngectomy, bilateral selective neck dissections (levels 2 through 4), right hemithyroidectomy (with incidental finding papillary thyroid microcarcinoma), primary TEP puncture with cricopharyngeal myotomy on August 12, 2019.  Treated with postoperative radiation therapy.    Type 2 diabetes mellitus with unspecified diabetic retinopathy without macular edema        PSH:  Past Surgical History:   Procedure Laterality Date    ceiol - cataract extraction and insertion of introcular lens      excision of papilloma      I&D of skin and subcutaneous tissue  05/31/2015    MULTIPLE TOOTH EXTRACTIONS Bilateral 09/06/2019    Full mouth extraction in preparation for radiation therapy.    PANENDOSCOPY N/A 07/08/2019    Initial diagnosis laryngeal squamous cell carcinoma.  Done in conjunction with an awake tracheostomy.    PERCUTANEOUS INSERTION OF GASTROSTOMY TUBE N/A 07/12/2019    SELECTIVE NECK DISSECTION Bilateral 08/12/2019    Done in conjunction with total laryngectomy for laryngeal squamous cell carcinoma.    THYROID LOBECTOMY Right 08/12/2019    Done in conjunction with total laryngectomy with incidental finding thyroid papillary microcarcinoma.    TOTAL LARYNGECTOMY N/A 08/12/2019    Total laryngectomy with bilateral selective neck  dissections (levels 2 through 4) right hemithyroidectomy and primary tracheoesophageal puncture cricopharyngeal myotomy for treatment laryngeal squamous cell carcinoma.    TRACHEOSTOMY  07/08/2019    Done in conjunction with panendoscopy at initial diagnosis of laryngeal squamous cell carcinoma.    TRACHEOSTOMY TUBE CHANGE  07/14/2019    Done in conjunction with control of post-operative tracheostomy wound hemorrhage.       FamHx:  Family History   Problem Relation Age of Onset    Hypertension Mother     Diabetes Mellitus Mother     Heart disease Mother     Diabetes Mellitus Sister     Hypertension Sister        SocHx:  Social History     Socioeconomic History    Marital status: Other   Tobacco Use    Smoking status: Former     Passive exposure: Past    Smokeless tobacco: Never   Substance and Sexual Activity    Alcohol use: Not Currently    Drug use: Never    Sexual activity: Not Currently   Social History Narrative    ** Merged History Encounter **          Social Determinants of Health     Financial Resource Strain: Low Risk     Difficulty of Paying Living Expenses: Not hard at all   Food Insecurity: No Food Insecurity    Worried About Running Out of Food in the Last Year: Never true    Ran Out of Food in the Last Year: Never true   Transportation Needs: No Transportation Needs    Lack of Transportation (Medical): No    Lack of Transportation (Non-Medical): No   Physical Activity: Insufficiently Active    Days of Exercise per Week: 3 days    Minutes of Exercise per Session: 30 min   Stress: No Stress Concern Present    Feeling of Stress : Not at all   Social Connections: Moderately Integrated    Frequency of Communication with Friends and Family: Three times a week    Frequency of Social Gatherings with Friends and Family: Three times a week    Attends Yazidi Services: More than 4 times per year    Active Member of Clubs or Organizations: Yes    Attends Club or Organization Meetings: 1 to 4 times per year     Marital Status:    Housing Stability: Low Risk     Unable to Pay for Housing in the Last Year: No    Number of Places Lived in the Last Year: 1    Unstable Housing in the Last Year: No       Physical Exam:  Vitals:    10/05/22 0932   BP: (!) 140/83   Pulse: 65   Resp: 20   Temp: 98.8 °F (37.1 °C)     General: A&Ox3, no apparent distress, no deformities  Neck: No masses, normal thyroid  Lungs: CTA vlad, no use of accessory muscles  Heart: RRR, no arrhythmias  Abdomen: Soft, NT, ND, no masses, no hernias, no hepatosplenomegaly  Lymphatic: Neck and groin nodes negative  Skin: The skin is warm and dry. No jaundice.  Ext: No c/c/e.    GUFemale: External genitalia normal. No lesions. Meatus normal size and location. Urethra normal. No masses. Bladder prolapsed to stage 3. No fullness or masses. Vagina normal with discharge or lesions. Anus/perineum normal.    Imaging: None      Impression: Mixed Incontinence      Plan: Refer to [facility] for Eval. & Treat for Bladder suspension.  Encouraged patient to practice Bladder Hygiene. Instructed patient on timed voiding every 2-3 hrs, double voiding, avoidance of bladder irritants such as alcohol, citrus foods, chocolate, caffeinated drinks, energy drinks, spicy foods, sugar, caffeine products, sodas. Instructed patient to avoid drinking fluids 1-2 hours prior to bedtime & void immediately before bedtime.

## 2022-10-05 NOTE — PROGRESS NOTES
Placed in room. Seen by Ricki Laird NP. Spoke with patient. Will refer to [facility] for Bladder prolapse. RTC in 6 months.

## 2022-10-07 ENCOUNTER — OFFICE VISIT (OUTPATIENT)
Dept: ENDOCRINOLOGY | Facility: CLINIC | Age: 67
End: 2022-10-07
Payer: MEDICARE

## 2022-10-07 VITALS
SYSTOLIC BLOOD PRESSURE: 115 MMHG | WEIGHT: 209.88 LBS | BODY MASS INDEX: 32.94 KG/M2 | DIASTOLIC BLOOD PRESSURE: 79 MMHG | TEMPERATURE: 99 F | HEIGHT: 67 IN | RESPIRATION RATE: 16 BRPM | HEART RATE: 71 BPM

## 2022-10-07 DIAGNOSIS — C73 PAPILLARY MICROCARCINOMA OF THYROID: Primary | ICD-10-CM

## 2022-10-07 DIAGNOSIS — E89.0 POSTSURGICAL HYPOTHYROIDISM: ICD-10-CM

## 2022-10-07 LAB — TSH SERPL-ACNC: 3.14 UIU/ML (ref 0.35–4.94)

## 2022-10-07 PROCEDURE — 36415 COLL VENOUS BLD VENIPUNCTURE: CPT

## 2022-10-07 PROCEDURE — 84432 ASSAY OF THYROGLOBULIN: CPT

## 2022-10-07 PROCEDURE — 84443 ASSAY THYROID STIM HORMONE: CPT

## 2022-10-07 PROCEDURE — 99215 OFFICE O/P EST HI 40 MIN: CPT | Mod: PBBFAC

## 2022-10-07 RX ORDER — GABAPENTIN 300 MG/1
CAPSULE ORAL
COMMUNITY
Start: 2021-11-15 | End: 2023-01-17 | Stop reason: SDUPTHER

## 2022-10-07 RX ORDER — METHOCARBAMOL 750 MG/1
750 TABLET, FILM COATED ORAL
COMMUNITY
End: 2023-05-17 | Stop reason: SDUPTHER

## 2022-10-07 RX ORDER — HYDROCODONE BITARTRATE AND ACETAMINOPHEN 10; 325 MG/1; MG/1
1 TABLET ORAL EVERY 12 HOURS PRN
Qty: 60 TABLET | Refills: 0 | Status: SHIPPED | OUTPATIENT
Start: 2022-10-07 | End: 2022-11-09 | Stop reason: SDUPTHER

## 2022-10-07 NOTE — TELEPHONE ENCOUNTER
----- Message from Marcy Gates sent at 10/7/2022  9:54 AM CDT -----  Regarding: medication refill  Patient called requesting refill for pain medication.

## 2022-10-07 NOTE — PROGRESS NOTES
[facility] Endocrinology  CLINIC NOTE    Patient Name: Laura Freeman  YOB: 1955  Chief Complaint: Hypothyroidism     PRESENTING HISTORY   History of Present Illness:  Ms. Laura Freeman is a 67 y.o. female w/ PMH laryngeal and incidental microPTC, stage I low risk d/s w/ indeterminant findings, s/p hemithyroidectomy here for f/u of postsurgical hypothyroidism.    Recent labs on 5/26/2022 with TSH of 0.29 and FT4 of 1.08; labs from 3/22 with thyroglobulin ab less than 1.8. US thyroid done on 9/21/2022 with stable left multinodular thyroid; two lymph nodes adjacent to left thyroid which appear to be benign. Pt endorses some right-sided neck pain and swelling but denies fevers, chills, night sweats, no intentional wt loss, no fatigue, diarrhea or constipation. She continues on levothyroxine 100mcg daily. Follow with ENT. Saw them recently with pending surveillance.       Review of Systems:  12 point review of symptoms negative unless otherwise stated above        MEDICATIONS:     Current Outpatient Medications   Medication Instructions    amLODIPine (NORVASC) 10 mg, Oral, Daily    aspirin 81 mg, Oral, Daily    blood sugar diagnostic Strp Use qd    blood sugar diagnostic Strp To check BG 1 times daily, to use with insurance preferred meter    blood-glucose meter kit To check BG 1 times daily, to use with insurance preferred meter    carvediloL (COREG) 3.125 MG tablet TAKE 1 TABLET BY MOUTH TWICE DAILY    cyclobenzaprine (FLEXERIL) 10 mg, Oral, Nightly    EScitalopram oxalate (LEXAPRO) 10 mg, Oral, Daily    famotidine (PEPCID) 40 mg, Oral, 2 times daily    FLUoxetine 40 mg, Oral, Daily    gabapentin (NEURONTIN) 300 MG capsule   See Instructions, TAKE 1 CAPSULE BY MOUTH THREE TIMES DAILY, # 90 cap(s), 2 Refill(s), Pharmacy: [id number], 174, cm, Height/Length Dosing, 10/24/21 10:52:00 CDT, 89, kg, Weight Dosing, 10/24/21 10:52:00 CDT    HYDROcodone-acetaminophen (NORCO)  mg per tablet 1  "tablet, Oral, Every 12 hours PRN    hydrOXYchloroQUINE (PLAQUENIL) 200 mg, Oral, 2 times daily, After food    isosorbide mononitrate (IMDUR) 30 mg, Oral, Every morning    lancets Misc Use qd    lancets Misc To check BG 1 times daily, to use with insurance preferred meter    lancets Misc Use to test 1 qd    levothyroxine (SYNTHROID) 100 MCG tablet TAKE 1 TABLET BY MOUTH DAILY    lisinopriL (PRINIVIL,ZESTRIL) 40 MG tablet TAKE 1 TABLET BY MOUTH DAILY    meclizine (ANTIVERT) 25 mg, Oral, 3 times daily    metFORMIN (GLUCOPHAGE) 500 mg, Oral, 2 times daily    methocarbamoL (ROBAXIN) 750 mg, Oral    nitroGLYCERIN (NITROSTAT) 0.4 mg, Sublingual, As needed (PRN)    omeprazole (PRILOSEC) 40 mg, Oral, Daily, Before lunch    ondansetron (ZOFRAN-ODT) 4 mg, Oral, 3 times daily    OXcarbazepine (TRILEPTAL) 300 MG Tab TAKE 1 TABLET BY MOUTH TWICE DAILY    OXcarbazepine (TRILEPTAL) 300 MG Tab TAKE 1 TABLET BY MOUTH TWICE DAILY        OBJECTIVE:   Vital Signs:  Vitals:    10/07/22 0940   BP: 115/79   Pulse: 71   Resp: 16   Temp: 99 °F (37.2 °C)   TempSrc: Oral   Weight: 95.2 kg (209 lb 14.4 oz)   Height: 5' 7" (1.702 m)        Physical Exam:  General: Awake, alert, & oriented to person, place & time. No acute distress  Psychiatric: Mood and affect normal  HEENT: Normocephalic, atraumatic. Face symmetric.  Cardiovascular: Regular rate & rhythm. Normal S1 & S2 w/out murmurs, rubs or gallops.  No JVD appreciated.  2+ pulses throughout.  Pulmonary: Bilateral symmetric chest rise. Non-labored, no wheezes, rhonchi or crackles are appreciated.  Abdominal:  Soft, nontender, nondistended. Bowel sounds present  Extremities: No clubbing, cyanosis or edema.  Skin:  Exposed skin is warm & dry.  Neuro:   Patient moves all extremities equally. Sensation intact bilateraly.        ASSESSMENT & PLAN:   67 y.o. female w/ PMH laryngeal and incidental microPTC,  stage I low risk d/s w/ indeterminant findings,s/p hemithyroidectomy.  -Ordered TSH and " thyroglobulin antibody.   -Continue levothyroxine 100mcg daily for now and will titrate medication per lab findings. Will also renew script at that time.   -Last Vit D of 45 in 5/2022.  -Advised pt to continue to monitor neck pain and swelling. If gets worse or is persistent then recommend she notify ENT and/or report to ER for further evaluation.       RTC 1 year.    Michelle Ramirez MD  PGY-3, Internal Medicine

## 2022-10-07 NOTE — PROGRESS NOTES
[facility]  Endocrinology Clinic      Endocrine Pertinent History: Incidental L thyroid nodule in pt with hx of suppraglotic SCC staged as a T3N0M0 SCCa of the supraglottis s/p total laryngectomy, bilateral neck dissection, craniopharyngeal myotomy, transesophageal puncture, R lobectomy, and radiotherapy 11/2019 with demonstration of remission on post treatment PET 2/2020    HPI/Summary: Laura Freeman is a 67 y.o. 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    T3N0M0 supraglottic laryngeal squamous cell carcinoma 07/08/2019    T3 N0 M0 squamous cell carcinoma of the supraglottic larynx diagnosed initially on July 8, 2019. Treated with total laryngectomy, bilateral selective neck dissections (levels 2 through 4), right hemithyroidectomy (with incidental finding papillary thyroid microcarcinoma), primary TEP puncture with cricopharyngeal myotomy on August 12, 2019.  Treated with postoperative radiation therapy.    Type 2 diabetes mellitus with unspecified diabetic retinopathy without macular edema       Past Surgical History:   Procedure Laterality Date    ceiol - cataract extraction and insertion of introcular lens      excision of papilloma      I&D of skin and subcutaneous tissue  05/31/2015    MULTIPLE TOOTH EXTRACTIONS Bilateral 09/06/2019    Full mouth extraction in preparation for radiation therapy.    PANENDOSCOPY N/A 07/08/2019    Initial diagnosis laryngeal squamous cell carcinoma.  Done in conjunction with an awake tracheostomy.    PERCUTANEOUS INSERTION OF GASTROSTOMY TUBE N/A 07/12/2019    SELECTIVE NECK DISSECTION Bilateral 08/12/2019    Done in conjunction with total laryngectomy for laryngeal squamous cell carcinoma.     THYROID LOBECTOMY Right 08/12/2019    Done in conjunction with total laryngectomy with incidental finding thyroid papillary microcarcinoma.    TOTAL LARYNGECTOMY N/A 08/12/2019    Total laryngectomy with bilateral selective neck dissections (levels 2 through 4) right hemithyroidectomy and primary tracheoesophageal puncture cricopharyngeal myotomy for treatment laryngeal squamous cell carcinoma.    TRACHEOSTOMY  07/08/2019    Done in conjunction with panendoscopy at initial diagnosis of laryngeal squamous cell carcinoma.    TRACHEOSTOMY TUBE CHANGE  07/14/2019    Done in conjunction with control of post-operative tracheostomy wound hemorrhage.      Family History   Problem Relation Age of Onset    Hypertension Mother     Diabetes Mellitus Mother     Heart disease Mother     Diabetes Mellitus Sister     Hypertension Sister       Social History     Socioeconomic History    Marital status: Other   Tobacco Use    Smoking status: Former     Passive exposure: Past    Smokeless tobacco: Never   Substance and Sexual Activity    Alcohol use: Not Currently    Drug use: Never    Sexual activity: Not Currently   Social History Narrative    ** Merged History Encounter **          Social Determinants of Health     Financial Resource Strain: Low Risk     Difficulty of Paying Living Expenses: Not hard at all   Food Insecurity: No Food Insecurity    Worried About Running Out of Food in the Last Year: Never true    Ran Out of Food in the Last Year: Never true   Transportation Needs: No Transportation Needs    Lack of Transportation (Medical): No    Lack of Transportation (Non-Medical): No   Physical Activity: Insufficiently Active    Days of Exercise per Week: 3 days    Minutes of Exercise per Session: 30 min   Stress: No Stress Concern Present    Feeling of Stress : Not at all   Social Connections: Moderately Integrated    Frequency of Communication with Friends and Family: Three times a week    Frequency of Social Gatherings with  Friends and Family: Three times a week    Attends Mormon Services: More than 4 times per year    Active Member of Clubs or Organizations: Yes    Attends Club or Organization Meetings: 1 to 4 times per year    Marital Status:    Housing Stability: Low Risk     Unable to Pay for Housing in the Last Year: No    Number of Places Lived in the Last Year: 1    Unstable Housing in the Last Year: No      Review of patient's allergies indicates:  No Known Allergies    Meds:  Current Outpatient Medications on File Prior to Visit   Medication Sig Dispense Refill    amLODIPine (NORVASC) 10 MG tablet Take 1 tablet (10 mg total) by mouth Daily. 90 tablet 1    aspirin 81 MG Chew Take 81 mg by mouth once daily.      blood sugar diagnostic Strp Use qd 100 each 11    blood sugar diagnostic Strp To check BG 1 times daily, to use with insurance preferred meter 100 strip 1    blood-glucose meter kit To check BG 1 times daily, to use with insurance preferred meter 1 each 1    carvediloL (COREG) 3.125 MG tablet TAKE 1 TABLET BY MOUTH TWICE DAILY 60 tablet 6    cyclobenzaprine (FLEXERIL) 10 MG tablet Take 1 tablet (10 mg total) by mouth every evening. 30 tablet 5    EScitalopram oxalate (LEXAPRO) 10 MG tablet Take 10 mg by mouth Daily.      famotidine (PEPCID) 40 MG tablet Take 40 mg by mouth 2 (two) times daily.      FLUoxetine 40 MG capsule Take 40 mg by mouth Daily.      gabapentin (NEURONTIN) 600 MG tablet Take 1 tablet (600 mg total) by mouth 3 (three) times daily. 90 tablet 5    HYDROcodone-acetaminophen (NORCO)  mg per tablet Take 1 tablet by mouth every 12 (twelve) hours as needed for Pain. 60 tablet 0    hydrOXYchloroQUINE (PLAQUENIL) 200 mg tablet Take 1 tablet (200 mg total) by mouth 2 (two) times daily. After food 60 tablet 5    isosorbide mononitrate (IMDUR) 30 MG 24 hr tablet Take 30 mg by mouth every morning.      lancets Misc Use qd 100 each 3    lancets Misc To check BG 1 times daily, to use with insurance  preferred meter 100 each 1    lancets Misc Use to test 1 qd 100 each 3    levothyroxine (SYNTHROID) 100 MCG tablet TAKE 1 TABLET BY MOUTH DAILY 90 tablet 1    lisinopriL (PRINIVIL,ZESTRIL) 40 MG tablet TAKE 1 TABLET BY MOUTH DAILY 90 tablet 1    meclizine (ANTIVERT) 25 mg tablet Take 25 mg by mouth 3 (three) times daily.      metFORMIN (GLUCOPHAGE) 500 MG tablet Take 1 tablet (500 mg total) by mouth 2 (two) times daily. 60 tablet 11    nitroGLYCERIN (NITROSTAT) 0.4 MG SL tablet Place 0.4 mg under the tongue as needed.      omeprazole (PRILOSEC) 40 MG capsule Take 1 capsule (40 mg total) by mouth once daily. Before lunch 30 capsule 11    ondansetron (ZOFRAN-ODT) 4 MG TbDL Take 4 mg by mouth 3 (three) times daily.      OXcarbazepine (TRILEPTAL) 300 MG Tab TAKE 1 TABLET BY MOUTH TWICE DAILY 60 tablet 6    OXcarbazepine (TRILEPTAL) 300 MG Tab TAKE 1 TABLET BY MOUTH TWICE DAILY 60 tablet 6     Current Facility-Administered Medications on File Prior to Visit   Medication Dose Route Frequency Provider Last Rate Last Admin    LIDOcaine (PF) 40 mg/mL (4 %) injection 1 mL  1 mL Other 1 time in Clinic/HOD Ray Austin MD        phenylephrine HCL 1% nasal spray 1 spray  1 spray Each Nostril 1 time in Clinic/HOD Ray Austin MD           ROS:  10-point ROS negative other than as mentioned above.    Physical Exam:  There were no vitals taken for this visit.   General: No acute distress. Well nourished and well developed.  HENT: Conjuctiva normal (no erythema). No scleral icterus. No exophthalmos. PERRL. EOMI. External ears normal b/l. Oropharynx clear and moist and without exudate  Neck: Trachea midline. No thyroid swelling or palpable nodules. No cervical lymphadenopathy  Cardio: RRR. No MRG  Resp: LCTAB. No w/r/r. No labored breathing or accessory muscle use. Speaking in full sentences without stopping to catch breath.  Abdomen: Soft, NTND. Normoactive BS throughout. No guarding or rigidity.  Skin: No rash or jaundice  noted.  MSK: ROM all ext grossly intact. No acute swelling, redness, increased warmth, or tenderness of b/l hands, wrists, and knees.  Neuro: Aox4. Strength 5/5 all ext. Sensation grossly intact. No tremor or slurred speech.  Psych: Normal insight and judgement. Linear thought process.  Endo: No acromegalic or cushingoid features     Assessment/Plan:    ***    RTC: ***    A total of 60 minutes was spent on this patient encounter including pre-charting (review of prior notes, labs, and imaging including independent interpretation of these tests as documented above), face-to-face time (including time spent counseling), time documenting the encounter, and time coordinating care (ordering labs, medications, and arranging f/u).    Patient care and plan discussed with supervising physician.    Jenaro Faria MD - PGY2  [facility] [facility]

## 2022-10-08 LAB
ENDOCRINOLOGIST REVIEW: ABNORMAL
THYROGLOB AB SERPL IA-ACNC: <1.8 IU/ML
THYROGLOB SERPL-MCNC: 5.1 NG/ML

## 2022-10-10 NOTE — PROGRESS NOTES
I have reviewed the notes, assessments, and/or procedures performed this visit, and I concur with the documentation.    Ray Austin M.D.

## 2022-10-11 ENCOUNTER — TELEPHONE (OUTPATIENT)
Dept: FAMILY MEDICINE | Facility: CLINIC | Age: 67
End: 2022-10-11
Payer: MEDICARE

## 2022-10-11 DIAGNOSIS — C73 THYROID CANCER: Primary | ICD-10-CM

## 2022-10-11 NOTE — TELEPHONE ENCOUNTER
Spoke with patient, informed labs are back, she has stage I low risk ca, u/s w/o concerning features, tg w/excellent trends, no change in mgmt for her cancer.  Thyroid level not at goal erx sent in for increased dose of LT4 will get TSH in early December.  Voiced understanding no questions at this time.

## 2022-10-11 NOTE — TELEPHONE ENCOUNTER
----- Message from Kiera Bob sent at 10/11/2022  1:29 PM CDT -----  Regarding: Refills  Provider: Dr. Vicenta Acevedo  Preferred Pharmacy: [facility]  Last Visit:  Next Visit: 10/31/2022  Patient's Contact Number:    1. Name of Medication: Carvedilol  Dosage: 3.125 mg tab  Comments:    2. Name of Medication: Atorvastatin  Dosage: 40 mg  Comments:    3. Name of Medication: Lisinopril  Dosage: 40 mg tab  Comments    4. Name of Medication: Metformin  Dosage: 500 mg tab  Comments:    5. Name of Medication: Oxcabazepine  Dosage: 300 mg tab  Comments:

## 2022-10-11 NOTE — TELEPHONE ENCOUNTER
Labs are back.  Pt has stage I low risk ca s/p stacie w/ indeterminate findings on u/s w/o concerning features recently and tg w/ excellent trends.  For now, no change in mgmt for her cancer except for her hypothyroidism.  Dose isn't at goal.  Erx increased LT4 112mcg po daily up from current 100mcg, #90, no refills.  Plan for repeat TSH at the beginning of December.

## 2022-10-12 RX ORDER — LEVOTHYROXINE SODIUM 112 UG/1
112 TABLET ORAL
Qty: 90 TABLET | Refills: 0 | Status: SHIPPED | OUTPATIENT
Start: 2022-10-12 | End: 2022-12-20

## 2022-10-17 ENCOUNTER — HOSPITAL ENCOUNTER (OUTPATIENT)
Dept: RADIOLOGY | Facility: HOSPITAL | Age: 67
Discharge: HOME | End: 2022-10-17
Attending: STUDENT IN AN ORGANIZED HEALTH CARE EDUCATION/TRAINING PROGRAM
Payer: MEDICARE

## 2022-10-17 DIAGNOSIS — Z90.02 H/O LARYNGECTOMY: ICD-10-CM

## 2022-10-17 DIAGNOSIS — C73 PAPILLARY MICROCARCINOMA OF THYROID: ICD-10-CM

## 2022-10-17 DIAGNOSIS — C32.9 LARYNGEAL SQUAMOUS CELL CARCINOMA: ICD-10-CM

## 2022-10-17 LAB
CREAT SERPL-MCNC: 1.13 MG/DL (ref 0.55–1.02)
GFR SERPLBLD CREATININE-BSD FMLA CKD-EPI: 54 MLS/MIN/1.73/M2

## 2022-10-17 PROCEDURE — 70491 CT SOFT TISSUE NECK W/DYE: CPT | Mod: TC

## 2022-10-17 PROCEDURE — 25500020 PHARM REV CODE 255: Performed by: STUDENT IN AN ORGANIZED HEALTH CARE EDUCATION/TRAINING PROGRAM

## 2022-10-17 PROCEDURE — 82565 ASSAY OF CREATININE: CPT | Performed by: STUDENT IN AN ORGANIZED HEALTH CARE EDUCATION/TRAINING PROGRAM

## 2022-10-17 PROCEDURE — 36415 COLL VENOUS BLD VENIPUNCTURE: CPT | Performed by: STUDENT IN AN ORGANIZED HEALTH CARE EDUCATION/TRAINING PROGRAM

## 2022-10-17 RX ADMIN — IOHEXOL 100 ML: 350 INJECTION, SOLUTION INTRAVENOUS at 11:10

## 2022-10-31 ENCOUNTER — OFFICE VISIT (OUTPATIENT)
Dept: FAMILY MEDICINE | Facility: CLINIC | Age: 67
End: 2022-10-31
Payer: MEDICARE

## 2022-10-31 VITALS
RESPIRATION RATE: 18 BRPM | BODY MASS INDEX: 33.08 KG/M2 | TEMPERATURE: 98 F | HEART RATE: 83 BPM | DIASTOLIC BLOOD PRESSURE: 96 MMHG | SYSTOLIC BLOOD PRESSURE: 160 MMHG | HEIGHT: 67 IN | WEIGHT: 210.75 LBS

## 2022-10-31 DIAGNOSIS — N39.46 MIXED INCONTINENCE: ICD-10-CM

## 2022-10-31 DIAGNOSIS — N81.10 BLADDER PROLAPSE, FEMALE, ACQUIRED: Primary | ICD-10-CM

## 2022-10-31 PROCEDURE — 3288F PR FALLS RISK ASSESSMENT DOCUMENTED: ICD-10-PCS | Mod: CPTII,,, | Performed by: FAMILY MEDICINE

## 2022-10-31 PROCEDURE — 1159F PR MEDICATION LIST DOCUMENTED IN MEDICAL RECORD: ICD-10-PCS | Mod: CPTII,,, | Performed by: FAMILY MEDICINE

## 2022-10-31 PROCEDURE — 1101F PR PT FALLS ASSESS DOC 0-1 FALLS W/OUT INJ PAST YR: ICD-10-PCS | Mod: CPTII,,, | Performed by: FAMILY MEDICINE

## 2022-10-31 PROCEDURE — 3288F FALL RISK ASSESSMENT DOCD: CPT | Mod: CPTII,,, | Performed by: FAMILY MEDICINE

## 2022-10-31 PROCEDURE — 99213 OFFICE O/P EST LOW 20 MIN: CPT | Mod: S$PBB,,, | Performed by: FAMILY MEDICINE

## 2022-10-31 PROCEDURE — 3077F SYST BP >= 140 MM HG: CPT | Mod: CPTII,,, | Performed by: FAMILY MEDICINE

## 2022-10-31 PROCEDURE — 4010F ACE/ARB THERAPY RXD/TAKEN: CPT | Mod: CPTII,,, | Performed by: FAMILY MEDICINE

## 2022-10-31 PROCEDURE — 1101F PT FALLS ASSESS-DOCD LE1/YR: CPT | Mod: CPTII,,, | Performed by: FAMILY MEDICINE

## 2022-10-31 PROCEDURE — 99213 PR OFFICE/OUTPT VISIT, EST, LEVL III, 20-29 MIN: ICD-10-PCS | Mod: S$PBB,,, | Performed by: FAMILY MEDICINE

## 2022-10-31 PROCEDURE — 3077F PR MOST RECENT SYSTOLIC BLOOD PRESSURE >= 140 MM HG: ICD-10-PCS | Mod: CPTII,,, | Performed by: FAMILY MEDICINE

## 2022-10-31 PROCEDURE — 3080F DIAST BP >= 90 MM HG: CPT | Mod: CPTII,,, | Performed by: FAMILY MEDICINE

## 2022-10-31 PROCEDURE — 1159F MED LIST DOCD IN RCRD: CPT | Mod: CPTII,,, | Performed by: FAMILY MEDICINE

## 2022-10-31 PROCEDURE — 99213 OFFICE O/P EST LOW 20 MIN: CPT | Mod: PBBFAC | Performed by: FAMILY MEDICINE

## 2022-10-31 PROCEDURE — 3080F PR MOST RECENT DIASTOLIC BLOOD PRESSURE >= 90 MM HG: ICD-10-PCS | Mod: CPTII,,, | Performed by: FAMILY MEDICINE

## 2022-10-31 PROCEDURE — 4010F PR ACE/ARB THEARPY RXD/TAKEN: ICD-10-PCS | Mod: CPTII,,, | Performed by: FAMILY MEDICINE

## 2022-10-31 NOTE — PROGRESS NOTES
Laura Freeman  10/31/2022  [id number]              Visit Type:a scheduled routine follow-up visit    Chief Complaint:  Chief Complaint   Patient presents with    Follow-up       History of Present Illness:  Patient has questions about urology visit. Was told she has stage 3. Wants to know if it is cancer.  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    T3N0M0 supraglottic laryngeal squamous cell carcinoma 07/08/2019    T3 N0 M0 squamous cell carcinoma of the supraglottic larynx diagnosed initially on July 8, 2019. Treated with total laryngectomy, bilateral selective neck dissections (levels 2 through 4), right hemithyroidectomy (with incidental finding papillary thyroid microcarcinoma), primary TEP puncture with cricopharyngeal myotomy on August 12, 2019.  Treated with postoperative radiation therapy.    Type 2 diabetes mellitus with unspecified diabetic retinopathy without macular edema      Past Surgical History:   Procedure Laterality Date    ceiol - cataract extraction and insertion of introcular lens      excision of papilloma      I&D of skin and subcutaneous tissue  05/31/2015    MULTIPLE TOOTH EXTRACTIONS Bilateral 09/06/2019    Full mouth extraction in preparation for radiation therapy.    PANENDOSCOPY N/A 07/08/2019    Initial diagnosis laryngeal squamous cell carcinoma.  Done in conjunction with an awake tracheostomy.    PERCUTANEOUS INSERTION OF GASTROSTOMY TUBE N/A 07/12/2019    SELECTIVE NECK DISSECTION Bilateral 08/12/2019    Done in conjunction with total laryngectomy for laryngeal squamous cell carcinoma.    THYROID LOBECTOMY Right 08/12/2019    Done in conjunction with total laryngectomy with incidental finding thyroid papillary  microcarcinoma.    TOTAL LARYNGECTOMY N/A 08/12/2019    Total laryngectomy with bilateral selective neck dissections (levels 2 through 4) right hemithyroidectomy and primary tracheoesophageal puncture cricopharyngeal myotomy for treatment laryngeal squamous cell carcinoma.    TRACHEOSTOMY  07/08/2019    Done in conjunction with panendoscopy at initial diagnosis of laryngeal squamous cell carcinoma.    TRACHEOSTOMY TUBE CHANGE  07/14/2019    Done in conjunction with control of post-operative tracheostomy wound hemorrhage.     Family History   Problem Relation Age of Onset    Hypertension Mother     Diabetes Mellitus Mother     Heart disease Mother     Diabetes Mellitus Sister     Hypertension Sister      Social History     Socioeconomic History    Marital status: Other   Tobacco Use    Smoking status: Former     Passive exposure: Past    Smokeless tobacco: Never   Substance and Sexual Activity    Alcohol use: Not Currently    Drug use: Never    Sexual activity: Not Currently   Social History Narrative    ** Merged History Encounter **          Social Determinants of Health     Financial Resource Strain: Low Risk     Difficulty of Paying Living Expenses: Not hard at all   Food Insecurity: No Food Insecurity    Worried About Running Out of Food in the Last Year: Never true    Ran Out of Food in the Last Year: Never true   Transportation Needs: No Transportation Needs    Lack of Transportation (Medical): No    Lack of Transportation (Non-Medical): No   Physical Activity: Insufficiently Active    Days of Exercise per Week: 3 days    Minutes of Exercise per Session: 30 min   Stress: No Stress Concern Present    Feeling of Stress : Not at all   Social Connections: Moderately Integrated    Frequency of Communication with Friends and Family: Three times a week    Frequency of Social Gatherings with Friends and Family: Three times a week    Attends [employer] Services: More than 4 times per year    Active Member of Clubs or  Organizations: Yes    Attends Club or Organization Meetings: 1 to 4 times per year    Marital Status:    Housing Stability: Low Risk     Unable to Pay for Housing in the Last Year: No    Number of Places Lived in the Last Year: 1    Unstable Housing in the Last Year: No     Patient Active Problem List   Diagnosis    Mixed incontinence    Hepatitis C virus infection    Low back pain    Laryngeal squamous cell carcinoma    Papillary microcarcinoma of thyroid    Nocturia    Hypertension    Hypothyroidism    Bladder prolapse, female, acquired    Postsurgical hypothyroidism     Review of patient's allergies indicates:  No Known Allergies      Medications:  Current Outpatient Medications on File Prior to Visit   Medication Sig Dispense Refill    amLODIPine (NORVASC) 10 MG tablet Take 1 tablet (10 mg total) by mouth Daily. 90 tablet 1    aspirin 81 MG Chew Take 81 mg by mouth once daily.      blood sugar diagnostic Strp Use qd 100 each 11    blood sugar diagnostic Strp To check BG 1 times daily, to use with insurance preferred meter 100 strip 1    blood-glucose meter kit To check BG 1 times daily, to use with insurance preferred meter 1 each 1    carvediloL (COREG) 3.125 MG tablet TAKE 1 TABLET BY MOUTH TWICE DAILY 60 tablet 6    cyclobenzaprine (FLEXERIL) 10 MG tablet Take 1 tablet (10 mg total) by mouth every evening. 30 tablet 5    EScitalopram oxalate (LEXAPRO) 10 MG tablet Take 10 mg by mouth Daily.      famotidine (PEPCID) 40 MG tablet Take 40 mg by mouth 2 (two) times daily.      FLUoxetine 40 MG capsule Take 40 mg by mouth Daily.      gabapentin (NEURONTIN) 300 MG capsule   See Instructions, TAKE 1 CAPSULE BY MOUTH THREE TIMES DAILY, # 90 cap(s), 2 Refill(s), Pharmacy: [facility][id number], 174, cm, Height/Length Dosing, 10/24/21 10:52:00 CDT, 89, kg, Weight Dosing, 10/24/21 10:52:00 CDT      HYDROcodone-acetaminophen (NORCO)  mg per tablet Take 1 tablet by mouth every 12 (twelve) hours as  needed for Pain. 60 tablet 0    hydrOXYchloroQUINE (PLAQUENIL) 200 mg tablet Take 1 tablet (200 mg total) by mouth 2 (two) times daily. After food 60 tablet 5    isosorbide mononitrate (IMDUR) 30 MG 24 hr tablet Take 30 mg by mouth every morning.      lancets Misc Use qd 100 each 3    lancets Misc To check BG 1 times daily, to use with insurance preferred meter 100 each 1    lancets Misc Use to test 1 qd 100 each 3    levothyroxine (SYNTHROID) 112 MCG tablet Take 1 tablet (112 mcg total) by mouth before breakfast. 90 tablet 0    lisinopriL (PRINIVIL,ZESTRIL) 40 MG tablet TAKE 1 TABLET BY MOUTH DAILY 90 tablet 1    meclizine (ANTIVERT) 25 mg tablet Take 25 mg by mouth 3 (three) times daily.      metFORMIN (GLUCOPHAGE) 500 MG tablet Take 1 tablet (500 mg total) by mouth 2 (two) times daily. 60 tablet 11    methocarbamoL (ROBAXIN) 750 MG Tab Take 750 mg by mouth.      nitroGLYCERIN (NITROSTAT) 0.4 MG SL tablet Place 0.4 mg under the tongue as needed.      omeprazole (PRILOSEC) 40 MG capsule Take 1 capsule (40 mg total) by mouth once daily. Before lunch 30 capsule 11    ondansetron (ZOFRAN-ODT) 4 MG TbDL Take 4 mg by mouth 3 (three) times daily.      OXcarbazepine (TRILEPTAL) 300 MG Tab TAKE 1 TABLET BY MOUTH TWICE DAILY 60 tablet 6    OXcarbazepine (TRILEPTAL) 300 MG Tab TAKE 1 TABLET BY MOUTH TWICE DAILY (Patient not taking: Reported on 10/7/2022) 60 tablet 6     Current Facility-Administered Medications on File Prior to Visit   Medication Dose Route Frequency Provider Last Rate Last Admin    LIDOcaine (PF) 40 mg/mL (4 %) injection 1 mL  1 mL Other 1 time in Clinic/HOD Ray Austin MD        phenylephrine HCL 1% nasal spray 1 spray  1 spray Each Nostril 1 time in Clinic/HOD Ray Austin MD           Medications have been reviewed and reconciled with patient at this visit.    Adverse reactions to current medications reviewed with patient..      Exam:  Wt Readings from Last 3 Encounters:   10/31/22 95.6 kg  (210 lb 12.2 oz)   10/07/22 95.2 kg (209 lb 14.4 oz)   10/05/22 94.1 kg (207 lb 7.3 oz)     Temp Readings from Last 3 Encounters:   10/31/22 98.2 °F (36.8 °C)   10/07/22 99 °F (37.2 °C) (Oral)   10/05/22 98.8 °F (37.1 °C) (Oral)     BP Readings from Last 3 Encounters:   10/31/22 (!) 160/96   10/07/22 115/79   10/05/22 (!) 140/83     Pulse Readings from Last 3 Encounters:   10/31/22 83   10/07/22 71   10/05/22 65     Body mass index is 33.01 kg/m².      ROS:  See HPI for details      All Other ROS: Negative except as stated in HPI.    PE:  General: Alert and oriented, No acute distress. Teary eded  Head: Normocephalic, Atraumatic.  Eye: Pupils are equal, round and reactive to light, Extraocular movements are intact, Sclera non-icteric.    Laboratory Reviewed ({Yes)  Lab Results   Component Value Date    WBC 3.3 (L) 06/16/2022    HGB 12.3 06/16/2022    HCT 38.5 06/16/2022     06/16/2022    CHOL 158 10/12/2019    TRIG 112 10/12/2019    HDL 56 10/12/2019    ALT 13 05/26/2022    AST 17 05/26/2022     05/26/2022    K 4.9 05/26/2022    CREATININE 1.13 (H) 10/17/2022    BUN 11.4 05/26/2022    CO2 28 05/26/2022    TSH 3.1360 10/07/2022    INR 0.94 04/07/2021    HGBA1C 5.7 03/18/2021       Laura was seen today for follow-up.    Diagnoses and all orders for this visit:    Bladder prolapse, female, acquired    Mixed incontinence        Explained stages of bladder prolapse and possible treatments to patient  Patient to use Zinc Oxide as barrier cream  BP elevated, continue BP loggs        Care Plan/Goals: Reviewed    Goals    None         Follow up: No follow-ups on file.

## 2022-11-09 RX ORDER — HYDROCODONE BITARTRATE AND ACETAMINOPHEN 10; 325 MG/1; MG/1
1 TABLET ORAL EVERY 12 HOURS PRN
Qty: 60 TABLET | Refills: 0 | Status: SHIPPED | OUTPATIENT
Start: 2022-11-09 | End: 2022-12-12 | Stop reason: SDUPTHER

## 2022-11-09 NOTE — TELEPHONE ENCOUNTER
----- Message from Marcy Gates sent at 11/9/2022  3:14 PM CST -----  Regarding: Medication refill  Patient called requesting refill for pain medication. Andrés Whitfield

## 2022-12-12 RX ORDER — HYDROCODONE BITARTRATE AND ACETAMINOPHEN 10; 325 MG/1; MG/1
1 TABLET ORAL EVERY 12 HOURS PRN
Qty: 60 TABLET | Refills: 0 | Status: SHIPPED | OUTPATIENT
Start: 2022-12-12 | End: 2023-01-13 | Stop reason: SDUPTHER

## 2022-12-19 DIAGNOSIS — C73 PAPILLARY MICROCARCINOMA OF THYROID: Primary | ICD-10-CM

## 2022-12-20 ENCOUNTER — LAB VISIT (OUTPATIENT)
Dept: LAB | Facility: HOSPITAL | Age: 67
End: 2022-12-20
Attending: INTERNAL MEDICINE
Payer: MEDICARE

## 2022-12-20 DIAGNOSIS — C73 THYROID CANCER: ICD-10-CM

## 2022-12-20 DIAGNOSIS — C32.9 LARYNGEAL SQUAMOUS CELL CARCINOMA: ICD-10-CM

## 2022-12-20 DIAGNOSIS — C73 PAPILLARY MICROCARCINOMA OF THYROID: ICD-10-CM

## 2022-12-20 DIAGNOSIS — Z90.02 H/O LARYNGECTOMY: ICD-10-CM

## 2022-12-20 LAB
CREAT SERPL-MCNC: 0.97 MG/DL (ref 0.55–1.02)
GFR SERPLBLD CREATININE-BSD FMLA CKD-EPI: >60 MLS/MIN/1.73/M2
T4 FREE SERPL-MCNC: 1.04 NG/DL (ref 0.7–1.48)
TSH SERPL-ACNC: 1.02 UIU/ML (ref 0.35–4.94)

## 2022-12-20 PROCEDURE — 36415 COLL VENOUS BLD VENIPUNCTURE: CPT

## 2022-12-20 PROCEDURE — 84443 ASSAY THYROID STIM HORMONE: CPT

## 2022-12-20 PROCEDURE — 84439 ASSAY OF FREE THYROXINE: CPT

## 2022-12-20 PROCEDURE — 82565 ASSAY OF CREATININE: CPT

## 2022-12-20 RX ORDER — LEVOTHYROXINE SODIUM 112 UG/1
TABLET ORAL
Qty: 90 TABLET | Refills: 3 | Status: SHIPPED | OUTPATIENT
Start: 2022-12-20 | End: 2023-09-08

## 2022-12-20 NOTE — TELEPHONE ENCOUNTER
Pt returned call, informed TSH is improved w/ room for further improvement.  Instructed pt to take 112mcg daily except 1.5 tabs on Sundays, up from current 100mcg tab daily rx (pt states she was already taking 112 mcg daily). Informed pt to have labs prior to her f/u appt. Voiced understanding.

## 2022-12-20 NOTE — TELEPHONE ENCOUNTER
TSH is improved w/ room for further improvement.  Have sent in increased erx 112mcg take daily except 1.5 tabs on Sundays, #100 w/ 3 refills up from current 100mcg tab daily rx.  Labs prior to f/u next year including TSH, tg w/ ab.  Keep u/s appt as well as scheduled per ENT.    Please sort/verify orders and notify pt.

## 2022-12-21 NOTE — TELEPHONE ENCOUNTER
Orders signed.  Sorry for the confusion.  It should have read, increase from 112mg flat daily to 112mcg Mon-Sat, 1.5 tabs of 112mcg on Sunday.

## 2022-12-22 RX ORDER — FAMOTIDINE 40 MG/1
40 TABLET, FILM COATED ORAL 2 TIMES DAILY
Qty: 60 TABLET | Refills: 3 | Status: SHIPPED | OUTPATIENT
Start: 2022-12-22 | End: 2023-08-25 | Stop reason: SDUPTHER

## 2023-01-13 RX ORDER — HYDROCODONE BITARTRATE AND ACETAMINOPHEN 10; 325 MG/1; MG/1
1 TABLET ORAL EVERY 12 HOURS PRN
Qty: 60 TABLET | Refills: 0 | Status: SHIPPED | OUTPATIENT
Start: 2023-01-13 | End: 2023-01-17 | Stop reason: SDUPTHER

## 2023-01-13 NOTE — TELEPHONE ENCOUNTER
----- Message from Marcy Gates sent at 1/13/2023 11:12 AM CST -----  Regarding: Medication refill  Patient called requesting refill for [facility] on [address].

## 2023-01-17 ENCOUNTER — OFFICE VISIT (OUTPATIENT)
Dept: RHEUMATOLOGY | Facility: CLINIC | Age: 68
End: 2023-01-17
Payer: MEDICARE

## 2023-01-17 VITALS
WEIGHT: 209.81 LBS | SYSTOLIC BLOOD PRESSURE: 123 MMHG | DIASTOLIC BLOOD PRESSURE: 82 MMHG | HEART RATE: 71 BPM | BODY MASS INDEX: 32.93 KG/M2 | TEMPERATURE: 99 F | OXYGEN SATURATION: 96 % | HEIGHT: 67 IN

## 2023-01-17 DIAGNOSIS — R35.1 NOCTURIA: ICD-10-CM

## 2023-01-17 DIAGNOSIS — N81.10 BLADDER PROLAPSE, FEMALE, ACQUIRED: ICD-10-CM

## 2023-01-17 DIAGNOSIS — N39.46 MIXED INCONTINENCE: ICD-10-CM

## 2023-01-17 DIAGNOSIS — M06.00 SERONEGATIVE RHEUMATOID ARTHRITIS: Primary | ICD-10-CM

## 2023-01-17 DIAGNOSIS — B18.2 HEPATITIS C VIRUS CARRIER STATE: ICD-10-CM

## 2023-01-17 DIAGNOSIS — M54.40 LOW BACK PAIN WITH SCIATICA, SCIATICA LATERALITY UNSPECIFIED, UNSPECIFIED BACK PAIN LATERALITY, UNSPECIFIED CHRONICITY: ICD-10-CM

## 2023-01-17 DIAGNOSIS — I10 HYPERTENSION, UNSPECIFIED TYPE: ICD-10-CM

## 2023-01-17 DIAGNOSIS — M19.90 INFLAMMATORY ARTHRITIS: ICD-10-CM

## 2023-01-17 DIAGNOSIS — E03.9 HYPOTHYROIDISM, UNSPECIFIED TYPE: ICD-10-CM

## 2023-01-17 DIAGNOSIS — C32.9 LARYNGEAL SQUAMOUS CELL CARCINOMA: ICD-10-CM

## 2023-01-17 DIAGNOSIS — C73 PAPILLARY MICROCARCINOMA OF THYROID: ICD-10-CM

## 2023-01-17 DIAGNOSIS — F51.01 PRIMARY INSOMNIA: ICD-10-CM

## 2023-01-17 DIAGNOSIS — M79.7 FIBROMYALGIA SYNDROME: ICD-10-CM

## 2023-01-17 PROCEDURE — 99214 PR OFFICE/OUTPT VISIT, EST, LEVL IV, 30-39 MIN: ICD-10-PCS | Mod: S$GLB,,, | Performed by: INTERNAL MEDICINE

## 2023-01-17 PROCEDURE — 3008F BODY MASS INDEX DOCD: CPT | Mod: CPTII,S$GLB,, | Performed by: INTERNAL MEDICINE

## 2023-01-17 PROCEDURE — 3288F PR FALLS RISK ASSESSMENT DOCUMENTED: ICD-10-PCS | Mod: CPTII,S$GLB,, | Performed by: INTERNAL MEDICINE

## 2023-01-17 PROCEDURE — 1159F MED LIST DOCD IN RCRD: CPT | Mod: CPTII,S$GLB,, | Performed by: INTERNAL MEDICINE

## 2023-01-17 PROCEDURE — 1159F PR MEDICATION LIST DOCUMENTED IN MEDICAL RECORD: ICD-10-PCS | Mod: CPTII,S$GLB,, | Performed by: INTERNAL MEDICINE

## 2023-01-17 PROCEDURE — 3074F PR MOST RECENT SYSTOLIC BLOOD PRESSURE < 130 MM HG: ICD-10-PCS | Mod: CPTII,S$GLB,, | Performed by: INTERNAL MEDICINE

## 2023-01-17 PROCEDURE — 3079F DIAST BP 80-89 MM HG: CPT | Mod: CPTII,S$GLB,, | Performed by: INTERNAL MEDICINE

## 2023-01-17 PROCEDURE — 3288F FALL RISK ASSESSMENT DOCD: CPT | Mod: CPTII,S$GLB,, | Performed by: INTERNAL MEDICINE

## 2023-01-17 PROCEDURE — 3074F SYST BP LT 130 MM HG: CPT | Mod: CPTII,S$GLB,, | Performed by: INTERNAL MEDICINE

## 2023-01-17 PROCEDURE — 1101F PT FALLS ASSESS-DOCD LE1/YR: CPT | Mod: CPTII,S$GLB,, | Performed by: INTERNAL MEDICINE

## 2023-01-17 PROCEDURE — 99214 OFFICE O/P EST MOD 30 MIN: CPT | Mod: S$GLB,,, | Performed by: INTERNAL MEDICINE

## 2023-01-17 PROCEDURE — 1126F PR PAIN SEVERITY QUANTIFIED, NO PAIN PRESENT: ICD-10-PCS | Mod: CPTII,S$GLB,, | Performed by: INTERNAL MEDICINE

## 2023-01-17 PROCEDURE — 1101F PR PT FALLS ASSESS DOC 0-1 FALLS W/OUT INJ PAST YR: ICD-10-PCS | Mod: CPTII,S$GLB,, | Performed by: INTERNAL MEDICINE

## 2023-01-17 PROCEDURE — 3008F PR BODY MASS INDEX (BMI) DOCUMENTED: ICD-10-PCS | Mod: CPTII,S$GLB,, | Performed by: INTERNAL MEDICINE

## 2023-01-17 PROCEDURE — 99999 PR PBB SHADOW E&M-EST. PATIENT-LVL IV: CPT | Mod: PBBFAC,,, | Performed by: INTERNAL MEDICINE

## 2023-01-17 PROCEDURE — 3079F PR MOST RECENT DIASTOLIC BLOOD PRESSURE 80-89 MM HG: ICD-10-PCS | Mod: CPTII,S$GLB,, | Performed by: INTERNAL MEDICINE

## 2023-01-17 PROCEDURE — 1126F AMNT PAIN NOTED NONE PRSNT: CPT | Mod: CPTII,S$GLB,, | Performed by: INTERNAL MEDICINE

## 2023-01-17 PROCEDURE — 99999 PR PBB SHADOW E&M-EST. PATIENT-LVL IV: ICD-10-PCS | Mod: PBBFAC,,, | Performed by: INTERNAL MEDICINE

## 2023-01-17 RX ORDER — CYCLOBENZAPRINE HCL 10 MG
10 TABLET ORAL NIGHTLY
Qty: 30 TABLET | Refills: 5 | Status: SHIPPED | OUTPATIENT
Start: 2023-01-17 | End: 2023-05-17 | Stop reason: SDUPTHER

## 2023-01-17 RX ORDER — GABAPENTIN 300 MG/1
CAPSULE ORAL
Qty: 90 CAPSULE | Refills: 5 | Status: SHIPPED | OUTPATIENT
Start: 2023-01-17 | End: 2023-05-17 | Stop reason: SDUPTHER

## 2023-01-17 RX ORDER — LISINOPRIL 40 MG/1
40 TABLET ORAL DAILY
Qty: 30 TABLET | Refills: 5 | Status: SHIPPED | OUTPATIENT
Start: 2023-01-17 | End: 2023-08-14

## 2023-01-17 RX ORDER — AMLODIPINE BESYLATE 10 MG/1
10 TABLET ORAL DAILY
Qty: 30 TABLET | Refills: 5 | Status: SHIPPED | OUTPATIENT
Start: 2023-01-17 | End: 2023-08-22 | Stop reason: SDUPTHER

## 2023-01-17 RX ORDER — OMEPRAZOLE 40 MG/1
40 CAPSULE, DELAYED RELEASE ORAL DAILY
Qty: 30 CAPSULE | Refills: 11 | Status: SHIPPED | OUTPATIENT
Start: 2023-01-17 | End: 2023-05-17 | Stop reason: SDUPTHER

## 2023-01-17 RX ORDER — ESCITALOPRAM OXALATE 10 MG/1
10 TABLET ORAL DAILY
Qty: 30 TABLET | Refills: 5 | Status: SHIPPED | OUTPATIENT
Start: 2023-01-17 | End: 2023-02-20 | Stop reason: SDUPTHER

## 2023-01-17 RX ORDER — HYDROCODONE BITARTRATE AND ACETAMINOPHEN 10; 325 MG/1; MG/1
1 TABLET ORAL EVERY 12 HOURS PRN
Qty: 60 TABLET | Refills: 0 | Status: SHIPPED | OUTPATIENT
Start: 2023-01-17 | End: 2023-02-22 | Stop reason: SDUPTHER

## 2023-01-17 RX ORDER — HYDROXYCHLOROQUINE SULFATE 200 MG/1
200 TABLET, FILM COATED ORAL 2 TIMES DAILY
Qty: 60 TABLET | Refills: 5 | Status: SHIPPED | OUTPATIENT
Start: 2023-01-17 | End: 2023-05-17 | Stop reason: SDUPTHER

## 2023-01-17 NOTE — PROGRESS NOTES
"Subjective:       Patient ID: Laura Freeman is a 67 y.o. female.    Chief Complaint: Follow-up (No complaints/)    The patient is complaining of joint pain involving the MCP PIP wrist elbow shoulders hips knees and ankles bilaterally.  The pain is 2/10 in intensity dull in quality and continuous.  That is associated with a morning stiffness lasting for more than 60 minutes.  Is also having difficulty maintaining a good night of sleep.  This has been associated with myalgias.  Muscle aches are 2/10 in intensity dull in quality and continuous.  They are associated with fatigue.  No fever no chills no others.      Review of Systems   Constitutional:  Negative for appetite change, chills and fever.   HENT:  Negative for congestion, ear pain, mouth sores, nosebleeds and trouble swallowing.    Eyes:  Negative for photophobia and discharge.   Respiratory:  Negative for chest tightness and shortness of breath.    Cardiovascular:  Negative for chest pain.   Gastrointestinal:  Negative for abdominal pain and vomiting.   Endocrine: Negative.    Genitourinary:  Negative for hematuria.   Musculoskeletal:         As per HPI   Skin:  Negative for rash.   Neurological:  Negative for weakness.       Objective:   /82 (BP Location: Right arm, Patient Position: Sitting, BP Method: Large (Automatic))   Pulse 71   Temp 98.7 °F (37.1 °C) (Oral)   Ht 5' 7" (1.702 m)   Wt 95.2 kg (209 lb 12.8 oz)   LMP  (LMP Unknown)   SpO2 96%   BMI 32.86 kg/m²      Physical Exam   Constitutional: She is oriented to person, place, and time. She appears well-developed and well-nourished. No distress.   HENT:   Head: Normocephalic and atraumatic.   Right Ear: External ear normal.   Left Ear: External ear normal.   Eyes: Pupils are equal, round, and reactive to light.   Cardiovascular: Normal rate, regular rhythm and normal heart sounds.   Pulmonary/Chest: Breath sounds normal.   Abdominal: Soft. There is no abdominal tenderness. "   Musculoskeletal:      Right shoulder: Normal.      Left shoulder: Normal.      Right elbow: Normal.      Left elbow: Normal.      Right wrist: Normal.      Left wrist: Normal.      Cervical back: Neck supple.      Right hip: Normal.      Left hip: Normal.      Right knee: Normal.      Left knee: Normal.   Lymphadenopathy:     She has no cervical adenopathy.   Neurological: She is alert and oriented to person, place, and time. She displays normal reflexes. No cranial nerve deficit or sensory deficit. She exhibits normal muscle tone. Coordination normal.   Skin: No rash noted. No erythema.   Vitals reviewed.      Right Side Rheumatological Exam     Examination finds the shoulder, elbow, wrist, knee, 1st PIP, 1st MCP, 2nd PIP, 2nd MCP, 3rd PIP, 3rd MCP, 4th PIP, 4th MCP, 5th PIP, 5th MCP, temporomandibular, hip, ankle, 1st MTP, 2nd MTP, 3rd MTP, 4th MTP and 5th MTP normal.    Left Side Rheumatological Exam     Examination finds the shoulder, elbow, wrist, knee, 1st PIP, 1st MCP, 2nd PIP, 2nd MCP, 3rd PIP, 3rd MCP, 4th PIP, 4th MCP, 5th PIP, 5th MCP, temporomandibular, hip, ankle, 1st MTP, 2nd MTP, 3rd MTP, 4th MTP and 5th MTP normal.       Completed Fibromyalgia exam 11/18 tender points.  No data to display     Assessment:       1. Seronegative rheumatoid arthritis    2. Hepatitis C virus carrier state    3. Hypothyroidism, unspecified type    4. Papillary microcarcinoma of thyroid    5. Laryngeal squamous cell carcinoma    6. Bladder prolapse, female, acquired    7. Nocturia    8. Mixed incontinence    9. Hypertension, unspecified type    10. Fibromyalgia syndrome    11. Primary insomnia    12. Low back pain with sciatica, sciatica laterality unspecified, unspecified back pain laterality, unspecified chronicity    13. Inflammatory arthritis            Medication List with Changes/Refills   Current Medications    ASPIRIN 81 MG CHEW    Take 81 mg by mouth once daily.       Start Date: --        End Date: --    BLOOD  SUGAR DIAGNOSTIC STRP    Use qd       Start Date: 5/24/2022 End Date: --    BLOOD SUGAR DIAGNOSTIC STRP    To check BG 1 times daily, to use with insurance preferred meter       Start Date: 6/16/2022 End Date: --    BLOOD-GLUCOSE METER KIT    To check BG 1 times daily, to use with insurance preferred meter       Start Date: 6/16/2022 End Date: 6/16/2023    CARVEDILOL (COREG) 3.125 MG TABLET    TAKE 1 TABLET BY MOUTH TWICE DAILY       Start Date: 5/30/2022 End Date: --    FAMOTIDINE (PEPCID) 40 MG TABLET    Take 1 tablet (40 mg total) by mouth 2 (two) times daily.       Start Date: 12/22/2022End Date: --    ISOSORBIDE MONONITRATE (IMDUR) 30 MG 24 HR TABLET    Take 30 mg by mouth every morning.       Start Date: 4/12/2022 End Date: --    LANCETS MISC    Use qd       Start Date: 5/24/2022 End Date: --    LEVOTHYROXINE (SYNTHROID) 112 MCG TABLET    1 tab po daily except 1.5 tabs on Sundays       Start Date: 12/20/2022End Date: --    MECLIZINE (ANTIVERT) 25 MG TABLET    Take 25 mg by mouth 3 (three) times daily.       Start Date: 3/31/2022 End Date: --    METFORMIN (GLUCOPHAGE) 500 MG TABLET    Take 1 tablet (500 mg total) by mouth 2 (two) times daily.       Start Date: 9/27/2022 End Date: --    METHOCARBAMOL (ROBAXIN) 750 MG TAB    Take 750 mg by mouth.       Start Date: --        End Date: --    NITROGLYCERIN (NITROSTAT) 0.4 MG SL TABLET    Place 0.4 mg under the tongue as needed.       Start Date: 9/20/2021 End Date: --    ONDANSETRON (ZOFRAN-ODT) 4 MG TBDL    Take 4 mg by mouth 3 (three) times daily.       Start Date: 3/31/2022 End Date: --    OXCARBAZEPINE (TRILEPTAL) 300 MG TAB    TAKE 1 TABLET BY MOUTH TWICE DAILY       Start Date: 5/30/2022 End Date: --   Changed and/or Refilled Medications    Modified Medication Previous Medication    AMLODIPINE (NORVASC) 10 MG TABLET amLODIPine (NORVASC) 10 MG tablet       Take 1 tablet (10 mg total) by mouth Daily.    Take 1 tablet (10 mg total) by mouth Daily.       Start  Date: 1/17/2023 End Date: --    Start Date: 6/16/2022 End Date: 1/17/2023    CYCLOBENZAPRINE (FLEXERIL) 10 MG TABLET cyclobenzaprine (FLEXERIL) 10 MG tablet       Take 1 tablet (10 mg total) by mouth every evening.    Take 1 tablet (10 mg total) by mouth every evening.       Start Date: 1/17/2023 End Date: 7/16/2023    Start Date: 9/6/2022  End Date: 1/17/2023    ESCITALOPRAM OXALATE (LEXAPRO) 10 MG TABLET EScitalopram oxalate (LEXAPRO) 10 MG tablet       Take 1 tablet (10 mg total) by mouth Daily.    Take 10 mg by mouth Daily.       Start Date: 1/17/2023 End Date: --    Start Date: 3/31/2022 End Date: 1/17/2023    GABAPENTIN (NEURONTIN) 300 MG CAPSULE gabapentin (NEURONTIN) 300 MG capsule       See Instructions, TAKE 1 CAPSULE BY MOUTH THREE TIMES DAILY, # 90 cap(s), 2 Refill(s), [id number], 174, cm, Height/Length Dosing, 10/24/21 10:52:00 CDT, 89, kg, Weight Dosing, 10/24/21 10:52:00 CDT      See Instructions, TAKE 1 CAPSULE BY MOUTH THREE TIMES DAILY, # 90 cap(s), 2 Refill(s), [id number], 174, cm, Height/Length Dosing, 10/24/21 10:52:00 CDT, 89, kg, Weight Dosing, 10/24/21 10:52:00 CDT       Start Date: 1/17/2023 End Date: --    Start Date: 11/15/2021End Date: 1/17/2023    HYDROCODONE-ACETAMINOPHEN (NORCO)  MG PER TABLET HYDROcodone-acetaminophen (NORCO)  mg per tablet       Take 1 tablet by mouth every 12 (twelve) hours as needed for Pain.    Take 1 tablet by mouth every 12 (twelve) hours as needed for Pain.       Start Date: 1/17/2023 End Date: --    Start Date: 1/13/2023 End Date: 1/17/2023    HYDROXYCHLOROQUINE (PLAQUENIL) 200 MG TABLET hydrOXYchloroQUINE (PLAQUENIL) 200 mg tablet       Take 1 tablet (200 mg total) by mouth 2 (two) times daily. After food    Take 1 tablet (200 mg total) by mouth 2 (two) times daily. After food       Start Date: 1/17/2023 End Date: 7/20/2023    Start Date: 9/6/2022  End Date: 1/17/2023    LISINOPRIL  (PRINIVIL,ZESTRIL) 40 MG TABLET lisinopriL (PRINIVIL,ZESTRIL) 40 MG tablet       Take 1 tablet (40 mg total) by mouth once daily.    TAKE 1 TABLET BY MOUTH DAILY       Start Date: 1/17/2023 End Date: --    Start Date: 12/19/2022End Date: 1/17/2023    OMEPRAZOLE (PRILOSEC) 40 MG CAPSULE omeprazole (PRILOSEC) 40 MG capsule       Take 1 capsule (40 mg total) by mouth once daily. Before lunch    Take 1 capsule (40 mg total) by mouth once daily. Before lunch       Start Date: 1/17/2023 End Date: 1/17/2024    Start Date: 9/6/2022  End Date: 1/17/2023   Discontinued Medications    FLUOXETINE 40 MG CAPSULE    Take 40 mg by mouth Daily.       Start Date: --        End Date: 1/17/2023    LANCETS MISC    To check BG 1 times daily, to use with insurance preferred meter       Start Date: 6/16/2022 End Date: 1/17/2023    LANCETS MISC    Use to test 1 qd       Start Date: 6/16/2022 End Date: 1/17/2023    OXCARBAZEPINE (TRILEPTAL) 300 MG TAB    TAKE 1 TABLET BY MOUTH TWICE DAILY       Start Date: 5/30/2022 End Date: 1/17/2023         Plan:         Problem List Items Addressed This Visit          Cardiac/Vascular    Hypertension    Relevant Medications    amLODIPine (NORVASC) 10 MG tablet    gabapentin (NEURONTIN) 300 MG capsule    HYDROcodone-acetaminophen (NORCO)  mg per tablet    hydrOXYchloroQUINE (PLAQUENIL) 200 mg tablet    EScitalopram oxalate (LEXAPRO) 10 MG tablet    cyclobenzaprine (FLEXERIL) 10 MG tablet    lisinopriL (PRINIVIL,ZESTRIL) 40 MG tablet    omeprazole (PRILOSEC) 40 MG capsule       Renal/    Mixed incontinence    Relevant Medications    amLODIPine (NORVASC) 10 MG tablet    gabapentin (NEURONTIN) 300 MG capsule    HYDROcodone-acetaminophen (NORCO)  mg per tablet    hydrOXYchloroQUINE (PLAQUENIL) 200 mg tablet    EScitalopram oxalate (LEXAPRO) 10 MG tablet    cyclobenzaprine (FLEXERIL) 10 MG tablet    lisinopriL (PRINIVIL,ZESTRIL) 40 MG tablet    omeprazole (PRILOSEC) 40 MG capsule    Nocturia     Relevant Medications    amLODIPine (NORVASC) 10 MG tablet    gabapentin (NEURONTIN) 300 MG capsule    HYDROcodone-acetaminophen (NORCO)  mg per tablet    hydrOXYchloroQUINE (PLAQUENIL) 200 mg tablet    EScitalopram oxalate (LEXAPRO) 10 MG tablet    cyclobenzaprine (FLEXERIL) 10 MG tablet    lisinopriL (PRINIVIL,ZESTRIL) 40 MG tablet    omeprazole (PRILOSEC) 40 MG capsule    Bladder prolapse, female, acquired    Relevant Medications    amLODIPine (NORVASC) 10 MG tablet    gabapentin (NEURONTIN) 300 MG capsule    HYDROcodone-acetaminophen (NORCO)  mg per tablet    hydrOXYchloroQUINE (PLAQUENIL) 200 mg tablet    EScitalopram oxalate (LEXAPRO) 10 MG tablet    cyclobenzaprine (FLEXERIL) 10 MG tablet    lisinopriL (PRINIVIL,ZESTRIL) 40 MG tablet    omeprazole (PRILOSEC) 40 MG capsule       Immunology/Multi System    Seronegative rheumatoid arthritis - Primary    Relevant Medications    amLODIPine (NORVASC) 10 MG tablet    gabapentin (NEURONTIN) 300 MG capsule    HYDROcodone-acetaminophen (NORCO)  mg per tablet    hydrOXYchloroQUINE (PLAQUENIL) 200 mg tablet    EScitalopram oxalate (LEXAPRO) 10 MG tablet    cyclobenzaprine (FLEXERIL) 10 MG tablet    lisinopriL (PRINIVIL,ZESTRIL) 40 MG tablet    omeprazole (PRILOSEC) 40 MG capsule       Oncology    Laryngeal squamous cell carcinoma    Relevant Medications    amLODIPine (NORVASC) 10 MG tablet    gabapentin (NEURONTIN) 300 MG capsule    HYDROcodone-acetaminophen (NORCO)  mg per tablet    hydrOXYchloroQUINE (PLAQUENIL) 200 mg tablet    EScitalopram oxalate (LEXAPRO) 10 MG tablet    cyclobenzaprine (FLEXERIL) 10 MG tablet    lisinopriL (PRINIVIL,ZESTRIL) 40 MG tablet    omeprazole (PRILOSEC) 40 MG capsule    Papillary microcarcinoma of thyroid    Relevant Medications    amLODIPine (NORVASC) 10 MG tablet    gabapentin (NEURONTIN) 300 MG capsule    HYDROcodone-acetaminophen (NORCO)  mg per tablet    hydrOXYchloroQUINE (PLAQUENIL) 200 mg tablet     EScitalopram oxalate (LEXAPRO) 10 MG tablet    cyclobenzaprine (FLEXERIL) 10 MG tablet    lisinopriL (PRINIVIL,ZESTRIL) 40 MG tablet    omeprazole (PRILOSEC) 40 MG capsule       Endocrine    Hypothyroidism    Relevant Medications    amLODIPine (NORVASC) 10 MG tablet    gabapentin (NEURONTIN) 300 MG capsule    HYDROcodone-acetaminophen (NORCO)  mg per tablet    hydrOXYchloroQUINE (PLAQUENIL) 200 mg tablet    EScitalopram oxalate (LEXAPRO) 10 MG tablet    cyclobenzaprine (FLEXERIL) 10 MG tablet    lisinopriL (PRINIVIL,ZESTRIL) 40 MG tablet    omeprazole (PRILOSEC) 40 MG capsule       GI    Hepatitis C virus infection    Relevant Medications    amLODIPine (NORVASC) 10 MG tablet    gabapentin (NEURONTIN) 300 MG capsule    HYDROcodone-acetaminophen (NORCO)  mg per tablet    hydrOXYchloroQUINE (PLAQUENIL) 200 mg tablet    EScitalopram oxalate (LEXAPRO) 10 MG tablet    cyclobenzaprine (FLEXERIL) 10 MG tablet    lisinopriL (PRINIVIL,ZESTRIL) 40 MG tablet    omeprazole (PRILOSEC) 40 MG capsule       Orthopedic    Low back pain    Relevant Medications    amLODIPine (NORVASC) 10 MG tablet    gabapentin (NEURONTIN) 300 MG capsule    HYDROcodone-acetaminophen (NORCO)  mg per tablet    hydrOXYchloroQUINE (PLAQUENIL) 200 mg tablet    EScitalopram oxalate (LEXAPRO) 10 MG tablet    cyclobenzaprine (FLEXERIL) 10 MG tablet    lisinopriL (PRINIVIL,ZESTRIL) 40 MG tablet    omeprazole (PRILOSEC) 40 MG capsule     Other Visit Diagnoses       Fibromyalgia syndrome        Relevant Medications    amLODIPine (NORVASC) 10 MG tablet    gabapentin (NEURONTIN) 300 MG capsule    HYDROcodone-acetaminophen (NORCO)  mg per tablet    hydrOXYchloroQUINE (PLAQUENIL) 200 mg tablet    EScitalopram oxalate (LEXAPRO) 10 MG tablet    cyclobenzaprine (FLEXERIL) 10 MG tablet    lisinopriL (PRINIVIL,ZESTRIL) 40 MG tablet    omeprazole (PRILOSEC) 40 MG capsule    Primary insomnia        Relevant Medications    amLODIPine (NORVASC) 10 MG  tablet    gabapentin (NEURONTIN) 300 MG capsule    HYDROcodone-acetaminophen (NORCO)  mg per tablet    hydrOXYchloroQUINE (PLAQUENIL) 200 mg tablet    EScitalopram oxalate (LEXAPRO) 10 MG tablet    cyclobenzaprine (FLEXERIL) 10 MG tablet    lisinopriL (PRINIVIL,ZESTRIL) 40 MG tablet    omeprazole (PRILOSEC) 40 MG capsule    Inflammatory arthritis        Relevant Medications    amLODIPine (NORVASC) 10 MG tablet    gabapentin (NEURONTIN) 300 MG capsule    HYDROcodone-acetaminophen (NORCO)  mg per tablet    hydrOXYchloroQUINE (PLAQUENIL) 200 mg tablet    EScitalopram oxalate (LEXAPRO) 10 MG tablet    cyclobenzaprine (FLEXERIL) 10 MG tablet    lisinopriL (PRINIVIL,ZESTRIL) 40 MG tablet    omeprazole (PRILOSEC) 40 MG capsule

## 2023-01-19 ENCOUNTER — TELEPHONE (OUTPATIENT)
Dept: RHEUMATOLOGY | Facility: CLINIC | Age: 68
End: 2023-01-19
Payer: MEDICARE

## 2023-01-19 NOTE — TELEPHONE ENCOUNTER
----- Message from Andria Kapadia sent at 1/19/2023 11:03 AM CST -----  Regarding: Refill  Patient called to get a refill on her atvorstatin (cholesterol) medication.  She said she will not have another PCP until next month and we filled all her other medication except that one.

## 2023-01-30 ENCOUNTER — HOSPITAL ENCOUNTER (OUTPATIENT)
Dept: RADIOLOGY | Facility: HOSPITAL | Age: 68
Discharge: HOME | End: 2023-01-30
Attending: NURSE PRACTITIONER
Payer: MEDICARE

## 2023-01-30 ENCOUNTER — OFFICE VISIT (OUTPATIENT)
Dept: OTOLARYNGOLOGY | Facility: CLINIC | Age: 68
End: 2023-01-30
Payer: MEDICARE

## 2023-01-30 VITALS
HEART RATE: 67 BPM | TEMPERATURE: 98 F | SYSTOLIC BLOOD PRESSURE: 151 MMHG | BODY MASS INDEX: 33.52 KG/M2 | WEIGHT: 214 LBS | DIASTOLIC BLOOD PRESSURE: 82 MMHG

## 2023-01-30 DIAGNOSIS — C73 PAPILLARY MICROCARCINOMA OF THYROID: ICD-10-CM

## 2023-01-30 DIAGNOSIS — Z90.02 H/O LARYNGECTOMY: ICD-10-CM

## 2023-01-30 DIAGNOSIS — C32.9 LARYNGEAL SQUAMOUS CELL CARCINOMA: ICD-10-CM

## 2023-01-30 DIAGNOSIS — C32.9 LARYNGEAL SQUAMOUS CELL CARCINOMA: Primary | ICD-10-CM

## 2023-01-30 DIAGNOSIS — E89.0 POSTSURGICAL HYPOTHYROIDISM: ICD-10-CM

## 2023-01-30 PROCEDURE — 99214 OFFICE O/P EST MOD 30 MIN: CPT | Mod: S$PBB,25,, | Performed by: NURSE PRACTITIONER

## 2023-01-30 PROCEDURE — 3288F PR FALLS RISK ASSESSMENT DOCUMENTED: ICD-10-PCS | Mod: CPTII,,, | Performed by: NURSE PRACTITIONER

## 2023-01-30 PROCEDURE — 3079F DIAST BP 80-89 MM HG: CPT | Mod: CPTII,,, | Performed by: NURSE PRACTITIONER

## 2023-01-30 PROCEDURE — 1101F PT FALLS ASSESS-DOCD LE1/YR: CPT | Mod: CPTII,,, | Performed by: NURSE PRACTITIONER

## 2023-01-30 PROCEDURE — 3008F PR BODY MASS INDEX (BMI) DOCUMENTED: ICD-10-PCS | Mod: CPTII,,, | Performed by: NURSE PRACTITIONER

## 2023-01-30 PROCEDURE — 3077F PR MOST RECENT SYSTOLIC BLOOD PRESSURE >= 140 MM HG: ICD-10-PCS | Mod: CPTII,,, | Performed by: NURSE PRACTITIONER

## 2023-01-30 PROCEDURE — 3008F BODY MASS INDEX DOCD: CPT | Mod: CPTII,,, | Performed by: NURSE PRACTITIONER

## 2023-01-30 PROCEDURE — 4010F ACE/ARB THERAPY RXD/TAKEN: CPT | Mod: CPTII,,, | Performed by: NURSE PRACTITIONER

## 2023-01-30 PROCEDURE — 4010F PR ACE/ARB THEARPY RXD/TAKEN: ICD-10-PCS | Mod: CPTII,,, | Performed by: NURSE PRACTITIONER

## 2023-01-30 PROCEDURE — 71046 X-RAY EXAM CHEST 2 VIEWS: CPT | Mod: TC

## 2023-01-30 PROCEDURE — 1101F PR PT FALLS ASSESS DOC 0-1 FALLS W/OUT INJ PAST YR: ICD-10-PCS | Mod: CPTII,,, | Performed by: NURSE PRACTITIONER

## 2023-01-30 PROCEDURE — 31575 PR LARYNGOSCOPY, FLEXIBLE; DIAGNOSTIC: ICD-10-PCS | Mod: S$PBB,,, | Performed by: NURSE PRACTITIONER

## 2023-01-30 PROCEDURE — 3077F SYST BP >= 140 MM HG: CPT | Mod: CPTII,,, | Performed by: NURSE PRACTITIONER

## 2023-01-30 PROCEDURE — 3079F PR MOST RECENT DIASTOLIC BLOOD PRESSURE 80-89 MM HG: ICD-10-PCS | Mod: CPTII,,, | Performed by: NURSE PRACTITIONER

## 2023-01-30 PROCEDURE — 31575 DIAGNOSTIC LARYNGOSCOPY: CPT | Mod: PBBFAC | Performed by: NURSE PRACTITIONER

## 2023-01-30 PROCEDURE — 1160F PR REVIEW ALL MEDS BY PRESCRIBER/CLIN PHARMACIST DOCUMENTED: ICD-10-PCS | Mod: CPTII,,, | Performed by: NURSE PRACTITIONER

## 2023-01-30 PROCEDURE — 99214 OFFICE O/P EST MOD 30 MIN: CPT | Mod: PBBFAC | Performed by: NURSE PRACTITIONER

## 2023-01-30 PROCEDURE — 31575 DIAGNOSTIC LARYNGOSCOPY: CPT | Mod: S$PBB,,, | Performed by: NURSE PRACTITIONER

## 2023-01-30 PROCEDURE — 1160F RVW MEDS BY RX/DR IN RCRD: CPT | Mod: CPTII,,, | Performed by: NURSE PRACTITIONER

## 2023-01-30 PROCEDURE — 3288F FALL RISK ASSESSMENT DOCD: CPT | Mod: CPTII,,, | Performed by: NURSE PRACTITIONER

## 2023-01-30 PROCEDURE — 1159F MED LIST DOCD IN RCRD: CPT | Mod: CPTII,,, | Performed by: NURSE PRACTITIONER

## 2023-01-30 PROCEDURE — 99214 PR OFFICE/OUTPT VISIT, EST, LEVL IV, 30-39 MIN: ICD-10-PCS | Mod: S$PBB,25,, | Performed by: NURSE PRACTITIONER

## 2023-01-30 PROCEDURE — 1159F PR MEDICATION LIST DOCUMENTED IN MEDICAL RECORD: ICD-10-PCS | Mod: CPTII,,, | Performed by: NURSE PRACTITIONER

## 2023-01-30 RX ORDER — DICLOFENAC SODIUM 10 MG/G
2 GEL TOPICAL 4 TIMES DAILY
Qty: 100 G | Refills: 0 | Status: SHIPPED | OUTPATIENT
Start: 2023-01-30

## 2023-01-30 RX ORDER — LIDOCAINE HYDROCHLORIDE 40 MG/ML
1 INJECTION, SOLUTION RETROBULBAR ONCE
Status: DISCONTINUED | OUTPATIENT
Start: 2023-01-30 | End: 2023-08-18

## 2023-01-30 NOTE — PROGRESS NOTES
Patient Name: Laura Freeman   YOB: 1955     Chief Complaint   Patient presents with    Follow-up        History of Present Illness:  This is a 64-year-old female presenting from Dr. Dennis for evaluation of laryngeal mass. Patient notes she has had hoarseness starting shortly before Ever, dysphagia since evaluated with an EGD and MBSS (showing aspirations), 1 month of sore throat that has been treated with antibiotics. She endorses some weight loss however, denies any dyspnea. She has not noted any lymphadenopathy, otalgia, or odynophagia.    7/24/19: Here for first follow up after admission for awake trach, panendoscopy. 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  Recommendation:  - TB cites likely inferior outcomes (functional and oncologic) with CRT vs TL, thus TL is best option  - TB recommends continued education to pt and family regarding life after laryngectomy, as well as likely poor functional outcome with CRT (aspiration, trach and peg dependence, npo status)  - However if pt is completely against surgery, should offer CRT for treatment    Has been doing ok at home. Had one day she went to ER with plugged inner cannula.    7/31/19: Here after meeting with ST and a laryngectomy patient, would like to proceed with surgery. No issues at home since last visit. No changes in medical history.    8/12/19: Underwent TL, BND, CP myotomy, R thyroid lobectomy and TEP.    8/27/19: Here for first follow up since discharge. Still taking liquids PO, no issues at home. Has ST appointment scheduled for Sept 3rd. Has right UE weakness and pain but mobility is ok. Doing well with stoma care.  [facility] TB Recs:  Recommendations: NCCN guidelines state consider adjuvant XRT  - Will plan to discuss risks/benefits of radiation with patient in ENT follow  - Will do same in Radiation Oncology clinic    9/4/19: Here for follow up and also for follow-up after presenting to ED last night with concerns for nodule  below stoma. Has 1 pill of her antibiotic given at last visit left. She reports that the swelling appeared suddenly yesterday. Is very tender on palpation. causes pain extending toward her right shoulder [1]    9/11/19: hospitalization on 9/4 s/p I&D of right clavicular head and teeth extraction. Starting radiation on Thursday. Otherwise no issues. [1]    10/16/2019: Recent hospitalization for TIA sx w/o any sequela. MRI, echo pending. Discharged on levo and doxy for right clavicular head drainage. patient hasn't taken any. Notes still have lots of blood secretions but not any now. Eating per mouth also supplementing thru G-tube [1]    11/19/19: Pt here for follow up. She has completed CRT 2 weeks ago. Eating and drinking well. Maintaining weight. Feels her right sternoclavicular joint is unchanged. Finished antibiotics over 2 weeks ago. Feels it has not changed since finishing antibiotics, no drainage. Per patient finished all chemo and radiation treatments.    12/24/19: No c/o today. Sternoclavicular joint has resolved. Tolerating PO intake and maintaining weight. She would like to have PEG removed, states she has not used it in over 2 months. [1]    1/28/20: Here for follow up. PET ordered today by Dr. Kim. No dyspnea, dysphagia. noting shoulder stiffness/pain. Doing home PT exercises. Notes some numbness underneath chin. Got PEG removed by GS today. [1]    3/5/20: Here for cancer surveaillcen. Post-treatment PET done 2/2020 with some hypermetabolic activity in nasopharynx, no eviednce of persistence/recurrence in neck. No distant mets. Denies dysphagia. Weight stable. Denies dyspnea. Voicing well with TEP.    5/26/20: Here for cancer surveillance. No weight loss, dysphagia, odynophagia. Reports that the lymphedema machine was denied per a letter she received. She is reporting severe pain to bilateral cheeks and upper neck? Voicing with TEP, no issues.    7/28/20: Denies dysphagia/odyophagia/otalgia. Reports  "weight loss ~ 10lbs since last visit bc she does not have much of an appetite, but has begun drinking ensure to keep calories up. Frustrated that her ear lobes have paresthesias and she ripped her earrings out. Requesting additional norco. C/o intermittent tight pulling sensation in bilateral neck muscles that makes her feel as though she is choking. Reports taking a few days of leftover abx rx 2 weeks ago bc her secretions had turned green and she was afraid to go to the ED. Denies having fever, but reports she got a little SOB. Secretions are now clear.     9/29/2020: Presenting in follow-up for cancer surveillance. She denies any dysphasia, odynophagia, otalgia or unexpected weight loss at this time. She notes she has a knot on her left neck close to her stoma that is nontender. She noticed this about month and a half ago. 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  A CT scan of the neck showed the presence of a 1.9 cm left thyroid nodule with an interval increase in size. No cervical adenopathy was noted. Patient is to undergo needle biopsy of this lesion today.  Thyroid function studies done on September 29, 2020, showed her to be hypothyroid with a TSH level of 15.190 and a normal free T4 0.76. Previously the patient had been given a prescription for levothyroxine 75 mcg daily but this had never been filled and the patient had not been taking that medication. Patient does admit to some fatigue but she does not feel "bad". She does not have any other new complaints today.    11/11/20: Doing well. S/p FNA of left thyroid nodule 10/29/20. Path came back that sample was insufficient for diagnostic evaluation. Reports compliance with " levothyroxine.    1/7/21: Doing well. No complaints at this time. No weight changes, dysphagia, odynophagia, otalgia, new neck masses.    3/11/21: Here today for possible FNA of nodule, however when using ultrasound no concerning nodules seen to biopsy. Patient had adjustment of Synthroid at last visit, TSH elevated in January. Due for surveillance appointment.    5/6/21: Here today for cancer surveillance. Reports significant tenderness to right neck starting 1-2 weeks ago but no associated mass or swelling. She has tried her lortab but this has not helped. No issues with PO intake. Weight is stable. No dysphagia, odynophagia, otalgia.    6-16-21: Follow-up for cancer surveillance, no current complaints including no palpable neck mass or tenderness on either side, no blood from the trachea or blood from the mouth. Her weight is remaining stable and she is tolerating p.o. well. She overall functioning well and appears happy.    8/18/21: here today for surveillance. No complaints overall; a little bit of generic fullness over the left angle of mandible but nothing discrete. Tolerating PO well. No odynophagia, dysphagia, weight changes, otalgia, difficulty speaking via alaryngeal voice.    10/19/2021: Pt is here for surveillance with hx of right thyroid lobectomy and FNA of 2.0 cm left thyroid nodule. Path came back that sample was insufficient for diagnostic evaluation 10/2020. She states that she has been doing well. Pt says that her only complaint is inability to taste food every once and a while. No current complaints    1/26/2022: Follow-up for cancer surveillance, currently tolerating diet well, without neck swelling, pain, hemoptysis from the trach or new areas of swelling or tenderness in her neck or in her throat. Denies otalgia. CT of chest and neck unremarkable for recurrence or metastatic disease. Makes note of diminished thyroid nodule. Patient followed by Dr. Golden in endocrine for monitoring thyroid  function and markers.     4/26/2022: Patient presenting in follow-up. She is doing well with her diet. She continues to note some tightness around her neck for which she is doing stretches and she does find these helpful. She denies any odynophagia, dysphagia, otalgia. She notes her weight does tend to waver and endocrinology has been fixing her thyroid medication dosage recently. Most recently she did have some lab work done to measure her thyroid function.    June 28, 2022: 67-year-old female presents today for follow-up of her supraglottic laryngeal squamous cell carcinoma and her thyroid papillary microcarcinoma.  Patient is doing well at this time.  She has no complaints of any difficulty swallowing, neck pain, ear pain, or hemoptysis.  The area of swelling on the left side of her neck has not changed.  Endocrinology is following her for management of her postoperative hypothyroidism and thyroid carcinoma.  She has no other new complaints today. Of note she is scheduled to have a follow-up thyroid ultrasound and ultrasound of the neck in October 2022.    9/28/22: Here for follow up. Patient reports no issues. Denies ear pain. Stable neck stiffness. No new lumps/bumps, swelling on neck is stable. Swallowing fine, no weight loss. Recent thyroid US unremarkable.  No complaints.     1/30/23: Doing well. Denies dysphagia, dysphonia, SOB. States she gained weight over the holidays. She began having some pain in left neck a few days ago and has been stretching and using a muscle rub. This has helped some.     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    T3N0M0 supraglottic laryngeal squamous cell carcinoma 07/08/2019    T3 N0 M0 squamous cell carcinoma of the supraglottic larynx diagnosed initially on  July 8, 2019. Treated with total laryngectomy, bilateral selective neck dissections (levels 2 through 4), right hemithyroidectomy (with incidental finding papillary thyroid microcarcinoma), primary TEP puncture with cricopharyngeal myotomy on August 12, 2019.  Treated with postoperative radiation therapy.    Type 2 diabetes mellitus with unspecified diabetic retinopathy without macular edema      Past Surgical History:   Procedure Laterality Date    ceiol - cataract extraction and insertion of introcular lens      excision of papilloma      I&D of skin and subcutaneous tissue  05/31/2015    MULTIPLE TOOTH EXTRACTIONS Bilateral 09/06/2019    Full mouth extraction in preparation for radiation therapy.    PANENDOSCOPY N/A 07/08/2019    Initial diagnosis laryngeal squamous cell carcinoma.  Done in conjunction with an awake tracheostomy.    PERCUTANEOUS INSERTION OF GASTROSTOMY TUBE N/A 07/12/2019    SELECTIVE NECK DISSECTION Bilateral 08/12/2019    Done in conjunction with total laryngectomy for laryngeal squamous cell carcinoma.    THYROID LOBECTOMY Right 08/12/2019    Done in conjunction with total laryngectomy with incidental finding thyroid papillary microcarcinoma.    TOTAL LARYNGECTOMY N/A 08/12/2019    Total laryngectomy with bilateral selective neck dissections (levels 2 through 4) right hemithyroidectomy and primary tracheoesophageal puncture cricopharyngeal myotomy for treatment laryngeal squamous cell carcinoma.    TRACHEOSTOMY  07/08/2019    Done in conjunction with panendoscopy at initial diagnosis of laryngeal squamous cell carcinoma.    TRACHEOSTOMY TUBE CHANGE  07/14/2019    Done in conjunction with control of post-operative tracheostomy wound hemorrhage.       Review of Systems:  Unremarkable except as mentioned above.    Current Medications:  Current Outpatient Medications   Medication Sig    amLODIPine (NORVASC) 10 MG tablet Take 1 tablet (10 mg total) by mouth Daily.    aspirin 81 MG Chew Take 81 mg  by mouth once daily.    blood sugar diagnostic Strp Use qd    blood sugar diagnostic Strp To check BG 1 times daily, to use with insurance preferred meter    blood-glucose meter kit To check BG 1 times daily, to use with insurance preferred meter    carvediloL (COREG) 3.125 MG tablet TAKE 1 TABLET BY MOUTH TWICE DAILY    cyclobenzaprine (FLEXERIL) 10 MG tablet Take 1 tablet (10 mg total) by mouth every evening.    EScitalopram oxalate (LEXAPRO) 10 MG tablet Take 10 mg by mouth Daily.    famotidine (PEPCID) 40 MG tablet Take 40 mg by mouth 2 (two) times daily.    FLUoxetine 40 MG capsule Take 40 mg by mouth Daily.    HYDROcodone-acetaminophen (NORCO)  mg per tablet Take 1 tablet by mouth every 12 (twelve) hours as needed for Pain.    hydrOXYchloroQUINE (PLAQUENIL) 200 mg tablet Take 1 tablet (200 mg total) by mouth 2 (two) times daily. After food    isosorbide mononitrate (IMDUR) 30 MG 24 hr tablet Take 30 mg by mouth every morning.    lancets Misc Use qd    lancets Misc To check BG 1 times daily, to use with insurance preferred meter    lancets Misc Use to test 1 qd    levothyroxine (SYNTHROID) 100 MCG tablet TAKE 1 TABLET BY MOUTH DAILY    lisinopriL (PRINIVIL,ZESTRIL) 40 MG tablet TAKE 1 TABLET BY MOUTH DAILY    meclizine (ANTIVERT) 25 mg tablet Take 25 mg by mouth 3 (three) times daily.    metFORMIN (GLUCOPHAGE) 500 MG tablet Take 1 tablet (500 mg total) by mouth 2 (two) times daily.    mirabegron (MYRBETRIQ) 50 mg Tb24 Take 1 tablet (50 mg total) by mouth once daily at 6am.    nitroGLYCERIN (NITROSTAT) 0.4 MG SL tablet Place 0.4 mg under the tongue as needed.    omeprazole (PRILOSEC) 40 MG capsule Take 1 capsule (40 mg total) by mouth once daily. Before lunch    ondansetron (ZOFRAN-ODT) 4 MG TbDL Take 4 mg by mouth 3 (three) times daily.    OXcarbazepine (TRILEPTAL) 300 MG Tab TAKE 1 TABLET BY MOUTH TWICE DAILY    OXcarbazepine (TRILEPTAL) 300 MG Tab TAKE 1 TABLET BY MOUTH TWICE DAILY    furosemide (LASIX)  40 MG tablet Take 40 mg by mouth daily as needed.    gabapentin (NEURONTIN) 600 MG tablet Take 1 tablet (600 mg total) by mouth 3 (three) times daily.    oxybutynin (DITROPAN-XL) 5 MG TR24 Take 1 tablet (5 mg total) by mouth once daily. (Patient not taking: Reported on 9/28/2022)     Current Facility-Administered Medications   Medication    LIDOcaine (PF) 40 mg/mL (4 %) injection 1 mL    phenylephrine HCL 1% nasal spray 1 spray        Allergies:  Review of patient's allergies indicates:  No Known Allergies     Physical Exam:  Vital signs:   There were no vitals filed for this visit.  General:  Well-developed well-nourished female in no acute distress.  Patient voices well with her TEP.  Head and face:  Normocephalic.  No facial lesions.  No temporomandibular joint tenderness or click.  Ears:  Right ear-auricle is normally developed.  External auditory canal is clear.  Tympanic membrane is nonerythematous.  No middle ear effusion.  Left ear-auricle is normally developed.  External auditory canal is clear.  Tympanic membrane is nonerythematous.  No middle ear effusion.  Nose:  Nasal dorsum is unremarkable.  No significant septal deviation.  No significant intranasal congestion.  Secretions are clear.  Oral cavity and oropharynx:  Tongue and floor mouth are without lesions.  Mucosa is moist.  No pharyngeal erythema or exudates.  No oropharyngeal masses.  No tonsillar hypertrophy.  Neck:  Supple without of palpable adenopathy.  She still does have palpable subcutaneous nodules in the left neck just posterior to the incision line in the midportion of the neck.  These are mobile and nontender.  There appeared to be 2-3 which are palpable. Myofascial TTP.  No masses in the thyroid bed or in the peristomal region.  Parotid and submandibular glands are normal to palpation.  Stoma is without granulation.  TEP is in place.  Eyes:  Extraocular muscles intact.  No nystagmus.  No exophthalmos or enophthalmos.  Neurologic:  Alert  and oriented.  Cranial nerves 2-12 are grossly normal.    Procedure note: Flexible laryngoscopy with Dr. Wu:   After informed consent was obtained and the nose was prepped with 1% phenyleprine nasal spray and tetracaine topically. The flexible fiberoptic laryngoscope was introduced into the right nostril and advanced. Examination of the nose showed the above mentioned findings. Examination of the nasopharynx showed no nasopharyngeal masses or eustachian tube obstruction. Examination of the base of tongue showed no masses. Neopharynx is without lesions or obstruction.  Examination of the trachea through the trachea stoma showed no masses or lesions down to the level of the adrianna.    Assessment/Plan:   67-year-old female with T3 N0 M0 squamous cell carcinoma of the supraglottic larynx status post total laryngectomy with bilateral selective neck dissections (levels 2 through 4), primary TEP puncture with cricopharyngeal myotomy, and right hemithyroidectomy with an incidental finding of a papillary microcarcinoma (T1a N0 M0) on August 12, 2019, with postoperative radiation therapy completed on November 5, 2019. CT neck reviewed with Dr. Wu & pt- ARCHIE. Did not obtain CXR. TFT WNL.    - She knows to call with any changes/issues for sooner appointment.   - CXR  - Diclofenac gel to neck QID prn. Gentle stretches  - Continue follow-up with Endocrinology  - RTC 4mo Res template for surveillance

## 2023-02-08 DIAGNOSIS — Z90.02 H/O LARYNGECTOMY: Primary | ICD-10-CM

## 2023-02-20 ENCOUNTER — OFFICE VISIT (OUTPATIENT)
Dept: FAMILY MEDICINE | Facility: CLINIC | Age: 68
End: 2023-02-20
Payer: MEDICARE

## 2023-02-20 VITALS
OXYGEN SATURATION: 96 % | SYSTOLIC BLOOD PRESSURE: 135 MMHG | DIASTOLIC BLOOD PRESSURE: 83 MMHG | HEART RATE: 71 BPM | BODY MASS INDEX: 33.74 KG/M2 | RESPIRATION RATE: 18 BRPM | WEIGHT: 215 LBS | TEMPERATURE: 98 F | HEIGHT: 67 IN

## 2023-02-20 DIAGNOSIS — Z23 NEEDS FLU SHOT: ICD-10-CM

## 2023-02-20 DIAGNOSIS — Z78.0 POSTMENOPAUSAL: ICD-10-CM

## 2023-02-20 DIAGNOSIS — I10 HYPERTENSION, UNSPECIFIED TYPE: ICD-10-CM

## 2023-02-20 DIAGNOSIS — Z23 NEED FOR PNEUMOCOCCAL VACCINATION: ICD-10-CM

## 2023-02-20 DIAGNOSIS — M06.00 SERONEGATIVE RHEUMATOID ARTHRITIS: ICD-10-CM

## 2023-02-20 DIAGNOSIS — Z23 NEED FOR SHINGLES VACCINE: ICD-10-CM

## 2023-02-20 DIAGNOSIS — F32.A DEPRESSION, UNSPECIFIED DEPRESSION TYPE: ICD-10-CM

## 2023-02-20 DIAGNOSIS — G40.909 SEIZURE DISORDER: ICD-10-CM

## 2023-02-20 DIAGNOSIS — E11.9 ENCOUNTER FOR COMPREHENSIVE DIABETIC FOOT EXAMINATION, TYPE 2 DIABETES MELLITUS: ICD-10-CM

## 2023-02-20 DIAGNOSIS — C73 PAPILLARY MICROCARCINOMA OF THYROID: ICD-10-CM

## 2023-02-20 DIAGNOSIS — N81.10 BLADDER PROLAPSE, FEMALE, ACQUIRED: ICD-10-CM

## 2023-02-20 DIAGNOSIS — C32.9 LARYNGEAL SQUAMOUS CELL CARCINOMA: ICD-10-CM

## 2023-02-20 DIAGNOSIS — Z86.73 HISTORY OF CVA (CEREBROVASCULAR ACCIDENT): ICD-10-CM

## 2023-02-20 DIAGNOSIS — B18.2 HEPATITIS C VIRUS CARRIER STATE: ICD-10-CM

## 2023-02-20 DIAGNOSIS — Z12.31 ENCOUNTER FOR SCREENING MAMMOGRAM FOR MALIGNANT NEOPLASM OF BREAST: ICD-10-CM

## 2023-02-20 DIAGNOSIS — E89.0 POSTSURGICAL HYPOTHYROIDISM: ICD-10-CM

## 2023-02-20 DIAGNOSIS — E11.40 TYPE 2 DIABETES MELLITUS WITH DIABETIC NEUROPATHY, WITHOUT LONG-TERM CURRENT USE OF INSULIN: Primary | ICD-10-CM

## 2023-02-20 PROCEDURE — 4010F ACE/ARB THERAPY RXD/TAKEN: CPT | Mod: CPTII,,, | Performed by: FAMILY MEDICINE

## 2023-02-20 PROCEDURE — 3008F BODY MASS INDEX DOCD: CPT | Mod: CPTII,,, | Performed by: FAMILY MEDICINE

## 2023-02-20 PROCEDURE — 1126F PR PAIN SEVERITY QUANTIFIED, NO PAIN PRESENT: ICD-10-PCS | Mod: CPTII,,, | Performed by: FAMILY MEDICINE

## 2023-02-20 PROCEDURE — 3075F SYST BP GE 130 - 139MM HG: CPT | Mod: CPTII,,, | Performed by: FAMILY MEDICINE

## 2023-02-20 PROCEDURE — 1160F RVW MEDS BY RX/DR IN RCRD: CPT | Mod: CPTII,,, | Performed by: FAMILY MEDICINE

## 2023-02-20 PROCEDURE — 1101F PR PT FALLS ASSESS DOC 0-1 FALLS W/OUT INJ PAST YR: ICD-10-PCS | Mod: CPTII,,, | Performed by: FAMILY MEDICINE

## 2023-02-20 PROCEDURE — 1159F MED LIST DOCD IN RCRD: CPT | Mod: CPTII,,, | Performed by: FAMILY MEDICINE

## 2023-02-20 PROCEDURE — 1101F PT FALLS ASSESS-DOCD LE1/YR: CPT | Mod: CPTII,,, | Performed by: FAMILY MEDICINE

## 2023-02-20 PROCEDURE — G0008 ADMIN INFLUENZA VIRUS VAC: HCPCS | Mod: PBBFAC

## 2023-02-20 PROCEDURE — 1160F PR REVIEW ALL MEDS BY PRESCRIBER/CLIN PHARMACIST DOCUMENTED: ICD-10-PCS | Mod: CPTII,,, | Performed by: FAMILY MEDICINE

## 2023-02-20 PROCEDURE — G0009 ADMIN PNEUMOCOCCAL VACCINE: HCPCS | Mod: PBBFAC

## 2023-02-20 PROCEDURE — 90472 IMMUNIZATION ADMIN EACH ADD: CPT | Mod: PBBFAC

## 2023-02-20 PROCEDURE — 90677 PCV20 VACCINE IM: CPT | Mod: PBBFAC

## 2023-02-20 PROCEDURE — 1126F AMNT PAIN NOTED NONE PRSNT: CPT | Mod: CPTII,,, | Performed by: FAMILY MEDICINE

## 2023-02-20 PROCEDURE — 3079F DIAST BP 80-89 MM HG: CPT | Mod: CPTII,,, | Performed by: FAMILY MEDICINE

## 2023-02-20 PROCEDURE — 99215 OFFICE O/P EST HI 40 MIN: CPT | Mod: PBBFAC | Performed by: FAMILY MEDICINE

## 2023-02-20 PROCEDURE — 99214 OFFICE O/P EST MOD 30 MIN: CPT | Mod: S$PBB,,, | Performed by: FAMILY MEDICINE

## 2023-02-20 PROCEDURE — 99214 PR OFFICE/OUTPT VISIT, EST, LEVL IV, 30-39 MIN: ICD-10-PCS | Mod: S$PBB,,, | Performed by: FAMILY MEDICINE

## 2023-02-20 PROCEDURE — 90662 IIV NO PRSV INCREASED AG IM: CPT | Mod: PBBFAC

## 2023-02-20 PROCEDURE — 3008F PR BODY MASS INDEX (BMI) DOCUMENTED: ICD-10-PCS | Mod: CPTII,,, | Performed by: FAMILY MEDICINE

## 2023-02-20 PROCEDURE — 4010F PR ACE/ARB THEARPY RXD/TAKEN: ICD-10-PCS | Mod: CPTII,,, | Performed by: FAMILY MEDICINE

## 2023-02-20 PROCEDURE — 1159F PR MEDICATION LIST DOCUMENTED IN MEDICAL RECORD: ICD-10-PCS | Mod: CPTII,,, | Performed by: FAMILY MEDICINE

## 2023-02-20 PROCEDURE — 3079F PR MOST RECENT DIASTOLIC BLOOD PRESSURE 80-89 MM HG: ICD-10-PCS | Mod: CPTII,,, | Performed by: FAMILY MEDICINE

## 2023-02-20 PROCEDURE — 90750 HZV VACC RECOMBINANT IM: CPT | Mod: PBBFAC

## 2023-02-20 PROCEDURE — 3075F PR MOST RECENT SYSTOLIC BLOOD PRESS GE 130-139MM HG: ICD-10-PCS | Mod: CPTII,,, | Performed by: FAMILY MEDICINE

## 2023-02-20 PROCEDURE — 3288F PR FALLS RISK ASSESSMENT DOCUMENTED: ICD-10-PCS | Mod: CPTII,,, | Performed by: FAMILY MEDICINE

## 2023-02-20 PROCEDURE — 3288F FALL RISK ASSESSMENT DOCD: CPT | Mod: CPTII,,, | Performed by: FAMILY MEDICINE

## 2023-02-20 RX ORDER — ATORVASTATIN CALCIUM 40 MG/1
40 TABLET, FILM COATED ORAL DAILY
Qty: 90 TABLET | Refills: 1 | Status: SHIPPED | OUTPATIENT
Start: 2023-02-20 | End: 2023-08-22 | Stop reason: SDUPTHER

## 2023-02-20 RX ORDER — LANCETS
EACH MISCELLANEOUS
Qty: 100 EACH | Refills: 3 | Status: SHIPPED | OUTPATIENT
Start: 2023-02-20

## 2023-02-20 RX ORDER — ESCITALOPRAM OXALATE 20 MG/1
20 TABLET ORAL DAILY
Qty: 60 TABLET | Refills: 0 | Status: SHIPPED | OUTPATIENT
Start: 2023-02-20 | End: 2023-05-01

## 2023-02-20 RX ORDER — INSULIN PUMP SYRINGE, 3 ML
EACH MISCELLANEOUS
Qty: 1 EACH | Refills: 1 | Status: SHIPPED | OUTPATIENT
Start: 2023-02-20 | End: 2023-02-20

## 2023-02-20 RX ORDER — INSULIN PUMP SYRINGE, 3 ML
EACH MISCELLANEOUS
Qty: 1 EACH | Refills: 1 | Status: SHIPPED | OUTPATIENT
Start: 2023-02-20 | End: 2024-02-20

## 2023-02-20 RX ADMIN — ZOSTER VACCINE RECOMBINANT, ADJUVANTED 0.5 ML: KIT at 03:02

## 2023-02-20 RX ADMIN — INFLUENZA A VIRUS A/MICHIGAN/45/2015 X-275 (H1N1) ANTIGEN (FORMALDEHYDE INACTIVATED), INFLUENZA A VIRUS A/SINGAPORE/INFIMH-16-0019/2016 IVR-186 (H3N2) ANTIGEN (FORMALDEHYDE INACTIVATED), AND INFLUENZA B VIRUS B/MARYLAND/15/2016 BX-69A (A B/COLORADO/6/2017-LIKE VIRUS) ANTIGEN (FORMALDEHYDE INACTIVATED) 0.5 ML: 60; 60; 60 INJECTION, SUSPENSION INTRAMUSCULAR at 03:02

## 2023-02-20 RX ADMIN — PNEUMOCOCCAL 20-VALENT CONJUGATE VACCINE 0.5 ML
2.2; 2.2; 2.2; 2.2; 2.2; 2.2; 2.2; 2.2; 2.2; 2.2; 2.2; 2.2; 2.2; 2.2; 2.2; 2.2; 4.4; 2.2; 2.2; 2.2 INJECTION, SUSPENSION INTRAMUSCULAR at 03:02

## 2023-02-20 NOTE — PROGRESS NOTES
"Patient Name: Laura Freeman   : 1955  MRN: [id number]     SUBJECTIVE:  Laura Freeman is a 67 y.o. female here for Hypertension (Patient is here for a follow up. She stated that her glucose meter broken and would like a replacement. )  .    HPI  PMHx of HTN, diabetes, CVA, depression, carrier Hep C, bladder prolapse, laryngeal and thyroid carcinoma, s/p partial thyroidectomy, s/p total laryngectomy, postoperative hypothyroidism, RA, fibromyalgia here for routine follow up.  Feels well. Follows with her specialists. Compliant with meds.  HTN well controlled. At home good readings. Compliant. Taking amlodipine and lisinopril 40 mg. imdur as well. Seeing cardiology the next few months. No chest pain lately for at least a year.    Requesting new glucometer. On metformin 500 mg BID.   Usually good numbers.  Hemoglobin A1c   Date Value Ref Range Status   2021 5.7 <<=7.0 % Final   06/15/2020 6.1 4.2 - 6.3 % Final   10/12/2019 6.0 4.2 - 6.3 % Final   Follows with eye doctor. Last one last year. Due in April per patient.  Has neuropathy. On gabapentin.  Would like to see podiatry      Chart reviewing, pt follows with ENT and endocrinology for the laryngeal and thyroid carcinoma. "Treated with total laryngectomy, bilateral selective neck dissections (levels 2 through 4), right hemithyroidectomy (with incidental finding papillary thyroid microcarcinoma), primary TEP puncture with cricopharyngeal myotomy on 2019.  Treated with postoperative radiation therapy." yearly CT neck, CXR, TSH/T4 for November. She continues on levothyroxine 100mcg daily  Regarding bladder prolapse, she will have the surgery done next week in [place].  Follows with rheumatology for the RA, fibromyalgia.  Used to follow with psychiatry for depression, insomnia. Has not seen them in a while. Used to follow with [facility]. Depression is not at goal control. Willing to increase to 20 mg qd. Sometimes she is actually taking " "2 tab when feeling low. Used to be an outgoing person, now staying at home mainly because of health issues,  but depression plays a great role too. Not suicidal.  Used to follow with neurology post CVA in 2013. Has seizure hx even before the stroke. About a year ago was the last episode. Reports grand mal seizures with postictal confusion.  Last mammogram 2022 normal. Due in 2 months. Will order.  Takes vit D OTC.      ALLERGIES: Review of patient's allergies indicates:  No Known Allergies      ROS:  Review of Systems   Constitutional:  Negative for chills, fever and weight loss.   HENT:  Negative for congestion, ear pain and sore throat.    Eyes:  Negative for blurred vision and pain.   Respiratory:  Negative for cough, shortness of breath and wheezing.    Cardiovascular:  Negative for chest pain, palpitations, leg swelling and PND.   Gastrointestinal:  Negative for abdominal pain, blood in stool, constipation, diarrhea, nausea and vomiting.   Genitourinary:  Positive for urgency. Negative for dysuria, flank pain and hematuria.   Musculoskeletal:  Negative for falls and myalgias.   Skin:  Negative for rash.   Neurological:  Negative for dizziness, focal weakness and headaches.   Psychiatric/Behavioral:  Positive for depression. Negative for substance abuse and suicidal ideas. The patient is not nervous/anxious.        OBJECTIVE:  Vital signs  Vitals:    02/20/23 1406   BP: 135/83   Pulse: 71   Resp: 18   Temp: 98.2 °F (36.8 °C)   TempSrc: Oral   SpO2: 96%   Weight: 97.5 kg (215 lb)   Height: 5' 7" (1.702 m)      Body mass index is 33.67 kg/m².    PHYSICAL EXAM:   Physical Exam  Vitals reviewed.   Constitutional:       General: She is not in acute distress.     Appearance: Normal appearance. She is obese. She is not ill-appearing.   HENT:      Head: Normocephalic and atraumatic.      Right Ear: External ear normal.      Left Ear: External ear normal.      Nose: Nose normal. No rhinorrhea.      Mouth/Throat:      " Mouth: Mucous membranes are moist.   Eyes:      General: No scleral icterus.        Right eye: No discharge.         Left eye: No discharge.      Conjunctiva/sclera: Conjunctivae normal.      Pupils: Pupils are equal, round, and reactive to light.   Neck:      Comments: TEP in place  Cardiovascular:      Rate and Rhythm: Normal rate and regular rhythm.      Pulses:           Dorsalis pedis pulses are 1+ on the right side and 1+ on the left side.      Heart sounds: No murmur heard.  Pulmonary:      Effort: Pulmonary effort is normal. No respiratory distress.      Breath sounds: No wheezing, rhonchi or rales.   Abdominal:      General: Bowel sounds are normal. There is no distension.      Palpations: Abdomen is soft.      Tenderness: There is no abdominal tenderness.   Musculoskeletal:         General: No swelling. Normal range of motion.      Cervical back: Normal range of motion and neck supple. No rigidity or tenderness.      Right lower leg: No edema.      Left lower leg: No edema.      Right foot: Normal range of motion.      Left foot: Normal range of motion.   Feet:      Right foot:      Protective Sensation: 10 sites tested.  7 sites sensed.      Skin integrity: Dry skin present. No ulcer.      Toenail Condition: Right toenails are abnormally thick.      Left foot:      Protective Sensation: 10 sites tested.  6 sites sensed.      Skin integrity: Dry skin present. No ulcer.      Toenail Condition: Left toenails are abnormally thick.   Skin:     General: Skin is warm.      Coloration: Skin is not pale.      Findings: No rash.   Neurological:      General: No focal deficit present.      Mental Status: She is alert and oriented to person, place, and time. Mental status is at baseline.   Psychiatric:         Mood and Affect: Mood is depressed.         Behavior: Behavior normal.        ASSESSMENT/PLAN:  1. Type 2 diabetes mellitus with diabetic neuropathy, without long-term current use of insulin  -     CBC Auto  Differential; Future; Expected date: 02/20/2023  -     Comprehensive Metabolic Panel; Future; Expected date: 02/20/2023  -     Lipid Panel; Future; Expected date: 02/20/2023  -     Hemoglobin A1C; Future; Expected date: 02/20/2023  -     Foot Exam Performed  -     Diabetic Eye Screening Photo; Future  -     Cancel: Microalbumin/Creatinine Ratio, Urine  -     Discontinue: blood-glucose meter kit; To check BG 1 times daily, to use with insurance preferred meter  Dispense: 1 each; Refill: 1  -     Discontinue: blood sugar diagnostic Strp; To check BG 1 times daily, to use with insurance preferred meter  Dispense: 100 strip; Refill: 1  -     lancets Misc; Use qd  Dispense: 100 each; Refill: 3  -     blood-glucose meter kit; To check BG 1 times daily, to use with insurance preferred meter  Dispense: 1 each; Refill: 1  -     blood sugar diagnostic Strp; To check BG 1 times daily, to use with insurance preferred meter  Dispense: 100 strip; Refill: 1    2. Encounter for comprehensive diabetic foot examination, type 2 diabetes mellitus  -     Ambulatory referral/consult to Podiatry; Future; Expected date: 02/27/2023    3. Depression, unspecified depression type  -     EScitalopram oxalate (LEXAPRO) 20 MG tablet; Take 1 tablet (20 mg total) by mouth Daily.  Dispense: 60 tablet; Refill: 0    4. Hypertension, unspecified type    5. Seizure disorder  -     Ambulatory referral/consult to Neurology; Future; Expected date: 02/27/2023    6. Postmenopausal  -     DXA Bone Density Axial Skeleton 1 or more sites; Future; Expected date: 02/20/2023    7. History of CVA (cerebrovascular accident)  -     atorvastatin (LIPITOR) 40 MG tablet; Take 1 tablet (40 mg total) by mouth once daily.  Dispense: 90 tablet; Refill: 1    8. Postsurgical hypothyroidism    9. Seronegative rheumatoid arthritis    10. Hepatitis C virus carrier state    11. Bladder prolapse, female, acquired    12. Encounter for screening mammogram for malignant neoplasm of  breast  -     Mammo Digital Screening Bilat w/ [facility]; Future; Expected date: 02/20/2023    13. Needs flu shot  -     influenza (HIGH-DOSE PF) vaccine 0.5 mL    14. Need for pneumococcal vaccination  -     pneumoc 20-marcia conj-dip cr(PF) (PREVNAR-20 (PF)) injection Syrg 0.5 mL    15. Need for shingles vaccine  -     varicella-zoster gE-AS01B (PF)(SHINGRIX) 50 mcg/0.5 mL injection    16. Laryngeal squamous cell carcinoma    17. Papillary microcarcinoma of thyroid    PLAN  - well controlled diabetes. Continue same. Referred to podiatry. Check diabetic eye screen. Also will follow with eye doctor in few months.  - increase lexapro to 20 mg. Has been feeling more depressed lately.  - well controlled htn. Continue same. Will see cardiology in few months  - add atorvastatin in regimen given the hx of stroke and having diabetes. Pt states she used to be on it but unsure what happened.  - referred to neurology for the seizure hx  - check DEXA. Mammogram. updated preventative healthcare.  - continue to follow with specialist endocrinology, ENT, rheumatology, urology          Previous medical history/lab work/radiology reviewed and considered during medical management decisions.   Medication list reviewed and medication reconciliation performed.  Patient was provided  and care about his/her current diagnosis (es) and medications including risk/benefit and side effects/adverse events, over the counter medication uses/doses, home self-care and contact precautions,  and red flags and indications for when to seek immediate medical attention.   Patient was advised to continue compliance with current medication list and medical recommendations.  Recommended/ Advised continued compliance with recommended eating habits/ diets for medical conditions and exercise 150 minutes/ week (if possible) for medical condition (s).        RESULTS:  No results found for this or any previous visit (from the past 1008 hour(s)).      Follow  Up:  Follow up in about 3 months (around 5/20/2023) for htn, diabetes.     [unfilled]    This note was created with the assistance of a voice recognition software or phone dictation. There may be transcription errors as a result of using this technology however minimal. Effort has been made to assure accuracy of transcription but any obvious errors or omissions should be clarified with the author of the document

## 2023-02-22 DIAGNOSIS — E03.9 HYPOTHYROIDISM, UNSPECIFIED TYPE: ICD-10-CM

## 2023-02-22 DIAGNOSIS — R35.1 NOCTURIA: ICD-10-CM

## 2023-02-22 DIAGNOSIS — M54.40 LOW BACK PAIN WITH SCIATICA, SCIATICA LATERALITY UNSPECIFIED, UNSPECIFIED BACK PAIN LATERALITY, UNSPECIFIED CHRONICITY: ICD-10-CM

## 2023-02-22 DIAGNOSIS — N39.46 MIXED INCONTINENCE: ICD-10-CM

## 2023-02-22 DIAGNOSIS — F51.01 PRIMARY INSOMNIA: ICD-10-CM

## 2023-02-22 DIAGNOSIS — B18.2 HEPATITIS C VIRUS CARRIER STATE: ICD-10-CM

## 2023-02-22 DIAGNOSIS — N81.10 BLADDER PROLAPSE, FEMALE, ACQUIRED: ICD-10-CM

## 2023-02-22 DIAGNOSIS — M19.90 INFLAMMATORY ARTHRITIS: ICD-10-CM

## 2023-02-22 DIAGNOSIS — C32.9 LARYNGEAL SQUAMOUS CELL CARCINOMA: ICD-10-CM

## 2023-02-22 DIAGNOSIS — I10 HYPERTENSION, UNSPECIFIED TYPE: ICD-10-CM

## 2023-02-22 DIAGNOSIS — M79.7 FIBROMYALGIA SYNDROME: ICD-10-CM

## 2023-02-22 DIAGNOSIS — M06.00 SERONEGATIVE RHEUMATOID ARTHRITIS: ICD-10-CM

## 2023-02-22 DIAGNOSIS — C73 PAPILLARY MICROCARCINOMA OF THYROID: ICD-10-CM

## 2023-02-22 RX ORDER — HYDROCODONE BITARTRATE AND ACETAMINOPHEN 10; 325 MG/1; MG/1
1 TABLET ORAL EVERY 12 HOURS PRN
Qty: 60 TABLET | Refills: 0 | Status: SHIPPED | OUTPATIENT
Start: 2023-02-22 | End: 2023-03-22 | Stop reason: SDUPTHER

## 2023-02-23 ENCOUNTER — LAB VISIT (OUTPATIENT)
Dept: LAB | Facility: HOSPITAL | Age: 68
End: 2023-02-23
Attending: FAMILY MEDICINE
Payer: MEDICARE

## 2023-02-23 DIAGNOSIS — E11.40 TYPE 2 DIABETES MELLITUS WITH DIABETIC NEUROPATHY, WITHOUT LONG-TERM CURRENT USE OF INSULIN: ICD-10-CM

## 2023-02-23 LAB
ALBUMIN SERPL-MCNC: 3.6 G/DL (ref 3.4–4.8)
ALBUMIN/GLOB SERPL: 0.8 RATIO (ref 1.1–2)
ALP SERPL-CCNC: 83 UNIT/L (ref 40–150)
ALT SERPL-CCNC: 9 UNIT/L (ref 0–55)
AST SERPL-CCNC: 13 UNIT/L (ref 5–34)
BASOPHILS # BLD AUTO: 0.02 X10(3)/MCL (ref 0–0.2)
BASOPHILS NFR BLD AUTO: 0.5 %
BILIRUBIN DIRECT+TOT PNL SERPL-MCNC: 0.3 MG/DL
BUN SERPL-MCNC: 11.4 MG/DL (ref 9.8–20.1)
CALCIUM SERPL-MCNC: 10.2 MG/DL (ref 8.4–10.2)
CHLORIDE SERPL-SCNC: 105 MMOL/L (ref 98–107)
CHOLEST SERPL-MCNC: 210 MG/DL
CHOLEST/HDLC SERPL: 4 {RATIO} (ref 0–5)
CO2 SERPL-SCNC: 26 MMOL/L (ref 23–31)
CREAT SERPL-MCNC: 0.92 MG/DL (ref 0.55–1.02)
EOSINOPHIL # BLD AUTO: 0.13 X10(3)/MCL (ref 0–0.9)
EOSINOPHIL NFR BLD AUTO: 3 %
ERYTHROCYTE [DISTWIDTH] IN BLOOD BY AUTOMATED COUNT: 13.2 % (ref 11.5–17)
EST. AVERAGE GLUCOSE BLD GHB EST-MCNC: 116.9 MG/DL
GFR SERPLBLD CREATININE-BSD FMLA CKD-EPI: >60 MLS/MIN/1.73/M2
GLOBULIN SER-MCNC: 4.4 GM/DL (ref 2.4–3.5)
GLUCOSE SERPL-MCNC: 88 MG/DL (ref 82–115)
HBA1C MFR BLD: 5.7 %
HCT VFR BLD AUTO: 39 % (ref 37–47)
HDLC SERPL-MCNC: 57 MG/DL (ref 35–60)
HGB BLD-MCNC: 12.3 G/DL (ref 12–16)
IMM GRANULOCYTES # BLD AUTO: 0.01 X10(3)/MCL (ref 0–0.04)
IMM GRANULOCYTES NFR BLD AUTO: 0.2 %
LDLC SERPL CALC-MCNC: 133 MG/DL (ref 50–140)
LYMPHOCYTES # BLD AUTO: 1.1 X10(3)/MCL (ref 0.6–4.6)
LYMPHOCYTES NFR BLD AUTO: 25.3 %
MCH RBC QN AUTO: 27.9 PG
MCHC RBC AUTO-ENTMCNC: 31.5 G/DL (ref 33–36)
MCV RBC AUTO: 88.4 FL (ref 80–94)
MONOCYTES # BLD AUTO: 0.51 X10(3)/MCL (ref 0.1–1.3)
MONOCYTES NFR BLD AUTO: 11.7 %
NEUTROPHILS # BLD AUTO: 2.58 X10(3)/MCL (ref 2.1–9.2)
NEUTROPHILS NFR BLD AUTO: 59.3 %
NRBC BLD AUTO-RTO: 0 %
PLATELET # BLD AUTO: 354 X10(3)/MCL (ref 130–400)
PMV BLD AUTO: 9.2 FL (ref 7.4–10.4)
POTASSIUM SERPL-SCNC: 4.6 MMOL/L (ref 3.5–5.1)
PROT SERPL-MCNC: 8 GM/DL (ref 5.8–7.6)
RBC # BLD AUTO: 4.41 X10(6)/MCL (ref 4.2–5.4)
SODIUM SERPL-SCNC: 141 MMOL/L (ref 136–145)
TRIGL SERPL-MCNC: 99 MG/DL (ref 37–140)
VLDLC SERPL CALC-MCNC: 20 MG/DL
WBC # SPEC AUTO: 4.4 X10(3)/MCL (ref 4.5–11.5)

## 2023-02-23 PROCEDURE — 80061 LIPID PANEL: CPT

## 2023-02-23 PROCEDURE — 85025 COMPLETE CBC W/AUTO DIFF WBC: CPT

## 2023-02-23 PROCEDURE — 36415 COLL VENOUS BLD VENIPUNCTURE: CPT

## 2023-02-23 PROCEDURE — 80053 COMPREHEN METABOLIC PANEL: CPT

## 2023-02-23 PROCEDURE — 83036 HEMOGLOBIN GLYCOSYLATED A1C: CPT

## 2023-03-06 ENCOUNTER — CLINICAL SUPPORT (OUTPATIENT)
Dept: REHABILITATION | Facility: HOSPITAL | Age: 68
End: 2023-03-06
Payer: MEDICARE

## 2023-03-06 DIAGNOSIS — Z90.02 H/O LARYNGECTOMY: ICD-10-CM

## 2023-03-06 PROCEDURE — L8509 TRACH-ESOPH VOICE PROS MD IN: HCPCS

## 2023-03-06 PROCEDURE — 92597 ORAL SPEECH DEVICE EVAL: CPT

## 2023-03-06 NOTE — PROGRESS NOTES
[facility]  Speech Therapy Evaluation   Laryngectomy  Date: 3/6/2023     Name: Laura Freeman   MRN: [id number]    Therapy Diagnosis:   Encounter Diagnosis   Name Primary?    H/O laryngectomy       Physician: Rowdy Wu MD    PATIENT IS A NECK-BREATHER - DO NOT ORALLY INTUBATE    Time In:  1300   Time Out:  1340     Procedure Min.   Prosthesis Evaluation  40   Total Untimed Units: 40  Charges Billed/# of units: 40/1    Precautions: Standard  Subjective    Chief Complaint: Voice going in and out for approximately 1 month    AFFECTnormal affect    Pain:   0/10  Pain Location / Description: NA  Current Medical History: Laura Freeman is a 67 y.o. female referred by Dr. Wu for TEP management to maximize alaryngeal communication without respiratory compromise.    Past Medical History:   Past Medical History:   Diagnosis Date    Hypertension     Papillary microcarcinoma of thyroid (T1a N0 M0) 07/12/2019    Diagnosed as an incidental finding at the time of a right hemithyroidectomy done in conjunction with total laryngectomy on August 12, 2019.    T3N0M0 supraglottic laryngeal squamous cell carcinoma 07/08/2019    T3 N0 M0 squamous cell carcinoma of the supraglottic larynx diagnosed initially on July 8, 2019. Treated with total laryngectomy, bilateral selective neck dissections (levels 2 through 4), right hemithyroidectomy (with incidental finding papillary thyroid microcarcinoma), primary TEP puncture with cricopharyngeal myotomy on August 12, 2019.  Treated with postoperative radiation therapy.    Type 2 diabetes mellitus with unspecified diabetic retinopathy without macular edema     Laura Freeman  has a past surgical history that includes Multiple tooth extractions (Bilateral, 09/06/2019); excision of papilloma; ceiol - cataract extraction and insertion of introcular lens; I&D of skin and subcutaneous tissue (05/31/2015); Tracheostomy (07/08/2019); Panendoscopy (N/A, 07/08/2019);  Percutaneous insertion of gastrostomy tube (N/A, 07/12/2019); Tracheostomy tube change (07/14/2019); Thyroid lobectomy (Right, 08/12/2019); Total laryngectomy (N/A, 08/12/2019); and Selective neck dissection (Bilateral, 08/12/2019).  Medical Hx and Allergies: Laura has a current medication list which includes the following prescription(s): amlodipine, aspirin, atorvastatin, blood sugar diagnostic, blood-glucose meter, carvedilol, cyclobenzaprine, diclofenac sodium, escitalopram oxalate, famotidine, gabapentin, hydrocodone-acetaminophen, hydroxychloroquine, isosorbide mononitrate, lancets, levothyroxine, lisinopril, meclizine, metformin, methocarbamol, nitroglycerin, omeprazole, ondansetron, and oxcarbazepine, and the following Facility-Administered Medications: lidocaine (pf), lidocaine (pf), and phenylephrine hcl 1%. Review of patient's allergies indicates:  No Known Allergies    Current Voice Function: alaryngeal communication via voice prothesis    Current Level of Swallow Function/Complaints:  no complaints  Prior Therapy:  09/09/2023    TEP ASSESSMENT   Initial Fitting: no    Re-fitting:yes    TEP Initial Fitting Date: 08/12/2019    TEP Current Status:Ms. Freeman is currently wearing a 17FR, 6mm Provox Johnson. Additionally, she wears a 10/36 fenestrated Provox akhil tube with push to talk HMEs.     TEP Patient Complaints:voice going in and out for about a month    TEP Accessories: 10/36 fenestrated Provox akhil tube with push to talk HMEs    Stoma Size:  adequate     Lymphedema:  no    TEP Fitting/Re-sizing:     Removed current TEP: yes, mild biofilm noted on esophageal phalange, discoloration noted on all aspects of prosthesis     TEP Removed catheter : no     TEP Sizing:yes 6mm    Puncture Dilation: no     TEP Prosthesis Placed:yes 17FR, 6mm Provox Johnson    TEP Voicing with Open Tract:no     TEP Voicing with Prosthesis:yes good voicing noted immediately    TEP Leaking through Prothesis:no     TEP Leaking around  Prosthesis:no       Treatment   Total Treatment Time Separate from Evaluation: n/a   no treatment performed 2/2 time to complete evaluation.    Education: Plan of Care, role of SLP in care,TEP and stoma management were discussed with pt. Patient expressed understanding.     Assessment       LongTerm Goals:  Current Progress:   1.  Patient will utilize alaryngeal communication via TEP to express needs and desires without respiratory compromise >90% of the time.  Progressing towards goal       Short Term Goals:  Current Progress:   1. Patient will demonstrate care and use of HME system for maximum pulmonary benefit >90% of the time. Progressing towards goal   2.  Patient will demonstrate adequate care and use of TEP to maintain TEP voicing >90% of the time.  Progressing towards goal   3.  Patient will demonstrate adequate occlusion and cleaning of TEP to maintain voicing >90% of the time.  Progressing towards goal   4. Patient will increase laryngectomy tube usage daily or PRN to maintain and increase stomal patency.  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  She is currently utilizing esophageal speech via a voice prosthesis for alaryngeal communication for functional communication with her family, friends, medical providers, and others to perform her daily life activities.  Ms. Freeman requires the use of a laryngectomy tube at all times to keep the airway patent and prevent stomal stenosis. HMEs are required daily for mucus management and pulmonary optimization .     Plan     SLP intervention is warranted 1-2 times a month or PRN for communication needs for a minimum of 1 year due to the chronic nature of the impairment.     Additional Information     Ms. Freeman entered reported intermittent voicing for approximately 1 month. SLP assessed TEP and noted that size remains adequate at this time. TEP change without incident this date. SLP to follow up as warranted.        Lexy Price MS, CCC-SLP    3/6/2023

## 2023-03-13 RX ORDER — ISOSORBIDE MONONITRATE 30 MG/1
30 TABLET, EXTENDED RELEASE ORAL
COMMUNITY
Start: 2022-04-12 | End: 2023-04-07

## 2023-03-13 RX ORDER — ISOSORBIDE MONONITRATE 30 MG/1
30 TABLET, EXTENDED RELEASE ORAL EVERY MORNING
Status: CANCELLED | OUTPATIENT
Start: 2023-03-13

## 2023-03-13 NOTE — TELEPHONE ENCOUNTER
----- Message from Ladan Jose David sent at 3/13/2023 11:39 AM CDT -----  Regarding: Refill  Provider: DR Bouchra Treadwell  Preferred Pharmacy: [facility] - [place]  Last Visit:   Next Visit:   Patient's Contact Number: [phone]    1. Name of Medication: Isosorbide Mononitrate  Dosage: 30 MG TABs  Comments:     2. Name of Medication:   Dosage:   Comments:     3. Name of Medication:   Dosage:   Comments     4. Name of Medication:   Dosage:   Comments:     5. Name of Medication:   Dosage:   Comments:

## 2023-03-17 RX ORDER — ISOSORBIDE MONONITRATE 30 MG/1
30 TABLET, EXTENDED RELEASE ORAL EVERY MORNING
Qty: 90 TABLET | Refills: 0 | OUTPATIENT
Start: 2023-03-17

## 2023-03-22 ENCOUNTER — CLINICAL SUPPORT (OUTPATIENT)
Dept: REHABILITATION | Facility: HOSPITAL | Age: 68
End: 2023-03-22
Payer: MEDICARE

## 2023-03-22 DIAGNOSIS — I10 HYPERTENSION, UNSPECIFIED TYPE: ICD-10-CM

## 2023-03-22 DIAGNOSIS — M06.00 SERONEGATIVE RHEUMATOID ARTHRITIS: ICD-10-CM

## 2023-03-22 DIAGNOSIS — C73 PAPILLARY MICROCARCINOMA OF THYROID: ICD-10-CM

## 2023-03-22 DIAGNOSIS — N81.10 BLADDER PROLAPSE, FEMALE, ACQUIRED: ICD-10-CM

## 2023-03-22 DIAGNOSIS — B18.2 HEPATITIS C VIRUS CARRIER STATE: ICD-10-CM

## 2023-03-22 DIAGNOSIS — N39.46 MIXED INCONTINENCE: ICD-10-CM

## 2023-03-22 DIAGNOSIS — R35.1 NOCTURIA: ICD-10-CM

## 2023-03-22 DIAGNOSIS — M79.7 FIBROMYALGIA SYNDROME: ICD-10-CM

## 2023-03-22 DIAGNOSIS — Z90.02 H/O LARYNGECTOMY: Primary | ICD-10-CM

## 2023-03-22 DIAGNOSIS — F51.01 PRIMARY INSOMNIA: ICD-10-CM

## 2023-03-22 DIAGNOSIS — E03.9 HYPOTHYROIDISM, UNSPECIFIED TYPE: ICD-10-CM

## 2023-03-22 DIAGNOSIS — M19.90 INFLAMMATORY ARTHRITIS: ICD-10-CM

## 2023-03-22 DIAGNOSIS — C32.9 LARYNGEAL SQUAMOUS CELL CARCINOMA: ICD-10-CM

## 2023-03-22 DIAGNOSIS — M54.40 LOW BACK PAIN WITH SCIATICA, SCIATICA LATERALITY UNSPECIFIED, UNSPECIFIED BACK PAIN LATERALITY, UNSPECIFIED CHRONICITY: ICD-10-CM

## 2023-03-22 PROCEDURE — L8509 TRACH-ESOPH VOICE PROS MD IN: HCPCS

## 2023-03-22 PROCEDURE — 92507 TX SP LANG VOICE COMM INDIV: CPT

## 2023-03-22 RX ORDER — HYDROCODONE BITARTRATE AND ACETAMINOPHEN 10; 325 MG/1; MG/1
1 TABLET ORAL EVERY 12 HOURS PRN
Qty: 60 TABLET | Refills: 0 | Status: SHIPPED | OUTPATIENT
Start: 2023-03-22 | End: 2023-04-21 | Stop reason: SDUPTHER

## 2023-03-22 NOTE — PROGRESS NOTES
[facility]  Speech Therapy Evaluation   Laryngectomy  Date: 3/22/2023     Name: Laura Freeman   MRN: [id number]    Therapy Diagnosis:   Encounter Diagnosis   Name Primary?    H/O laryngectomy Yes      Physician: Rowdy Wu MD    PATIENT IS A NECK-BREATHER - DO NOT ORALLY INTUBATE    Time In:  1300   Time Out:  1330     Procedure Min.   Prosthesis Evaluation  30   Total Untimed Units: 30  Charges Billed/# of units: 30/1    Precautions: Standard  Subjective    Chief Complaint: Leaking for 1 week    AFFECTnormal affect    Pain:   0/10  Pain Location / Description: NA  Current Medical History: Laura Freeman is a 67 y.o. female referred by Dr. Wu for TEP management to maximize alaryngeal communication without respiratory compromise.    Past Medical History:   Past Medical History:   Diagnosis Date    Hypertension     Papillary microcarcinoma of thyroid (T1a N0 M0) 07/12/2019    Diagnosed as an incidental finding at the time of a right hemithyroidectomy done in conjunction with total laryngectomy on August 12, 2019.    T3N0M0 supraglottic laryngeal squamous cell carcinoma 07/08/2019    T3 N0 M0 squamous cell carcinoma of the supraglottic larynx diagnosed initially on July 8, 2019. Treated with total laryngectomy, bilateral selective neck dissections (levels 2 through 4), right hemithyroidectomy (with incidental finding papillary thyroid microcarcinoma), primary TEP puncture with cricopharyngeal myotomy on August 12, 2019.  Treated with postoperative radiation therapy.    Type 2 diabetes mellitus with unspecified diabetic retinopathy without macular edema     Laura Freeman  has a past surgical history that includes Multiple tooth extractions (Bilateral, 09/06/2019); excision of papilloma; ceiol - cataract extraction and insertion of introcular lens; I&D of skin and subcutaneous tissue (05/31/2015); Tracheostomy (07/08/2019); Panendoscopy (N/A, 07/08/2019); Percutaneous insertion of  gastrostomy tube (N/A, 07/12/2019); Tracheostomy tube change (07/14/2019); Thyroid lobectomy (Right, 08/12/2019); Total laryngectomy (N/A, 08/12/2019); and Selective neck dissection (Bilateral, 08/12/2019).  Medical Hx and Allergies: Laura has a current medication list which includes the following prescription(s): amlodipine, aspirin, atorvastatin, blood sugar diagnostic, blood-glucose meter, carvedilol, cyclobenzaprine, diclofenac sodium, escitalopram oxalate, famotidine, gabapentin, hydrocodone-acetaminophen, hydroxychloroquine, isosorbide mononitrate, isosorbide mononitrate, lancets, levothyroxine, lisinopril, meclizine, metformin, methocarbamol, nitroglycerin, omeprazole, ondansetron, and oxcarbazepine, and the following Facility-Administered Medications: lidocaine (pf), lidocaine (pf), and phenylephrine hcl 1%. Review of patient's allergies indicates:  No Known Allergies    Current Voice Function: alaryngeal communication via voice prothesis    Current Level of Swallow Function/Complaints:  no complaints  Prior Therapy:  03/06/2023    TEP ASSESSMENT   Initial Fitting: no    Re-fitting:yes    TEP Initial Fitting Date: 08/12/2019    TEP Current Status:Ms. Freeman is currently wearing a 17FR, 6mm Provox Johnson placed on 03/06/2023. Additionally, she wears a 10/36 fenestrated Provox akhil tube with push to talk HMEs.     TEP Patient Complaints: leaking for approximately 1 week (TEP placed 15 days ago)    TEP Accessories: 10/36 fenestrated Provox akhil tube with push to talk HMEs    Stoma Size:  adequate     Lymphedema:  no    TEP Fitting/Re-sizing:     Removed current TEP: yes, mild biofilm noted on valve    TEP Removed catheter : no     TEP Sizing:yes 6mm    Puncture Dilation: no     TEP Prosthesis Placed:yes 17FR, 6mm Provox Johnson    TEP Voicing with Open Tract:no     TEP Voicing with Prosthesis:yes good voicing noted immediately    TEP Leaking through Prothesis:no     TEP Leaking around Prosthesis:no       Treatment    Total Treatment Time Separate from Evaluation: n/a   no treatment performed 2/2 time to complete evaluation.    Education: Plan of Care, role of SLP in care,TEP and stoma management were discussed with pt. Patient expressed understanding.     Assessment       LongTerm Goals:  Current Progress:   1.  Patient will utilize alaryngeal communication via TEP to express needs and desires without respiratory compromise >90% of the time.  Progressing towards goal       Short Term Goals:  Current Progress:   1. Patient will demonstrate care and use of HME system for maximum pulmonary benefit >90% of the time. Progressing towards goal   2.  Patient will demonstrate adequate care and use of TEP to maintain TEP voicing >90% of the time.  Progressing towards goal   3.  Patient will demonstrate adequate occlusion and cleaning of TEP to maintain voicing >90% of the time.  Progressing towards goal   4. Patient will increase laryngectomy tube usage daily or PRN to maintain and increase stomal patency.  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  She is currently utilizing esophageal speech via a voice prosthesis for alaryngeal communication for functional communication with her family, friends, medical providers, and others to perform her daily life activities.  Ms. Freeman requires the use of a laryngectomy tube at all times to keep the airway patent and prevent stomal stenosis. HMEs are required daily for mucus management and pulmonary optimization .     Plan     SLP intervention is warranted 1-2 times a month or PRN for communication needs for a minimum of 1 year due to the chronic nature of the impairment.     Additional Information     Ms. Freeman entered reported leaking for 1 week, TEP placed 15 days ago. SLP assessed TEP and noted that size remains adequate at this time. Leaking noted immediately with thin liquid trials. TEP change without incident this date. SLP to follow up as  warranted.       Lexy Price MS, CCC-SLP    3/22/2023

## 2023-03-22 NOTE — TELEPHONE ENCOUNTER
----- Message from Marcy Gates sent at 3/22/2023 10:39 AM CDT -----  Regarding: medication refill  Patient called requesting refill for pain medication.

## 2023-04-12 ENCOUNTER — HOSPITAL ENCOUNTER (OUTPATIENT)
Dept: RADIOLOGY | Facility: HOSPITAL | Age: 68
Discharge: HOME | End: 2023-04-12
Attending: FAMILY MEDICINE
Payer: MEDICARE

## 2023-04-12 ENCOUNTER — OFFICE VISIT (OUTPATIENT)
Dept: GYNECOLOGY | Facility: CLINIC | Age: 68
End: 2023-04-12
Attending: FAMILY MEDICINE
Payer: MEDICARE

## 2023-04-12 VITALS
BODY MASS INDEX: 32.33 KG/M2 | HEART RATE: 74 BPM | SYSTOLIC BLOOD PRESSURE: 105 MMHG | OXYGEN SATURATION: 97 % | DIASTOLIC BLOOD PRESSURE: 62 MMHG | RESPIRATION RATE: 20 BRPM | WEIGHT: 206 LBS | HEIGHT: 67 IN | TEMPERATURE: 99 F

## 2023-04-12 DIAGNOSIS — N39.46 MIXED INCONTINENCE: ICD-10-CM

## 2023-04-12 DIAGNOSIS — N89.8 VAGINAL DISCHARGE: Primary | ICD-10-CM

## 2023-04-12 DIAGNOSIS — Z78.0 POSTMENOPAUSAL: ICD-10-CM

## 2023-04-12 DIAGNOSIS — N81.10 BLADDER PROLAPSE, FEMALE, ACQUIRED: ICD-10-CM

## 2023-04-12 DIAGNOSIS — Z12.31 ENCOUNTER FOR SCREENING MAMMOGRAM FOR MALIGNANT NEOPLASM OF BREAST: ICD-10-CM

## 2023-04-12 LAB
APPEARANCE UR: CLEAR
BACTERIA #/AREA URNS AUTO: ABNORMAL /HPF
BILIRUB UR QL STRIP.AUTO: NEGATIVE MG/DL
C TRACH DNA SPEC QL NAA+PROBE: NOT DETECTED
CLUE CELLS VAG QL WET PREP: ABNORMAL
COLOR UR AUTO: YELLOW
GLUCOSE UR QL STRIP.AUTO: NORMAL MG/DL
HYALINE CASTS #/AREA URNS LPF: ABNORMAL /LPF
KETONES UR QL STRIP.AUTO: NEGATIVE MG/DL
LEUKOCYTE ESTERASE UR QL STRIP.AUTO: 500 UNIT/L
MUCOUS THREADS URNS QL MICRO: ABNORMAL /LPF
N GONORRHOEA DNA SPEC QL NAA+PROBE: NOT DETECTED
NITRITE UR QL STRIP.AUTO: NEGATIVE
PH UR STRIP.AUTO: 5.5 [PH]
PROT UR QL STRIP.AUTO: ABNORMAL MG/DL
RBC #/AREA URNS AUTO: ABNORMAL /HPF
RBC UR QL AUTO: NEGATIVE UNIT/L
SP GR UR STRIP.AUTO: 1.02
SQUAMOUS #/AREA URNS LPF: ABNORMAL /HPF
T VAGINALIS VAG QL WET PREP: ABNORMAL
UROBILINOGEN UR STRIP-ACNC: ABNORMAL MG/DL
WBC #/AREA URNS AUTO: ABNORMAL /HPF
WBC #/AREA VAG WET PREP: ABNORMAL
YEAST BUDDING URNS QL: ABNORMAL /HPF
YEAST SPEC QL WET PREP: ABNORMAL

## 2023-04-12 PROCEDURE — 77067 SCR MAMMO BI INCL CAD: CPT | Mod: TC

## 2023-04-12 PROCEDURE — 77063 BREAST TOMOSYNTHESIS BI: CPT | Mod: 26,,, | Performed by: RADIOLOGY

## 2023-04-12 PROCEDURE — 99213 OFFICE O/P EST LOW 20 MIN: CPT | Mod: PBBFAC,25

## 2023-04-12 PROCEDURE — 87591 N.GONORRHOEAE DNA AMP PROB: CPT

## 2023-04-12 PROCEDURE — 81001 URINALYSIS AUTO W/SCOPE: CPT

## 2023-04-12 PROCEDURE — 77067 SCR MAMMO BI INCL CAD: CPT | Mod: 26,,, | Performed by: RADIOLOGY

## 2023-04-12 PROCEDURE — 77080 DXA BONE DENSITY AXIAL: CPT | Mod: TC

## 2023-04-12 PROCEDURE — 77067 MAMMO DIGITAL SCREENING BILAT WITH TOMO: ICD-10-PCS | Mod: 26,,, | Performed by: RADIOLOGY

## 2023-04-12 PROCEDURE — 77063 MAMMO DIGITAL SCREENING BILAT WITH TOMO: ICD-10-PCS | Mod: 26,,, | Performed by: RADIOLOGY

## 2023-04-12 PROCEDURE — 87210 SMEAR WET MOUNT SALINE/INK: CPT

## 2023-04-12 RX ORDER — ISOSORBIDE MONONITRATE 30 MG/1
30 TABLET, EXTENDED RELEASE ORAL EVERY MORNING
COMMUNITY
Start: 2022-12-22 | End: 2023-08-18

## 2023-04-12 RX ORDER — MIRABEGRON 25 MG/1
25 TABLET, FILM COATED, EXTENDED RELEASE ORAL DAILY
COMMUNITY
End: 2023-08-18

## 2023-04-12 NOTE — PROGRESS NOTES
[facility] GYNECOLOGY CLINIC NOTE     Laura Freeman is a 68 y.o.  presenting to GYN clinic for pessary cleaning.     Pessary was placed in [place] on 2023 for anterior prolapse; She returned to  on Mar 28, and had it removed cleaned and replaced. Prolapse symptoms have improved with pessary.    Mixed urinary incontinence - Per urology note (10/5/2022), hx of mixed urinary incontinence not responsive to medical management (oxybutynin and myrbetriq). Was referred to [facility] for surgical management, but has not gotten a call from them.  Urinary incontinence has continued, requiring use of adult diapers. Denies dysuria, hematuria, vaginal pain, discharge, hematochezia, changes in BM.     Gyn History: Last Pap (3/31/2022) NILM, HPV negative. No known hx of abnormal Pap or Not sexually active 12 years. Postmenopausal.  H/o syphilis, RPR nonreactive .     OB History          7    Para   5    Term                AB        Living             SAB        IAB        Ectopic        Multiple        Live Births                    Past Medical History:   Diagnosis Date    Hypertension     Papillary microcarcinoma of thyroid (T1a N0 M0) 2019    Diagnosed as an incidental finding at the time of a right hemithyroidectomy done in conjunction with total laryngectomy on 2019.    T3N0M0 supraglottic laryngeal squamous cell carcinoma 2019    T3 N0 M0 squamous cell carcinoma of the supraglottic larynx diagnosed initially on 2019. Treated with total laryngectomy, bilateral selective neck dissections (levels 2 through 4), right hemithyroidectomy (with incidental finding papillary thyroid microcarcinoma), primary TEP puncture with cricopharyngeal myotomy on 2019.  Treated with postoperative radiation therapy.    Type 2 diabetes mellitus with unspecified diabetic retinopathy without macular edema       Past Surgical History:   Procedure Laterality Date    ceiol - cataract  extraction and insertion of introcular lens      excision of papilloma      I&D of skin and subcutaneous tissue  05/31/2015    MULTIPLE TOOTH EXTRACTIONS Bilateral 09/06/2019    Full mouth extraction in preparation for radiation therapy.    PANENDOSCOPY N/A 07/08/2019    Initial diagnosis laryngeal squamous cell carcinoma.  Done in conjunction with an awake tracheostomy.    PERCUTANEOUS INSERTION OF GASTROSTOMY TUBE N/A 07/12/2019    SELECTIVE NECK DISSECTION Bilateral 08/12/2019    Done in conjunction with total laryngectomy for laryngeal squamous cell carcinoma.    THYROID LOBECTOMY Right 08/12/2019    Done in conjunction with total laryngectomy with incidental finding thyroid papillary microcarcinoma.    TOTAL LARYNGECTOMY N/A 08/12/2019    Total laryngectomy with bilateral selective neck dissections (levels 2 through 4) right hemithyroidectomy and primary tracheoesophageal puncture cricopharyngeal myotomy for treatment laryngeal squamous cell carcinoma.    TRACHEOSTOMY  07/08/2019    Done in conjunction with panendoscopy at initial diagnosis of laryngeal squamous cell carcinoma.    TRACHEOSTOMY TUBE CHANGE  07/14/2019    Done in conjunction with control of post-operative tracheostomy wound hemorrhage.      Current Outpatient Medications   Medication Instructions    amLODIPine (NORVASC) 10 mg, Oral, Daily    aspirin 81 mg, Oral, Daily    atorvastatin (LIPITOR) 40 mg, Oral, Daily    blood sugar diagnostic Strp To check BG 1 times daily, to use with insurance preferred meter    blood-glucose meter kit To check BG 1 times daily, to use with insurance preferred meter    carvediloL (COREG) 3.125 MG tablet TAKE 1 TABLET BY MOUTH TWICE DAILY    cyclobenzaprine (FLEXERIL) 10 mg, Oral, Nightly    diclofenac sodium (VOLTAREN) 2 g, Topical (Top), 4 times daily, PRN for pain    EScitalopram oxalate (LEXAPRO) 20 mg, Oral, Daily    famotidine (PEPCID) 40 mg, Oral, 2 times daily    gabapentin (NEURONTIN) 300 MG capsule See  "Instructions, TAKE 1 CAPSULE BY MOUTH THREE TIMES DAILY, # 90 cap(s), 2 Refill(s), [id number], 174, cm, Height/Length Dosing, 10/24/21 10:52:00 CDT, 89, kg, Weight Dosing, 10/24/21 10:52:00 CDT    HYDROcodone-acetaminophen (NORCO)  mg per tablet 1 tablet, Oral, Every 12 hours PRN    hydrOXYchloroQUINE (PLAQUENIL) 200 mg, Oral, 2 times daily, After food    isosorbide mononitrate (IMDUR) 30 mg, Oral, Every morning    isosorbide mononitrate (IMDUR) 30 mg, Oral, Every morning    lancets Misc Use qd    levothyroxine (SYNTHROID) 112 MCG tablet 1 tab po daily except 1.5 tabs on Sundays    lisinopriL (PRINIVIL,ZESTRIL) 40 mg, Oral, Daily    meclizine (ANTIVERT) 25 mg, Oral, 3 times daily    metFORMIN (GLUCOPHAGE) 500 mg, Oral, 2 times daily    methocarbamoL (ROBAXIN) 750 mg, Oral    MYRBETRIQ 25 mg, Oral, Daily    nitroGLYCERIN (NITROSTAT) 0.4 mg, Sublingual, As needed (PRN)    omeprazole (PRILOSEC) 40 mg, Oral, Daily, Before lunch    ondansetron (ZOFRAN-ODT) 4 mg, Oral, 3 times daily    OXcarbazepine (TRILEPTAL) 300 MG Tab TAKE 1 TABLET BY MOUTH TWICE DAILY     Social History     Tobacco Use    Smoking status: Former     Passive exposure: Past    Smokeless tobacco: Never   Substance Use Topics    Alcohol use: Yes     Comment: occ    Drug use: Never       Review of Systems  Pertinent items are noted in HPI.     Objective:     /62   Pulse 74   Temp 98.7 °F (37.1 °C)   Resp 20   Ht 5' 7" (1.702 m)   Wt 93.4 kg (206 lb)   LMP  (LMP Unknown)   SpO2 97%   BMI 32.26 kg/m²   Physical Exam:  Gen: Well-nourished, well-developed female appearing stated age. Alert, cooperative, in no acute distress.  Abdomen: Soft, non-tender, no masses.  External genitalia: Normal female genetalia without lesion, discharge or tenderness.  Speculum Exam: atrophic vaginal mucosa. No lesions or masses. Stage 1 anterior vaginal wall prolapse on Valsalva. Vaginal vault with minimal vaginal discharge, no " erosions, odor, ulcers, lesions noted. Nonodorous.  Cervical os visualized as closed, no lesions/masses.   Bimanual Exam: Size #5 RWS pessary removed, cleaned, and reinserted. No cervical motion tenderness. Uterus mobile. Small size uterus. No adnexal masses.  Nontender exam. Pessary replaced, sits behind pubic symphysis with 1 finger space around circumference.     Note: RN chaperone present for entirety of exam.    Assessment:       68 y.o.  here for pessary cleaning.  1. Vaginal discharge  Urinalysis, Reflex to Urine Culture    Chlamydia/GC, PCR    Wet Prep, Genital      2. Mixed incontinence        3. Bladder prolapse, female, acquired               Plan:     Problem List Items Addressed This Visit          Renal/    Mixed incontinence     Will have clinic staff facilitate referral to Urogyn in [place] regarding further management of mixed incontinence           Bladder prolapse, female, acquired     Size 5 RWS pessary removed, cleaned, replaced.  Appears to fit well. Has improved symptoms of prolapse.  RTC 3mo for cleaning            Other Visit Diagnoses       Vaginal discharge    -  Primary    Relevant Orders    Urinalysis, Reflex to Urine Culture (Completed)    Chlamydia/GC, PCR    Wet Prep, Genital (Completed)          Return to clinic in 3mo for pessary cleaning     Discussed patient and plan with Dr. Provost Ana Sanon MD  [facility] PGY2  2023 5:56 PM

## 2023-04-12 NOTE — ASSESSMENT & PLAN NOTE
Will have clinic staff facilitate referral to Urogyn in [place] regarding further management of mixed incontinence

## 2023-04-12 NOTE — ASSESSMENT & PLAN NOTE
Size 5 RWS pessary removed, cleaned, replaced.  Appears to fit well. Has improved symptoms of prolapse.  RTC 3mo for cleaning

## 2023-04-13 DIAGNOSIS — N81.9 FEMALE GENITAL PROLAPSE, UNSPECIFIED TYPE: Primary | ICD-10-CM

## 2023-04-14 ENCOUNTER — TELEPHONE (OUTPATIENT)
Dept: GYNECOLOGY | Facility: CLINIC | Age: 68
End: 2023-04-14
Payer: MEDICARE

## 2023-04-14 NOTE — TELEPHONE ENCOUNTER
Called to let patient know that I have sent referral to Dr Andrew hdez at [facility], gave her telephone number and location, instructed her to f/u in 2 weeks if she doesn't hear anything by then

## 2023-04-21 DIAGNOSIS — R35.1 NOCTURIA: ICD-10-CM

## 2023-04-21 DIAGNOSIS — M19.90 INFLAMMATORY ARTHRITIS: ICD-10-CM

## 2023-04-21 DIAGNOSIS — F51.01 PRIMARY INSOMNIA: ICD-10-CM

## 2023-04-21 DIAGNOSIS — C73 PAPILLARY MICROCARCINOMA OF THYROID: ICD-10-CM

## 2023-04-21 DIAGNOSIS — I10 HYPERTENSION, UNSPECIFIED TYPE: ICD-10-CM

## 2023-04-21 DIAGNOSIS — N39.46 MIXED INCONTINENCE: ICD-10-CM

## 2023-04-21 DIAGNOSIS — M54.40 LOW BACK PAIN WITH SCIATICA, SCIATICA LATERALITY UNSPECIFIED, UNSPECIFIED BACK PAIN LATERALITY, UNSPECIFIED CHRONICITY: ICD-10-CM

## 2023-04-21 DIAGNOSIS — B18.2 HEPATITIS C VIRUS CARRIER STATE: ICD-10-CM

## 2023-04-21 DIAGNOSIS — C32.9 LARYNGEAL SQUAMOUS CELL CARCINOMA: ICD-10-CM

## 2023-04-21 DIAGNOSIS — N81.10 BLADDER PROLAPSE, FEMALE, ACQUIRED: ICD-10-CM

## 2023-04-21 DIAGNOSIS — E03.9 HYPOTHYROIDISM, UNSPECIFIED TYPE: ICD-10-CM

## 2023-04-21 DIAGNOSIS — M79.7 FIBROMYALGIA SYNDROME: ICD-10-CM

## 2023-04-21 DIAGNOSIS — M06.00 SERONEGATIVE RHEUMATOID ARTHRITIS: ICD-10-CM

## 2023-04-21 RX ORDER — HYDROCODONE BITARTRATE AND ACETAMINOPHEN 10; 325 MG/1; MG/1
1 TABLET ORAL EVERY 12 HOURS PRN
Qty: 60 TABLET | Refills: 0 | Status: SHIPPED | OUTPATIENT
Start: 2023-04-21 | End: 2023-05-17 | Stop reason: SDUPTHER

## 2023-04-21 NOTE — TELEPHONE ENCOUNTER
----- Message from Marcy Gates sent at 4/21/2023 11:07 AM CDT -----  Regarding: Medication refill  Patient called requesting refill for [facility] on [address].

## 2023-04-28 DIAGNOSIS — F32.A DEPRESSION, UNSPECIFIED DEPRESSION TYPE: ICD-10-CM

## 2023-05-01 RX ORDER — ESCITALOPRAM OXALATE 20 MG/1
TABLET ORAL
Qty: 30 TABLET | Refills: 0 | Status: SHIPPED | OUTPATIENT
Start: 2023-05-01 | End: 2023-05-22 | Stop reason: SDUPTHER

## 2023-05-02 ENCOUNTER — CLINICAL SUPPORT (OUTPATIENT)
Dept: REHABILITATION | Facility: HOSPITAL | Age: 68
End: 2023-05-02
Payer: MEDICARE

## 2023-05-02 DIAGNOSIS — Z90.02 H/O LARYNGECTOMY: Primary | ICD-10-CM

## 2023-05-02 PROCEDURE — L8509 TRACH-ESOPH VOICE PROS MD IN: HCPCS

## 2023-05-02 PROCEDURE — 92507 TX SP LANG VOICE COMM INDIV: CPT

## 2023-05-02 NOTE — PROGRESS NOTES
"[facility]  Speech Therapy PROGRESS NOTE  Laryngectomy  Date: 5/2/2023     Name: Laura Freeman   MRN: [id number]    Therapy Diagnosis:   Encounter Diagnosis   Name Primary?    H/O laryngectomy Yes      Patient Active Problem List   Diagnosis    Mixed incontinence    Hepatitis C virus infection    Low back pain    Laryngeal squamous cell carcinoma    Papillary microcarcinoma of thyroid    Nocturia    Hypertension    Hypothyroidism    Bladder prolapse, female, acquired    Postsurgical hypothyroidism    Seronegative rheumatoid arthritis    H/O laryngectomy    Type 2 diabetes mellitus with diabetic neuropathy, without long-term current use of insulin     Physician: Rowdy uW MD    PATIENT IS A NECK-BREATHER - DO NOT ORALLY INTUBATE    Time In:  0800   Time Out:  0825    Procedure Min.   Speech-Language Therapy  25   Total Untimed Units: 25  Charges Billed/# of units: 25/1    Precautions: Standard  Subjective    Chief Complaint: "Leaking a while". Pt reported leaking shortly after last appointment on 03/21/2023    AFFECT normal affect    Pain:   0/10  Pain Location / Description: NA  Current Medical History: Laura Freeman is a 68 y.o. female referred by Dr. uW for TEP management to maximize alaryngeal communication without respiratory compromise.    Past Medical History:   Past Medical History:   Diagnosis Date    Hypertension     Papillary microcarcinoma of thyroid (T1a N0 M0) 07/12/2019    Diagnosed as an incidental finding at the time of a right hemithyroidectomy done in conjunction with total laryngectomy on August 12, 2019.    T3N0M0 supraglottic laryngeal squamous cell carcinoma 07/08/2019    T3 N0 M0 squamous cell carcinoma of the supraglottic larynx diagnosed initially on July 8, 2019. Treated with total laryngectomy, bilateral selective neck dissections (levels 2 through 4), right hemithyroidectomy (with incidental finding papillary thyroid microcarcinoma), primary TEP puncture " "with cricopharyngeal myotomy on August 12, 2019.  Treated with postoperative radiation therapy.    Type 2 diabetes mellitus with unspecified diabetic retinopathy without macular edema     Laura Freeman  has a past surgical history that includes Multiple tooth extractions (Bilateral, 09/06/2019); excision of papilloma; ceiol - cataract extraction and insertion of introcular lens; I&D of skin and subcutaneous tissue (05/31/2015); Tracheostomy (07/08/2019); Panendoscopy (N/A, 07/08/2019); Percutaneous insertion of gastrostomy tube (N/A, 07/12/2019); Tracheostomy tube change (07/14/2019); Thyroid lobectomy (Right, 08/12/2019); Total laryngectomy (N/A, 08/12/2019); and Selective neck dissection (Bilateral, 08/12/2019).  Medical Hx and Allergies: Laura has a current medication list which includes the following prescription(s): amlodipine, aspirin, atorvastatin, blood sugar diagnostic, blood-glucose meter, carvedilol, cyclobenzaprine, diclofenac sodium, escitalopram oxalate, famotidine, gabapentin, hydrocodone-acetaminophen, hydroxychloroquine, isosorbide mononitrate, isosorbide mononitrate, lancets, levothyroxine, lisinopril, meclizine, metformin, methocarbamol, myrbetriq, nitroglycerin, omeprazole, ondansetron, and oxcarbazepine, and the following Facility-Administered Medications: lidocaine (pf), lidocaine (pf), and phenylephrine hcl 1%. Review of patient's allergies indicates:  No Known Allergies    Current Voice Function: alaryngeal communication via voice prothesis    Current Level of Swallow Function/Complaints:  no complaints  Prior Therapy:  03/21/2023    TEP ASSESSMENT   Initial Fitting: no    Re-fitting:yes    TEP Initial Fitting Date: 08/12/2019    TEP Current Status:Ms. Freeman is currently wearing a 17FR, 6mm Provox Johnson placed on 03/21/2023. Additionally, she wears a 10/36 fenestrated Provox akhil tube with push to talk HMEs.     TEP Patient Complaints: leaking for "a while" shortly after last replaced on " 03/21/2023. Pt reported she didn't want to return for replacement.     TEP Accessories: 10/36 fenestrated Provox akhil tube with push to talk HMEs    Stoma Size:  adequate     Lymphedema:  no    TEP Fitting/Re-sizing:     Removed current TEP: yes, moderate biofilm noted on back of tracheal and esphageal phalange. Valve noted to be angled in semi-open position.     TEP Removed catheter : no     TEP Sizing:yes 6mm    Puncture Dilation: no     TEP Prosthesis Placed:yes 17FR, 6mm Provox Johnson    TEP Voicing with Open Tract:no     TEP Voicing with Prosthesis:yes good voicing noted immediately    TEP Leaking through Prothesis:no     TEP Leaking around Prosthesis:no       Treatment   Education: Plan of Care, role of SLP in care,TEP and stoma management were discussed with pt. SLP educated on plug usage. Pt able to demonstrate insertion with sample TEP and plug. Patient expressed understanding.     Assessment       LongTerm Goals:  Current Progress:   1.  Patient will utilize alaryngeal communication via TEP to express needs and desires without respiratory compromise >90% of the time.  Progressing towards goal       Short Term Goals:  Current Progress:   1. Patient will demonstrate care and use of HME system for maximum pulmonary benefit >90% of the time. Progressing towards goal   2.  Patient will demonstrate adequate care and use of TEP to maintain TEP voicing >90% of the time.  Progressing towards goal   3.  Patient will demonstrate adequate occlusion and cleaning of TEP to maintain voicing >90% of the time.  Progressing towards goal   4. Patient will increase laryngectomy tube usage daily or PRN to maintain and increase stomal patency.  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  She is currently utilizing esophageal speech via a voice prosthesis for alaryngeal communication for functional communication with her family, friends, medical providers, and others to perform  "her daily life activities.  Ms. Freeman requires the use of a laryngectomy tube at all times to keep the airway patent and prevent stomal stenosis. HMEs are required daily for mucus management and pulmonary optimization .     Plan     SLP intervention is warranted 1-2 times a month or PRN for communication needs for a minimum of 1 year due to the chronic nature of the impairment.     Additional Information     Ms. Freeman entered reported leaking "a while" shortly after last replaced on 03/21/2023. Pt reported she didn't want to return for replacement. SLP assessed TEP and noted that size remains adequate at this time. Leaking noted immediately with thin liquid trials. Moderate biofilm noted on back of tracheal and esphageal phalange. Valve noted to be angled in semi-open position.TEP change without incident this date. SLP to follow up as warranted.       Lxey Price MS, CCC-SLP    5/2/2023              "

## 2023-05-16 PROBLEM — M79.7 FIBROMYALGIA SYNDROME: Status: ACTIVE | Noted: 2023-05-16

## 2023-05-17 ENCOUNTER — OFFICE VISIT (OUTPATIENT)
Dept: RHEUMATOLOGY | Facility: CLINIC | Age: 68
End: 2023-05-17
Payer: MEDICARE

## 2023-05-17 ENCOUNTER — TELEPHONE (OUTPATIENT)
Dept: RHEUMATOLOGY | Facility: CLINIC | Age: 68
End: 2023-05-17
Payer: MEDICARE

## 2023-05-17 VITALS
RESPIRATION RATE: 18 BRPM | DIASTOLIC BLOOD PRESSURE: 73 MMHG | HEIGHT: 67 IN | OXYGEN SATURATION: 95 % | SYSTOLIC BLOOD PRESSURE: 110 MMHG | TEMPERATURE: 98 F | HEART RATE: 66 BPM | BODY MASS INDEX: 30.92 KG/M2 | WEIGHT: 197 LBS

## 2023-05-17 DIAGNOSIS — C73 PAPILLARY MICROCARCINOMA OF THYROID: ICD-10-CM

## 2023-05-17 DIAGNOSIS — C32.9 LARYNGEAL SQUAMOUS CELL CARCINOMA: ICD-10-CM

## 2023-05-17 DIAGNOSIS — I10 HYPERTENSION, UNSPECIFIED TYPE: ICD-10-CM

## 2023-05-17 DIAGNOSIS — M79.7 FIBROMYALGIA SYNDROME: ICD-10-CM

## 2023-05-17 DIAGNOSIS — B18.2 HEPATITIS C VIRUS CARRIER STATE: ICD-10-CM

## 2023-05-17 DIAGNOSIS — M06.00 SERONEGATIVE RHEUMATOID ARTHRITIS: Primary | ICD-10-CM

## 2023-05-17 PROCEDURE — 99999 PR PBB SHADOW E&M-EST. PATIENT-LVL V: CPT | Mod: PBBFAC,,,

## 2023-05-17 PROCEDURE — 3074F PR MOST RECENT SYSTOLIC BLOOD PRESSURE < 130 MM HG: ICD-10-PCS | Mod: CPTII,,,

## 2023-05-17 PROCEDURE — 1159F MED LIST DOCD IN RCRD: CPT | Mod: CPTII,,,

## 2023-05-17 PROCEDURE — 99214 PR OFFICE/OUTPT VISIT, EST, LEVL IV, 30-39 MIN: ICD-10-PCS | Mod: ,,,

## 2023-05-17 PROCEDURE — 99999 PR PBB SHADOW E&M-EST. PATIENT-LVL V: ICD-10-PCS | Mod: PBBFAC,,,

## 2023-05-17 PROCEDURE — 3288F PR FALLS RISK ASSESSMENT DOCUMENTED: ICD-10-PCS | Mod: CPTII,,,

## 2023-05-17 PROCEDURE — 3008F BODY MASS INDEX DOCD: CPT | Mod: CPTII,,,

## 2023-05-17 PROCEDURE — 4010F ACE/ARB THERAPY RXD/TAKEN: CPT | Mod: CPTII,,,

## 2023-05-17 PROCEDURE — 4010F PR ACE/ARB THEARPY RXD/TAKEN: ICD-10-PCS | Mod: CPTII,,,

## 2023-05-17 PROCEDURE — 3074F SYST BP LT 130 MM HG: CPT | Mod: CPTII,,,

## 2023-05-17 PROCEDURE — 1159F PR MEDICATION LIST DOCUMENTED IN MEDICAL RECORD: ICD-10-PCS | Mod: CPTII,,,

## 2023-05-17 PROCEDURE — 3288F FALL RISK ASSESSMENT DOCD: CPT | Mod: CPTII,,,

## 2023-05-17 PROCEDURE — 3078F PR MOST RECENT DIASTOLIC BLOOD PRESSURE < 80 MM HG: ICD-10-PCS | Mod: CPTII,,,

## 2023-05-17 PROCEDURE — 1101F PT FALLS ASSESS-DOCD LE1/YR: CPT | Mod: CPTII,,,

## 2023-05-17 PROCEDURE — 99214 OFFICE O/P EST MOD 30 MIN: CPT | Mod: ,,,

## 2023-05-17 PROCEDURE — 3008F PR BODY MASS INDEX (BMI) DOCUMENTED: ICD-10-PCS | Mod: CPTII,,,

## 2023-05-17 PROCEDURE — 1126F AMNT PAIN NOTED NONE PRSNT: CPT | Mod: CPTII,,,

## 2023-05-17 PROCEDURE — 3078F DIAST BP <80 MM HG: CPT | Mod: CPTII,,,

## 2023-05-17 PROCEDURE — 1101F PR PT FALLS ASSESS DOC 0-1 FALLS W/OUT INJ PAST YR: ICD-10-PCS | Mod: CPTII,,,

## 2023-05-17 PROCEDURE — 1126F PR PAIN SEVERITY QUANTIFIED, NO PAIN PRESENT: ICD-10-PCS | Mod: CPTII,,,

## 2023-05-17 RX ORDER — HYDROCODONE BITARTRATE AND ACETAMINOPHEN 10; 325 MG/1; MG/1
1 TABLET ORAL EVERY 12 HOURS PRN
Qty: 60 TABLET | Refills: 0 | Status: SHIPPED | OUTPATIENT
Start: 2023-05-17 | End: 2023-06-28 | Stop reason: SDUPTHER

## 2023-05-17 RX ORDER — CYCLOBENZAPRINE HCL 10 MG
10 TABLET ORAL NIGHTLY
Qty: 30 TABLET | Refills: 5 | Status: SHIPPED | OUTPATIENT
Start: 2023-05-17 | End: 2023-08-18 | Stop reason: SDUPTHER

## 2023-05-17 RX ORDER — OMEPRAZOLE 40 MG/1
40 CAPSULE, DELAYED RELEASE ORAL DAILY
Qty: 30 CAPSULE | Refills: 11 | Status: SHIPPED | OUTPATIENT
Start: 2023-05-17 | End: 2023-08-18

## 2023-05-17 RX ORDER — GABAPENTIN 400 MG/1
CAPSULE ORAL
Qty: 90 CAPSULE | Refills: 5 | Status: SHIPPED | OUTPATIENT
Start: 2023-05-17 | End: 2023-08-18 | Stop reason: SDUPTHER

## 2023-05-17 RX ORDER — HYDROXYCHLOROQUINE SULFATE 200 MG/1
200 TABLET, FILM COATED ORAL 2 TIMES DAILY
Qty: 60 TABLET | Refills: 5 | Status: SHIPPED | OUTPATIENT
Start: 2023-05-17 | End: 2023-08-18 | Stop reason: SDUPTHER

## 2023-05-17 NOTE — PROGRESS NOTES
"Subjective:           Patient ID: Laura Freeman is a 68 y.o. female.    Chief Complaint: Follow-up (Tingling in bilateral feet and hands . )       is a 69 y/o female here for a f./u. She was initially referred by Dr. Vicenta Acevedo. She established care with Dr. Frye on 5/23/22 . Past labs: RF neg. CCP neg. 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  She is currently utilizing esophageal speech via a voice prosthesis for alaryngeal communication for functional communication. She has a history of Stroke (18 years ago) and history of seizure. She is followed by cardiology and awaiting a new patient appointment with neurology.  Today she is reporting joint pain involving the MCP PIP wrist elbow shoulders hips knees and ankles bilaterally.  The pain is 2/10 in intensity dull in quality and continuous.  That is associated with a morning stiffness lasting for less than 60 minutes. She is also reporting tingling in her bilateral hand and feet. They are associated with fatigue.  Denies fever and  chills no others.         Review of Systems   Constitutional:  Negative for appetite change, chills and fever.   HENT:  Negative for congestion, ear pain, mouth sores, nosebleeds and trouble swallowing.    Eyes:  Negative for photophobia and discharge.   Respiratory:  Negative for chest tightness and shortness of breath.    Cardiovascular:  Negative for chest pain.   Gastrointestinal:  Negative for abdominal pain and vomiting.   Endocrine: Negative.    Genitourinary:  Negative for hematuria.   Musculoskeletal:         As per HPI   Skin:  Negative for rash.   Neurological:  Positive for numbness. Negative for weakness.        Bilateral numbness/tingling in hands and feet       Objective:   /73 (BP Location: Right arm, Patient Position: Sitting, BP Method: Medium (Automatic))   Pulse 66   Temp 98.1 °F (36.7 °C) (Temporal)   Resp 18   Ht 5' 7" (1.702 m)   Wt 89.4 kg (197 " lb)   LMP  (LMP Unknown)   SpO2 95%   BMI 30.85 kg/m²          Physical Exam   Constitutional: She is oriented to person, place, and time. She appears well-developed and well-nourished. No distress.   HENT:   Head: Normocephalic and atraumatic.   Right Ear: External ear normal.   Left Ear: External ear normal.   Eyes: Pupils are equal, round, and reactive to light.   Cardiovascular: Normal rate.   Pulmonary/Chest: Effort normal.   Abdominal: Soft. There is no abdominal tenderness.   Musculoskeletal:      Cervical back: Neck supple.   Lymphadenopathy:     She has no cervical adenopathy.   Neurological: She is alert and oriented to person, place, and time. She displays normal reflexes. No cranial nerve deficit or sensory deficit. She exhibits normal muscle tone. Coordination normal.   Skin: No rash noted. No erythema.   Vitals reviewed.      Right Side Rheumatological Exam     The patient is tender to palpation of the 1st PIP, 1st MCP, 2nd PIP, 2nd MCP, 3rd PIP, 3rd MCP, 4th PIP, 4th MCP, 5th PIP and 5th MCP    Left Side Rheumatological Exam     The patient is tender to palpation of the 1st PIP, 1st MCP, 2nd PIP, 2nd MCP, 3rd PIP, 3rd MCP, 4th PIP, 4th MCP, 5th PIP and 5th MCP.         Completed Fibromyalgia exam 18/18 tender points.    No data to display     Assessment:         Medication List with Changes/Refills   Current Medications    AMLODIPINE (NORVASC) 10 MG TABLET    Take 1 tablet (10 mg total) by mouth Daily.       Start Date: 1/17/2023 End Date: --    ASPIRIN 81 MG CHEW    Take 81 mg by mouth once daily.       Start Date: --        End Date: --    ATORVASTATIN (LIPITOR) 40 MG TABLET    Take 1 tablet (40 mg total) by mouth once daily.       Start Date: 2/20/2023 End Date: --    BLOOD SUGAR DIAGNOSTIC STRP    To check BG 1 times daily, to use with insurance preferred meter       Start Date: 2/20/2023 End Date: --    BLOOD-GLUCOSE METER KIT    To check BG 1 times daily, to use with insurance preferred meter        Start Date: 2/20/2023 End Date: 2/20/2024    CARVEDILOL (COREG) 3.125 MG TABLET    TAKE 1 TABLET BY MOUTH TWICE DAILY       Start Date: 5/30/2022 End Date: --    DICLOFENAC SODIUM (VOLTAREN) 1 % GEL    Apply 2 g topically 4 (four) times daily. PRN for pain       Start Date: 1/30/2023 End Date: --    ESCITALOPRAM OXALATE (LEXAPRO) 20 MG TABLET    TAKE 1 TABLET(20 MG) BY MOUTH DAILY       Start Date: 5/1/2023  End Date: --    FAMOTIDINE (PEPCID) 40 MG TABLET    Take 1 tablet (40 mg total) by mouth 2 (two) times daily.       Start Date: 12/22/2022End Date: --    HYDROCODONE-ACETAMINOPHEN (NORCO)  MG PER TABLET    Take 1 tablet by mouth every 12 (twelve) hours as needed for Pain.       Start Date: 4/21/2023 End Date: --    ISOSORBIDE MONONITRATE (IMDUR) 30 MG 24 HR TABLET    Take 30 mg by mouth every morning.       Start Date: 4/12/2022 End Date: --    ISOSORBIDE MONONITRATE (IMDUR) 30 MG 24 HR TABLET    Take 30 mg by mouth every morning.       Start Date: 12/22/2022End Date: --    LANCETS MISC    Use qd       Start Date: 2/20/2023 End Date: --    LEVOTHYROXINE (SYNTHROID) 112 MCG TABLET    1 tab po daily except 1.5 tabs on Sundays       Start Date: 12/20/2022End Date: --    LISINOPRIL (PRINIVIL,ZESTRIL) 40 MG TABLET    Take 1 tablet (40 mg total) by mouth once daily.       Start Date: 1/17/2023 End Date: --    MECLIZINE (ANTIVERT) 25 MG TABLET    Take 25 mg by mouth 3 (three) times daily.       Start Date: 3/31/2022 End Date: --    METFORMIN (GLUCOPHAGE) 500 MG TABLET    Take 1 tablet (500 mg total) by mouth 2 (two) times daily.       Start Date: 9/27/2022 End Date: --    MIRABEGRON (MYRBETRIQ) 25 MG TB24 ER TABLET    Take 25 mg by mouth once daily.       Start Date: --        End Date: --    NITROGLYCERIN (NITROSTAT) 0.4 MG SL TABLET    Place 0.4 mg under the tongue as needed.       Start Date: 9/20/2021 End Date: --    ONDANSETRON (ZOFRAN-ODT) 4 MG TBDL    Take 4 mg by mouth 3 (three) times daily.        Start Date: 3/31/2022 End Date: --    OXCARBAZEPINE (TRILEPTAL) 300 MG TAB    TAKE 1 TABLET BY MOUTH TWICE DAILY       Start Date: 5/30/2022 End Date: --   Changed and/or Refilled Medications    Modified Medication Previous Medication    CYCLOBENZAPRINE (FLEXERIL) 10 MG TABLET cyclobenzaprine (FLEXERIL) 10 MG tablet       Take 1 tablet (10 mg total) by mouth every evening.    Take 1 tablet (10 mg total) by mouth every evening.       Start Date: 5/17/2023 End Date: 11/13/2023    Start Date: 1/17/2023 End Date: 5/17/2023    GABAPENTIN (NEURONTIN) 400 MG CAPSULE gabapentin (NEURONTIN) 300 MG capsule       See Instructions, TAKE 1 CAPSULE BY MOUTH THREE TIMES DAILY, # 90 cap(s), 2 Refill(s), Pharmacy: [facility] #[id number], 174, cm, Height/Length Dosing, 10/24/21 10:52:00 CDT, 89, kg, Weight Dosing, 10/24/21 10:52:00 CDT    See Instructions, TAKE 1 CAPSULE BY MOUTH THREE TIMES DAILY, # 90 cap(s), 2 Refill(s), Pharmacy: [facility] #[id number], 174, cm, Height/Length Dosing, 10/24/21 10:52:00 CDT, 89, kg, Weight Dosing, 10/24/21 10:52:00 CDT       Start Date: 5/17/2023 End Date: --    Start Date: 1/17/2023 End Date: 5/17/2023    HYDROXYCHLOROQUINE (PLAQUENIL) 200 MG TABLET hydrOXYchloroQUINE (PLAQUENIL) 200 mg tablet       Take 1 tablet (200 mg total) by mouth 2 (two) times daily. After food    Take 1 tablet (200 mg total) by mouth 2 (two) times daily. After food       Start Date: 5/17/2023 End Date: 11/17/2023    Start Date: 1/17/2023 End Date: 5/17/2023    OMEPRAZOLE (PRILOSEC) 40 MG CAPSULE omeprazole (PRILOSEC) 40 MG capsule       Take 1 capsule (40 mg total) by mouth once daily. Before lunch    Take 1 capsule (40 mg total) by mouth once daily. Before lunch       Start Date: 5/17/2023 End Date: 5/16/2024    Start Date: 1/17/2023 End Date: 5/17/2023   Discontinued Medications    METHOCARBAMOL (ROBAXIN) 750 MG TAB    Take 750 mg by mouth.       Start Date: --        End Date: 5/17/2023         ICD-10-CM  ICD-9-CM   1. Seronegative rheumatoid arthritis  M06.00 714.0   2. Fibromyalgia syndrome  M79.7 729.1   3. Hypertension, unspecified type  I10 401.9   4. Hepatitis C virus carrier state  B18.2 V02.62   5. Laryngeal squamous cell carcinoma  C32.9 161.9   6. Papillary microcarcinoma of thyroid  C73 193           Plan:       1. Seronegative rheumatoid arthritis  Assessment & Plan:  Continue Hydroxychloroquine 200 mg BID  Continue Norco  Q 12 hrs PRN- Rx by MD    Plaquenil Eye Exam: UTD followed by Glenn    Orders:  -     omeprazole (PRILOSEC) 40 MG capsule; Take 1 capsule (40 mg total) by mouth once daily. Before lunch  Dispense: 30 capsule; Refill: 11  -     hydrOXYchloroQUINE (PLAQUENIL) 200 mg tablet; Take 1 tablet (200 mg total) by mouth 2 (two) times daily. After food  Dispense: 60 tablet; Refill: 5    2. Fibromyalgia syndrome  Assessment & Plan:  Increase Gabapentin from 300 to 400mg TID    Continue Flexeril 10 mg nightly    Orders:  -     cyclobenzaprine (FLEXERIL) 10 MG tablet; Take 1 tablet (10 mg total) by mouth every evening.  Dispense: 30 tablet; Refill: 5  -     gabapentin (NEURONTIN) 400 MG capsule; See Instructions, TAKE 1 CAPSULE BY MOUTH THREE TIMES DAILY, # 90 cap(s), 2 Refill(s), Pharmacy: [facility] #[id number], 174, cm, Height/Length Dosing, 10/24/21 10:52:00 CDT, 89, kg, Weight Dosing, 10/24/21 10:52:00 CDT  Dispense: 90 capsule; Refill: 5    3. Hypertension, unspecified type  Assessment & Plan:  BP today is 110/73    Continue RX medications as prescribed  Low Sodium Diet (Dash Diet - less than 2 grams of sodium per day).  Maintain healthy weight with goal BMI <30. Exercise 30 minutes per day 5 days per week.        4. Hepatitis C virus carrier state    5. Laryngeal squamous cell carcinoma    6. Papillary microcarcinoma of thyroid         Last set of labs reviewed with patient during visit  33 minutes of total time spent on the encounter, which includes face to face time and non-face  to face time preparing to see the patient (eg, review of tests), Obtaining and/or reviewing separately obtained history, Documenting clinical information in the electronic or other health record, Independently interpreting results (not separately reported) and communicating results to the patient/family/caregiver, or Care coordination (not separately reported).

## 2023-05-17 NOTE — ASSESSMENT & PLAN NOTE
Continue Hydroxychloroquine 200 mg BID  Continue Norco  Q 12 hrs PRN- Rx by MD    Plaquenil Eye Exam: UTD followed by Glenn

## 2023-05-17 NOTE — ASSESSMENT & PLAN NOTE
BP today is 110/73    Continue RX medications as prescribed  Low Sodium Diet (Dash Diet - less than 2 grams of sodium per day).  Maintain healthy weight with goal BMI <30. Exercise 30 minutes per day 5 days per week.

## 2023-05-17 NOTE — TELEPHONE ENCOUNTER
----- Message from Marcy Gates sent at 5/17/2023 10:47 AM CDT -----  Regarding: Medication clarification  [facility] called needing clarification concerning Gabapentin medication. [phone]

## 2023-05-22 ENCOUNTER — OFFICE VISIT (OUTPATIENT)
Dept: FAMILY MEDICINE | Facility: CLINIC | Age: 68
End: 2023-05-22
Payer: MEDICARE

## 2023-05-22 VITALS
WEIGHT: 196 LBS | BODY MASS INDEX: 30.76 KG/M2 | HEIGHT: 67 IN | TEMPERATURE: 99 F | SYSTOLIC BLOOD PRESSURE: 107 MMHG | HEART RATE: 82 BPM | RESPIRATION RATE: 18 BRPM | DIASTOLIC BLOOD PRESSURE: 72 MMHG

## 2023-05-22 DIAGNOSIS — Z23 IMMUNIZATION DUE: ICD-10-CM

## 2023-05-22 DIAGNOSIS — E11.40 TYPE 2 DIABETES MELLITUS WITH DIABETIC NEUROPATHY, WITHOUT LONG-TERM CURRENT USE OF INSULIN: ICD-10-CM

## 2023-05-22 DIAGNOSIS — F32.A DEPRESSION, UNSPECIFIED DEPRESSION TYPE: Primary | ICD-10-CM

## 2023-05-22 DIAGNOSIS — Z12.11 SCREEN FOR COLON CANCER: ICD-10-CM

## 2023-05-22 PROCEDURE — 3008F PR BODY MASS INDEX (BMI) DOCUMENTED: ICD-10-PCS | Mod: CPTII,,, | Performed by: FAMILY MEDICINE

## 2023-05-22 PROCEDURE — 4010F ACE/ARB THERAPY RXD/TAKEN: CPT | Mod: CPTII,,, | Performed by: FAMILY MEDICINE

## 2023-05-22 PROCEDURE — 3288F PR FALLS RISK ASSESSMENT DOCUMENTED: ICD-10-PCS | Mod: CPTII,,, | Performed by: FAMILY MEDICINE

## 2023-05-22 PROCEDURE — 3008F BODY MASS INDEX DOCD: CPT | Mod: CPTII,,, | Performed by: FAMILY MEDICINE

## 2023-05-22 PROCEDURE — 99213 PR OFFICE/OUTPT VISIT, EST, LEVL III, 20-29 MIN: ICD-10-PCS | Mod: S$PBB,,, | Performed by: FAMILY MEDICINE

## 2023-05-22 PROCEDURE — 1101F PR PT FALLS ASSESS DOC 0-1 FALLS W/OUT INJ PAST YR: ICD-10-PCS | Mod: CPTII,,, | Performed by: FAMILY MEDICINE

## 2023-05-22 PROCEDURE — 3288F FALL RISK ASSESSMENT DOCD: CPT | Mod: CPTII,,, | Performed by: FAMILY MEDICINE

## 2023-05-22 PROCEDURE — 99215 OFFICE O/P EST HI 40 MIN: CPT | Mod: PBBFAC | Performed by: FAMILY MEDICINE

## 2023-05-22 PROCEDURE — 3074F SYST BP LT 130 MM HG: CPT | Mod: CPTII,,, | Performed by: FAMILY MEDICINE

## 2023-05-22 PROCEDURE — 1101F PT FALLS ASSESS-DOCD LE1/YR: CPT | Mod: CPTII,,, | Performed by: FAMILY MEDICINE

## 2023-05-22 PROCEDURE — 3078F PR MOST RECENT DIASTOLIC BLOOD PRESSURE < 80 MM HG: ICD-10-PCS | Mod: CPTII,,, | Performed by: FAMILY MEDICINE

## 2023-05-22 PROCEDURE — 1160F PR REVIEW ALL MEDS BY PRESCRIBER/CLIN PHARMACIST DOCUMENTED: ICD-10-PCS | Mod: CPTII,,, | Performed by: FAMILY MEDICINE

## 2023-05-22 PROCEDURE — 1159F MED LIST DOCD IN RCRD: CPT | Mod: CPTII,,, | Performed by: FAMILY MEDICINE

## 2023-05-22 PROCEDURE — 90750 HZV VACC RECOMBINANT IM: CPT | Mod: PBBFAC

## 2023-05-22 PROCEDURE — 1160F RVW MEDS BY RX/DR IN RCRD: CPT | Mod: CPTII,,, | Performed by: FAMILY MEDICINE

## 2023-05-22 PROCEDURE — 3078F DIAST BP <80 MM HG: CPT | Mod: CPTII,,, | Performed by: FAMILY MEDICINE

## 2023-05-22 PROCEDURE — 90471 IMMUNIZATION ADMIN: CPT | Mod: PBBFAC

## 2023-05-22 PROCEDURE — 4010F PR ACE/ARB THEARPY RXD/TAKEN: ICD-10-PCS | Mod: CPTII,,, | Performed by: FAMILY MEDICINE

## 2023-05-22 PROCEDURE — 3074F PR MOST RECENT SYSTOLIC BLOOD PRESSURE < 130 MM HG: ICD-10-PCS | Mod: CPTII,,, | Performed by: FAMILY MEDICINE

## 2023-05-22 PROCEDURE — 99213 OFFICE O/P EST LOW 20 MIN: CPT | Mod: S$PBB,,, | Performed by: FAMILY MEDICINE

## 2023-05-22 PROCEDURE — 1159F PR MEDICATION LIST DOCUMENTED IN MEDICAL RECORD: ICD-10-PCS | Mod: CPTII,,, | Performed by: FAMILY MEDICINE

## 2023-05-22 RX ORDER — ESCITALOPRAM OXALATE 20 MG/1
TABLET ORAL
Qty: 30 TABLET | Refills: 2 | Status: SHIPPED | OUTPATIENT
Start: 2023-05-22 | End: 2023-08-22 | Stop reason: SDUPTHER

## 2023-05-22 RX ADMIN — ZOSTER VACCINE RECOMBINANT, ADJUVANTED 0.5 ML: KIT at 03:05

## 2023-05-22 NOTE — PROGRESS NOTES
"Patient Name: Laura Freeman   : 1955  MRN: [id number]     SUBJECTIVE:  Laura Freeman is a 68 y.o. female here for Follow-up (Patient states that her blood pressure and blood sugar has been ok. No complaints.)  .    HPI  PMHx of HTN, diabetes, CVA, depression, carrier Hep C, bladder prolapse, laryngeal and thyroid carcinoma, s/p partial thyroidectomy, s/p total laryngectomy, postoperative hypothyroidism, RA, fibromyalgia here for routine follow up.  Feels well. Follows with her specialists. Compliant with meds.  HTN well controlled. At home good readings. Compliant. Taking amlodipine and lisinopril 40 mg. Coreg 3.125 mg bid, imdur as well. Seeing cardiology.    Regarding diabetes: On metformin 500 mg BID.   Usually good numbers at home.  Hemoglobin A1c   Date Value Ref Range Status   2023 5.7 <=7.0 % Final   2021 5.7 <<=7.0 % Final   06/15/2020 6.1 4.2 - 6.3 % Final   Follows with eye doctor  Has neuropathy. On gabapentin 400 mg  from rheumatology.     Used to follow with psychiatry for depression, insomnia. Used to follow with [facility]. Last visit increased lexapro to 20 mg qd.  Feeling much better. Not suicidal.    Chart reviewing, pt follows with ENT and endocrinology for the laryngeal and thyroid carcinoma. "Treated with total laryngectomy, bilateral selective neck dissections (levels 2 through 4), right hemithyroidectomy (with incidental finding papillary thyroid microcarcinoma), primary TEP puncture with cricopharyngeal myotomy on 2019.  Treated with postoperative radiation therapy." yearly CT neck, CXR, TSH/T4 for November. She continues on levothyroxine 100mcg daily  Regarding bladder prolapse, she has a pessary, freqently cleaned and changed by gynecology.  Follows with rheumatology for the RA, fibromyalgia.    Used to follow with neurology post CVA in . Has seizure hx even before the stroke. About a year ago was the last episode. Reports grand mal seizures with " "postictal confusion.  Last mammogram 2022 normal.  Takes vit D OTC. Normal DEXA scan      ALLERGIES: Review of patient's allergies indicates:  No Known Allergies      ROS:  Review of Systems   Constitutional:  Negative for chills, fever and weight loss.   HENT:  Negative for congestion.    Eyes:  Negative for blurred vision and pain.   Respiratory:  Negative for cough and shortness of breath.    Cardiovascular:  Negative for chest pain, palpitations and leg swelling.   Gastrointestinal:  Negative for abdominal pain, blood in stool, constipation, diarrhea, nausea and vomiting.   Genitourinary:  Positive for urgency. Negative for dysuria, flank pain and hematuria.   Musculoskeletal:  Negative for falls and myalgias.   Skin:  Negative for rash.   Neurological:  Negative for dizziness and headaches.   Psychiatric/Behavioral:  Positive for depression. Negative for suicidal ideas. The patient is not nervous/anxious.        OBJECTIVE:  Vital signs  Vitals:    05/22/23 1427   BP: 107/72   Pulse: 82   Resp: 18   Temp: 98.5 °F (36.9 °C)   TempSrc: Oral   Weight: 88.9 kg (196 lb)   Height: 5' 7" (1.702 m)        Body mass index is 30.7 kg/m².    PHYSICAL EXAM:   Physical Exam  Vitals reviewed.   Constitutional:       General: She is not in acute distress.     Appearance: Normal appearance. She is obese. She is not ill-appearing.   HENT:      Head: Normocephalic and atraumatic.      Right Ear: External ear normal.      Left Ear: External ear normal.      Nose: Nose normal. No rhinorrhea.      Mouth/Throat:      Mouth: Mucous membranes are moist.   Eyes:      General: No scleral icterus.        Right eye: No discharge.         Left eye: No discharge.      Conjunctiva/sclera: Conjunctivae normal.      Pupils: Pupils are equal, round, and reactive to light.   Neck:      Comments: TEP in place  Cardiovascular:      Rate and Rhythm: Normal rate and regular rhythm.      Heart sounds: No murmur heard.  Pulmonary:      Effort: " Pulmonary effort is normal. No respiratory distress.      Breath sounds: No wheezing, rhonchi or rales.   Abdominal:      General: Bowel sounds are normal. There is no distension.      Palpations: Abdomen is soft.      Tenderness: There is no abdominal tenderness.   Musculoskeletal:         General: No swelling. Normal range of motion.      Cervical back: Normal range of motion and neck supple. No rigidity or tenderness.      Right lower leg: No edema.      Left lower leg: No edema.   Skin:     General: Skin is warm.      Coloration: Skin is not pale.      Findings: No rash.   Neurological:      General: No focal deficit present.      Mental Status: She is alert and oriented to person, place, and time. Mental status is at baseline.   Psychiatric:         Mood and Affect: Mood normal. Mood is not depressed.         Behavior: Behavior normal.        ASSESSMENT/PLAN:  1. Depression, unspecified depression type  -     EScitalopram oxalate (LEXAPRO) 20 MG tablet; TAKE 1 TABLET(20 MG) BY MOUTH DAILY  Dispense: 30 tablet; Refill: 2    2. Type 2 diabetes mellitus with diabetic neuropathy, without long-term current use of insulin  -     Microalbumin/Creatinine Ratio, Urine; Future; Expected date: 08/22/2023  -     Hemoglobin A1C; Future; Expected date: 08/22/2023    3. Immunization due  -     varicella-zoster [id number] (PF)(SHINGRIX) 50 mcg/0.5 mL injection    4. Screen for colon cancer  -     Cologuard Screening (Multitarget Stool DNA); Future; Expected date: 05/22/2023      PLAN  - well controlled depression. Continue with lexapro to 20 mg.   - well controlled diabetes. Recheck A1C before next visit.  Continue with metformin 500 mg b.i.d.  -2nd shot of Shingrix given.  - continue to follow with specialist endocrinology, ENT, rheumatology, urology      Previous medical history/lab work/radiology reviewed and considered during medical management decisions.   Medication list reviewed and medication reconciliation  performed.  Patient was provided  and care about his/her current diagnosis (es) and medications including risk/benefit and side effects/adverse events, over the counter medication uses/doses, home self-care and contact precautions,  and red flags and indications for when to seek immediate medical attention.   Patient was advised to continue compliance with current medication list and medical recommendations.  Recommended/ Advised continued compliance with recommended eating habits/ diets for medical conditions and exercise 150 minutes/ week (if possible) for medical condition (s).        RESULTS:  Recent Results (from the past 1008 hour(s))   Chlamydia/GC, PCR    Collection Time: 04/12/23  4:08 PM    Specimen: Urine   Result Value Ref Range    Chlamydia trachomatis PCR Not Detected Not Detected    N. gonorrhea PCR Not Detected Not Detected   Urinalysis, Reflex to Urine Culture    Collection Time: 04/12/23  4:10 PM    Specimen: Urine   Result Value Ref Range    Color, UA Yellow Yellow, Light-Yellow, Dark Yellow, Demi, Straw    Appearance, UA Clear Clear    Specific Gravity, UA 1.024     pH, UA 5.5 5.0 - 8.5    Protein, UA Trace (A) Negative mg/dL    Glucose, UA Normal Negative, Normal mg/dL    Ketones, UA Negative Negative mg/dL    Blood, UA Negative Negative unit/L    Bilirubin, UA Negative Negative mg/dL    Urobilinogen, UA 1+ (A) 0.2, 1.0, Normal mg/dL    Nitrites, UA Negative Negative    Leukocyte Esterase,  (A) Negative unit/L    WBC, UA 6-10 (A) None Seen, 0-2, 3-5, 0-5 /HPF    Bacteria, UA Few (A) None Seen /HPF    Budding Yeast, UA Few /HPF    Squamous Epithelial Cells, UA Occ (A) None Seen /HPF    Mucous, UA Few (A) None Seen /LPF    Hyaline Casts, UA None Seen None Seen /lpf    RBC, UA 0-5 None Seen, 0-2, 3-5, 0-5 /HPF   Wet Prep, Genital    Collection Time: 04/12/23  4:11 PM    Specimen: Cervicovaginal; Genital   Result Value Ref Range    WBC, Wet Prep Moderate (A) None Seen    Clue Cells,  Wet Prep None Seen None Seen    Trichomonas, Wet Prep None Seen None Seen    Yeast, Wet Prep Few (A) None Seen         Follow Up:  Follow up in about 3 months (around 8/22/2023) for diabetes, htn, anxiety.     [unfilled]    This note was created with the assistance of a voice recognition software or phone dictation. There may be transcription errors as a result of using this technology however minimal. Effort has been made to assure accuracy of transcription but any obvious errors or omissions should be clarified with the author of the document

## 2023-05-30 ENCOUNTER — OFFICE VISIT (OUTPATIENT)
Dept: OTOLARYNGOLOGY | Facility: CLINIC | Age: 68
End: 2023-05-30
Payer: MEDICARE

## 2023-05-30 VITALS
TEMPERATURE: 99 F | DIASTOLIC BLOOD PRESSURE: 62 MMHG | WEIGHT: 196.63 LBS | SYSTOLIC BLOOD PRESSURE: 97 MMHG | BODY MASS INDEX: 30.79 KG/M2 | HEART RATE: 64 BPM

## 2023-05-30 DIAGNOSIS — Z90.02 S/P LARYNGECTOMY: ICD-10-CM

## 2023-05-30 DIAGNOSIS — Z92.3 HISTORY OF RADIATION TO HEAD AND NECK REGION: ICD-10-CM

## 2023-05-30 DIAGNOSIS — Z85.21 HISTORY OF LARYNGEAL CANCER: Primary | ICD-10-CM

## 2023-05-30 PROCEDURE — 31575 DIAGNOSTIC LARYNGOSCOPY: CPT | Mod: PBBFAC | Performed by: STUDENT IN AN ORGANIZED HEALTH CARE EDUCATION/TRAINING PROGRAM

## 2023-05-30 PROCEDURE — 99213 OFFICE O/P EST LOW 20 MIN: CPT | Mod: PBBFAC | Performed by: STUDENT IN AN ORGANIZED HEALTH CARE EDUCATION/TRAINING PROGRAM

## 2023-05-30 NOTE — PROGRESS NOTES
Patient Name: Laura Freeman   YOB: 1955     Chief Complaint   Patient presents with    Follow-up        History of Present Illness:  This is a 64-year-old female presenting from Dr. Dennis for evaluation of laryngeal mass. Patient notes she has had hoarseness starting shortly before Ever, dysphagia since evaluated with an EGD and MBSS (showing aspirations), 1 month of sore throat that has been treated with antibiotics. She endorses some weight loss however, denies any dyspnea. She has not noted any lymphadenopathy, otalgia, or odynophagia.    7/24/19: Here for first follow up after admission for awake trach, panendoscopy. 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  Recommendation:  - TB cites likely inferior outcomes (functional and oncologic) with CRT vs TL, thus TL is best option  - TB recommends continued education to pt and family regarding life after laryngectomy, as well as likely poor functional outcome with CRT (aspiration, trach and peg dependence, npo status)  - However if pt is completely against surgery, should offer CRT for treatment    Has been doing ok at home. Had one day she went to ER with plugged inner cannula.    7/31/19: Here after meeting with ST and a laryngectomy patient, would like to proceed with surgery. No issues at home since last visit. No changes in medical history.    8/12/19: Underwent TL, BND, CP myotomy, R thyroid lobectomy and TEP.    8/27/19: Here for first follow up since discharge. Still taking liquids PO, no issues at home. Has ST appointment scheduled for Sept 3rd. Has right UE weakness and pain but mobility is ok. Doing well with stoma care.  [facility] TB Recs:  Recommendations: NCCN guidelines state consider adjuvant XRT  - Will plan to discuss risks/benefits of radiation with patient in ENT follow  - Will do same in Radiation Oncology clinic    9/4/19: Here for follow up and also for follow-up after presenting to ED last night with concerns for nodule  below stoma. Has 1 pill of her antibiotic given at last visit left. She reports that the swelling appeared suddenly yesterday. Is very tender on palpation. causes pain extending toward her right shoulder [1]    9/11/19: hospitalization on 9/4 s/p I&D of right clavicular head and teeth extraction. Starting radiation on Thursday. Otherwise no issues. [1]    10/16/2019: Recent hospitalization for TIA sx w/o any sequela. MRI, echo pending. Discharged on levo and doxy for right clavicular head drainage. patient hasn't taken any. Notes still have lots of blood secretions but not any now. Eating per mouth also supplementing thru G-tube [1]    11/19/19: Pt here for follow up. She has completed CRT 2 weeks ago. Eating and drinking well. Maintaining weight. Feels her right sternoclavicular joint is unchanged. Finished antibiotics over 2 weeks ago. Feels it has not changed since finishing antibiotics, no drainage. Per patient finished all chemo and radiation treatments.    12/24/19: No c/o today. Sternoclavicular joint has resolved. Tolerating PO intake and maintaining weight. She would like to have PEG removed, states she has not used it in over 2 months. [1]    1/28/20: Here for follow up. PET ordered today by Dr. Kim. No dyspnea, dysphagia. noting shoulder stiffness/pain. Doing home PT exercises. Notes some numbness underneath chin. Got PEG removed by GS today. [1]    3/5/20: Here for cancer surveaillcen. Post-treatment PET done 2/2020 with some hypermetabolic activity in nasopharynx, no eviednce of persistence/recurrence in neck. No distant mets. Denies dysphagia. Weight stable. Denies dyspnea. Voicing well with TEP.    5/26/20: Here for cancer surveillance. No weight loss, dysphagia, odynophagia. Reports that the lymphedema machine was denied per a letter she received. She is reporting severe pain to bilateral cheeks and upper neck? Voicing with TEP, no issues.    7/28/20: Denies dysphagia/odyophagia/otalgia. Reports  "weight loss ~ 10lbs since last visit bc she does not have much of an appetite, but has begun drinking ensure to keep calories up. Frustrated that her ear lobes have paresthesias and she ripped her earrings out. Requesting additional norco. C/o intermittent tight pulling sensation in bilateral neck muscles that makes her feel as though she is choking. Reports taking a few days of leftover abx rx 2 weeks ago bc her secretions had turned green and she was afraid to go to the ED. Denies having fever, but reports she got a little SOB. Secretions are now clear.     9/29/2020: Presenting in follow-up for cancer surveillance. She denies any dysphasia, odynophagia, otalgia or unexpected weight loss at this time. She notes she has a knot on her left neck close to her stoma that is nontender. She noticed this about month and a half ago. 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  A CT scan of the neck showed the presence of a 1.9 cm left thyroid nodule with an interval increase in size. No cervical adenopathy was noted. Patient is to undergo needle biopsy of this lesion today.  Thyroid function studies done on September 29, 2020, showed her to be hypothyroid with a TSH level of 15.190 and a normal free T4 0.76. Previously the patient had been given a prescription for levothyroxine 75 mcg daily but this had never been filled and the patient had not been taking that medication. Patient does admit to some fatigue but she does not feel "bad". She does not have any other new complaints today.    11/11/20: Doing well. S/p FNA of left thyroid nodule 10/29/20. Path came back that sample was insufficient for diagnostic evaluation. Reports compliance with " levothyroxine.    1/7/21: Doing well. No complaints at this time. No weight changes, dysphagia, odynophagia, otalgia, new neck masses.    3/11/21: Here today for possible FNA of nodule, however when using ultrasound no concerning nodules seen to biopsy. Patient had adjustment of Synthroid at last visit, TSH elevated in January. Due for surveillance appointment.    5/6/21: Here today for cancer surveillance. Reports significant tenderness to right neck starting 1-2 weeks ago but no associated mass or swelling. She has tried her lortab but this has not helped. No issues with PO intake. Weight is stable. No dysphagia, odynophagia, otalgia.    6-16-21: Follow-up for cancer surveillance, no current complaints including no palpable neck mass or tenderness on either side, no blood from the trachea or blood from the mouth. Her weight is remaining stable and she is tolerating p.o. well. She overall functioning well and appears happy.    8/18/21: here today for surveillance. No complaints overall; a little bit of generic fullness over the left angle of mandible but nothing discrete. Tolerating PO well. No odynophagia, dysphagia, weight changes, otalgia, difficulty speaking via alaryngeal voice.    10/19/2021: Pt is here for surveillance with hx of right thyroid lobectomy and FNA of 2.0 cm left thyroid nodule. Path came back that sample was insufficient for diagnostic evaluation 10/2020. She states that she has been doing well. Pt says that her only complaint is inability to taste food every once and a while. No current complaints    1/26/2022: Follow-up for cancer surveillance, currently tolerating diet well, without neck swelling, pain, hemoptysis from the trach or new areas of swelling or tenderness in her neck or in her throat. Denies otalgia. CT of chest and neck unremarkable for recurrence or metastatic disease. Makes note of diminished thyroid nodule. Patient followed by Dr. Golden in endocrine for monitoring thyroid  function and markers.     4/26/2022: Patient presenting in follow-up. She is doing well with her diet. She continues to note some tightness around her neck for which she is doing stretches and she does find these helpful. She denies any odynophagia, dysphagia, otalgia. She notes her weight does tend to waver and endocrinology has been fixing her thyroid medication dosage recently. Most recently she did have some lab work done to measure her thyroid function.    June 28, 2022: 67-year-old female presents today for follow-up of her supraglottic laryngeal squamous cell carcinoma and her thyroid papillary microcarcinoma.  Patient is doing well at this time.  She has no complaints of any difficulty swallowing, neck pain, ear pain, or hemoptysis.  The area of swelling on the left side of her neck has not changed.  Endocrinology is following her for management of her postoperative hypothyroidism and thyroid carcinoma.  She has no other new complaints today. Of note she is scheduled to have a follow-up thyroid ultrasound and ultrasound of the neck in October 2022.    9/28/22: Here for follow up. Patient reports no issues. Denies ear pain. Stable neck stiffness. No new lumps/bumps, swelling on neck is stable. Swallowing fine, no weight loss. Recent thyroid US unremarkable.  No complaints.     1/30/23: Doing well. Denies dysphagia, dysphonia, SOB. States she gained weight over the holidays. She began having some pain in left neck a few days ago and has been stretching and using a muscle rub. This has helped some.     5/30/23: Here today for cancer surveillance. Reports she is doing well overall. Denies any dysphagia, odynophagia, new palpable lumps/bumps, no SOB. Wearing HME over akhil tube, no issues with TEP leaking.     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    T3N0M0 supraglottic laryngeal squamous cell carcinoma 07/08/2019    T3 N0 M0 squamous cell carcinoma of the supraglottic larynx diagnosed initially on July 8, 2019. Treated with total laryngectomy, bilateral selective neck dissections (levels 2 through 4), right hemithyroidectomy (with incidental finding papillary thyroid microcarcinoma), primary TEP puncture with cricopharyngeal myotomy on August 12, 2019.  Treated with postoperative radiation therapy.    Type 2 diabetes mellitus with unspecified diabetic retinopathy without macular edema      Past Surgical History:   Procedure Laterality Date    ceiol - cataract extraction and insertion of introcular lens      excision of papilloma      I&D of skin and subcutaneous tissue  05/31/2015    MULTIPLE TOOTH EXTRACTIONS Bilateral 09/06/2019    Full mouth extraction in preparation for radiation therapy.    PANENDOSCOPY N/A 07/08/2019    Initial diagnosis laryngeal squamous cell carcinoma.  Done in conjunction with an awake tracheostomy.    PERCUTANEOUS INSERTION OF GASTROSTOMY TUBE N/A 07/12/2019    SELECTIVE NECK DISSECTION Bilateral 08/12/2019    Done in conjunction with total laryngectomy for laryngeal squamous cell carcinoma.    THYROID LOBECTOMY Right 08/12/2019    Done in conjunction with total laryngectomy with incidental finding thyroid papillary microcarcinoma.    TOTAL LARYNGECTOMY N/A 08/12/2019    Total laryngectomy with bilateral selective neck dissections (levels 2 through 4) right hemithyroidectomy and primary tracheoesophageal puncture cricopharyngeal myotomy for treatment laryngeal squamous cell carcinoma.    TRACHEOSTOMY  07/08/2019    Done in conjunction with panendoscopy at initial diagnosis of laryngeal squamous cell carcinoma.    TRACHEOSTOMY TUBE CHANGE  07/14/2019    Done in conjunction with control of post-operative tracheostomy wound hemorrhage.       Review of Systems:  Unremarkable  except as mentioned above.    Current Medications:  Current Outpatient Medications   Medication Sig    amLODIPine (NORVASC) 10 MG tablet Take 1 tablet (10 mg total) by mouth Daily.    aspirin 81 MG Chew Take 81 mg by mouth once daily.    blood sugar diagnostic Strp Use qd    blood sugar diagnostic Strp To check BG 1 times daily, to use with insurance preferred meter    blood-glucose meter kit To check BG 1 times daily, to use with insurance preferred meter    carvediloL (COREG) 3.125 MG tablet TAKE 1 TABLET BY MOUTH TWICE DAILY    cyclobenzaprine (FLEXERIL) 10 MG tablet Take 1 tablet (10 mg total) by mouth every evening.    EScitalopram oxalate (LEXAPRO) 10 MG tablet Take 10 mg by mouth Daily.    famotidine (PEPCID) 40 MG tablet Take 40 mg by mouth 2 (two) times daily.    FLUoxetine 40 MG capsule Take 40 mg by mouth Daily.    HYDROcodone-acetaminophen (NORCO)  mg per tablet Take 1 tablet by mouth every 12 (twelve) hours as needed for Pain.    hydrOXYchloroQUINE (PLAQUENIL) 200 mg tablet Take 1 tablet (200 mg total) by mouth 2 (two) times daily. After food    isosorbide mononitrate (IMDUR) 30 MG 24 hr tablet Take 30 mg by mouth every morning.    lancets Misc Use qd    lancets Misc To check BG 1 times daily, to use with insurance preferred meter    lancets Misc Use to test 1 qd    levothyroxine (SYNTHROID) 100 MCG tablet TAKE 1 TABLET BY MOUTH DAILY    lisinopriL (PRINIVIL,ZESTRIL) 40 MG tablet TAKE 1 TABLET BY MOUTH DAILY    meclizine (ANTIVERT) 25 mg tablet Take 25 mg by mouth 3 (three) times daily.    metFORMIN (GLUCOPHAGE) 500 MG tablet Take 1 tablet (500 mg total) by mouth 2 (two) times daily.    mirabegron (MYRBETRIQ) 50 mg Tb24 Take 1 tablet (50 mg total) by mouth once daily at 6am.    nitroGLYCERIN (NITROSTAT) 0.4 MG SL tablet Place 0.4 mg under the tongue as needed.    omeprazole (PRILOSEC) 40 MG capsule Take 1 capsule (40 mg total) by mouth once daily. Before lunch    ondansetron (ZOFRAN-ODT) 4 MG TbDL  Take 4 mg by mouth 3 (three) times daily.    OXcarbazepine (TRILEPTAL) 300 MG Tab TAKE 1 TABLET BY MOUTH TWICE DAILY    OXcarbazepine (TRILEPTAL) 300 MG Tab TAKE 1 TABLET BY MOUTH TWICE DAILY    furosemide (LASIX) 40 MG tablet Take 40 mg by mouth daily as needed.    gabapentin (NEURONTIN) 600 MG tablet Take 1 tablet (600 mg total) by mouth 3 (three) times daily.    oxybutynin (DITROPAN-XL) 5 MG TR24 Take 1 tablet (5 mg total) by mouth once daily. (Patient not taking: Reported on 9/28/2022)     Current Facility-Administered Medications   Medication    LIDOcaine (PF) 40 mg/mL (4 %) injection 1 mL    phenylephrine HCL 1% nasal spray 1 spray        Allergies:  Review of patient's allergies indicates:  No Known Allergies     Physical Exam:  BP 97/62   Pulse 64   Temp 99 °F (37.2 °C)   Wt 89.2 kg (196 lb 9.6 oz)   LMP  (LMP Unknown)   BMI 30.79 kg/m²     General:  Well-developed well-nourished female in no acute distress.  Patient voices well with her TEP.  Head and face:  Normocephalic.  No facial lesions  Ears:  Right ear-auricle is normally developed.  External auditory canal is clear.  Tympanic membrane is nonerythematous.  No middle ear effusion.  Left ear-auricle is normally developed.  External auditory canal is clear.  Tympanic membrane is nonerythematous.  No middle ear effusion.  Nose:  Nasal dorsum is unremarkable.  No significant septal deviation.  No significant intranasal congestion.  Secretions are clear.  Oral cavity and oropharynx:  Edentulous, Tongue and floor mouth are without lesions.  Mucosa is moist.  No pharyngeal erythema or exudates.  No oropharyngeal masses.  No tonsillar hypertrophy.  Neck:  Supple without of palpable adenopathy.  No masses in the thyroid bed or in the peristomal region.  Parotid and submandibular glands are normal to palpation.  Stoma is without granulation.  TEP is in place.  Eyes:  Extraocular muscles intact.  No nystagmus.  No exophthalmos or enophthalmos.  Neurologic:   Alert and oriented.  Cranial nerves 2-12 are grossly normal.    Procedure note: Flexible laryngoscopy  After informed consent was obtained and the nose was prepped with 1% phenyleprine nasal spray and tetracaine topically. The flexible fiberoptic laryngoscope was introduced into the right nostril and advanced. Examination of the nose showed the above mentioned findings. Examination of the nasopharynx showed no nasopharyngeal masses or eustachian tube obstruction. Examination of the base of tongue showed no masses. Neopharynx is without lesions or obstruction.  Examination of the trachea through the trachea stoma showed TEP in place without leakage, no masses or lesions down to the level of the adrianna.    Imaging:     CT neck w/ contrast 10/17/22:     COMPARISON:  CT neck dated 11/03/2021     FINDINGS:  There are postoperative changes of the neck with changes of prior total laryngectomy.  There is no definite new or progressive focal enhancement.  There is no cervical lymphadenopathy.     The parotid glands, submandibular glands and thyroid gland are unremarkable.  The major vessels in the neck are patent with atherosclerotic changes of the carotid arteries.  There is no acute abnormality of the orbits or visualized brain parenchyma.  There is no destructive bone lesion.  The lung apices are clear.     Impression:     Stable exam without significant interval change compared to 11/03/2021.    EXAMINATION:  XR CHEST PA AND LATERAL 1/30/23:     CLINICAL HISTORY:  Malignant neoplasm of larynx, unspecified     TECHNIQUE:  Two views of the chest     COMPARISON:  09/20/2021     FINDINGS:  No focal opacification, pleural effusion, or pneumothorax.     Linear subsegmental atelectatic changes of the bilateral bases.     The cardiomediastinal silhouette is within normal limits.     No acute osseous abnormality.     Impression:     No acute cardiopulmonary process.            Assessment/Plan:   67-year-old female with T3 N0 M0  squamous cell carcinoma of the supraglottic larynx status post total laryngectomy with bilateral selective neck dissections (levels 2 through 4), primary TEP puncture with cricopharyngeal myotomy, and right hemithyroidectomy with an incidental finding of a papillary microcarcinoma (T1a N0 M0) on August 12, 2019, with postoperative radiation therapy completed on November 5, 2019. CT neck 10/2022 [facility]. CXR without concerning findings. TFT WNL.    - She knows to call with any changes/issues for sooner appointment.   - Continue follow-up with Endocrinology  - RTC 5mo for surveillance        Stephenie Zhu MD  Otolaryngology PGYV

## 2023-05-30 NOTE — NURSING
The scope used for the exam was:  Flexible scope ENF-P4  Serial Number:  1)    [id number]    []   2)    [id number]    []   3)    [id number]    [x]   4)    [id number]    []   5)    [id number]    []   6)    [id number]    []       The scope used for the exam was:  Rigid scope   Serial Number:  1)   [id number]    []   2)   [id number]    []   3)   [id number]    []   4)    3384   []   5)    0824   []   6)    5554   []     7)   7425    []   8)   2240    []   9)   1109    []

## 2023-06-22 LAB — NONINV COLON CA DNA+OCC BLD SCRN STL QL: POSITIVE

## 2023-06-28 DIAGNOSIS — M06.00 SERONEGATIVE RHEUMATOID ARTHRITIS: ICD-10-CM

## 2023-06-28 RX ORDER — HYDROCODONE BITARTRATE AND ACETAMINOPHEN 10; 325 MG/1; MG/1
1 TABLET ORAL EVERY 12 HOURS PRN
Qty: 60 TABLET | Refills: 0 | Status: SHIPPED | OUTPATIENT
Start: 2023-06-28 | End: 2023-08-03 | Stop reason: SDUPTHER

## 2023-06-28 NOTE — TELEPHONE ENCOUNTER
----- Message from Marcy Gates sent at 6/28/2023 10:37 AM CDT -----  Regarding: Medication refill  Patient came in requesting a refill for [facility] on [address]

## 2023-06-30 ENCOUNTER — TELEPHONE (OUTPATIENT)
Dept: FAMILY MEDICINE | Facility: CLINIC | Age: 68
End: 2023-06-30
Payer: MEDICARE

## 2023-06-30 DIAGNOSIS — R19.5 POSITIVE COLORECTAL CANCER SCREENING USING COLOGUARD TEST: Primary | ICD-10-CM

## 2023-06-30 NOTE — TELEPHONE ENCOUNTER
Called patient via phone call and patient understands results given. No further questions at this time.   ----- Message from Bouchra Treadwell MD sent at 6/30/2023  2:13 PM CDT -----  Please let the patient know that the Cologuard test was positive, so she needs to do a colonoscopy for better visualization of the colon.  Orders placed.  Thanks

## 2023-06-30 NOTE — PROGRESS NOTES
Please let the patient know that the Cologuard test was positive, so she needs to do a colonoscopy for better visualization of the colon.  Orders placed.  Thanks

## 2023-07-13 RX ORDER — CARVEDILOL 3.12 MG/1
3.12 TABLET ORAL 2 TIMES DAILY
Qty: 60 TABLET | Refills: 0 | Status: SHIPPED | OUTPATIENT
Start: 2023-07-13 | End: 2023-07-13

## 2023-07-13 RX ORDER — CARVEDILOL 3.12 MG/1
TABLET ORAL
Qty: 180 TABLET | Refills: 0 | Status: SHIPPED | OUTPATIENT
Start: 2023-07-13 | End: 2023-12-06

## 2023-07-13 NOTE — TELEPHONE ENCOUNTER
----- Message from Kiera Rojas sent at 7/12/2023  2:12 PM CDT -----  Regarding: refill  Provider: Dr Jahaira Treadwell  [facility]: [address]  Last Visit:  Next Visit: 8/22/2023  Patient's Contact Number:    1. Name of Medication: Carvedilol  Dosage: 3.125 mg tab  Comments:    2. Name of Medication:  Dosage:  Comments:    3. Name of Medication:  Dosage:  Comments    4. Name of Medication:  Dosage:  Comments:    5. Name of Medication:  Dosage:  Comments:

## 2023-07-14 ENCOUNTER — OFFICE VISIT (OUTPATIENT)
Dept: GYNECOLOGY | Facility: CLINIC | Age: 68
End: 2023-07-14
Payer: MEDICARE

## 2023-07-14 VITALS
HEIGHT: 67 IN | SYSTOLIC BLOOD PRESSURE: 132 MMHG | BODY MASS INDEX: 30.79 KG/M2 | WEIGHT: 196.19 LBS | TEMPERATURE: 99 F | DIASTOLIC BLOOD PRESSURE: 83 MMHG | OXYGEN SATURATION: 96 % | RESPIRATION RATE: 18 BRPM | HEART RATE: 69 BPM

## 2023-07-14 DIAGNOSIS — N81.10 BLADDER PROLAPSE, FEMALE, ACQUIRED: Primary | ICD-10-CM

## 2023-07-14 PROCEDURE — 99213 OFFICE O/P EST LOW 20 MIN: CPT | Mod: PBBFAC

## 2023-07-14 RX ORDER — ESTRADIOL 0.1 MG/G
0.5 CREAM VAGINAL
COMMUNITY
Start: 2023-05-26

## 2023-07-14 NOTE — PROGRESS NOTES
[facility] GYNECOLOGY CLINIC NOTE     Laura Freeman is a 68 y.o.  presenting to GYN clinic for pessary cleaning.    Last seen 2023, size #5 RWS was cleaned and replaced. She was seen in the interval by Dr. Newsome (), urogyn for her mixed incontinence. He noted irritation along he sidewalls and upper vagina and started her on vaginal estrogen. Regarding mixed incontinence, was evaluated with behavioral recommendations, and RWS was replaced with a size 5 ring with knob.    Today reports no bleeding, no abnormal discharge. Has been applying vaginal estrogen cream twice weekly at night. Feels this pessary is working well regarding prolapse but has not affected incontinence symptoms.    Gynecology  OB History          7    Para   5    Term                AB        Living             SAB        IAB        Ectopic        Multiple        Live Births                    Past Medical History:   Diagnosis Date    Hypertension     Papillary microcarcinoma of thyroid (T1a N0 M0) 2019    Diagnosed as an incidental finding at the time of a right hemithyroidectomy done in conjunction with total laryngectomy on 2019.    T3N0M0 supraglottic laryngeal squamous cell carcinoma 2019    T3 N0 M0 squamous cell carcinoma of the supraglottic larynx diagnosed initially on 2019. Treated with total laryngectomy, bilateral selective neck dissections (levels 2 through 4), right hemithyroidectomy (with incidental finding papillary thyroid microcarcinoma), primary TEP puncture with cricopharyngeal myotomy on 2019.  Treated with postoperative radiation therapy.    Type 2 diabetes mellitus with unspecified diabetic retinopathy without macular edema       Past Surgical History:   Procedure Laterality Date    ceiol - cataract extraction and insertion of introcular lens      excision of papilloma      I&D of skin and subcutaneous tissue  2015    MULTIPLE TOOTH EXTRACTIONS Bilateral  09/06/2019    Full mouth extraction in preparation for radiation therapy.    PANENDOSCOPY N/A 07/08/2019    Initial diagnosis laryngeal squamous cell carcinoma.  Done in conjunction with an awake tracheostomy.    PERCUTANEOUS INSERTION OF GASTROSTOMY TUBE N/A 07/12/2019    SELECTIVE NECK DISSECTION Bilateral 08/12/2019    Done in conjunction with total laryngectomy for laryngeal squamous cell carcinoma.    THYROID LOBECTOMY Right 08/12/2019    Done in conjunction with total laryngectomy with incidental finding thyroid papillary microcarcinoma.    TOTAL LARYNGECTOMY N/A 08/12/2019    Total laryngectomy with bilateral selective neck dissections (levels 2 through 4) right hemithyroidectomy and primary tracheoesophageal puncture cricopharyngeal myotomy for treatment laryngeal squamous cell carcinoma.    TRACHEOSTOMY  07/08/2019    Done in conjunction with panendoscopy at initial diagnosis of laryngeal squamous cell carcinoma.    TRACHEOSTOMY TUBE CHANGE  07/14/2019    Done in conjunction with control of post-operative tracheostomy wound hemorrhage.      Current Outpatient Medications   Medication Instructions    amLODIPine (NORVASC) 10 mg, Oral, Daily    aspirin 81 mg, Oral, Daily    atorvastatin (LIPITOR) 40 mg, Oral, Daily    blood sugar diagnostic Strp To check BG 1 times daily, to use with insurance preferred meter    blood-glucose meter kit To check BG 1 times daily, to use with insurance preferred meter    carvediloL (COREG) 3.125 MG tablet TAKE 1 TABLET(3.125 MG) BY MOUTH TWICE DAILY    cyclobenzaprine (FLEXERIL) 10 mg, Oral, Nightly    diclofenac sodium (VOLTAREN) 2 g, Topical (Top), 4 times daily, PRN for pain    EScitalopram oxalate (LEXAPRO) 20 MG tablet TAKE 1 TABLET(20 MG) BY MOUTH DAILY    estradioL (ESTRACE) 0.5 g, Vaginal, Twice weekly    famotidine (PEPCID) 40 mg, Oral, 2 times daily    gabapentin (NEURONTIN) 400 MG capsule See Instructions, TAKE 1 CAPSULE BY MOUTH THREE TIMES DAILY, # 90 cap(s), 2  "Refill(s), Pharmacy: [id number], 174, cm, Height/Length Dosing, 10/24/21 10:52:00 CDT, 89, kg, Weight Dosing, 10/24/21 10:52:00 CDT    HYDROcodone-acetaminophen (NORCO)  mg per tablet 1 tablet, Oral, Every 12 hours PRN    hydrOXYchloroQUINE (PLAQUENIL) 200 mg, Oral, 2 times daily, After food    isosorbide mononitrate (IMDUR) 30 mg, Oral, Every morning    isosorbide mononitrate (IMDUR) 30 mg, Oral, Every morning    lancets Misc Use qd    levothyroxine (SYNTHROID) 112 MCG tablet 1 tab po daily except 1.5 tabs on Sundays    lisinopriL (PRINIVIL,ZESTRIL) 40 mg, Oral, Daily    meclizine (ANTIVERT) 25 mg, Oral, 3 times daily    metFORMIN (GLUCOPHAGE) 500 mg, Oral, 2 times daily    MYRBETRIQ 25 mg, Oral, Daily    nitroGLYCERIN (NITROSTAT) 0.4 mg, Sublingual, As needed (PRN)    omeprazole (PRILOSEC) 40 mg, Oral, Daily, Before lunch    ondansetron (ZOFRAN-ODT) 4 mg, Oral, 3 times daily    OXcarbazepine (TRILEPTAL) 300 MG Tab TAKE 1 TABLET BY MOUTH TWICE DAILY     Social History     Tobacco Use    Smoking status: Former     Types: Cigarettes     Passive exposure: Past    Smokeless tobacco: Never   Substance Use Topics    Alcohol use: Yes     Comment: occ    Drug use: Never       Review of Systems  Pertinent items are noted in HPI.     Objective:     /83 (BP Location: Right arm, Patient Position: Sitting, BP Method: Large (Automatic))   Pulse 69   Temp 98.6 °F (37 °C)   Resp 18   Ht 5' 7" (1.702 m)   Wt 89 kg (196 lb 3.4 oz)   LMP  (LMP Unknown)   SpO2 96%   BMI 30.73 kg/m²   Physical Exam:  Gen: Well-nourished, well-developed female appearing stated age. Alert, cooperative, in no acute distress.  CV: rrr  Chest: no increased work of breathing. Does have tube in throat, difficulty with prolonged conversation   External genitalia: Normal female genetalia without lesion, discharge or tenderness. I  Speculum Exam:  Atrophic vaginal mucosa. Stage 1 anterior vaginal wall prolapse on Valsalva. " Vaginal vault with chunky white discharge at introitus but not in vault (susepect vaginal estrogen cream), erosion of mucosa noted along side posterior and sidewall toward apex, and left sidewall, friable. Cervical os visualized as closed, no lesions/masses.   Bimanual Exam: No cervical motion tenderness.  Uterus freely mobile.  Normal size uterus. No adnexal masses.  Nontender exam.   Note: RN chaperone present for entirety of exam.      Assessment:       68 y.o.  here for pessary management.  1. Bladder prolapse, female, acquired          Plan:     Problem List Items Addressed This Visit          Renal/    Bladder prolapse, female, acquired - Primary     Size 5 ring with knob removed and cleaned. Erosions noted. Plan to leave pessary out for 2 weeks then reassess vaginal mucosa health before replacing or resizing          Return to clinic 2 weeks for exam and potentially pessary refit    Discussed patient and plan with Dr. Provost Ana Sanon MD  [employer] PGY3  07/15/2023 10:56 AM

## 2023-07-15 NOTE — ASSESSMENT & PLAN NOTE
Size 5 ring with knob removed and cleaned. Erosions noted. Plan to leave pessary out for 2 weeks then reassess vaginal mucosa health before replacing or resizing

## 2023-07-28 ENCOUNTER — OFFICE VISIT (OUTPATIENT)
Dept: GYNECOLOGY | Facility: CLINIC | Age: 68
End: 2023-07-28
Payer: MEDICARE

## 2023-07-28 VITALS — HEIGHT: 67 IN | BODY MASS INDEX: 32.39 KG/M2 | WEIGHT: 206.38 LBS | RESPIRATION RATE: 14 BRPM

## 2023-07-28 DIAGNOSIS — N81.10 BLADDER PROLAPSE, FEMALE, ACQUIRED: Primary | ICD-10-CM

## 2023-07-28 PROCEDURE — 99212 OFFICE O/P EST SF 10 MIN: CPT | Mod: PBBFAC

## 2023-07-28 NOTE — PROGRESS NOTES
[facility] GYNECOLOGY CLINIC NOTE     Laura Freeman is a 68 y.o.  presenting to GYN clinic for pessary follow up.     Last seen  for pessary check, was wearing a ring with knob #5. Noted on exam to have erosions with friability on bilateral sidewalls towards vaginal apex. Pessary was left out and pt was rec to continue vaginal estrogen and rtc in 2 weeks for re-examine.    Reports no bleeding no abnormal discharge. Reports mild prolapse bothersome but not severe. Has been using the vaginal estrogen with the applicator.      OB History          7    Para   5    Term                AB        Living             SAB        IAB        Ectopic        Multiple        Live Births                    Past Medical History:   Diagnosis Date    Hypertension     Papillary microcarcinoma of thyroid (T1a N0 M0) 2019    Diagnosed as an incidental finding at the time of a right hemithyroidectomy done in conjunction with total laryngectomy on 2019.    T3N0M0 supraglottic laryngeal squamous cell carcinoma 2019    T3 N0 M0 squamous cell carcinoma of the supraglottic larynx diagnosed initially on 2019. Treated with total laryngectomy, bilateral selective neck dissections (levels 2 through 4), right hemithyroidectomy (with incidental finding papillary thyroid microcarcinoma), primary TEP puncture with cricopharyngeal myotomy on 2019.  Treated with postoperative radiation therapy.    Type 2 diabetes mellitus with unspecified diabetic retinopathy without macular edema       Past Surgical History:   Procedure Laterality Date    ceiol - cataract extraction and insertion of introcular lens      excision of papilloma      I&D of skin and subcutaneous tissue  2015    MULTIPLE TOOTH EXTRACTIONS Bilateral 2019    Full mouth extraction in preparation for radiation therapy.    PANENDOSCOPY N/A 2019    Initial diagnosis laryngeal squamous cell carcinoma.  Done in  conjunction with an awake tracheostomy.    PERCUTANEOUS INSERTION OF GASTROSTOMY TUBE N/A 07/12/2019    SELECTIVE NECK DISSECTION Bilateral 08/12/2019    Done in conjunction with total laryngectomy for laryngeal squamous cell carcinoma.    THYROID LOBECTOMY Right 08/12/2019    Done in conjunction with total laryngectomy with incidental finding thyroid papillary microcarcinoma.    TOTAL LARYNGECTOMY N/A 08/12/2019    Total laryngectomy with bilateral selective neck dissections (levels 2 through 4) right hemithyroidectomy and primary tracheoesophageal puncture cricopharyngeal myotomy for treatment laryngeal squamous cell carcinoma.    TRACHEOSTOMY  07/08/2019    Done in conjunction with panendoscopy at initial diagnosis of laryngeal squamous cell carcinoma.    TRACHEOSTOMY TUBE CHANGE  07/14/2019    Done in conjunction with control of post-operative tracheostomy wound hemorrhage.      Current Outpatient Medications   Medication Instructions    amLODIPine (NORVASC) 10 mg, Oral, Daily    aspirin 81 mg, Oral, Daily    atorvastatin (LIPITOR) 40 mg, Oral, Daily    blood sugar diagnostic Strp To check BG 1 times daily, to use with insurance preferred meter    blood-glucose meter kit To check BG 1 times daily, to use with insurance preferred meter    carvediloL (COREG) 3.125 MG tablet TAKE 1 TABLET(3.125 MG) BY MOUTH TWICE DAILY    cyclobenzaprine (FLEXERIL) 10 mg, Oral, Nightly    diclofenac sodium (VOLTAREN) 2 g, Topical (Top), 4 times daily, PRN for pain    EScitalopram oxalate (LEXAPRO) 20 MG tablet TAKE 1 TABLET(20 MG) BY MOUTH DAILY    estradioL (ESTRACE) 0.5 g, Vaginal, Twice weekly    famotidine (PEPCID) 40 mg, Oral, 2 times daily    gabapentin (NEURONTIN) 400 MG capsule See Instructions, TAKE 1 CAPSULE BY MOUTH THREE TIMES DAILY, # 90 cap(s), 2 Refill(s), Pharmacy: [id number], 174, cm, Height/Length Dosing, 10/24/21 10:52:00 CDT, 89, kg, Weight Dosing, 10/24/21 10:52:00 CDT     "HYDROcodone-acetaminophen (NORCO)  mg per tablet 1 tablet, Oral, Every 12 hours PRN    hydrOXYchloroQUINE (PLAQUENIL) 200 mg, Oral, 2 times daily, After food    isosorbide mononitrate (IMDUR) 30 mg, Oral, Every morning    isosorbide mononitrate (IMDUR) 30 mg, Oral, Every morning    lancets Misc Use qd    levothyroxine (SYNTHROID) 112 MCG tablet 1 tab po daily except 1.5 tabs on Sundays    lisinopriL (PRINIVIL,ZESTRIL) 40 mg, Oral, Daily    meclizine (ANTIVERT) 25 mg, Oral, 3 times daily    metFORMIN (GLUCOPHAGE) 500 mg, Oral, 2 times daily    MYRBETRIQ 25 mg, Oral, Daily    nitroGLYCERIN (NITROSTAT) 0.4 mg, Sublingual, As needed (PRN)    omeprazole (PRILOSEC) 40 mg, Oral, Daily, Before lunch    ondansetron (ZOFRAN-ODT) 4 mg, Oral, 3 times daily    OXcarbazepine (TRILEPTAL) 300 MG Tab TAKE 1 TABLET BY MOUTH TWICE DAILY     Social History     Tobacco Use    Smoking status: Former     Types: Cigarettes     Passive exposure: Past    Smokeless tobacco: Never   Substance Use Topics    Alcohol use: Yes     Comment: occ    Drug use: Never       Review of Systems  Pertinent items are noted in HPI.     Objective:     Resp 14   Ht 5' 7" (1.702 m)   Wt 93.6 kg (206 lb 6.4 oz)   LMP  (LMP Unknown)   BMI 32.33 kg/m²   Physical Exam:  Gen: Alert, cooperative, in no acute distress.  CV: regular rate  Chest: no increased work of breathing. Does have tube in throat, cough/difficulty with prolonged conversation   Extrem: Extremities normal, atraumatic, non-tender calves.  External genitalia: Normal female genitalia without lesion, discharge or tenderness. Small 1-2mm epidermal inclusion cyst noted to left of posterior fourchette  Speculum Exam: Atrophic vaginal mucosa. Stage 1 anterior vaginal wall prolapse on Valsalva. Vaginal vault with minimal white discharge (suspect vaginal estrogen cream), erosion of mucosa noted along side posterior and sidewall toward apex. Noted to appear healing and nonfriable, improved from prior. " Cervical os visualized as closed, no lesions/masses.   Bimanual Exam: No cervical motion tenderness.  Uterus freely mobile.  Normal size uterus. No adnexal masses.  Nontender exam.   Note: RN chaperone present for entirety of exam.      Assessment:       68 y.o.  here for pessary management.  1. Bladder prolapse, female, acquired          Plan:     Problem List Items Addressed This Visit          Renal/    Bladder prolapse, female, acquired - Primary     Erosions appear to still be present but improved and healing. No size #4 ring with support or ring with knob available in clinic today. Will order trial pessaries and actual pessaries. Keep original pessary out for now to help with healing, and continue vaginal estrogen use.  RTC 4 weeks for repeat exam and pessary fitting .            Return to clinic 4 weeks    Discussed patient and plan with Dr. Provost Ana Sanon MD  [facility] PGY3  2023 10:14 PM

## 2023-07-29 NOTE — ASSESSMENT & PLAN NOTE
Erosions appear to still be present but improved and healing. No size #4 ring with support or ring with knob available in clinic today. Will order trial pessaries and actual pessaries. Keep original pessary out for now to help with healing, and continue vaginal estrogen use.  RTC 4 weeks for repeat exam and pessary fitting .

## 2023-08-03 DIAGNOSIS — M06.00 SERONEGATIVE RHEUMATOID ARTHRITIS: ICD-10-CM

## 2023-08-03 RX ORDER — HYDROCODONE BITARTRATE AND ACETAMINOPHEN 10; 325 MG/1; MG/1
1 TABLET ORAL EVERY 12 HOURS PRN
Qty: 60 TABLET | Refills: 0 | Status: SHIPPED | OUTPATIENT
Start: 2023-08-03 | End: 2023-08-03

## 2023-08-03 RX ORDER — HYDROCODONE BITARTRATE AND ACETAMINOPHEN 10; 325 MG/1; MG/1
1 TABLET ORAL EVERY 12 HOURS PRN
Qty: 60 TABLET | Refills: 0 | Status: SHIPPED | OUTPATIENT
Start: 2023-08-03 | End: 2023-09-06 | Stop reason: SDUPTHER

## 2023-08-03 NOTE — TELEPHONE ENCOUNTER
----- Message from Marcy Gates sent at 8/3/2023  8:20 AM CDT -----  Regarding: Medication refill  Patient called requesting a refill for [place]. [facility] on [address]

## 2023-08-14 DIAGNOSIS — M19.90 INFLAMMATORY ARTHRITIS: ICD-10-CM

## 2023-08-14 DIAGNOSIS — N81.10 BLADDER PROLAPSE, FEMALE, ACQUIRED: ICD-10-CM

## 2023-08-14 DIAGNOSIS — B18.2 HEPATITIS C VIRUS CARRIER STATE: ICD-10-CM

## 2023-08-14 DIAGNOSIS — C32.9 LARYNGEAL SQUAMOUS CELL CARCINOMA: ICD-10-CM

## 2023-08-14 DIAGNOSIS — M54.40 LOW BACK PAIN WITH SCIATICA, SCIATICA LATERALITY UNSPECIFIED, UNSPECIFIED BACK PAIN LATERALITY, UNSPECIFIED CHRONICITY: ICD-10-CM

## 2023-08-14 DIAGNOSIS — R35.1 NOCTURIA: ICD-10-CM

## 2023-08-14 DIAGNOSIS — M79.7 FIBROMYALGIA SYNDROME: ICD-10-CM

## 2023-08-14 DIAGNOSIS — E03.9 HYPOTHYROIDISM, UNSPECIFIED TYPE: ICD-10-CM

## 2023-08-14 DIAGNOSIS — M06.00 SERONEGATIVE RHEUMATOID ARTHRITIS: ICD-10-CM

## 2023-08-14 DIAGNOSIS — N39.46 MIXED INCONTINENCE: ICD-10-CM

## 2023-08-14 DIAGNOSIS — C73 PAPILLARY MICROCARCINOMA OF THYROID: ICD-10-CM

## 2023-08-14 DIAGNOSIS — F51.01 PRIMARY INSOMNIA: ICD-10-CM

## 2023-08-14 DIAGNOSIS — I10 HYPERTENSION, UNSPECIFIED TYPE: ICD-10-CM

## 2023-08-14 RX ORDER — LISINOPRIL 40 MG/1
40 TABLET ORAL
Qty: 30 TABLET | Refills: 0 | Status: SHIPPED | OUTPATIENT
Start: 2023-08-14 | End: 2024-01-09 | Stop reason: SDUPTHER

## 2023-08-14 NOTE — TELEPHONE ENCOUNTER
Refilled x 1 month. She has documented office visits this past month with normal BP. Will assess at next office visit and plan for future refills to from PCP.

## 2023-08-14 NOTE — TELEPHONE ENCOUNTER
Patient last seen in May. She does have a PCP and we did fill this for her in January but the last time she had it filled was in May and now requesting a refill. Not sure if you want to wait until the patient comes for her office visit so that we may assess her B/P and see if needed or decline and pamela them to have her check with her PCP she has an appt with Nighat on 08/18/2023. Was not sure it would be safe to fill this since she has not filled since May and we do not know how her B/P is currently.   Thank you Ladan

## 2023-08-17 ENCOUNTER — LAB VISIT (OUTPATIENT)
Dept: LAB | Facility: HOSPITAL | Age: 68
End: 2023-08-17
Attending: FAMILY MEDICINE
Payer: MEDICARE

## 2023-08-17 DIAGNOSIS — C73 PAPILLARY MICROCARCINOMA OF THYROID: ICD-10-CM

## 2023-08-17 DIAGNOSIS — E11.40 TYPE 2 DIABETES MELLITUS WITH DIABETIC NEUROPATHY, WITHOUT LONG-TERM CURRENT USE OF INSULIN: ICD-10-CM

## 2023-08-17 LAB
EST. AVERAGE GLUCOSE BLD GHB EST-MCNC: 114 MG/DL
HBA1C MFR BLD: 5.6 %
T4 FREE SERPL-MCNC: 1.2 NG/DL (ref 0.7–1.48)
TSH SERPL-ACNC: 0.02 UIU/ML (ref 0.35–4.94)

## 2023-08-17 PROCEDURE — 84443 ASSAY THYROID STIM HORMONE: CPT

## 2023-08-17 PROCEDURE — 83036 HEMOGLOBIN GLYCOSYLATED A1C: CPT

## 2023-08-17 PROCEDURE — 84432 ASSAY OF THYROGLOBULIN: CPT

## 2023-08-17 PROCEDURE — 36415 COLL VENOUS BLD VENIPUNCTURE: CPT

## 2023-08-17 PROCEDURE — 84439 ASSAY OF FREE THYROXINE: CPT

## 2023-08-18 ENCOUNTER — OFFICE VISIT (OUTPATIENT)
Dept: RHEUMATOLOGY | Facility: CLINIC | Age: 68
End: 2023-08-18
Payer: MEDICARE

## 2023-08-18 VITALS
OXYGEN SATURATION: 97 % | HEIGHT: 67 IN | HEART RATE: 78 BPM | DIASTOLIC BLOOD PRESSURE: 78 MMHG | BODY MASS INDEX: 31.96 KG/M2 | TEMPERATURE: 99 F | WEIGHT: 203.63 LBS | RESPIRATION RATE: 16 BRPM | SYSTOLIC BLOOD PRESSURE: 121 MMHG

## 2023-08-18 DIAGNOSIS — B18.2 HEPATITIS C VIRUS CARRIER STATE: ICD-10-CM

## 2023-08-18 DIAGNOSIS — M79.7 FIBROMYALGIA SYNDROME: ICD-10-CM

## 2023-08-18 DIAGNOSIS — I10 HYPERTENSION, UNSPECIFIED TYPE: ICD-10-CM

## 2023-08-18 DIAGNOSIS — M06.00 SERONEGATIVE RHEUMATOID ARTHRITIS: Primary | ICD-10-CM

## 2023-08-18 PROCEDURE — 3288F PR FALLS RISK ASSESSMENT DOCUMENTED: ICD-10-PCS | Mod: CPTII,S$GLB,,

## 2023-08-18 PROCEDURE — 3008F PR BODY MASS INDEX (BMI) DOCUMENTED: ICD-10-PCS | Mod: CPTII,S$GLB,,

## 2023-08-18 PROCEDURE — 1101F PR PT FALLS ASSESS DOC 0-1 FALLS W/OUT INJ PAST YR: ICD-10-PCS | Mod: CPTII,S$GLB,,

## 2023-08-18 PROCEDURE — 3074F SYST BP LT 130 MM HG: CPT | Mod: CPTII,S$GLB,,

## 2023-08-18 PROCEDURE — 3008F BODY MASS INDEX DOCD: CPT | Mod: CPTII,S$GLB,,

## 2023-08-18 PROCEDURE — 3044F PR MOST RECENT HEMOGLOBIN A1C LEVEL <7.0%: ICD-10-PCS | Mod: CPTII,S$GLB,,

## 2023-08-18 PROCEDURE — 3078F PR MOST RECENT DIASTOLIC BLOOD PRESSURE < 80 MM HG: ICD-10-PCS | Mod: CPTII,S$GLB,,

## 2023-08-18 PROCEDURE — 1125F PR PAIN SEVERITY QUANTIFIED, PAIN PRESENT: ICD-10-PCS | Mod: CPTII,S$GLB,,

## 2023-08-18 PROCEDURE — 3078F DIAST BP <80 MM HG: CPT | Mod: CPTII,S$GLB,,

## 2023-08-18 PROCEDURE — 1101F PT FALLS ASSESS-DOCD LE1/YR: CPT | Mod: CPTII,S$GLB,,

## 2023-08-18 PROCEDURE — 99213 PR OFFICE/OUTPT VISIT, EST, LEVL III, 20-29 MIN: ICD-10-PCS | Mod: S$GLB,,,

## 2023-08-18 PROCEDURE — 99213 OFFICE O/P EST LOW 20 MIN: CPT | Mod: S$GLB,,,

## 2023-08-18 PROCEDURE — 1125F AMNT PAIN NOTED PAIN PRSNT: CPT | Mod: CPTII,S$GLB,,

## 2023-08-18 PROCEDURE — 3288F FALL RISK ASSESSMENT DOCD: CPT | Mod: CPTII,S$GLB,,

## 2023-08-18 PROCEDURE — 3074F PR MOST RECENT SYSTOLIC BLOOD PRESSURE < 130 MM HG: ICD-10-PCS | Mod: CPTII,S$GLB,,

## 2023-08-18 PROCEDURE — 1159F PR MEDICATION LIST DOCUMENTED IN MEDICAL RECORD: ICD-10-PCS | Mod: CPTII,S$GLB,,

## 2023-08-18 PROCEDURE — 99999 PR PBB SHADOW E&M-EST. PATIENT-LVL V: ICD-10-PCS | Mod: PBBFAC,,,

## 2023-08-18 PROCEDURE — 99999 PR PBB SHADOW E&M-EST. PATIENT-LVL V: CPT | Mod: PBBFAC,,,

## 2023-08-18 PROCEDURE — 3044F HG A1C LEVEL LT 7.0%: CPT | Mod: CPTII,S$GLB,,

## 2023-08-18 PROCEDURE — 1159F MED LIST DOCD IN RCRD: CPT | Mod: CPTII,S$GLB,,

## 2023-08-18 PROCEDURE — 4010F ACE/ARB THERAPY RXD/TAKEN: CPT | Mod: CPTII,S$GLB,,

## 2023-08-18 PROCEDURE — 4010F PR ACE/ARB THEARPY RXD/TAKEN: ICD-10-PCS | Mod: CPTII,S$GLB,,

## 2023-08-18 RX ORDER — HYDROXYCHLOROQUINE SULFATE 200 MG/1
200 TABLET, FILM COATED ORAL 2 TIMES DAILY
Qty: 60 TABLET | Refills: 5 | Status: SHIPPED | OUTPATIENT
Start: 2023-08-18 | End: 2024-02-18

## 2023-08-18 RX ORDER — CYCLOBENZAPRINE HCL 10 MG
10 TABLET ORAL NIGHTLY
Qty: 30 TABLET | Refills: 5 | Status: SHIPPED | OUTPATIENT
Start: 2023-08-18 | End: 2023-12-08 | Stop reason: SDUPTHER

## 2023-08-18 RX ORDER — GABAPENTIN 400 MG/1
CAPSULE ORAL
Qty: 90 CAPSULE | Refills: 5 | Status: SHIPPED | OUTPATIENT
Start: 2023-08-18 | End: 2023-12-08 | Stop reason: SDUPTHER

## 2023-08-18 NOTE — ASSESSMENT & PLAN NOTE
Continue Hydroxychloroquine 200 mg BID  Continue Norco  Q 12 hrs PRN- Rx by MD     Plaquenil Eye Exam: UTD followed by Eitan- next appointment is today

## 2023-08-18 NOTE — ASSESSMENT & PLAN NOTE
BP today is 121/78  Manual recheck:   Continue RX medications as prescribed  Low Sodium Diet (Dash Diet - less than 2 grams of sodium per day).  Maintain healthy weight with goal BMI <30. Exercise 30 minutes per day 5 days per week.

## 2023-08-18 NOTE — PROGRESS NOTES
Subjective:           Patient ID: Laura Freeman is a 68 y.o. female.    Chief Complaint: Follow-up (Patient is a 3 month f/u for RA.  She states she is doing fine today, but her pain can get to 5/10.  )       is a 69 y/o female here for a f./u. She was initially referred by Dr. Vicenta Acevedo. She established care with Dr. Frye on 5/23/22 . Past labs: RF neg. CCP neg. 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  She is currently utilizing esophageal speech via a voice prosthesis for alaryngeal communication for functional communication. She has a history of Stroke (18 years ago) and history of seizure. She is followed by cardiology and awaiting a new patient appointment with neurology.  At her last visit gabapentin was increased from 300 to 400 mg TID. She does report improvement with her bilateral tingling. She is feeling anxious that she was told she has a positive cologuard test and she is scheduled for a colonoscopy next month. Other than that she feels her medications are working well for her and she is without complaints today    Today she is reporting joint pain involving the MCP PIP wrist elbow shoulders hips knees and ankles bilaterally.  The pain is 2/10 in intensity dull in quality and continuous.  That is associated with a morning stiffness lasting for less than 60 minutes. Reports the tingling in her bilateral hand and feet have improved since the increase dose of gabapentin at her last visit. They are associated with fatigue.  Denies fever and  chills no others.           Review of Systems   Constitutional:  Negative for appetite change, chills and fever.   HENT:  Negative for congestion, ear pain, mouth sores, nosebleeds and trouble swallowing.    Eyes:  Negative for photophobia and discharge.   Respiratory:  Negative for chest tightness and shortness of breath.    Cardiovascular:  Negative for chest pain.   Gastrointestinal:  Negative for abdominal  "pain and vomiting.   Endocrine: Negative.    Genitourinary:  Negative for hematuria.   Musculoskeletal:         As per HPI   Skin:  Negative for rash.   Neurological:  Negative for weakness.         Objective:   /78 (BP Location: Left arm, Patient Position: Sitting, BP Method: Large (Automatic))   Pulse 78   Temp 99.1 °F (37.3 °C) (Oral)   Resp 16   Ht 5' 7" (1.702 m)   Wt 92.4 kg (203 lb 9.6 oz)   LMP  (LMP Unknown)   SpO2 97%   BMI 31.89 kg/m²          Physical Exam   Constitutional: She is oriented to person, place, and time. She appears well-developed and well-nourished. No distress.   HENT:   Head: Normocephalic and atraumatic.   Right Ear: External ear normal.   Left Ear: External ear normal.   Eyes: Pupils are equal, round, and reactive to light.   Cardiovascular: Normal rate.   Pulmonary/Chest: Effort normal.   Abdominal: Soft. There is no abdominal tenderness.   Musculoskeletal:      Cervical back: Neck supple.   Lymphadenopathy:     She has no cervical adenopathy.   Neurological: She is alert and oriented to person, place, and time. She displays normal reflexes. No cranial nerve deficit or sensory deficit. She exhibits normal muscle tone. Coordination normal.   Skin: No rash noted. No erythema.   Vitals reviewed.      Right Side Rheumatological Exam     Examination finds the 4th MCP, 5th PIP and 5th MCP normal.    The patient is tender to palpation of the 1st PIP, 1st MCP, 2nd PIP, 2nd MCP, 3rd PIP, 3rd MCP and 4th PIP    Left Side Rheumatological Exam     Examination finds the 1st PIP, 1st MCP, 2nd PIP, 2nd MCP, 3rd PIP, 3rd MCP, 4th PIP, 4th MCP, 5th PIP and 5th MCP normal.           Completed Fibromyalgia exam 18/18 tender points.    No data to display     Assessment:         Medication List with Changes/Refills   Current Medications    AMLODIPINE (NORVASC) 10 MG TABLET    Take 1 tablet (10 mg total) by mouth Daily.       Start Date: 1/17/2023 End Date: --    ASPIRIN 81 MG CHEW    Take 81 " mg by mouth once daily.       Start Date: --        End Date: --    ATORVASTATIN (LIPITOR) 40 MG TABLET    Take 1 tablet (40 mg total) by mouth once daily.       Start Date: 2/20/2023 End Date: --    BLOOD SUGAR DIAGNOSTIC STRP    To check BG 1 times daily, to use with insurance preferred meter       Start Date: 2/20/2023 End Date: --    BLOOD-GLUCOSE METER KIT    To check BG 1 times daily, to use with insurance preferred meter       Start Date: 2/20/2023 End Date: 2/20/2024    CARVEDILOL (COREG) 3.125 MG TABLET    TAKE 1 TABLET(3.125 MG) BY MOUTH TWICE DAILY       Start Date: 7/13/2023 End Date: --    DICLOFENAC SODIUM (VOLTAREN) 1 % GEL    Apply 2 g topically 4 (four) times daily. PRN for pain       Start Date: 1/30/2023 End Date: --    ESCITALOPRAM OXALATE (LEXAPRO) 20 MG TABLET    TAKE 1 TABLET(20 MG) BY MOUTH DAILY       Start Date: 5/22/2023 End Date: --    ESTRADIOL (ESTRACE) 0.01 % (0.1 MG/GRAM) VAGINAL CREAM    Place 0.5 g vaginally twice a week.       Start Date: 5/26/2023 End Date: --    FAMOTIDINE (PEPCID) 40 MG TABLET    Take 1 tablet (40 mg total) by mouth 2 (two) times daily.       Start Date: 12/22/2022End Date: --    HYDROCODONE-ACETAMINOPHEN (NORCO)  MG PER TABLET    Take 1 tablet by mouth every 12 (twelve) hours as needed for Pain.       Start Date: 8/3/2023  End Date: --    LANCETS MISC    Use qd       Start Date: 2/20/2023 End Date: --    LEVOTHYROXINE (SYNTHROID) 112 MCG TABLET    1 tab po daily except 1.5 tabs on Sundays       Start Date: 12/20/2022End Date: --    LISINOPRIL (PRINIVIL,ZESTRIL) 40 MG TABLET    TAKE 1 TABLET(40 MG) BY MOUTH EVERY DAY       Start Date: 8/14/2023 End Date: --    MECLIZINE (ANTIVERT) 25 MG TABLET    Take 25 mg by mouth 3 (three) times daily.       Start Date: 3/31/2022 End Date: --    METFORMIN (GLUCOPHAGE) 500 MG TABLET    Take 1 tablet (500 mg total) by mouth 2 (two) times daily.       Start Date: 9/27/2022 End Date: --    NITROGLYCERIN (NITROSTAT) 0.4 MG  SL TABLET    Place 0.4 mg under the tongue as needed.       Start Date: 9/20/2021 End Date: --    ONDANSETRON (ZOFRAN-ODT) 4 MG TBDL    Take 4 mg by mouth 3 (three) times daily.       Start Date: 3/31/2022 End Date: --    OXCARBAZEPINE (TRILEPTAL) 300 MG TAB    TAKE 1 TABLET BY MOUTH TWICE DAILY       Start Date: 5/30/2022 End Date: --   Changed and/or Refilled Medications    Modified Medication Previous Medication    CYCLOBENZAPRINE (FLEXERIL) 10 MG TABLET cyclobenzaprine (FLEXERIL) 10 MG tablet       Take 1 tablet (10 mg total) by mouth every evening.    Take 1 tablet (10 mg total) by mouth every evening.       Start Date: 8/18/2023 End Date: 2/14/2024    Start Date: 5/17/2023 End Date: 8/18/2023    GABAPENTIN (NEURONTIN) 400 MG CAPSULE gabapentin (NEURONTIN) 400 MG capsule       See Instructions, TAKE 1 CAPSULE BY MOUTH THREE TIMES DAILY, # 90 cap(s), 2 Refill(s), Pharmacy: [facility] #[id number], 174, cm, Height/Length Dosing, 10/24/21 10:52:00 CDT, 89, kg, Weight Dosing, 10/24/21 10:52:00 CDT    See Instructions, TAKE 1 CAPSULE BY MOUTH THREE TIMES DAILY, # 90 cap(s), 2 Refill(s), Pharmacy: [facility] #[id number], 174, cm, Height/Length Dosing, 10/24/21 10:52:00 CDT, 89, kg, Weight Dosing, 10/24/21 10:52:00 CDT       Start Date: 8/18/2023 End Date: --    Start Date: 5/17/2023 End Date: 8/18/2023    HYDROXYCHLOROQUINE (PLAQUENIL) 200 MG TABLET hydrOXYchloroQUINE (PLAQUENIL) 200 mg tablet       Take 1 tablet (200 mg total) by mouth 2 (two) times daily. After food    Take 1 tablet (200 mg total) by mouth 2 (two) times daily. After food       Start Date: 8/18/2023 End Date: 2/18/2024    Start Date: 5/17/2023 End Date: 8/18/2023   Discontinued Medications    ISOSORBIDE MONONITRATE (IMDUR) 30 MG 24 HR TABLET    Take 30 mg by mouth every morning.       Start Date: 4/12/2022 End Date: 8/18/2023    ISOSORBIDE MONONITRATE (IMDUR) 30 MG 24 HR TABLET    Take 30 mg by mouth every morning.       Start Date:  12/22/2022End Date: 8/18/2023    MIRABEGRON (MYRBETRIQ) 25 MG TB24 ER TABLET    Take 25 mg by mouth once daily.       Start Date: --        End Date: 8/18/2023    OMEPRAZOLE (PRILOSEC) 40 MG CAPSULE    Take 1 capsule (40 mg total) by mouth once daily. Before lunch       Start Date: 5/17/2023 End Date: 8/18/2023         ICD-10-CM ICD-9-CM   1. Seronegative rheumatoid arthritis  M06.00 714.0   2. Fibromyalgia syndrome  M79.7 729.1   3. Hepatitis C virus carrier state  B18.2 V02.62   4. Hypertension, unspecified type  I10 401.9             Plan:       1. Seronegative rheumatoid arthritis  Assessment & Plan:  Continue Hydroxychloroquine 200 mg BID  Continue Norco  Q 12 hrs PRN- Rx by MD     Plaquenil Eye Exam: UTD followed by [facility]- next appointment is today    Orders:  -     hydrOXYchloroQUINE (PLAQUENIL) 200 mg tablet; Take 1 tablet (200 mg total) by mouth 2 (two) times daily. After food  Dispense: 60 tablet; Refill: 5    2. Fibromyalgia syndrome  Assessment & Plan:  Continue Gabapentin 400mg TID     Continue Flexeril 10 mg nightly    Orders:  -     cyclobenzaprine (FLEXERIL) 10 MG tablet; Take 1 tablet (10 mg total) by mouth every evening.  Dispense: 30 tablet; Refill: 5  -     gabapentin (NEURONTIN) 400 MG capsule; See Instructions, TAKE 1 CAPSULE BY MOUTH THREE TIMES DAILY, # 90 cap(s), 2 Refill(s), Pharmacy: [facility] #[id number], 174, cm, Height/Length Dosing, 10/24/21 10:52:00 CDT, 89, kg, Weight Dosing, 10/24/21 10:52:00 CDT  Dispense: 90 capsule; Refill: 5    3. Hepatitis C virus carrier state    4. Hypertension, unspecified type  Assessment & Plan:  BP today is 121/78  Manual recheck:   Continue RX medications as prescribed  Low Sodium Diet (Dash Diet - less than 2 grams of sodium per day).  Maintain healthy weight with goal BMI <30. Exercise 30 minutes per day 5 days per week.

## 2023-08-19 LAB
ENDOCRINOLOGIST REVIEW: ABNORMAL
THYROGLOB AB SERPL-ACNC: <1.8 IU/ML
THYROGLOB SERPL-MCNC: 3.5 NG/ML

## 2023-08-21 ENCOUNTER — TELEPHONE (OUTPATIENT)
Dept: ENDOCRINOLOGY | Facility: CLINIC | Age: 68
End: 2023-08-21
Payer: MEDICARE

## 2023-08-21 DIAGNOSIS — C73 PAPILLARY MICROCARCINOMA OF THYROID: Primary | ICD-10-CM

## 2023-08-21 NOTE — TELEPHONE ENCOUNTER
F/u is booked 10/23.  Pt did labs which are back but u/s is also due.  Please sort a neck u/s then once back will sort either an earlier cancellation f/u appt or if pt wants to wait until 10/23 to regroup.

## 2023-08-22 ENCOUNTER — OFFICE VISIT (OUTPATIENT)
Dept: FAMILY MEDICINE | Facility: CLINIC | Age: 68
End: 2023-08-22
Payer: MEDICARE

## 2023-08-22 VITALS
RESPIRATION RATE: 18 BRPM | OXYGEN SATURATION: 97 % | SYSTOLIC BLOOD PRESSURE: 127 MMHG | DIASTOLIC BLOOD PRESSURE: 84 MMHG | TEMPERATURE: 98 F | HEART RATE: 65 BPM | BODY MASS INDEX: 32.33 KG/M2 | HEIGHT: 67 IN | WEIGHT: 206 LBS

## 2023-08-22 DIAGNOSIS — I10 HYPERTENSION, UNSPECIFIED TYPE: Primary | ICD-10-CM

## 2023-08-22 DIAGNOSIS — R56.9 SEIZURE: ICD-10-CM

## 2023-08-22 DIAGNOSIS — F32.A DEPRESSION, UNSPECIFIED DEPRESSION TYPE: ICD-10-CM

## 2023-08-22 DIAGNOSIS — K59.00 CONSTIPATION, UNSPECIFIED CONSTIPATION TYPE: ICD-10-CM

## 2023-08-22 DIAGNOSIS — Z86.73 HISTORY OF CVA (CEREBROVASCULAR ACCIDENT): ICD-10-CM

## 2023-08-22 PROCEDURE — 3044F HG A1C LEVEL LT 7.0%: CPT | Mod: CPTII,,, | Performed by: FAMILY MEDICINE

## 2023-08-22 PROCEDURE — 3008F BODY MASS INDEX DOCD: CPT | Mod: CPTII,,, | Performed by: FAMILY MEDICINE

## 2023-08-22 PROCEDURE — 3008F PR BODY MASS INDEX (BMI) DOCUMENTED: ICD-10-PCS | Mod: CPTII,,, | Performed by: FAMILY MEDICINE

## 2023-08-22 PROCEDURE — 3288F PR FALLS RISK ASSESSMENT DOCUMENTED: ICD-10-PCS | Mod: CPTII,,, | Performed by: FAMILY MEDICINE

## 2023-08-22 PROCEDURE — 99214 PR OFFICE/OUTPT VISIT, EST, LEVL IV, 30-39 MIN: ICD-10-PCS | Mod: S$PBB,,, | Performed by: FAMILY MEDICINE

## 2023-08-22 PROCEDURE — 4010F PR ACE/ARB THEARPY RXD/TAKEN: ICD-10-PCS | Mod: CPTII,,, | Performed by: FAMILY MEDICINE

## 2023-08-22 PROCEDURE — 4010F ACE/ARB THERAPY RXD/TAKEN: CPT | Mod: CPTII,,, | Performed by: FAMILY MEDICINE

## 2023-08-22 PROCEDURE — 1101F PR PT FALLS ASSESS DOC 0-1 FALLS W/OUT INJ PAST YR: ICD-10-PCS | Mod: CPTII,,, | Performed by: FAMILY MEDICINE

## 2023-08-22 PROCEDURE — 99215 OFFICE O/P EST HI 40 MIN: CPT | Mod: PBBFAC | Performed by: FAMILY MEDICINE

## 2023-08-22 PROCEDURE — 3079F PR MOST RECENT DIASTOLIC BLOOD PRESSURE 80-89 MM HG: ICD-10-PCS | Mod: CPTII,,, | Performed by: FAMILY MEDICINE

## 2023-08-22 PROCEDURE — 1159F PR MEDICATION LIST DOCUMENTED IN MEDICAL RECORD: ICD-10-PCS | Mod: CPTII,,, | Performed by: FAMILY MEDICINE

## 2023-08-22 PROCEDURE — 3288F FALL RISK ASSESSMENT DOCD: CPT | Mod: CPTII,,, | Performed by: FAMILY MEDICINE

## 2023-08-22 PROCEDURE — 1160F PR REVIEW ALL MEDS BY PRESCRIBER/CLIN PHARMACIST DOCUMENTED: ICD-10-PCS | Mod: CPTII,,, | Performed by: FAMILY MEDICINE

## 2023-08-22 PROCEDURE — 3079F DIAST BP 80-89 MM HG: CPT | Mod: CPTII,,, | Performed by: FAMILY MEDICINE

## 2023-08-22 PROCEDURE — 1159F MED LIST DOCD IN RCRD: CPT | Mod: CPTII,,, | Performed by: FAMILY MEDICINE

## 2023-08-22 PROCEDURE — 1160F RVW MEDS BY RX/DR IN RCRD: CPT | Mod: CPTII,,, | Performed by: FAMILY MEDICINE

## 2023-08-22 PROCEDURE — 1101F PT FALLS ASSESS-DOCD LE1/YR: CPT | Mod: CPTII,,, | Performed by: FAMILY MEDICINE

## 2023-08-22 PROCEDURE — 99214 OFFICE O/P EST MOD 30 MIN: CPT | Mod: S$PBB,,, | Performed by: FAMILY MEDICINE

## 2023-08-22 PROCEDURE — 3044F PR MOST RECENT HEMOGLOBIN A1C LEVEL <7.0%: ICD-10-PCS | Mod: CPTII,,, | Performed by: FAMILY MEDICINE

## 2023-08-22 PROCEDURE — 3074F PR MOST RECENT SYSTOLIC BLOOD PRESSURE < 130 MM HG: ICD-10-PCS | Mod: CPTII,,, | Performed by: FAMILY MEDICINE

## 2023-08-22 PROCEDURE — 3074F SYST BP LT 130 MM HG: CPT | Mod: CPTII,,, | Performed by: FAMILY MEDICINE

## 2023-08-22 RX ORDER — DOCUSATE SODIUM 100 MG/1
100 CAPSULE, LIQUID FILLED ORAL 2 TIMES DAILY
Qty: 60 CAPSULE | Refills: 2 | Status: SHIPPED | OUTPATIENT
Start: 2023-08-22

## 2023-08-22 RX ORDER — ESCITALOPRAM OXALATE 20 MG/1
TABLET ORAL
Qty: 90 TABLET | Refills: 1 | Status: SHIPPED | OUTPATIENT
Start: 2023-08-22

## 2023-08-22 RX ORDER — ATORVASTATIN CALCIUM 40 MG/1
40 TABLET, FILM COATED ORAL DAILY
Qty: 90 TABLET | Refills: 1 | Status: SHIPPED | OUTPATIENT
Start: 2023-08-22 | End: 2024-03-07

## 2023-08-22 RX ORDER — AMLODIPINE BESYLATE 10 MG/1
10 TABLET ORAL DAILY
Qty: 90 TABLET | Refills: 1 | Status: SHIPPED | OUTPATIENT
Start: 2023-08-22

## 2023-08-22 RX ORDER — OXCARBAZEPINE 300 MG/1
300 TABLET, FILM COATED ORAL 2 TIMES DAILY
Qty: 60 TABLET | Refills: 2 | Status: SHIPPED | OUTPATIENT
Start: 2023-08-22 | End: 2023-10-18 | Stop reason: SDUPTHER

## 2023-08-22 NOTE — PROGRESS NOTES
"Patient Name: Laura Freeman   : 1955  MRN: [id number]     SUBJECTIVE:  Laura Freeman is a 68 y.o. female here for Follow-up  .    HPI  Here for routine follow-up.  HTN well controlled. At home good readings. Compliant. Taking amlodipine and lisinopril 40 mg. Coreg 3.125 mg bid, imdur as well. Seeing cardiology  Regarding diabetes: On metformin 500 mg BID.  A1c done few days ago 5.6%.    Mood is well-controlled with the increased dose of Lexapro to 20 mg.  Feeling much better. Not suicidal.    Used to follow with neurology post CVA in 2013. Has seizure hx even before the stroke. About a year ago was the last episode. Reports grand mal seizures with postictal confusion. Has appt coming up with neurology. Requesting trileptal refill until then    Positive colon cancer screening, having colonoscopy next month. does feel constipated. Has tried laxative but will give her diarrhea.      ALLERGIES: Review of patient's allergies indicates:  No Known Allergies      ROS:  Review of Systems   Constitutional:  Negative for chills and fever.   HENT:  Negative for congestion.    Respiratory:  Negative for cough and shortness of breath.    Cardiovascular:  Negative for chest pain, palpitations and leg swelling.   Gastrointestinal:  Positive for constipation. Negative for abdominal pain, blood in stool, diarrhea, melena, nausea and vomiting.   Genitourinary:  Negative for dysuria and hematuria.   Neurological:  Negative for dizziness and headaches.   Psychiatric/Behavioral:  Negative for depression. The patient is not nervous/anxious.          OBJECTIVE:  Vital signs  Vitals:    23 1420   BP: 127/84   Pulse: 65   Resp: 18   Temp: 98.1 °F (36.7 °C)   TempSrc: Oral   SpO2: 97%   Weight: 93.4 kg (206 lb)   Height: 5' 7" (1.702 m)      Body mass index is 32.26 kg/m².    PHYSICAL EXAM:   Physical Exam  Vitals reviewed.   Constitutional:       General: She is not in acute distress.     Appearance: Normal appearance. She is not " ill-appearing.   HENT:      Head: Normocephalic and atraumatic.      Right Ear: External ear normal.      Left Ear: External ear normal.      Nose: Nose normal. No rhinorrhea.      Mouth/Throat:      Mouth: Mucous membranes are moist.   Eyes:      General: No scleral icterus.        Right eye: No discharge.         Left eye: No discharge.      Conjunctiva/sclera: Conjunctivae normal.      Pupils: Pupils are equal, round, and reactive to light.   Neck:      Comments: TEP in place  Cardiovascular:      Rate and Rhythm: Normal rate and regular rhythm.   Pulmonary:      Effort: Pulmonary effort is normal. No respiratory distress.      Breath sounds: No wheezing, rhonchi or rales.   Abdominal:      General: Bowel sounds are normal. There is no distension.      Palpations: Abdomen is soft.      Tenderness: There is no abdominal tenderness.   Musculoskeletal:      Cervical back: Normal range of motion and neck supple. No rigidity or tenderness.      Right lower leg: No edema.      Left lower leg: No edema.   Skin:     General: Skin is warm.      Findings: No rash.   Neurological:      General: No focal deficit present.      Mental Status: She is alert and oriented to person, place, and time.   Psychiatric:         Mood and Affect: Mood normal.         Behavior: Behavior normal.        ASSESSMENT/PLAN:  1. Hypertension, unspecified type  -     amLODIPine (NORVASC) 10 MG tablet; Take 1 tablet (10 mg total) by mouth Daily.  Dispense: 90 tablet; Refill: 1    2. Depression, unspecified depression type  -     EScitalopram oxalate (LEXAPRO) 20 MG tablet; TAKE 1 TABLET(20 MG) BY MOUTH DAILY  Dispense: 90 tablet; Refill: 1    3. History of CVA (cerebrovascular accident)  -     atorvastatin (LIPITOR) 40 MG tablet; Take 1 tablet (40 mg total) by mouth once daily.  Dispense: 90 tablet; Refill: 1  -     OXcarbazepine (TRILEPTAL) 300 MG Tab; Take 1 tablet (300 mg total) by mouth 2 (two) times daily.  Dispense: 60 tablet; Refill: 2    4.  Seizure  -     OXcarbazepine (TRILEPTAL) 300 MG Tab; Take 1 tablet (300 mg total) by mouth 2 (two) times daily.  Dispense: 60 tablet; Refill: 2    5. Constipation, unspecified constipation type  -     docusate sodium (COLACE) 100 MG capsule; Take 1 capsule (100 mg total) by mouth 2 (two) times daily.  Dispense: 60 capsule; Refill: 2       PLAN  - well-controlled hypertension.  Continue same regimen, amlodipine 10 mg and lisinopril 40 mg. Coreg 3.125 mg bid, imdur   -well-controlled depression.  Continue with Lexapro 20 mg daily.  Also Trileptal helps with it.  -okay to refill Trileptal until she sees Neurology as scheduled.  No seizures for at least a year.  Continue with Lipitor 40 mg for history of stroke.  -start Colace for constipation, laxative is causing diarrhea.  Increase fibers.  Has colonoscopy next month for positive Cologuard.      Previous medical history/lab work/radiology reviewed and considered during medical management decisions.   Medication list reviewed and medication reconciliation performed.  Patient was provided  and care about his/her current diagnosis (es) and medications including risk/benefit and side effects/adverse events, over the counter medication uses/doses, home self-care and contact precautions,  and red flags and indications for when to seek immediate medical attention.   Patient was advised to continue compliance with current medication list and medical recommendations.  Recommended/ Advised continued compliance with recommended eating habits/ diets for medical conditions and exercise 150 minutes/ week (if possible) for medical condition (s).        RESULTS:  Recent Results (from the past 1008 hour(s))   TSH    Collection Time: 08/17/23  2:52 PM   Result Value Ref Range    Thyroid Stimulating Hormone 0.020 (L) 0.350 - 4.940 uIU/mL   Thyroglobulin    Collection Time: 08/17/23  2:52 PM   Result Value Ref Range    Thyroglobulin Antibody, S <1.8 <1.8 IU/mL    Thyroglobulin,  Tumor Marker, S 3.5 (H) ng/mL    Thyroglobulin Interpretation SEE COMMENTS    Hemoglobin A1C    Collection Time: 08/17/23  2:52 PM   Result Value Ref Range    Hemoglobin A1c 5.6 <=7.0 %    Estimated Average Glucose 114.0 mg/dL   T4, Free    Collection Time: 08/17/23  2:52 PM   Result Value Ref Range    Thyroxine Free 1.20 0.70 - 1.48 ng/dL         Follow Up:  Follow up in about 6 months (around 2/22/2024).     [unfilled]    This note was created with the assistance of a voice recognition software or phone dictation. There may be transcription errors as a result of using this technology however minimal. Effort has been made to assure accuracy of transcription but any obvious errors or omissions should be clarified with the author of the document

## 2023-08-23 DIAGNOSIS — I10 HYPERTENSION, UNSPECIFIED TYPE: ICD-10-CM

## 2023-08-23 RX ORDER — FAMOTIDINE 40 MG/1
40 TABLET, FILM COATED ORAL 2 TIMES DAILY
Qty: 60 TABLET | Refills: 3 | Status: CANCELLED | OUTPATIENT
Start: 2023-08-23

## 2023-08-24 ENCOUNTER — HOSPITAL ENCOUNTER (OUTPATIENT)
Dept: RADIOLOGY | Facility: HOSPITAL | Age: 68
Discharge: HOME | End: 2023-08-24
Attending: INTERNAL MEDICINE
Payer: MEDICARE

## 2023-08-24 DIAGNOSIS — C73 PAPILLARY MICROCARCINOMA OF THYROID: ICD-10-CM

## 2023-08-24 PROCEDURE — 76536 US EXAM OF HEAD AND NECK: CPT | Mod: TC

## 2023-08-25 RX ORDER — FAMOTIDINE 40 MG/1
40 TABLET, FILM COATED ORAL 2 TIMES DAILY
Qty: 60 TABLET | Refills: 6 | Status: SHIPPED | OUTPATIENT
Start: 2023-08-25

## 2023-08-28 NOTE — TELEPHONE ENCOUNTER
Tests are all done, please add pt to cancellation list to be seen sooner if she desires otherwise will discuss at her f/u appt as booked in 5-6 weeks.

## 2023-09-06 DIAGNOSIS — M06.00 SERONEGATIVE RHEUMATOID ARTHRITIS: ICD-10-CM

## 2023-09-06 RX ORDER — HYDROCODONE BITARTRATE AND ACETAMINOPHEN 10; 325 MG/1; MG/1
1 TABLET ORAL EVERY 12 HOURS PRN
Qty: 60 TABLET | Refills: 0 | Status: SHIPPED | OUTPATIENT
Start: 2023-09-06 | End: 2023-10-09 | Stop reason: SDUPTHER

## 2023-09-07 ENCOUNTER — PATIENT MESSAGE (OUTPATIENT)
Dept: RESEARCH | Facility: HOSPITAL | Age: 68
End: 2023-09-07
Payer: MEDICARE

## 2023-09-08 RX ORDER — LEVOTHYROXINE SODIUM 112 UG/1
112 TABLET ORAL
Qty: 90 TABLET | Refills: 0 | Status: SHIPPED | OUTPATIENT
Start: 2023-09-08 | End: 2024-03-06

## 2023-09-08 NOTE — TELEPHONE ENCOUNTER
Given recent TSH, reduced renewal to flat 112mcg po daily including Sundays, ie no extra half tab on Sundays.  Ok to notify pt while awaiting f/u.

## 2023-09-28 ENCOUNTER — TELEPHONE (OUTPATIENT)
Dept: GYNECOLOGY | Facility: CLINIC | Age: 68
End: 2023-09-28
Payer: MEDICARE

## 2023-09-28 NOTE — TELEPHONE ENCOUNTER
Please advise on scheduling? It would have to be overbooked so approved by Dr. Rodriguez/Dr. Godoy. Thank you.     ----- Message from Génesis Barroso sent at 9/28/2023 10:01 AM CDT -----  Above pt came to r/s her appt that was scheduled for yesterday because she had two appt around the same time and she couldn't miss the other because she just found out she has cancer. Next available is 02/2024. Pt states she cant wait that longer because he bladder has fallen down and she needs that pessary put in cause she can feel it when she use the restroom. Pt is requesting an sooner appt. Please advise,Thanks!

## 2023-10-09 DIAGNOSIS — M06.00 SERONEGATIVE RHEUMATOID ARTHRITIS: ICD-10-CM

## 2023-10-09 RX ORDER — HYDROCODONE BITARTRATE AND ACETAMINOPHEN 10; 325 MG/1; MG/1
1 TABLET ORAL EVERY 12 HOURS PRN
Qty: 60 TABLET | Refills: 0 | Status: SHIPPED | OUTPATIENT
Start: 2023-10-09

## 2023-10-10 ENCOUNTER — OFFICE VISIT (OUTPATIENT)
Dept: ENDOCRINOLOGY | Facility: CLINIC | Age: 68
End: 2023-10-10
Payer: MEDICARE

## 2023-10-10 VITALS
RESPIRATION RATE: 16 BRPM | WEIGHT: 214 LBS | HEART RATE: 58 BPM | BODY MASS INDEX: 33.59 KG/M2 | TEMPERATURE: 98 F | HEIGHT: 67 IN | SYSTOLIC BLOOD PRESSURE: 125 MMHG | DIASTOLIC BLOOD PRESSURE: 80 MMHG

## 2023-10-10 DIAGNOSIS — E04.2 MULTINODULAR GOITER: ICD-10-CM

## 2023-10-10 DIAGNOSIS — E89.0 POSTSURGICAL HYPOTHYROIDISM: ICD-10-CM

## 2023-10-10 DIAGNOSIS — C73 THYROID CANCER: Primary | ICD-10-CM

## 2023-10-10 PROCEDURE — 99215 OFFICE O/P EST HI 40 MIN: CPT | Mod: PBBFAC

## 2023-10-10 NOTE — PROGRESS NOTES
[facility] ENDOCRIN[facility]    Subjective     Patient ID: Laura Freeman is a 68 y.o. female.    Chief Complaint: Hypothyroidism    This is a 67 y.o. 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 She comes in today for postsurgical hypothyroidism.She denies subjective complaints. She has been gaining weight 8 lbs for the past 2-3 months, she said this was because she has been eating a lot lately. Her levothyroxine dose was recently decreased 4 weeks ago to 112 mcg OD.    Recent labs:  FT4 1.20 (8/2023)  Thyroglobulin Antibody <1.8 (8/2023)  Thyroglobulin, Tumor Marker 3.5 High (8/2023)  TSH 0.020 Low (8/2023)  A1c 5.6 (8/2023)    Recent imaging:  US Neck (8/2023)  The right thyroid has been surgically removed.  The left thyroid measures 4.1 x 1.1 x 1.6 cm.  No residual recurrent tissue seen in the right thyroid bed.  There is a hypoechoic nodule in the left lower thyroid that measures 5 mm x 3 mm x 6 mm.  This is stable since the prior examination.  There is a hypoechoic nodule in the left lower thyroid that measures 6 mm x 4.2 x 6 mm.  This is stable since prior examination.  No other left thyroid nodules are seen on today's examination.  Impression:  Stable nodule seen in the left lower thyroid  Status post right thyroidectomy    Meds:  Levothyroxine 112 mcg OD  Metformin 500 mg BID    Usual CBG at home       Review of Systems   Constitutional:  Negative for chills, fatigue and fever.   Respiratory:  Negative for cough and shortness of breath.    Cardiovascular:  Negative for chest pain and leg swelling.   Gastrointestinal:  Negative for  abdominal pain, nausea and vomiting.   Genitourinary:  Negative for dysuria.   Neurological: Negative.    Psychiatric/Behavioral: Negative.            Objective     Physical Exam  Constitutional:       General: She is not in acute distress.     Appearance: Normal appearance. She is not toxic-appearing.   HENT:      Head: Normocephalic and atraumatic.   Neck:      Comments: Prosthesis in place  Cardiovascular:      Rate and Rhythm: Normal rate and regular rhythm.   Pulmonary:      Breath sounds: Normal breath sounds. No wheezing or rhonchi.   Chest:      Chest wall: No tenderness.   Abdominal:      General: Abdomen is flat. Bowel sounds are normal.      Palpations: Abdomen is soft.      Tenderness: There is no abdominal tenderness.   Musculoskeletal:         General: No tenderness or deformity. Normal range of motion.      Cervical back: Normal range of motion and neck supple. No rigidity.   Skin:     General: Skin is warm and dry.   Neurological:      General: No focal deficit present.      Mental Status: She is alert and oriented to person, place, and time. Mental status is at baseline.            Assessment and Plan     1. 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 The left thyroid measures 4.1 x 1.1 x 1.6 cm.  No residual recurrent tissue seen in the right thyroid bed.  There is a hypoechoic nodule in the left lower thyroid that  measures 5 mm x 3 mm x 6 mm.  This is stable since the prior examination.  There is a hypoechoic nodule in the left lower thyroid that measures 6 mm x 4.2 x 6 mm.  This is stable since prior examination.   -Will repeat TSH on 10/31/23 to assess adequate response to recent levothyroxine dosage changes      2. Type 2 diabetes mellitus with diabetic neuropathy, without long-term current use of insulin  Assessment & Plan:  -A1c 5.6 (8/2023)  -Continue metformin 500 BID  -Lifestyle modifications discussed          FF-UP IN 4 MONTHS

## 2023-10-10 NOTE — ASSESSMENT & PLAN NOTE
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 The left thyroid measures 4.1 x 1.1 x 1.6 cm.  No residual recurrent tissue seen in the right thyroid bed.  There is a hypoechoic nodule in the left lower thyroid that measures 5 mm x 3 mm x 6 mm.  This is stable since the prior examination.  There is a hypoechoic nodule in the left lower thyroid that measures 6 mm x 4.2 x 6 mm.  This is stable since prior examination.   -Will repeat TSH on 10/31/23 to assess adequate response to recent levothyroxine dosage changes
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
## 2023-10-18 ENCOUNTER — OFFICE VISIT (OUTPATIENT)
Dept: NEUROLOGY | Facility: CLINIC | Age: 68
End: 2023-10-18
Payer: MEDICARE

## 2023-10-18 VITALS
BODY MASS INDEX: 34.47 KG/M2 | WEIGHT: 219.63 LBS | HEART RATE: 57 BPM | DIASTOLIC BLOOD PRESSURE: 63 MMHG | SYSTOLIC BLOOD PRESSURE: 119 MMHG | HEIGHT: 67 IN | TEMPERATURE: 98 F | OXYGEN SATURATION: 97 %

## 2023-10-18 DIAGNOSIS — M54.81 OCCIPITAL NEURALGIA, UNSPECIFIED LATERALITY: ICD-10-CM

## 2023-10-18 DIAGNOSIS — G40.909 SEIZURE DISORDER: Primary | ICD-10-CM

## 2023-10-18 PROCEDURE — 99204 PR OFFICE/OUTPT VISIT, NEW, LEVL IV, 45-59 MIN: ICD-10-PCS | Mod: S$PBB,,, | Performed by: PSYCHIATRY & NEUROLOGY

## 2023-10-18 PROCEDURE — 1159F MED LIST DOCD IN RCRD: CPT | Mod: CPTII,,, | Performed by: PSYCHIATRY & NEUROLOGY

## 2023-10-18 PROCEDURE — 1126F PR PAIN SEVERITY QUANTIFIED, NO PAIN PRESENT: ICD-10-PCS | Mod: CPTII,,, | Performed by: PSYCHIATRY & NEUROLOGY

## 2023-10-18 PROCEDURE — 3288F PR FALLS RISK ASSESSMENT DOCUMENTED: ICD-10-PCS | Mod: CPTII,,, | Performed by: PSYCHIATRY & NEUROLOGY

## 2023-10-18 PROCEDURE — 99204 OFFICE O/P NEW MOD 45 MIN: CPT | Mod: S$PBB,,, | Performed by: PSYCHIATRY & NEUROLOGY

## 2023-10-18 PROCEDURE — 3008F PR BODY MASS INDEX (BMI) DOCUMENTED: ICD-10-PCS | Mod: CPTII,,, | Performed by: PSYCHIATRY & NEUROLOGY

## 2023-10-18 PROCEDURE — 3008F BODY MASS INDEX DOCD: CPT | Mod: CPTII,,, | Performed by: PSYCHIATRY & NEUROLOGY

## 2023-10-18 PROCEDURE — 1101F PR PT FALLS ASSESS DOC 0-1 FALLS W/OUT INJ PAST YR: ICD-10-PCS | Mod: CPTII,,, | Performed by: PSYCHIATRY & NEUROLOGY

## 2023-10-18 PROCEDURE — 3074F SYST BP LT 130 MM HG: CPT | Mod: CPTII,,, | Performed by: PSYCHIATRY & NEUROLOGY

## 2023-10-18 PROCEDURE — 1159F PR MEDICATION LIST DOCUMENTED IN MEDICAL RECORD: ICD-10-PCS | Mod: CPTII,,, | Performed by: PSYCHIATRY & NEUROLOGY

## 2023-10-18 PROCEDURE — 1126F AMNT PAIN NOTED NONE PRSNT: CPT | Mod: CPTII,,, | Performed by: PSYCHIATRY & NEUROLOGY

## 2023-10-18 PROCEDURE — 99215 OFFICE O/P EST HI 40 MIN: CPT | Mod: PBBFAC | Performed by: PSYCHIATRY & NEUROLOGY

## 2023-10-18 PROCEDURE — 4010F PR ACE/ARB THEARPY RXD/TAKEN: ICD-10-PCS | Mod: CPTII,,, | Performed by: PSYCHIATRY & NEUROLOGY

## 2023-10-18 PROCEDURE — 4010F ACE/ARB THERAPY RXD/TAKEN: CPT | Mod: CPTII,,, | Performed by: PSYCHIATRY & NEUROLOGY

## 2023-10-18 PROCEDURE — 1101F PT FALLS ASSESS-DOCD LE1/YR: CPT | Mod: CPTII,,, | Performed by: PSYCHIATRY & NEUROLOGY

## 2023-10-18 PROCEDURE — 3078F PR MOST RECENT DIASTOLIC BLOOD PRESSURE < 80 MM HG: ICD-10-PCS | Mod: CPTII,,, | Performed by: PSYCHIATRY & NEUROLOGY

## 2023-10-18 PROCEDURE — 3074F PR MOST RECENT SYSTOLIC BLOOD PRESSURE < 130 MM HG: ICD-10-PCS | Mod: CPTII,,, | Performed by: PSYCHIATRY & NEUROLOGY

## 2023-10-18 PROCEDURE — 3078F DIAST BP <80 MM HG: CPT | Mod: CPTII,,, | Performed by: PSYCHIATRY & NEUROLOGY

## 2023-10-18 PROCEDURE — 3044F HG A1C LEVEL LT 7.0%: CPT | Mod: CPTII,,, | Performed by: PSYCHIATRY & NEUROLOGY

## 2023-10-18 PROCEDURE — 3044F PR MOST RECENT HEMOGLOBIN A1C LEVEL <7.0%: ICD-10-PCS | Mod: CPTII,,, | Performed by: PSYCHIATRY & NEUROLOGY

## 2023-10-18 PROCEDURE — 3288F FALL RISK ASSESSMENT DOCD: CPT | Mod: CPTII,,, | Performed by: PSYCHIATRY & NEUROLOGY

## 2023-10-18 RX ORDER — OXCARBAZEPINE 300 MG/1
300 TABLET, FILM COATED ORAL 2 TIMES DAILY
Qty: 60 TABLET | Refills: 2 | Status: SHIPPED | OUTPATIENT
Start: 2023-10-18 | End: 2024-02-02 | Stop reason: SDUPTHER

## 2023-10-23 ENCOUNTER — OFFICE VISIT (OUTPATIENT)
Dept: GYNECOLOGY | Facility: CLINIC | Age: 68
End: 2023-10-23
Payer: MEDICARE

## 2023-10-23 VITALS
RESPIRATION RATE: 20 BRPM | HEART RATE: 60 BPM | TEMPERATURE: 98 F | SYSTOLIC BLOOD PRESSURE: 125 MMHG | HEIGHT: 67 IN | BODY MASS INDEX: 33.9 KG/M2 | OXYGEN SATURATION: 97 % | DIASTOLIC BLOOD PRESSURE: 83 MMHG | WEIGHT: 216 LBS

## 2023-10-23 DIAGNOSIS — N81.9 FEMALE GENITAL PROLAPSE, UNSPECIFIED TYPE: ICD-10-CM

## 2023-10-23 DIAGNOSIS — N39.3 PRIMARY STRESS URINARY INCONTINENCE: Primary | ICD-10-CM

## 2023-10-23 PROCEDURE — 99213 OFFICE O/P EST LOW 20 MIN: CPT | Mod: PBBFAC

## 2023-10-23 NOTE — PROGRESS NOTES
"[facility] OB/GYN CLINIC NOTE  [facility]  [address] [place] [address]  Phone: [phone]  Fax: [phone]    Subjective:     Laura Freeman is a 68 y.o.  who presents for follow up of anterior compartment prolapse, stress urinary incontinence, and pessary fitting.    Last seen on 23 with documentation as below:  "Laura Freeman is a 68 y.o.  presenting to GYN clinic for pessary follow up.   Last seen  for pessary check, was wearing a ring with knob #5. Noted on exam to have erosions with friability on bilateral sidewalls towards vaginal apex. Pessary was left out and pt was rec to continue vaginal estrogen and rtc in 2 weeks for re-examine.  Reports no bleeding no abnormal discharge. Reports mild prolapse bothersome but not severe. Has been using the vaginal estrogen with the applicator. "   At that appointment correct sized pessaries were not in stock, patient was encouraged to continue using vaginal estrogen.    Today she reports she is ready to have pessary placed again. She feels like it helped with her anterior bulge symptoms and lessened her leakage episodes, which were both very bothersome to her. Reports since last seeing us, she saw a urogynecologist in [place] who told her she was not a good candidate for a surgical procedure. She has continued to use her vaginal estrogen 2-3 night per week. She has no new complaints.    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    T3N0M0 supraglottic laryngeal squamous cell carcinoma 2019    T3 N0 M0 squamous cell carcinoma of the supraglottic larynx diagnosed initially on 2019. Treated with total laryngectomy, bilateral selective neck dissections (levels 2 through 4), right hemithyroidectomy (with incidental " finding papillary thyroid microcarcinoma), primary TEP puncture with cricopharyngeal myotomy on August 12, 2019.  Treated with postoperative radiation therapy.    Type 2 diabetes mellitus with unspecified diabetic retinopathy without macular edema      SurgHx:   Past Surgical History:   Procedure Laterality Date    ceiol - cataract extraction and insertion of introcular lens      excision of papilloma      I&D of skin and subcutaneous tissue  05/31/2015    MULTIPLE TOOTH EXTRACTIONS Bilateral 09/06/2019    Full mouth extraction in preparation for radiation therapy.    PANENDOSCOPY N/A 07/08/2019    Initial diagnosis laryngeal squamous cell carcinoma.  Done in conjunction with an awake tracheostomy.    PERCUTANEOUS INSERTION OF GASTROSTOMY TUBE N/A 07/12/2019    SELECTIVE NECK DISSECTION Bilateral 08/12/2019    Done in conjunction with total laryngectomy for laryngeal squamous cell carcinoma.    THYROID LOBECTOMY Right 08/12/2019    Done in conjunction with total laryngectomy with incidental finding thyroid papillary microcarcinoma.    TOTAL LARYNGECTOMY N/A 08/12/2019    Total laryngectomy with bilateral selective neck dissections (levels 2 through 4) right hemithyroidectomy and primary tracheoesophageal puncture cricopharyngeal myotomy for treatment laryngeal squamous cell carcinoma.    TRACHEOSTOMY  07/08/2019    Done in conjunction with panendoscopy at initial diagnosis of laryngeal squamous cell carcinoma.    TRACHEOSTOMY TUBE CHANGE  07/14/2019    Done in conjunction with control of post-operative tracheostomy wound hemorrhage.     Medications:     Current Outpatient Medications:     amLODIPine (NORVASC) 10 MG tablet, Take 1 tablet (10 mg total) by mouth Daily., Disp: 90 tablet, Rfl: 1    aspirin 81 MG Chew, Take 81 mg by mouth once daily., Disp: , Rfl:     atorvastatin (LIPITOR) 40 MG tablet, Take 1 tablet (40 mg total) by mouth once daily., Disp: 90 tablet, Rfl: 1    blood sugar diagnostic Strp, To check BG 1  times daily, to use with insurance preferred meter, Disp: 100 strip, Rfl: 1    blood-glucose meter kit, To check BG 1 times daily, to use with insurance preferred meter, Disp: 1 each, Rfl: 1    carvediloL (COREG) 3.125 MG tablet, TAKE 1 TABLET(3.125 MG) BY MOUTH TWICE DAILY, Disp: 180 tablet, Rfl: 0    cyclobenzaprine (FLEXERIL) 10 MG tablet, Take 1 tablet (10 mg total) by mouth every evening., Disp: 30 tablet, Rfl: 5    diclofenac sodium (VOLTAREN) 1 % Gel, Apply 2 g topically 4 (four) times daily. PRN for pain, Disp: 100 g, Rfl: 0    docusate sodium (COLACE) 100 MG capsule, Take 1 capsule (100 mg total) by mouth 2 (two) times daily., Disp: 60 capsule, Rfl: 2    EScitalopram oxalate (LEXAPRO) 20 MG tablet, TAKE 1 TABLET(20 MG) BY MOUTH DAILY, Disp: 90 tablet, Rfl: 1    estradioL (ESTRACE) 0.01 % (0.1 mg/gram) vaginal cream, Place 0.5 g vaginally twice a week., Disp: , Rfl:     famotidine (PEPCID) 40 MG tablet, Take 1 tablet (40 mg total) by mouth 2 (two) times daily., Disp: 60 tablet, Rfl: 6    gabapentin (NEURONTIN) 400 MG capsule, See Instructions, TAKE 1 CAPSULE BY MOUTH THREE TIMES DAILY, # 90 cap(s), 2 Refill(s), Pharmacy: [id number], 174, cm, Height/Length Dosing, 10/24/21 10:52:00 CDT, 89, kg, Weight Dosing, 10/24/21 10:52:00 CDT, Disp: 90 capsule, Rfl: 5    HYDROcodone-acetaminophen (NORCO)  mg per tablet, Take 1 tablet by mouth every 12 (twelve) hours as needed for Pain., Disp: 60 tablet, Rfl: 0    hydrOXYchloroQUINE (PLAQUENIL) 200 mg tablet, Take 1 tablet (200 mg total) by mouth 2 (two) times daily. After food, Disp: 60 tablet, Rfl: 5    lancets Misc, Use qd, Disp: 100 each, Rfl: 3    levothyroxine (SYNTHROID) 112 MCG tablet, Take 1 tablet (112 mcg total) by mouth before breakfast., Disp: 90 tablet, Rfl: 0    lisinopriL (PRINIVIL,ZESTRIL) 40 MG tablet, TAKE 1 TABLET(40 MG) BY MOUTH EVERY DAY, Disp: 30 tablet, Rfl: 0    meclizine (ANTIVERT) 25 mg tablet, Take 25 mg by mouth as  needed., Disp: , Rfl:     metFORMIN (GLUCOPHAGE) 500 MG tablet, Take 1 tablet (500 mg total) by mouth 2 (two) times daily., Disp: 60 tablet, Rfl: 11    nitroGLYCERIN (NITROSTAT) 0.4 MG SL tablet, Place 0.4 mg under the tongue as needed., Disp: , Rfl:     ondansetron (ZOFRAN-ODT) 4 MG TbDL, Take 4 mg by mouth 3 (three) times daily., Disp: , Rfl:     OXcarbazepine (TRILEPTAL) 300 MG Tab, Take 1 tablet (300 mg total) by mouth 2 (two) times daily., Disp: 60 tablet, Rfl: 2    Current Facility-Administered Medications:     phenylephrine HCL 1% nasal spray 1 spray, 1 spray, Each Nostril, 1 time in Clinic/HOD, Ivana Carrillo FNP    FM Hx: Denies hx of ovarian, uterine, endometrial, or colon cancer. Denies history of bleeding or coagulation disorders.  Family History   Problem Relation Age of Onset    Hypertension Mother     Diabetes Mellitus Mother     Heart disease Mother     Diabetes Mellitus Sister     Hypertension Sister      Social Hx: Denies tobacco, alcohol and illicit drug usage.  Social History     Socioeconomic History    Marital status: Other   Tobacco Use    Smoking status: Former     Types: Cigarettes     Passive exposure: Past    Smokeless tobacco: Never   Substance and Sexual Activity    Alcohol use: Yes     Comment: occassionally    Drug use: Never    Sexual activity: Not Currently   Social History Narrative    ** Merged History Encounter **          Social Determinants of Health     Financial Resource Strain: Low Risk  (6/16/2022)    Overall Financial Resource Strain (CARDIA)     Difficulty of Paying Living Expenses: Not hard at all   Food Insecurity: No Food Insecurity (6/16/2022)    Hunger Vital Sign     Worried About Running Out of Food in the Last Year: Never true     Ran Out of Food in the Last Year: Never true   Transportation Needs: No Transportation Needs (6/16/2022)    PRAPARE - Transportation     Lack of Transportation (Medical): No     Lack of Transportation (Non-Medical): No   Physical Activity:  "Insufficiently Active (6/16/2022)    Exercise Vital Sign     Days of Exercise per Week: 3 days     Minutes of Exercise per Session: 30 min   Stress: No Stress Concern Present (6/16/2022)    [employer] [employer] [place] [employer] - Occupational Stress Questionnaire     Feeling of Stress : Not at all   Social Connections: Moderately Integrated (6/16/2022)    Social Connection and Isolation Panel [NHANES]     Frequency of Communication with Friends and Family: Three times a week     Frequency of Social Gatherings with Friends and Family: Three times a week     Attends Nondenominational Services: More than 4 times per year     Active Member of Clubs or Organizations: Yes     Attends Club or Organization Meetings: 1 to 4 times per year     Marital Status:    Housing Stability: Low Risk  (6/16/2022)    Housing Stability Vital Sign     Unable to Pay for Housing in the Last Year: No     Number of Places Lived in the Last Year: 1     Unstable Housing in the Last Year: No     Review of Systems  Denies fevers, chills, headache, blurry vision, nausea, vomiting, dizziness, or syncope.   Denies chest pain, shortness of breath, RUQ pain, or calf pain.    Objective:     Vitals:    10/23/23 0859   BP: 125/83   BP Location: Right arm   Patient Position: Sitting   BP Method: Large (Automatic)   Pulse: 60   Resp: 20   Temp: 98.2 °F (36.8 °C)   TempSrc: Oral   SpO2: 97%   Weight: 98 kg (216 lb)   Height: 5' 7" (1.702 m)     Body mass index is 33.83 kg/m².    Physical Exam:     General: alert and oriented, in no acute distress  Lungs: clear to auscultation bilaterally, no conversational dyspnea  Heart: regular rate and rhythm  Abdomen: Soft, non-distended, non tender to palpation, no involuntary guarding, no rebound tenderness  Extremities: Normal, atraumatic, non-edematous, No cords or calf tenderness, No significant calf/ankle edema  External genitalia: Normal postmenopausal female genitalia without lesion, discharge or tenderness. " Normal appearing urethral meatus. Normal appearing external anus.  Bimanual Exam: No pelvic lymphadenopathy noted bilaterally. Vagina with adequate capacity, >3cm under pubic arch. Uterus 8cm in size, no cervical motion tenderness. Good descent of uterus, >residential down vagina with fundal pressure, mobile. No adnexal fullness/tenderness. Normal urethra. Normal bladder.   Speculum Exam: Vaginal mucosa normal in appearance. Pale pink. No masses/lesions. With valsalva, anterior wall of vagina descends to <1cm from introitus. No leakage of urine with cough/Valsalva. Cervix well visualized, smooth in contour no masses or lesions. Os normal in appearance, no blood or discharge coming from the os    Note: RN chaperone present for entirety of exam.     Imaging  No images are attached to the encounter.  Assessment/Plan:    Laura Freeman is a 68 y.o.  with stage II anterior prolapse and stress urinary incontinence.    Ring #3 with incontinence knob (2 1/2 inch) fitted and placed. Patient able to urinate.   Erosions no longer present, exam benign today. She will return in 4 weeks for repeat exam.   Encouraged to continue using vaginal estrogen 2-3 nights per week.   If still leaking next time consider PFPT.  RTC in 4 weeks.    Future Appointments   Date Time Provider Department Center   10/31/2023  1:30 PM RESIDENT 3, [facility] OTORHINOLARYNGOLOGY [facility] ENT [place]    2023  9:30 AM EEG, [facility] EEG [place]    2023  9:45 AM RESIDENTS, [facility] GYN [facility] GYN [place]    2023 11:00 AM Nighat Dao, [facility]   2024  9:45 AM Arianna Ruiz MD [facility] NEURO [place]   2024  1:30 PM Bouchra Treadwell MD [facility] FAMMED [place]    2024  1:30 PM Nette Last ANP [facility] GYN [place]    2024  8:20 AM RESIDENTS, [facility] ENDOCRINOLOGY [facility] ENDOCR [place] [facility]         Patient and plan were discussed with Dr. Godoy.    Leesa Crockett MD  [employer] [facility] PGY4

## 2023-10-24 ENCOUNTER — OFFICE VISIT (OUTPATIENT)
Dept: FAMILY MEDICINE | Facility: CLINIC | Age: 68
End: 2023-10-24
Payer: MEDICARE

## 2023-10-24 VITALS
DIASTOLIC BLOOD PRESSURE: 75 MMHG | TEMPERATURE: 99 F | OXYGEN SATURATION: 97 % | BODY MASS INDEX: 34.37 KG/M2 | WEIGHT: 219 LBS | RESPIRATION RATE: 18 BRPM | SYSTOLIC BLOOD PRESSURE: 115 MMHG | HEART RATE: 60 BPM | HEIGHT: 67 IN

## 2023-10-24 DIAGNOSIS — D84.821 DRUG-INDUCED IMMUNODEFICIENCY: ICD-10-CM

## 2023-10-24 DIAGNOSIS — E11.40 TYPE 2 DIABETES MELLITUS WITH DIABETIC NEUROPATHY, WITHOUT LONG-TERM CURRENT USE OF INSULIN: ICD-10-CM

## 2023-10-24 DIAGNOSIS — I10 HYPERTENSION, UNSPECIFIED TYPE: ICD-10-CM

## 2023-10-24 DIAGNOSIS — Z23 IMMUNIZATION DUE: ICD-10-CM

## 2023-10-24 DIAGNOSIS — Z01.818 PRE-OPERATIVE CLEARANCE: Primary | ICD-10-CM

## 2023-10-24 DIAGNOSIS — Z79.899 DRUG-INDUCED IMMUNODEFICIENCY: ICD-10-CM

## 2023-10-24 DIAGNOSIS — Z93.0 PRESENCE OF TRACHEOSTOMY: ICD-10-CM

## 2023-10-24 LAB
ANION GAP SERPL CALC-SCNC: 9 MEQ/L
BUN SERPL-MCNC: 14.6 MG/DL (ref 9.8–20.1)
CALCIUM SERPL-MCNC: 9.8 MG/DL (ref 8.4–10.2)
CHLORIDE SERPL-SCNC: 105 MMOL/L (ref 98–107)
CO2 SERPL-SCNC: 26 MMOL/L (ref 23–31)
CREAT SERPL-MCNC: 0.91 MG/DL (ref 0.55–1.02)
CREAT/UREA NIT SERPL: 16
GFR SERPLBLD CREATININE-BSD FMLA CKD-EPI: >60 MLS/MIN/1.73/M2
GLUCOSE SERPL-MCNC: 59 MG/DL (ref 82–115)
POTASSIUM SERPL-SCNC: 5 MMOL/L (ref 3.5–5.1)
SODIUM SERPL-SCNC: 140 MMOL/L (ref 136–145)

## 2023-10-24 PROCEDURE — 90694 VACC AIIV4 NO PRSRV 0.5ML IM: CPT | Mod: PBBFAC

## 2023-10-24 PROCEDURE — 99214 OFFICE O/P EST MOD 30 MIN: CPT | Mod: S$PBB,,, | Performed by: FAMILY MEDICINE

## 2023-10-24 PROCEDURE — 80048 BASIC METABOLIC PNL TOTAL CA: CPT | Performed by: FAMILY MEDICINE

## 2023-10-24 PROCEDURE — 99215 OFFICE O/P EST HI 40 MIN: CPT | Mod: PBBFAC,25 | Performed by: FAMILY MEDICINE

## 2023-10-24 PROCEDURE — 1101F PR PT FALLS ASSESS DOC 0-1 FALLS W/OUT INJ PAST YR: ICD-10-PCS | Mod: CPTII,,, | Performed by: FAMILY MEDICINE

## 2023-10-24 PROCEDURE — 1160F RVW MEDS BY RX/DR IN RCRD: CPT | Mod: CPTII,,, | Performed by: FAMILY MEDICINE

## 2023-10-24 PROCEDURE — 1159F PR MEDICATION LIST DOCUMENTED IN MEDICAL RECORD: ICD-10-PCS | Mod: CPTII,,, | Performed by: FAMILY MEDICINE

## 2023-10-24 PROCEDURE — 3074F SYST BP LT 130 MM HG: CPT | Mod: CPTII,,, | Performed by: FAMILY MEDICINE

## 2023-10-24 PROCEDURE — 3074F PR MOST RECENT SYSTOLIC BLOOD PRESSURE < 130 MM HG: ICD-10-PCS | Mod: CPTII,,, | Performed by: FAMILY MEDICINE

## 2023-10-24 PROCEDURE — 3044F HG A1C LEVEL LT 7.0%: CPT | Mod: CPTII,,, | Performed by: FAMILY MEDICINE

## 2023-10-24 PROCEDURE — 3044F PR MOST RECENT HEMOGLOBIN A1C LEVEL <7.0%: ICD-10-PCS | Mod: CPTII,,, | Performed by: FAMILY MEDICINE

## 2023-10-24 PROCEDURE — 4010F PR ACE/ARB THEARPY RXD/TAKEN: ICD-10-PCS | Mod: CPTII,,, | Performed by: FAMILY MEDICINE

## 2023-10-24 PROCEDURE — 1160F PR REVIEW ALL MEDS BY PRESCRIBER/CLIN PHARMACIST DOCUMENTED: ICD-10-PCS | Mod: CPTII,,, | Performed by: FAMILY MEDICINE

## 2023-10-24 PROCEDURE — 3288F PR FALLS RISK ASSESSMENT DOCUMENTED: ICD-10-PCS | Mod: CPTII,,, | Performed by: FAMILY MEDICINE

## 2023-10-24 PROCEDURE — 3078F PR MOST RECENT DIASTOLIC BLOOD PRESSURE < 80 MM HG: ICD-10-PCS | Mod: CPTII,,, | Performed by: FAMILY MEDICINE

## 2023-10-24 PROCEDURE — 93005 ELECTROCARDIOGRAM TRACING: CPT

## 2023-10-24 PROCEDURE — 3008F PR BODY MASS INDEX (BMI) DOCUMENTED: ICD-10-PCS | Mod: CPTII,,, | Performed by: FAMILY MEDICINE

## 2023-10-24 PROCEDURE — 99214 PR OFFICE/OUTPT VISIT, EST, LEVL IV, 30-39 MIN: ICD-10-PCS | Mod: S$PBB,,, | Performed by: FAMILY MEDICINE

## 2023-10-24 PROCEDURE — 36415 COLL VENOUS BLD VENIPUNCTURE: CPT | Performed by: FAMILY MEDICINE

## 2023-10-24 PROCEDURE — 1159F MED LIST DOCD IN RCRD: CPT | Mod: CPTII,,, | Performed by: FAMILY MEDICINE

## 2023-10-24 PROCEDURE — 1101F PT FALLS ASSESS-DOCD LE1/YR: CPT | Mod: CPTII,,, | Performed by: FAMILY MEDICINE

## 2023-10-24 PROCEDURE — 4010F ACE/ARB THERAPY RXD/TAKEN: CPT | Mod: CPTII,,, | Performed by: FAMILY MEDICINE

## 2023-10-24 PROCEDURE — 3288F FALL RISK ASSESSMENT DOCD: CPT | Mod: CPTII,,, | Performed by: FAMILY MEDICINE

## 2023-10-24 PROCEDURE — G0008 ADMIN INFLUENZA VIRUS VAC: HCPCS | Mod: PBBFAC

## 2023-10-24 PROCEDURE — 3008F BODY MASS INDEX DOCD: CPT | Mod: CPTII,,, | Performed by: FAMILY MEDICINE

## 2023-10-24 PROCEDURE — 3078F DIAST BP <80 MM HG: CPT | Mod: CPTII,,, | Performed by: FAMILY MEDICINE

## 2023-10-24 RX ADMIN — INFLUENZA A VIRUS A/VICTORIA/4897/2022 IVR-238 (H1N1) ANTIGEN (FORMALDEHYDE INACTIVATED), INFLUENZA A VIRUS A/DARWIN/6/2021 IVR-227 (H3N2) ANTIGEN (FORMALDEHYDE INACTIVATED), INFLUENZA B VIRUS B/AUSTRIA/1359417/2021 BVR-26 ANTIGEN (FORMALDEHYDE INACTIVATED), INFLUENZA B VIRUS B/PHUKET/3073/2013 BVR-1B ANTIGEN (FORMALDEHYDE INACTIVATED) 0.5 ML: 15; 15; 15; 15 INJECTION, SUSPENSION INTRAMUSCULAR at 10:10

## 2023-10-24 NOTE — PROGRESS NOTES
Patient Name: Laura Freeman   : 1955  MRN: [id number]     SUBJECTIVE:  Laura Freeman is a 68 y.o. female here for surgery clearance  .    HPI  Here for preop clearance for ptosis repair surgery scheduled for 2023 with IV sedation/general.    HTN well controlled. At home good readings. Compliant. Taking amlodipine and lisinopril 40 mg. Coreg 3.125 mg bid. Has not seen cardiology in a while, maybe a year or so. Saw them for chest pain at that time and had stress test at that time and was told it was normal. No medical records though. Was told to use nitro as needed but has not used any in many months.  No sizures since a year and a half again. Seeing neurology.  Metformin 500 mg bid, a1c 5.6%. well controlled. Normal readings ad well.  TEP in place, following with ENT/endocrinology.  Also sees rheumatology for RA.   Had colonoscopy without complications for positive cologuard. No malignancy, had polypectomy.    Risk/complexity of surgery:   ___ High risk (> 5% MI risk): cardiac and aortic and peripheral vascular surgery   ___ Intermediate risk (1-5% MI risk): head and neck (including CEA), intraperitoneal, intrathoracic, orthopedic, prostate   _x_ Low risk (1% MI risk): endoscopic procedures, cataract surgery, breast surgery     Current medical problems: Is EKG needed?   ___ Active CVD   ___ Creatinine > 2.0   ___ DM on insulin   ___ QT prolonging drugs   ___ Hx of abnormal EKG   __x_ HTN   No active CVD, creatinine > 2.0, DM on insulin - therefore no ECG required, but will have one since it has been a while since last checked    Functional status: . 4 METs defined as can do light housework (dishwashing), can walk two blocks on level ground, can climb a flight of stairs, walk up a hill, or run     Hemostasis: No blood thinner use   _x__ Patient does use aspirin   ___  Hx of MI or PCI - discontinuing results in 3x risk if previous MI, 90x risk if previous PCI       Pregnancy: No   History of anesthesia  "problems: No   Smoking status: former smoker   Social support: Yes    Revised cardiac index:   _x__ History of CVA   ___ CHF   ___ Creatinine > 2.0   ___ DM on insulin   ___ Ischemic heart disease   ___ Suprainguinal vascular, intrathoracic, or intra-abdominal surgical site     Total:   0 - 0.4% risk perioperative cardiac event   1 - 0.9% risk perioperative cardiac event   2 - 6.6% risk perioperative cardiac event   3 - 11% risk perioperative cardiac event, perioperative beta blocker for four weeks prior to surgery would be beneficial   4 - 11% risk perioperative cardiac event, perioperative beta blocker for four weeks prior to surgery would be beneficial   5 - 11% risk perioperative cardiac event, perioperative beta blocker for four weeks prior to surgery would be beneficial     Medications:   Stop aspirin 5-7 days before surgery.  Hold metformin 48 hours before surgery    Risk for surgical site infection secondary to:   ___ Smoking   _x__ Diabetes    ___ Obesity   ___ Malnutrition   ___ Chronic skin disease       ALLERGIES: Review of patient's allergies indicates:  No Known Allergies      ROS:  Review of Systems   Constitutional:  Negative for chills and fever.   HENT:  Negative for congestion.    Eyes:  Negative for blurred vision.   Respiratory:  Negative for cough and shortness of breath.    Cardiovascular:  Negative for chest pain, palpitations and leg swelling.   Gastrointestinal:  Negative for abdominal pain, blood in stool, diarrhea, nausea and vomiting.   Genitourinary:  Negative for dysuria and hematuria.   Neurological:  Negative for dizziness and headaches.   Psychiatric/Behavioral:  Negative for depression. The patient is not nervous/anxious.          OBJECTIVE:  Vital signs  Vitals:    10/24/23 0911   BP: 115/75   Pulse: 60   Resp: 18   Temp: 98.7 °F (37.1 °C)   TempSrc: Oral   SpO2: 97%   Weight: 99.3 kg (219 lb)   Height: 5' 7" (1.702 m)      Body mass index is 34.3 kg/m².    PHYSICAL EXAM: "   Physical Exam  Vitals reviewed.   Constitutional:       General: She is not in acute distress.     Appearance: Normal appearance. She is obese. She is not ill-appearing.   HENT:      Head: Normocephalic and atraumatic.      Right Ear: External ear normal.      Left Ear: External ear normal.      Nose: Nose normal.      Mouth/Throat:      Mouth: Mucous membranes are moist.   Eyes:      General: No scleral icterus.        Right eye: No discharge.         Left eye: No discharge.      Conjunctiva/sclera: Conjunctivae normal.      Pupils: Pupils are equal, round, and reactive to light.   Neck:      Comments: TEP in place  Cardiovascular:      Rate and Rhythm: Normal rate and regular rhythm.   Pulmonary:      Effort: Pulmonary effort is normal. No respiratory distress.      Breath sounds: No wheezing, rhonchi or rales.   Abdominal:      General: Bowel sounds are normal. There is no distension.      Palpations: Abdomen is soft.      Tenderness: There is no abdominal tenderness.   Musculoskeletal:         General: Normal range of motion.      Cervical back: Normal range of motion and neck supple. No rigidity or tenderness.      Right lower leg: No edema.      Left lower leg: No edema.   Skin:     General: Skin is warm.      Findings: No rash.   Neurological:      General: No focal deficit present.      Mental Status: She is alert and oriented to person, place, and time.   Psychiatric:         Mood and Affect: Mood normal.         Behavior: Behavior normal.          ASSESSMENT/PLAN:  1. Pre-operative clearance  Low cardiac risk for low risk procedure. Cleared for surgery.  Hold metformin 48 hours before surgery. Hold aspirin 5-7 days before the surgery.  EKG in the office with no acute changes.   BMP collected for updated creatinine but no previous issues.  -     IN OFFICE EKG 12-LEAD (to [facility])  -     Basic Metabolic Panel    2. Type 2 diabetes mellitus with diabetic neuropathy, without long-term current use of  insulin  Well controlled. Trial of decreasing to once a day metformin 500 mg. Recheck A1C before next visit  -     Basic Metabolic Panel  -     Hemoglobin A1C; Future; Expected date: 10/24/2023    3. Hypertension, unspecified type  Well controlled.  Continue with amlodipine and lisinopril 40 mg. Coreg 3.125 mg bid  -     Basic Metabolic Panel    4. Presence of tracheostomy  TEP in place. No issues. Follow up with ENT, Endocrinology    5. Drug-induced immunodeficiency  On plaquenil. Follows with rheumatology             Previous medical history/lab work/radiology reviewed and considered during medical management decisions.   Medication list reviewed and medication reconciliation performed.  Patient was provided  and care about his/her current diagnosis (es) and medications including risk/benefit and side effects/adverse events, over the counter medication uses/doses, home self-care and contact precautions,  and red flags and indications for when to seek immediate medical attention.   Patient was advised to continue compliance with current medication list and medical recommendations.  Recommended/ Advised continued compliance with recommended eating habits/ diets for medical conditions and exercise 150 minutes/ week (if possible) for medical condition (s).        RESULTS:  Recent Results (from the past 1008 hour(s))   POCT GLUCOSE    Collection Time: 09/27/23  9:36 AM   Result Value Ref Range    POC Glucose 92 70 - 100 mg/dL         Follow Up:  Follow up for as scheduled.     [unfilled]    This note was created with the assistance of a voice recognition software or phone dictation. There may be transcription errors as a result of using this technology however minimal. Effort has been made to assure accuracy of transcription but any obvious errors or omissions should be clarified with the author of the document

## 2023-10-25 ENCOUNTER — TELEPHONE (OUTPATIENT)
Dept: FAMILY MEDICINE | Facility: CLINIC | Age: 68
End: 2023-10-25
Payer: MEDICARE

## 2023-10-25 NOTE — PROGRESS NOTES
Normal blood work except for low blood sugar level.  Patient was fasting.  Please remind her to start taking metformin only once a day as discussed in the office.  Thanks

## 2023-10-25 NOTE — TELEPHONE ENCOUNTER
Patient informed normal blood work except for low blood sugar level.  Patient was fasting.  Reminded patient to start taking metformin only once a day as discussed in the office.  Patient voiced understanding.

## 2023-10-31 ENCOUNTER — OFFICE VISIT (OUTPATIENT)
Dept: OTOLARYNGOLOGY | Facility: CLINIC | Age: 68
End: 2023-10-31
Payer: MEDICARE

## 2023-10-31 VITALS
HEART RATE: 57 BPM | HEIGHT: 67 IN | WEIGHT: 220.38 LBS | DIASTOLIC BLOOD PRESSURE: 83 MMHG | SYSTOLIC BLOOD PRESSURE: 130 MMHG | OXYGEN SATURATION: 97 % | BODY MASS INDEX: 34.59 KG/M2 | RESPIRATION RATE: 18 BRPM | TEMPERATURE: 99 F

## 2023-10-31 DIAGNOSIS — C32.9 LARYNGEAL SQUAMOUS CELL CARCINOMA: Primary | ICD-10-CM

## 2023-10-31 PROCEDURE — 99215 OFFICE O/P EST HI 40 MIN: CPT | Mod: PBBFAC,25 | Performed by: STUDENT IN AN ORGANIZED HEALTH CARE EDUCATION/TRAINING PROGRAM

## 2023-10-31 PROCEDURE — 31575 DIAGNOSTIC LARYNGOSCOPY: CPT | Mod: PBBFAC | Performed by: STUDENT IN AN ORGANIZED HEALTH CARE EDUCATION/TRAINING PROGRAM

## 2023-10-31 NOTE — PROGRESS NOTES
Patient Name: Laura Freeman   YOB: 1955     Chief Complaint   Patient presents with    Follow-up        History of Present Illness:  This is a 64-year-old female presenting from Dr. Dennis for evaluation of laryngeal mass. Patient notes she has had hoarseness starting shortly before Ever, dysphagia since evaluated with an EGD and MBSS (showing aspirations), 1 month of sore throat that has been treated with antibiotics. She endorses some weight loss however, denies any dyspnea. She has not noted any lymphadenopathy, otalgia, or odynophagia.    7/24/19: Here for first follow up after admission for awake trach, panendoscopy. 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  Recommendation:  - TB cites likely inferior outcomes (functional and oncologic) with CRT vs TL, thus TL is best option  - TB recommends continued education to pt and family regarding life after laryngectomy, as well as likely poor functional outcome with CRT (aspiration, trach and peg dependence, npo status)  - However if pt is completely against surgery, should offer CRT for treatment    Has been doing ok at home. Had one day she went to ER with plugged inner cannula.    7/31/19: Here after meeting with ST and a laryngectomy patient, would like to proceed with surgery. No issues at home since last visit. No changes in medical history.    8/12/19: Underwent TL, BND, CP myotomy, R thyroid lobectomy and TEP.    8/27/19: Here for first follow up since discharge. Still taking liquids PO, no issues at home. Has ST appointment scheduled for Sept 3rd. Has right UE weakness and pain but mobility is ok. Doing well with stoma care.  [facility] TB Recs:  Recommendations: NCCN guidelines state consider adjuvant XRT  - Will plan to discuss risks/benefits of radiation with patient in ENT follow  - Will do same in Radiation Oncology clinic    9/4/19: Here for follow up and also for follow-up after presenting to ED last night with concerns for nodule  below stoma. Has 1 pill of her antibiotic given at last visit left. She reports that the swelling appeared suddenly yesterday. Is very tender on palpation. causes pain extending toward her right shoulder [1]    9/11/19: hospitalization on 9/4 s/p I&D of right clavicular head and teeth extraction. Starting radiation on Thursday. Otherwise no issues. [1]    10/16/2019: Recent hospitalization for TIA sx w/o any sequela. MRI, echo pending. Discharged on levo and doxy for right clavicular head drainage. patient hasn't taken any. Notes still have lots of blood secretions but not any now. Eating per mouth also supplementing thru G-tube [1]    11/19/19: Pt here for follow up. She has completed CRT 2 weeks ago. Eating and drinking well. Maintaining weight. Feels her right sternoclavicular joint is unchanged. Finished antibiotics over 2 weeks ago. Feels it has not changed since finishing antibiotics, no drainage. Per patient finished all chemo and radiation treatments.    12/24/19: No c/o today. Sternoclavicular joint has resolved. Tolerating PO intake and maintaining weight. She would like to have PEG removed, states she has not used it in over 2 months. [1]    1/28/20: Here for follow up. PET ordered today by Dr. Kmi. No dyspnea, dysphagia. noting shoulder stiffness/pain. Doing home PT exercises. Notes some numbness underneath chin. Got PEG removed by GS today. [1]    3/5/20: Here for cancer surveaillcen. Post-treatment PET done 2/2020 with some hypermetabolic activity in nasopharynx, no eviednce of persistence/recurrence in neck. No distant mets. Denies dysphagia. Weight stable. Denies dyspnea. Voicing well with TEP.    5/26/20: Here for cancer surveillance. No weight loss, dysphagia, odynophagia. Reports that the lymphedema machine was denied per a letter she received. She is reporting severe pain to bilateral cheeks and upper neck? Voicing with TEP, no issues.    7/28/20: Denies dysphagia/odyophagia/otalgia. Reports  "weight loss ~ 10lbs since last visit bc she does not have much of an appetite, but has begun drinking ensure to keep calories up. Frustrated that her ear lobes have paresthesias and she ripped her earrings out. Requesting additional norco. C/o intermittent tight pulling sensation in bilateral neck muscles that makes her feel as though she is choking. Reports taking a few days of leftover abx rx 2 weeks ago bc her secretions had turned green and she was afraid to go to the ED. Denies having fever, but reports she got a little SOB. Secretions are now clear.     9/29/2020: Presenting in follow-up for cancer surveillance. She denies any dysphasia, odynophagia, otalgia or unexpected weight loss at this time. She notes she has a knot on her left neck close to her stoma that is nontender. She noticed this about month and a half ago. 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  A CT scan of the neck showed the presence of a 1.9 cm left thyroid nodule with an interval increase in size. No cervical adenopathy was noted. Patient is to undergo needle biopsy of this lesion today.  Thyroid function studies done on September 29, 2020, showed her to be hypothyroid with a TSH level of 15.190 and a normal free T4 0.76. Previously the patient had been given a prescription for levothyroxine 75 mcg daily but this had never been filled and the patient had not been taking that medication. Patient does admit to some fatigue but she does not feel "bad". She does not have any other new complaints today.    11/11/20: Doing well. S/p FNA of left thyroid nodule 10/29/20. Path came back that sample was insufficient for diagnostic evaluation. Reports compliance with " levothyroxine.    1/7/21: Doing well. No complaints at this time. No weight changes, dysphagia, odynophagia, otalgia, new neck masses.    3/11/21: Here today for possible FNA of nodule, however when using ultrasound no concerning nodules seen to biopsy. Patient had adjustment of Synthroid at last visit, TSH elevated in January. Due for surveillance appointment.    5/6/21: Here today for cancer surveillance. Reports significant tenderness to right neck starting 1-2 weeks ago but no associated mass or swelling. She has tried her lortab but this has not helped. No issues with PO intake. Weight is stable. No dysphagia, odynophagia, otalgia.    6-16-21: Follow-up for cancer surveillance, no current complaints including no palpable neck mass or tenderness on either side, no blood from the trachea or blood from the mouth. Her weight is remaining stable and she is tolerating p.o. well. She overall functioning well and appears happy.    8/18/21: here today for surveillance. No complaints overall; a little bit of generic fullness over the left angle of mandible but nothing discrete. Tolerating PO well. No odynophagia, dysphagia, weight changes, otalgia, difficulty speaking via alaryngeal voice.    10/19/2021: Pt is here for surveillance with hx of right thyroid lobectomy and FNA of 2.0 cm left thyroid nodule. Path came back that sample was insufficient for diagnostic evaluation 10/2020. She states that she has been doing well. Pt says that her only complaint is inability to taste food every once and a while. No current complaints    1/26/2022: Follow-up for cancer surveillance, currently tolerating diet well, without neck swelling, pain, hemoptysis from the trach or new areas of swelling or tenderness in her neck or in her throat. Denies otalgia. CT of chest and neck unremarkable for recurrence or metastatic disease. Makes note of diminished thyroid nodule. Patient followed by Dr. Golden in endocrine for monitoring thyroid  function and markers.     4/26/2022: Patient presenting in follow-up. She is doing well with her diet. She continues to note some tightness around her neck for which she is doing stretches and she does find these helpful. She denies any odynophagia, dysphagia, otalgia. She notes her weight does tend to waver and endocrinology has been fixing her thyroid medication dosage recently. Most recently she did have some lab work done to measure her thyroid function.    June 28, 2022: 67-year-old female presents today for follow-up of her supraglottic laryngeal squamous cell carcinoma and her thyroid papillary microcarcinoma.  Patient is doing well at this time.  She has no complaints of any difficulty swallowing, neck pain, ear pain, or hemoptysis.  The area of swelling on the left side of her neck has not changed.  Endocrinology is following her for management of her postoperative hypothyroidism and thyroid carcinoma.  She has no other new complaints today. Of note she is scheduled to have a follow-up thyroid ultrasound and ultrasound of the neck in October 2022.    9/28/22: Here for follow up. Patient reports no issues. Denies ear pain. Stable neck stiffness. No new lumps/bumps, swelling on neck is stable. Swallowing fine, no weight loss. Recent thyroid US unremarkable.  No complaints.     1/30/23: Doing well. Denies dysphagia, dysphonia, SOB. States she gained weight over the holidays. She began having some pain in left neck a few days ago and has been stretching and using a muscle rub. This has helped some.     5/30/23: Here today for cancer surveillance. Reports she is doing well overall. Denies any dysphagia, odynophagia, new palpable lumps/bumps, no SOB. Wearing HME over akhil tube, no issues with TEP leaking.     10/31/2023: Patient here today for follow up.  Patient is overall doing very well today.  No lumps or bumps of the head or neck, dysphagia, issues with TEP, shortness of breath.  No further complaints.  Following with endocrine for thyroid.    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    T3N0M0 supraglottic laryngeal squamous cell carcinoma 07/08/2019    T3 N0 M0 squamous cell carcinoma of the supraglottic larynx diagnosed initially on July 8, 2019. Treated with total laryngectomy, bilateral selective neck dissections (levels 2 through 4), right hemithyroidectomy (with incidental finding papillary thyroid microcarcinoma), primary TEP puncture with cricopharyngeal myotomy on August 12, 2019.  Treated with postoperative radiation therapy.    Type 2 diabetes mellitus with unspecified diabetic retinopathy without macular edema      Past Surgical History:   Procedure Laterality Date    ceiol - cataract extraction and insertion of introcular lens      excision of papilloma      I&D of skin and subcutaneous tissue  05/31/2015    MULTIPLE TOOTH EXTRACTIONS Bilateral 09/06/2019    Full mouth extraction in preparation for radiation therapy.    PANENDOSCOPY N/A 07/08/2019    Initial diagnosis laryngeal squamous cell carcinoma.  Done in conjunction with an awake tracheostomy.    PERCUTANEOUS INSERTION OF GASTROSTOMY TUBE N/A 07/12/2019    SELECTIVE NECK DISSECTION Bilateral 08/12/2019    Done in conjunction with total laryngectomy for laryngeal squamous cell carcinoma.    THYROID LOBECTOMY Right 08/12/2019    Done in conjunction with total laryngectomy with incidental finding thyroid papillary microcarcinoma.    TOTAL LARYNGECTOMY N/A 08/12/2019    Total laryngectomy with bilateral selective neck dissections (levels 2 through 4) right hemithyroidectomy and primary tracheoesophageal puncture cricopharyngeal myotomy for treatment laryngeal squamous cell carcinoma.    TRACHEOSTOMY  07/08/2019    Done in conjunction  with panendoscopy at initial diagnosis of laryngeal squamous cell carcinoma.    TRACHEOSTOMY TUBE CHANGE  07/14/2019    Done in conjunction with control of post-operative tracheostomy wound hemorrhage.       Review of Systems:  Unremarkable except as mentioned above.    Current Medications:  Current Outpatient Medications   Medication Sig    amLODIPine (NORVASC) 10 MG tablet Take 1 tablet (10 mg total) by mouth Daily.    aspirin 81 MG Chew Take 81 mg by mouth once daily.    blood sugar diagnostic Strp Use qd    blood sugar diagnostic Strp To check BG 1 times daily, to use with insurance preferred meter    blood-glucose meter kit To check BG 1 times daily, to use with insurance preferred meter    carvediloL (COREG) 3.125 MG tablet TAKE 1 TABLET BY MOUTH TWICE DAILY    cyclobenzaprine (FLEXERIL) 10 MG tablet Take 1 tablet (10 mg total) by mouth every evening.    EScitalopram oxalate (LEXAPRO) 10 MG tablet Take 10 mg by mouth Daily.    famotidine (PEPCID) 40 MG tablet Take 40 mg by mouth 2 (two) times daily.    FLUoxetine 40 MG capsule Take 40 mg by mouth Daily.    HYDROcodone-acetaminophen (NORCO)  mg per tablet Take 1 tablet by mouth every 12 (twelve) hours as needed for Pain.    hydrOXYchloroQUINE (PLAQUENIL) 200 mg tablet Take 1 tablet (200 mg total) by mouth 2 (two) times daily. After food    isosorbide mononitrate (IMDUR) 30 MG 24 hr tablet Take 30 mg by mouth every morning.    lancets Misc Use qd    lancets Misc To check BG 1 times daily, to use with insurance preferred meter    lancets Misc Use to test 1 qd    levothyroxine (SYNTHROID) 100 MCG tablet TAKE 1 TABLET BY MOUTH DAILY    lisinopriL (PRINIVIL,ZESTRIL) 40 MG tablet TAKE 1 TABLET BY MOUTH DAILY    meclizine (ANTIVERT) 25 mg tablet Take 25 mg by mouth 3 (three) times daily.    metFORMIN (GLUCOPHAGE) 500 MG tablet Take 1 tablet (500 mg total) by mouth 2 (two) times daily.    mirabegron (MYRBETRIQ) 50 mg Tb24 Take 1 tablet (50 mg total) by mouth once  "daily at 6am.    nitroGLYCERIN (NITROSTAT) 0.4 MG SL tablet Place 0.4 mg under the tongue as needed.    omeprazole (PRILOSEC) 40 MG capsule Take 1 capsule (40 mg total) by mouth once daily. Before lunch    ondansetron (ZOFRAN-ODT) 4 MG TbDL Take 4 mg by mouth 3 (three) times daily.    OXcarbazepine (TRILEPTAL) 300 MG Tab TAKE 1 TABLET BY MOUTH TWICE DAILY    OXcarbazepine (TRILEPTAL) 300 MG Tab TAKE 1 TABLET BY MOUTH TWICE DAILY    furosemide (LASIX) 40 MG tablet Take 40 mg by mouth daily as needed.    gabapentin (NEURONTIN) 600 MG tablet Take 1 tablet (600 mg total) by mouth 3 (three) times daily.    oxybutynin (DITROPAN-XL) 5 MG TR24 Take 1 tablet (5 mg total) by mouth once daily. (Patient not taking: Reported on 9/28/2022)     Current Facility-Administered Medications   Medication    LIDOcaine (PF) 40 mg/mL (4 %) injection 1 mL    phenylephrine HCL 1% nasal spray 1 spray        Allergies:  Review of patient's allergies indicates:  No Known Allergies     Physical Exam:  /83 (BP Location: Right arm, Patient Position: Sitting, BP Method: Large (Automatic))   Pulse (!) 57   Temp 98.7 °F (37.1 °C) (Oral)   Resp 18   Ht 5' 7" (1.702 m)   Wt 100 kg (220 lb 6.4 oz)   LMP  (LMP Unknown)   SpO2 97%   BMI 34.52 kg/m²     General:  Well-developed well-nourished female in no acute distress.  Patient voices well with her TEP.  Head and face:  Normocephalic.  No facial lesions  Ears:  Right ear-auricle is normally developed.  External auditory canal is clear.  Tympanic membrane is nonerythematous.  No middle ear effusion.  Left ear-auricle is normally developed.  External auditory canal is clear.  Tympanic membrane is nonerythematous.  No middle ear effusion.  Nose:  Nasal dorsum is unremarkable.  No significant septal deviation.  No significant intranasal congestion.  Secretions are clear.  Oral cavity and oropharynx:  Edentulous, Tongue and floor mouth are without lesions.  Mucosa is moist.  No pharyngeal " erythema or exudates.  No oropharyngeal masses.  No tonsillar hypertrophy.  Neck:  Supple without of palpable adenopathy.  No masses in the thyroid bed or in the peristomal region.  Parotid and submandibular glands are normal to palpation.  Stoma is without granulation.  TEP is in place.  Eyes:  Extraocular muscles intact.  No nystagmus.  No exophthalmos or enophthalmos.  Neurologic:  Alert and oriented.  Cranial nerves 2-12 are grossly normal.    Procedure note: Flexible laryngoscopy  After informed consent was obtained and the nose was prepped with 1% phenyleprine nasal spray and lidocaine topically. The flexible fiberoptic laryngoscope was introduced into the right nostril and advanced. Examination of the nose showed the above mentioned findings. Examination of the nasopharynx showed no nasopharyngeal masses or eustachian tube obstruction. Examination of the base of tongue showed no masses. Neopharynx is without lesions or obstruction.  Examination of the trachea through the trachea stoma showed TEP in place without leakage, no masses or lesions down to the level of the adrianna.    Imaging:     CT neck w/ contrast 10/17/22:     COMPARISON:  CT neck dated 11/03/2021     FINDINGS:  There are postoperative changes of the neck with changes of prior total laryngectomy.  There is no definite new or progressive focal enhancement.  There is no cervical lymphadenopathy.     The parotid glands, submandibular glands and thyroid gland are unremarkable.  The major vessels in the neck are patent with atherosclerotic changes of the carotid arteries.  There is no acute abnormality of the orbits or visualized brain parenchyma.  There is no destructive bone lesion.  The lung apices are clear.     Impression:     Stable exam without significant interval change compared to 11/03/2021.    EXAMINATION:  XR CHEST PA AND LATERAL 1/30/23:     CLINICAL HISTORY:  Malignant neoplasm of larynx, unspecified     TECHNIQUE:  Two views of the  chest     COMPARISON:  09/20/2021     FINDINGS:  No focal opacification, pleural effusion, or pneumothorax.     Linear subsegmental atelectatic changes of the bilateral bases.     The cardiomediastinal silhouette is within normal limits.     No acute osseous abnormality.     Impression:     No acute cardiopulmonary process.            Assessment/Plan:   67-year-old female with T3 N0 M0 squamous cell carcinoma of the supraglottic larynx status post total laryngectomy with bilateral selective neck dissections (levels 2 through 4), primary TEP puncture with cricopharyngeal myotomy, and right hemithyroidectomy with an incidental finding of a papillary microcarcinoma (T1a N0 M0) on August 12, 2019, with postoperative radiation therapy completed on November 5, 2019. CT neck 10/2022 [facility]. CXR without concerning findings. TFT WNL.    - She knows to call with any changes/issues for sooner appointment.   - Continue follow-up with Endocrinology  - RTC 5mo for surveillance  - I will put in an order for CT scan for cancer surveillance         Tyrese Martínez MD  Otolaryngology PGYIV

## 2023-10-31 NOTE — PROGRESS NOTES
The scope used for the exam was:  Flexible scope ENF-P4  Serial Number:  1)    [id number]    []   2)    [id number]    []   3)    [id number]    []   4)    [id number]    [x]   5)    [id number]    []   6)    [id number]    []       The scope used for the exam was:  Rigid scope   Serial Number:  1)   [id number]    []   2)   [id number]    []   3)   [id number]    []   4)    3384   []   5)    0824   []   6)    5554   []     7)   7425    []   8)   2240    []   9)   1109    []

## 2023-11-01 ENCOUNTER — LAB VISIT (OUTPATIENT)
Dept: LAB | Facility: HOSPITAL | Age: 68
End: 2023-11-01
Attending: INTERNAL MEDICINE
Payer: MEDICARE

## 2023-11-01 DIAGNOSIS — E11.40 TYPE 2 DIABETES MELLITUS WITH DIABETIC NEUROPATHY, WITHOUT LONG-TERM CURRENT USE OF INSULIN: ICD-10-CM

## 2023-11-01 DIAGNOSIS — C73 THYROID CANCER: ICD-10-CM

## 2023-11-01 DIAGNOSIS — C32.9 LARYNGEAL SQUAMOUS CELL CARCINOMA: ICD-10-CM

## 2023-11-01 DIAGNOSIS — E89.0 POSTSURGICAL HYPOTHYROIDISM: ICD-10-CM

## 2023-11-01 LAB
CREAT SERPL-MCNC: 0.91 MG/DL (ref 0.55–1.02)
CREAT UR-MCNC: 77.9 MG/DL (ref 45–106)
EST. AVERAGE GLUCOSE BLD GHB EST-MCNC: 114 MG/DL
GFR SERPLBLD CREATININE-BSD FMLA CKD-EPI: >60 MLS/MIN/1.73/M2
HBA1C MFR BLD: 5.6 %
MICROALBUMIN UR-MCNC: <5 UG/ML
MICROALBUMIN/CREAT RATIO PNL UR: NORMAL
T4 FREE SERPL-MCNC: 1.05 NG/DL (ref 0.7–1.48)
TSH SERPL-ACNC: 0.12 UIU/ML (ref 0.35–4.94)

## 2023-11-01 PROCEDURE — 84439 ASSAY OF FREE THYROXINE: CPT

## 2023-11-01 PROCEDURE — 82565 ASSAY OF CREATININE: CPT

## 2023-11-01 PROCEDURE — 84443 ASSAY THYROID STIM HORMONE: CPT

## 2023-11-01 PROCEDURE — 83036 HEMOGLOBIN GLYCOSYLATED A1C: CPT

## 2023-11-01 PROCEDURE — 82043 UR ALBUMIN QUANTITATIVE: CPT

## 2023-11-01 PROCEDURE — 36415 COLL VENOUS BLD VENIPUNCTURE: CPT

## 2023-11-02 ENCOUNTER — PROCEDURE VISIT (OUTPATIENT)
Dept: NEUROLOGY | Facility: HOSPITAL | Age: 68
End: 2023-11-02
Attending: PSYCHIATRY & NEUROLOGY
Payer: MEDICARE

## 2023-11-02 DIAGNOSIS — G40.909 SEIZURE DISORDER: ICD-10-CM

## 2023-11-02 PROCEDURE — 95816 EEG AWAKE AND DROWSY: CPT | Mod: 26,,, | Performed by: PSYCHIATRY & NEUROLOGY

## 2023-11-02 PROCEDURE — 95816 EEG AWAKE AND DROWSY: CPT

## 2023-11-02 PROCEDURE — 95816 PR EEG,W/AWAKE & DROWSY RECORD: ICD-10-PCS | Mod: 26,,, | Performed by: PSYCHIATRY & NEUROLOGY

## 2023-11-02 NOTE — PROCEDURES
ROUTINE EEG    Date/Time: 11/2/2023 12:30 PM    Performed by: Arianna Ruiz MD  Authorized by: Arianna Ruiz MD      Indication: Seizure disorder    Clinical Information: This is 68 y.o. 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    Anticonvulsants: Oxcarbazepine 300 mg twice a day    Recording Condition: This is a routine electroencephalogram performed in outpatient settings using OOYYO software. Electroencephalogram leads were placed using a 10-20 placement system. The electroencephalogram is monitored in real time by a technologist and is of good technical quality for review.     Technique: Electroencephalogram leads were placed using a 10-20 placement system and electrode impedance was maintained below 10KOhms. Morgan and seizure detection computer program was used for digital EEG analysis throughout the monitoring period, to screen the EEG in real time and kati the data file with pointers to electrographic seizure and interictal discharges. Hyperventilation was not performed and Photic stimulation was not performed.     Description:   Background Symmetry- Symmetric  Frequency - Beta and Alpha  Voltage - Normal   Continuity - Continuous  Anterior to Posterior Gradient - Present  Posterior Dominant Rhythm - 11 Hz   Reactivity - Unclear  State Changes - Present without sleep stage N2 transients   Breach Artifact - Absent    Cyclic Alternating Pattern of Encephalopathy (CAPE) - Absent  Sporadic Epileptiform Discharges - Absent  Rhythmic or Periodic Patterns (RPPs) - Absent    Electroclinical Seizures (ECSz): Absent  Electroclinical Status Epilepticus (ECSE): Absent  Electrographical Seizures (Esz): Absent    Briefly Potentially Ictal Rhythmic Discharges (BIRDs):  Absent  Ictal-Interictal Continuum (IIC): Absent    Conclusion: This is a normal routine awake and drowsy EEG.     Impression: This is a normal routine awake and drowsy EEG. The possibility that the patient may have a potentially epileptogenic focus cannot be entirely excluded. There is however no ongoing seizure activity, nor is there any evidence of status epilepticus in this study. Clinical correlation is advised.

## 2023-11-06 ENCOUNTER — TELEPHONE (OUTPATIENT)
Dept: ENDOCRINOLOGY | Facility: CLINIC | Age: 68
End: 2023-11-06
Payer: MEDICARE

## 2023-11-06 DIAGNOSIS — C73 THYROID CANCER: Primary | ICD-10-CM

## 2023-11-06 NOTE — TELEPHONE ENCOUNTER
Pt's thyroid labs show her levels are at goal on the current regimen.  Would continue and keep f/u next year as booked w/ TSH, tg prior to regroup.

## 2023-11-09 NOTE — PROGRESS NOTES
I reviewed the history and physical exam with the resident.   I agree with findings and plan.    Ray Austin M.D.

## 2023-11-10 NOTE — TELEPHONE ENCOUNTER
Pt did not read portal message, called and Spoke with patient, notified about results and plan, patient voiced understanding.

## 2023-11-29 ENCOUNTER — OFFICE VISIT (OUTPATIENT)
Dept: GYNECOLOGY | Facility: CLINIC | Age: 68
End: 2023-11-29
Payer: MEDICARE

## 2023-11-29 VITALS
BODY MASS INDEX: 34.21 KG/M2 | OXYGEN SATURATION: 99 % | WEIGHT: 218 LBS | HEIGHT: 67 IN | RESPIRATION RATE: 22 BRPM | DIASTOLIC BLOOD PRESSURE: 74 MMHG | HEART RATE: 69 BPM | SYSTOLIC BLOOD PRESSURE: 132 MMHG | TEMPERATURE: 99 F

## 2023-11-29 DIAGNOSIS — N39.3 PRIMARY STRESS URINARY INCONTINENCE: ICD-10-CM

## 2023-11-29 DIAGNOSIS — N81.9 FEMALE GENITAL PROLAPSE, UNSPECIFIED TYPE: Primary | ICD-10-CM

## 2023-11-29 PROCEDURE — 99214 OFFICE O/P EST MOD 30 MIN: CPT | Mod: PBBFAC

## 2023-11-29 RX ORDER — NEOMYCIN SULFATE, POLYMYXIN B SULFATE, AND DEXAMETHASONE 3.5; 10000; 1 MG/G; [USP'U]/G; MG/G
OINTMENT OPHTHALMIC
COMMUNITY
Start: 2023-11-09

## 2023-11-29 NOTE — PROGRESS NOTES
"U OB/GYN CLINIC NOTE  [facility]  [address]  [place] [address]  Phone: [phone]  Fax: [phone]    Subjective:     Laura Freeman is a 68 y.o.  with stage II anterior prolapse and stress urinary incontinence who presents for pessary check.    Patient was last seen 10/23 with history as below:   Patient was seen  for pessary check, was wearing a ring with knob #5. Noted on exam to have erosions with friability on bilateral sidewalls towards vaginal apex. Pessary was left out and pt was rec to continue vaginal estrogen and rtc in 2 weeks for re-examination.  Reports no bleeding no abnormal discharge. Reports mild prolapse bothersome but not severe. Has been using the vaginal estrogen with the applicator. She returned  and improvement was noted in exam, however at that appointment correct sized pessaries were not in stock, patient was encouraged to continue using vaginal estrogen.      On 10/23 she reported she is ready to have pessary placed again. She feels like it helped with her anterior bulge symptoms and lessened her leakage episodes, which were both very bothersome to her. Reports since last seeing us, she saw a urogynecologist in [place] who told her she was not a good candidate for a surgical procedure. Ring #3 with incontinence knob (2 1/2 inch) was fitted and placed.    Today, reports she has been doing ok. She felt the pessary move around - she reports the doctor she saw at another facility told her to put the vaginal estrogen applicator to the top of the vagina, and then inject, which she thinks dislodged the pessary from original position. Denies pain, just reports the feeling of it moving around is uncomfortable. Reports the pessary greatly improves her prolapse/bulge symptoms, but is not helping her incontinence symptoms. She still has small leakage of urine every time she coughs, laughs or sneezes. She has been coughing a lot because her "tracheostomy tube has been clogged " "and she needs a new one". She has continued to use her vaginal estrogen 2-3 night per week. Has not been putting on urethra. No vaginal bleeding or discharge. She has no other complaints.    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    Prolapse of female pelvic organs     T3N0M0 supraglottic laryngeal squamous cell carcinoma 07/08/2019    T3 N0 M0 squamous cell carcinoma of the supraglottic larynx diagnosed initially on July 8, 2019. Treated with total laryngectomy, bilateral selective neck dissections (levels 2 through 4), right hemithyroidectomy (with incidental finding papillary thyroid microcarcinoma), primary TEP puncture with cricopharyngeal myotomy on August 12, 2019.  Treated with postoperative radiation therapy.    Type 2 diabetes mellitus with unspecified diabetic retinopathy without macular edema      SurgHx:   Past Surgical History:   Procedure Laterality Date    ceiol - cataract extraction and insertion of introcular lens      excision of papilloma      I&D of skin and subcutaneous tissue  05/31/2015    MULTIPLE TOOTH EXTRACTIONS Bilateral 09/06/2019    Full mouth extraction in preparation for radiation therapy.    PANENDOSCOPY N/A 07/08/2019    Initial diagnosis laryngeal squamous cell carcinoma.  Done in conjunction with an awake tracheostomy.    PERCUTANEOUS INSERTION OF GASTROSTOMY TUBE N/A 07/12/2019    SELECTIVE NECK DISSECTION Bilateral 08/12/2019    Done in conjunction with total laryngectomy for laryngeal squamous cell carcinoma.    THYROID LOBECTOMY Right 08/12/2019    Done in conjunction with total laryngectomy with incidental finding thyroid papillary microcarcinoma.    TOTAL LARYNGECTOMY N/A 08/12/2019    Total laryngectomy with bilateral selective neck dissections " (levels 2 through 4) right hemithyroidectomy and primary tracheoesophageal puncture cricopharyngeal myotomy for treatment laryngeal squamous cell carcinoma.    TRACHEOSTOMY  07/08/2019    Done in conjunction with panendoscopy at initial diagnosis of laryngeal squamous cell carcinoma.    TRACHEOSTOMY TUBE CHANGE  07/14/2019    Done in conjunction with control of post-operative tracheostomy wound hemorrhage.     Medications:     Current Outpatient Medications:     neomycin-polymyxin-dexamethasone (DEXACINE) 3.5 mg/g-10,000 unit/g-0.1 % Oint, , Disp: , Rfl:     amLODIPine (NORVASC) 10 MG tablet, Take 1 tablet (10 mg total) by mouth Daily., Disp: 90 tablet, Rfl: 1    aspirin 81 MG Chew, Take 81 mg by mouth once daily., Disp: , Rfl:     atorvastatin (LIPITOR) 40 MG tablet, Take 1 tablet (40 mg total) by mouth once daily., Disp: 90 tablet, Rfl: 1    blood sugar diagnostic Strp, To check BG 1 times daily, to use with insurance preferred meter, Disp: 100 strip, Rfl: 1    blood-glucose meter kit, To check BG 1 times daily, to use with insurance preferred meter, Disp: 1 each, Rfl: 1    carvediloL (COREG) 3.125 MG tablet, TAKE 1 TABLET(3.125 MG) BY MOUTH TWICE DAILY, Disp: 180 tablet, Rfl: 0    cyclobenzaprine (FLEXERIL) 10 MG tablet, Take 1 tablet (10 mg total) by mouth every evening., Disp: 30 tablet, Rfl: 5    diclofenac sodium (VOLTAREN) 1 % Gel, Apply 2 g topically 4 (four) times daily. PRN for pain, Disp: 100 g, Rfl: 0    docusate sodium (COLACE) 100 MG capsule, Take 1 capsule (100 mg total) by mouth 2 (two) times daily., Disp: 60 capsule, Rfl: 2    EScitalopram oxalate (LEXAPRO) 20 MG tablet, TAKE 1 TABLET(20 MG) BY MOUTH DAILY, Disp: 90 tablet, Rfl: 1    estradioL (ESTRACE) 0.01 % (0.1 mg/gram) vaginal cream, Place 0.5 g vaginally twice a week., Disp: , Rfl:     famotidine (PEPCID) 40 MG tablet, Take 1 tablet (40 mg total) by mouth 2 (two) times daily., Disp: 60 tablet, Rfl: 6    gabapentin (NEURONTIN) 400 MG capsule,  See Instructions, TAKE 1 CAPSULE BY MOUTH THREE TIMES DAILY, # 90 cap(s), 2 Refill(s), Pharmacy: [id number], 174, cm, Height/Length Dosing, 10/24/21 10:52:00 CDT, 89, kg, Weight Dosing, 10/24/21 10:52:00 CDT, Disp: 90 capsule, Rfl: 5    HYDROcodone-acetaminophen (NORCO)  mg per tablet, Take 1 tablet by mouth every 12 (twelve) hours as needed for Pain., Disp: 60 tablet, Rfl: 0    hydrOXYchloroQUINE (PLAQUENIL) 200 mg tablet, Take 1 tablet (200 mg total) by mouth 2 (two) times daily. After food, Disp: 60 tablet, Rfl: 5    lancets Misc, Use qd, Disp: 100 each, Rfl: 3    levothyroxine (SYNTHROID) 112 MCG tablet, Take 1 tablet (112 mcg total) by mouth before breakfast., Disp: 90 tablet, Rfl: 0    lisinopriL (PRINIVIL,ZESTRIL) 40 MG tablet, TAKE 1 TABLET(40 MG) BY MOUTH EVERY DAY, Disp: 30 tablet, Rfl: 0    meclizine (ANTIVERT) 25 mg tablet, Take 25 mg by mouth as needed., Disp: , Rfl:     metFORMIN (GLUCOPHAGE) 500 MG tablet, Take 1 tablet (500 mg total) by mouth 2 (two) times daily., Disp: 60 tablet, Rfl: 11    nitroGLYCERIN (NITROSTAT) 0.4 MG SL tablet, Place 0.4 mg under the tongue as needed., Disp: , Rfl:     ondansetron (ZOFRAN-ODT) 4 MG TbDL, Take 4 mg by mouth 3 (three) times daily., Disp: , Rfl:     OXcarbazepine (TRILEPTAL) 300 MG Tab, Take 1 tablet (300 mg total) by mouth 2 (two) times daily., Disp: 60 tablet, Rfl: 2    Current Facility-Administered Medications:     phenylephrine HCL 1% nasal spray 1 spray, 1 spray, Each Nostril, 1 time in Clinic/HOD, Ivana Carrillo FNP    FM Hx: Denies hx of ovarian, uterine, endometrial, or colon cancer. Denies history of bleeding or coagulation disorders.  Family History   Problem Relation Age of Onset    Hypertension Mother     Diabetes Mellitus Mother     Heart disease Mother     Diabetes Mellitus Sister     Hypertension Sister      Social Hx: Denies tobacco, alcohol and illicit drug usage.  Social History     Socioeconomic History    Marital status:  Other   Tobacco Use    Smoking status: Former     Types: Cigarettes     Passive exposure: Past    Smokeless tobacco: Never    Tobacco comments:     Smoked from 6510-0745   Substance and Sexual Activity    Alcohol use: Yes     Comment: occassionally    Drug use: Never    Sexual activity: Not Currently   Social History Narrative    ** Merged History Encounter **          Social Determinants of Health     Financial Resource Strain: Low Risk  (6/16/2022)    Overall Financial Resource Strain (CARDIA)     Difficulty of Paying Living Expenses: Not hard at all   Food Insecurity: No Food Insecurity (6/16/2022)    Hunger Vital Sign     Worried About Running Out of Food in the Last Year: Never true     Ran Out of Food in the Last Year: Never true   Transportation Needs: No Transportation Needs (6/16/2022)    PRAPARE - Transportation     Lack of Transportation (Medical): No     Lack of Transportation (Non-Medical): No   Physical Activity: Insufficiently Active (6/16/2022)    Exercise Vital Sign     Days of Exercise per Week: 3 days     Minutes of Exercise per Session: 30 min   Stress: No Stress Concern Present (6/16/2022)    [employer] [place] [employer] - Occupational Stress Questionnaire     Feeling of Stress : Not at all   Social Connections: Moderately Integrated (6/16/2022)    Social Connection and Isolation Panel [NHANES]     Frequency of Communication with Friends and Family: Three times a week     Frequency of Social Gatherings with Friends and Family: Three times a week     Attends Druze Services: More than 4 times per year     Active Member of Clubs or Organizations: Yes     Attends Club or Organization Meetings: 1 to 4 times per year     Marital Status:    Housing Stability: Low Risk  (6/16/2022)    Housing Stability Vital Sign     Unable to Pay for Housing in the Last Year: No     Number of Places Lived in the Last Year: 1     Unstable Housing in the Last Year: No     Review of Systems  Denies  "fevers, chills, headache, blurry vision, nausea, vomiting, dizziness, or syncope.   Denies chest pain, shortness of breath, RUQ pain, or calf pain.    Objective:     Vitals:    23 1135   BP: 132/74   Pulse: 69   Resp: (!) 22   Temp: 98.6 °F (37 °C)   SpO2: 99%   Weight: 98.9 kg (218 lb)   Height: 5' 7" (1.702 m)     Body mass index is 34.14 kg/m².    Physical Exam:     General: alert and oriented, in no acute distress  Lungs: Breath sounds not well heard due to tracheostomy tube interference, no conversational dyspnea  Heart: regular rate and rhythm  Abdomen: Soft, non-distended, non tender to palpation, no involuntary guarding, no rebound tenderness  Extremities: Normal, atraumatic, non-edematous, No cords or calf tenderness, No significant calf/ankle edema  External genitalia: Normal postmenopausal female genitalia without lesion, discharge or tenderness. Normal appearing urethral meatus, atrophic. Normal appearing external anus.  Bimanual Exam: No pelvic lymphadenopathy noted bilaterally. Vagina with adequate capacity, >3cm under pubic arch. Uterus 8cm in size, no cervical motion tenderness. Good descent of uterus, >shelter down vagina with fundal pressure, mobile. No adnexal fullness/tenderness. Normal urethra. Normal bladder.   Speculum Exam: Vaginal mucosa normal in appearance. Pale pink. No masses/lesions. With valsalva, anterior wall of vagina descends to <1cm from introitus. No leakage of urine with cough/Valsalva. Cervix well visualized, smooth in contour no masses or lesions. Os normal in appearance, no blood or discharge coming from the os. No lesions, friability, erosion noted.    Note: RN chaperone present for entirety of exam.     Imaging  No images are attached to the encounter.  Assessment/Plan:    Laura Freeman is a 68 y.o.  with stage II anterior prolapse and stress urinary incontinence.    Pessary check: Exam reassuring with no lesions or erosions in vagina. Pessary cleaned and " replaced, patient allowed to sit/walk around room and reported comfort with placement.   Discussed with her to place vaginal estrogen with fingertip rather than applicator, apply to inside of vagina including urethra to aid with preventing urinary leakage.   Will repeat exam in 4 weeks. Discussed with clinic staff to order ring #4 with knob, can consider sizing up to this #4 if needed for additional help with continence.  RTC in 4 weeks.    Future Appointments   Date Time Provider Department Center   12/8/2023 11:00 AM [facility]   1/3/2024  9:45 AM RESIDENTS, [facility] GYN [facility] GYN [place]    2/2/2024  9:45 AM Arianna Ruiz MD [facility] NEURO [place]    2/22/2024  1:30 PM Bouchra Treadwell MD [facility] FAMMED [place]    4/4/2024  2:00 PM RESIDENT 1, [facility] OTORHINOLARYNGOLOGY [facility] ENT [place]   4/16/2024  1:30 PM Nette Last, ANP [facility] GYN [place]   5/23/2024  8:20 AM RESIDENTS, [facility] ENDOCRINOLOGY [facility] ENDOCR [place]        Patient and plan were discussed with Dr. Godoy.    Leesa Crockett MD  [employer] [facility] PGY4

## 2023-12-07 NOTE — PROGRESS NOTES
"Subjective:           Patient ID: Laura Fremean is a 68 y.o. female.    Chief Complaint: Seronegative Rheumatoid Arthritis  (The patient states that she is having minor spasms.  )       is a 67 y/o female here for a f./u. She was initially referred by Dr. Vicenta Acevedo. She established care with Dr. Frye on 5/23/22 . Past labs: RF neg. CCP neg. 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  She is currently utilizing esophageal speech via a voice prosthesis for alaryngeal communication for functional communication. She has a history of Stroke (18 years ago) and Hx of seizure. Reports bilateral numbness/tingling is currently doing well with current dose of gabapentin. At the last visit she was anxious that she was told she has a positive cologuard test and has now completed colonoscopy and was WNL.    Other than that she feels her medications are working well for her and she is without complaints today. Morning stiffness approximately 20 minutes. No red, warm or swollen joints      Followed by cardiology,  Followed by Endocrinology- Dr Pinto  Follow by Neurology Hx of seizure                Review of Systems   Constitutional:  Negative for appetite change, chills and fever.   HENT:  Negative for congestion, ear pain, mouth sores, nosebleeds and trouble swallowing.    Eyes:  Negative for photophobia and discharge.   Respiratory:  Negative for chest tightness and shortness of breath.    Cardiovascular:  Negative for chest pain.   Gastrointestinal:  Negative for abdominal pain and vomiting.   Endocrine: Negative.    Genitourinary:  Negative for hematuria.   Musculoskeletal:         As per HPI   Skin:  Negative for rash.   Neurological:  Negative for weakness.         Objective:   BP (!) 150/85 (BP Location: Left arm, Patient Position: Sitting, BP Method: Large (Automatic))   Pulse 70   Temp 98.2 °F (36.8 °C) (Temporal)   Resp 20   Ht 5' 7" (1.702 m)   Wt 100.2 kg " (221 lb)   LMP  (LMP Unknown)   SpO2 99%   BMI 34.61 kg/m²          Physical Exam   Constitutional: She is oriented to person, place, and time. She appears well-developed and well-nourished. No distress.   HENT:   Head: Normocephalic and atraumatic.   Right Ear: External ear normal.   Left Ear: External ear normal.   Mouth/Throat: Voice prosthesis for communication  Eyes: Pupils are equal, round, and reactive to light.   Cardiovascular: Normal rate.   Pulmonary/Chest: Effort normal.   Abdominal: Soft. There is no abdominal tenderness.   Musculoskeletal:      Cervical back: Neck supple.   Lymphadenopathy:     She has no cervical adenopathy.   Neurological: She is alert and oriented to person, place, and time. She displays normal reflexes. No cranial nerve deficit or sensory deficit. She exhibits normal muscle tone. Coordination normal.   Skin: No rash noted. No erythema.   Vitals reviewed.      Right Side Rheumatological Exam     Examination finds the 1st PIP, 1st MCP, 2nd PIP, 2nd MCP, 3rd PIP, 3rd MCP, 4th PIP, 4th MCP, 5th PIP and 5th MCP normal.    Left Side Rheumatological Exam     Examination finds the 1st PIP, 1st MCP, 2nd PIP, 2nd MCP, 3rd PIP, 3rd MCP, 4th PIP, 4th MCP, 5th PIP and 5th MCP normal.           Completed Fibromyalgia exam 18/18 tender points.    No data to display     Assessment:         Medication List with Changes/Refills   Current Medications    AMLODIPINE (NORVASC) 10 MG TABLET    Take 1 tablet (10 mg total) by mouth Daily.       Start Date: 8/22/2023 End Date: --    ASPIRIN 81 MG CHEW    Take 81 mg by mouth once daily.       Start Date: --        End Date: --    ATORVASTATIN (LIPITOR) 40 MG TABLET    Take 1 tablet (40 mg total) by mouth once daily.       Start Date: 8/22/2023 End Date: --    BLOOD SUGAR DIAGNOSTIC STRP    To check BG 1 times daily, to use with insurance preferred meter       Start Date: 2/20/2023 End Date: --    BLOOD-GLUCOSE METER KIT    To check BG 1 times daily, to  use with insurance preferred meter       Start Date: 2/20/2023 End Date: 2/20/2024    CARVEDILOL (COREG) 3.125 MG TABLET    TAKE 1 TABLET(3.125 MG) BY MOUTH TWICE DAILY       Start Date: 12/6/2023 End Date: --    DICLOFENAC SODIUM (VOLTAREN) 1 % GEL    Apply 2 g topically 4 (four) times daily. PRN for pain       Start Date: 1/30/2023 End Date: --    DOCUSATE SODIUM (COLACE) 100 MG CAPSULE    Take 1 capsule (100 mg total) by mouth 2 (two) times daily.       Start Date: 8/22/2023 End Date: --    ESCITALOPRAM OXALATE (LEXAPRO) 20 MG TABLET    TAKE 1 TABLET(20 MG) BY MOUTH DAILY       Start Date: 8/22/2023 End Date: --    ESTRADIOL (ESTRACE) 0.01 % (0.1 MG/GRAM) VAGINAL CREAM    Place 0.5 g vaginally twice a week.       Start Date: 5/26/2023 End Date: --    FAMOTIDINE (PEPCID) 40 MG TABLET    Take 1 tablet (40 mg total) by mouth 2 (two) times daily.       Start Date: 8/25/2023 End Date: --    HYDROCODONE-ACETAMINOPHEN (NORCO)  MG PER TABLET    Take 1 tablet by mouth every 12 (twelve) hours as needed for Pain.       Start Date: 10/9/2023 End Date: --    HYDROXYCHLOROQUINE (PLAQUENIL) 200 MG TABLET    Take 1 tablet (200 mg total) by mouth 2 (two) times daily. After food       Start Date: 8/18/2023 End Date: 2/18/2024    LANCETS MISC    Use qd       Start Date: 2/20/2023 End Date: --    LEVOTHYROXINE (SYNTHROID) 112 MCG TABLET    Take 1 tablet (112 mcg total) by mouth before breakfast.       Start Date: 9/8/2023  End Date: --    LISINOPRIL (PRINIVIL,ZESTRIL) 40 MG TABLET    TAKE 1 TABLET(40 MG) BY MOUTH EVERY DAY       Start Date: 8/14/2023 End Date: --    MECLIZINE (ANTIVERT) 25 MG TABLET    Take 25 mg by mouth as needed.       Start Date: 3/31/2022 End Date: --    METFORMIN (GLUCOPHAGE) 500 MG TABLET    Take 1 tablet (500 mg total) by mouth 2 (two) times daily.       Start Date: 9/27/2022 End Date: --    NEOMYCIN-POLYMYXIN-DEXAMETHASONE (DEXACINE) 3.5 MG/G-10,000 UNIT/G-0.1 % OINT           Start Date: 11/9/2023  End Date: --    NITROGLYCERIN (NITROSTAT) 0.4 MG SL TABLET    Place 0.4 mg under the tongue as needed.       Start Date: 9/20/2021 End Date: --    OMEPRAZOLE (PRILOSEC) 40 MG CAPSULE    Take 40 mg by mouth.       Start Date: 12/6/2023 End Date: --    ONDANSETRON (ZOFRAN-ODT) 4 MG TBDL    Take 4 mg by mouth 3 (three) times daily.       Start Date: 3/31/2022 End Date: --    OXCARBAZEPINE (TRILEPTAL) 300 MG TAB    Take 1 tablet (300 mg total) by mouth 2 (two) times daily.       Start Date: 10/18/2023End Date: --   Changed and/or Refilled Medications    Modified Medication Previous Medication    CYCLOBENZAPRINE (FLEXERIL) 10 MG TABLET cyclobenzaprine (FLEXERIL) 10 MG tablet       Take 1 tablet (10 mg total) by mouth every evening.    Take 1 tablet (10 mg total) by mouth every evening.       Start Date: 12/8/2023 End Date: 6/5/2024    Start Date: 8/18/2023 End Date: 12/8/2023    GABAPENTIN (NEURONTIN) 400 MG CAPSULE gabapentin (NEURONTIN) 400 MG capsule       See Instructions, TAKE 1 CAPSULE BY MOUTH THREE TIMES DAILY, # 90 cap(s), 2 Refill(s), Pharmacy: [facility] #[id number], 174, cm, Height/Length Dosing, 10/24/21 10:52:00 CDT, 89, kg, Weight Dosing, 10/24/21 10:52:00 CDT    See Instructions, TAKE 1 CAPSULE BY MOUTH THREE TIMES DAILY, # 90 cap(s), 2 Refill(s), Pharmacy: [facility] #[id number], 174, cm, Height/Length Dosing, 10/24/21 10:52:00 CDT, 89, kg, Weight Dosing, 10/24/21 10:52:00 CDT       Start Date: 12/8/2023 End Date: --    Start Date: 8/18/2023 End Date: 12/8/2023         ICD-10-CM ICD-9-CM   1. Seronegative rheumatoid arthritis  M06.00 714.0   2. Hypertension, unspecified type  I10 401.9   3. Fibromyalgia syndrome  M79.7 729.1   4. Hepatitis C virus carrier state  B18.2 V02.62               Plan:       1. Seronegative rheumatoid arthritis  Assessment & Plan:  Stable; No active synovitis on exam. Morning stiffness about 20-30 minutes. She is aware that I am unable to prescribe Norco for pain; she  would like to follow Dr. Frye so she continue be treated with her pain medication.    Continue Hydroxychloroquine 200 mg BID  Previously prescribed Norco  Q 12 hrs PRN- Rx by MD     Plaquenil Eye Exam: UTD followed by [facility]    Available labs reviewed in the EMR.       2. Hypertension, unspecified type  Assessment & Plan:  BP today is 150/85  She monitors at home   Continue RX medications as prescribed by managing provider  Low Sodium Diet (Dash Diet - less than 2 grams of sodium per day).  Maintain healthy weight with goal BMI <30. Exercise 30 minutes per day 5 days per week.        3. Fibromyalgia syndrome  Assessment & Plan:  One time Rx of Gabapentin and Flexeril sent to her pharmacy. She understands that we will not be able to provide these medications in the future and other providers such as PCP, pain mangement, or if she follows Dr. Frye would need to further prescribe these medications    Continue Gabapentin 400mg TID  Continue Flexeril 10 mg nightly     Fibromyalgia is a chronic pain syndrome.  Underlying causes of fibromyalgia may be numerous.  An underlying nonrestorative sleep pattern, mental and physical stressors play a role in pain perception.  Discussed importance of decreasing stress levels and improving sleep patterns/hygiene.   Exercise and a healthy life-style is important in management of this syndrome.     Orders:  -     gabapentin (NEURONTIN) 400 MG capsule; See Instructions, TAKE 1 CAPSULE BY MOUTH THREE TIMES DAILY, # 90 cap(s), 2 Refill(s), Pharmacy: [facility] #[id number], 174, cm, Height/Length Dosing, 10/24/21 10:52:00 CDT, 89, kg, Weight Dosing, 10/24/21 10:52:00 CDT  Dispense: 90 capsule; Refill: 5  -     cyclobenzaprine (FLEXERIL) 10 MG tablet; Take 1 tablet (10 mg total) by mouth every evening.  Dispense: 30 tablet; Refill: 5    4. Hepatitis C virus carrier state               35 minutes of total time spent on the encounter, which includes face to face time and  non-face to face time preparing to see the patient (eg, review of tests), Obtaining and/or reviewing separately obtained history, Documenting clinical information in the electronic or other health record, Independently interpreting results (not separately reported) and communicating results to the patient/family/caregiver, or Care coordination (not separately reported).

## 2023-12-08 ENCOUNTER — OFFICE VISIT (OUTPATIENT)
Dept: RHEUMATOLOGY | Facility: CLINIC | Age: 68
End: 2023-12-08
Payer: MEDICARE

## 2023-12-08 VITALS
HEART RATE: 70 BPM | DIASTOLIC BLOOD PRESSURE: 85 MMHG | BODY MASS INDEX: 34.69 KG/M2 | SYSTOLIC BLOOD PRESSURE: 150 MMHG | WEIGHT: 221 LBS | RESPIRATION RATE: 20 BRPM | HEIGHT: 67 IN | OXYGEN SATURATION: 99 % | TEMPERATURE: 98 F

## 2023-12-08 DIAGNOSIS — B18.2 HEPATITIS C VIRUS CARRIER STATE: ICD-10-CM

## 2023-12-08 DIAGNOSIS — I10 HYPERTENSION, UNSPECIFIED TYPE: ICD-10-CM

## 2023-12-08 DIAGNOSIS — M79.7 FIBROMYALGIA SYNDROME: ICD-10-CM

## 2023-12-08 DIAGNOSIS — M06.00 SERONEGATIVE RHEUMATOID ARTHRITIS: Primary | ICD-10-CM

## 2023-12-08 PROCEDURE — 3079F PR MOST RECENT DIASTOLIC BLOOD PRESSURE 80-89 MM HG: ICD-10-PCS | Mod: CPTII,S$GLB,,

## 2023-12-08 PROCEDURE — 1101F PT FALLS ASSESS-DOCD LE1/YR: CPT | Mod: CPTII,S$GLB,,

## 2023-12-08 PROCEDURE — 3288F PR FALLS RISK ASSESSMENT DOCUMENTED: ICD-10-PCS | Mod: CPTII,S$GLB,,

## 2023-12-08 PROCEDURE — 3061F PR NEG MICROALBUMINURIA RESULT DOCUMENTED/REVIEW: ICD-10-PCS | Mod: CPTII,S$GLB,,

## 2023-12-08 PROCEDURE — 3077F PR MOST RECENT SYSTOLIC BLOOD PRESSURE >= 140 MM HG: ICD-10-PCS | Mod: CPTII,S$GLB,,

## 2023-12-08 PROCEDURE — 3066F NEPHROPATHY DOC TX: CPT | Mod: CPTII,S$GLB,,

## 2023-12-08 PROCEDURE — 4010F ACE/ARB THERAPY RXD/TAKEN: CPT | Mod: CPTII,S$GLB,,

## 2023-12-08 PROCEDURE — 3079F DIAST BP 80-89 MM HG: CPT | Mod: CPTII,S$GLB,,

## 2023-12-08 PROCEDURE — 3008F PR BODY MASS INDEX (BMI) DOCUMENTED: ICD-10-PCS | Mod: CPTII,S$GLB,,

## 2023-12-08 PROCEDURE — 3066F PR DOCUMENTATION OF TREATMENT FOR NEPHROPATHY: ICD-10-PCS | Mod: CPTII,S$GLB,,

## 2023-12-08 PROCEDURE — 3008F BODY MASS INDEX DOCD: CPT | Mod: CPTII,S$GLB,,

## 2023-12-08 PROCEDURE — 3044F HG A1C LEVEL LT 7.0%: CPT | Mod: CPTII,S$GLB,,

## 2023-12-08 PROCEDURE — 99214 OFFICE O/P EST MOD 30 MIN: CPT | Mod: S$GLB,,,

## 2023-12-08 PROCEDURE — 1125F AMNT PAIN NOTED PAIN PRSNT: CPT | Mod: CPTII,S$GLB,,

## 2023-12-08 PROCEDURE — 99999 PR PBB SHADOW E&M-EST. PATIENT-LVL V: CPT | Mod: PBBFAC,,,

## 2023-12-08 PROCEDURE — 1159F MED LIST DOCD IN RCRD: CPT | Mod: CPTII,S$GLB,,

## 2023-12-08 PROCEDURE — 4010F PR ACE/ARB THEARPY RXD/TAKEN: ICD-10-PCS | Mod: CPTII,S$GLB,,

## 2023-12-08 PROCEDURE — 3077F SYST BP >= 140 MM HG: CPT | Mod: CPTII,S$GLB,,

## 2023-12-08 PROCEDURE — 1159F PR MEDICATION LIST DOCUMENTED IN MEDICAL RECORD: ICD-10-PCS | Mod: CPTII,S$GLB,,

## 2023-12-08 PROCEDURE — 1101F PR PT FALLS ASSESS DOC 0-1 FALLS W/OUT INJ PAST YR: ICD-10-PCS | Mod: CPTII,S$GLB,,

## 2023-12-08 PROCEDURE — 99214 PR OFFICE/OUTPT VISIT, EST, LEVL IV, 30-39 MIN: ICD-10-PCS | Mod: S$GLB,,,

## 2023-12-08 PROCEDURE — 3044F PR MOST RECENT HEMOGLOBIN A1C LEVEL <7.0%: ICD-10-PCS | Mod: CPTII,S$GLB,,

## 2023-12-08 PROCEDURE — 3061F NEG MICROALBUMINURIA REV: CPT | Mod: CPTII,S$GLB,,

## 2023-12-08 PROCEDURE — 3288F FALL RISK ASSESSMENT DOCD: CPT | Mod: CPTII,S$GLB,,

## 2023-12-08 PROCEDURE — 1125F PR PAIN SEVERITY QUANTIFIED, PAIN PRESENT: ICD-10-PCS | Mod: CPTII,S$GLB,,

## 2023-12-08 PROCEDURE — 99999 PR PBB SHADOW E&M-EST. PATIENT-LVL V: ICD-10-PCS | Mod: PBBFAC,,,

## 2023-12-08 RX ORDER — CYCLOBENZAPRINE HCL 10 MG
10 TABLET ORAL NIGHTLY
Qty: 30 TABLET | Refills: 5 | Status: SHIPPED | OUTPATIENT
Start: 2023-12-08 | End: 2024-06-05

## 2023-12-08 RX ORDER — GABAPENTIN 400 MG/1
CAPSULE ORAL
Qty: 90 CAPSULE | Refills: 5 | Status: SHIPPED | OUTPATIENT
Start: 2023-12-08

## 2023-12-08 RX ORDER — OMEPRAZOLE 40 MG/1
40 CAPSULE, DELAYED RELEASE ORAL
COMMUNITY
Start: 2023-12-06

## 2023-12-08 NOTE — ASSESSMENT & PLAN NOTE
One time Rx of Gabapentin and Flexeril sent to her pharmacy. She understands that we will not be able to provide these medications in the future and other providers such as PCP, pain mangement, or if she follows Dr. Frye would need to further prescribe these medications    Continue Gabapentin 400mg TID  Continue Flexeril 10 mg nightly     Fibromyalgia is a chronic pain syndrome.  Underlying causes of fibromyalgia may be numerous.  An underlying nonrestorative sleep pattern, mental and physical stressors play a role in pain perception.  Discussed importance of decreasing stress levels and improving sleep patterns/hygiene.   Exercise and a healthy life-style is important in management of this syndrome.

## 2023-12-08 NOTE — ASSESSMENT & PLAN NOTE
Stable; No active synovitis on exam. Morning stiffness about 20-30 minutes. She is aware that I am unable to prescribe Norco for pain; she would like to follow Dr. Frye so she continue be treated with her pain medication.    Continue Hydroxychloroquine 200 mg BID  Previously prescribed Norco  Q 12 hrs PRN- Rx by MD     Plaquenil Eye Exam: UTD followed by Eitan    Available labs reviewed in the EMR.

## 2023-12-08 NOTE — ASSESSMENT & PLAN NOTE
BP today is 150/85  She monitors at home   Continue RX medications as prescribed by managing provider  Low Sodium Diet (Dash Diet - less than 2 grams of sodium per day).  Maintain healthy weight with goal BMI <30. Exercise 30 minutes per day 5 days per week.

## 2024-01-03 ENCOUNTER — OFFICE VISIT (OUTPATIENT)
Dept: GYNECOLOGY | Facility: CLINIC | Age: 69
End: 2024-01-03
Payer: MEDICARE

## 2024-01-03 VITALS
TEMPERATURE: 99 F | SYSTOLIC BLOOD PRESSURE: 131 MMHG | DIASTOLIC BLOOD PRESSURE: 85 MMHG | HEART RATE: 59 BPM | RESPIRATION RATE: 20 BRPM | WEIGHT: 220 LBS | OXYGEN SATURATION: 98 % | HEIGHT: 67 IN | BODY MASS INDEX: 34.53 KG/M2

## 2024-01-03 DIAGNOSIS — N39.46 MIXED INCONTINENCE: ICD-10-CM

## 2024-01-03 DIAGNOSIS — Z46.89 PESSARY MAINTENANCE: ICD-10-CM

## 2024-01-03 DIAGNOSIS — N81.9 FEMALE GENITAL PROLAPSE, UNSPECIFIED TYPE: Primary | ICD-10-CM

## 2024-01-03 LAB
APPEARANCE UR: ABNORMAL
BACTERIA #/AREA URNS AUTO: ABNORMAL /HPF
BILIRUB UR QL STRIP.AUTO: NEGATIVE
COLOR UR AUTO: YELLOW
GLUCOSE UR QL STRIP.AUTO: NORMAL
HYALINE CASTS #/AREA URNS LPF: ABNORMAL /LPF
KETONES UR QL STRIP.AUTO: NEGATIVE
LEUKOCYTE ESTERASE UR QL STRIP.AUTO: 500
MUCOUS THREADS URNS QL MICRO: ABNORMAL /LPF
NITRITE UR QL STRIP.AUTO: NEGATIVE
PH UR STRIP.AUTO: 5.5 [PH]
PROT UR QL STRIP.AUTO: ABNORMAL
RBC #/AREA URNS AUTO: ABNORMAL /HPF
RBC UR QL AUTO: NEGATIVE
SP GR UR STRIP.AUTO: 1.03 (ref 1–1.03)
SQUAMOUS #/AREA URNS LPF: ABNORMAL /HPF
UROBILINOGEN UR STRIP-ACNC: NORMAL
WBC #/AREA URNS AUTO: ABNORMAL /HPF

## 2024-01-03 PROCEDURE — 81001 URINALYSIS AUTO W/SCOPE: CPT

## 2024-01-03 PROCEDURE — 87077 CULTURE AEROBIC IDENTIFY: CPT

## 2024-01-03 PROCEDURE — 99215 OFFICE O/P EST HI 40 MIN: CPT | Mod: PBBFAC

## 2024-01-03 PROCEDURE — 87086 URINE CULTURE/COLONY COUNT: CPT

## 2024-01-03 NOTE — PROGRESS NOTES
[facility] GYNECOLOGY CLINIC NOTE     Laura Freeman is a 68 y.o.  with stage II anterior prolapse and stress urinary incontinence who presents for pessary check.     She reports that she has been doing well with her pessary.  she was sized down from ring #5 with knob to ring #3 with knob with discussion to possibly move up to ring #4 with knob at today's visit.     She denies any bleeding, discharge, or pain. She states that her urinary symptoms have not improved any, but otherwise she is doing fine. Using her vaginal estrogen as directed and placing it digitally. No concern for displaced pessary or pessary falling out.    She reports continuation of her urinary symptoms. States that she is leaking urine consistently throughout the day or night.States that she will sometimes leak with sneeze or cough, but also will have episodes of voiding where she does not realize she is voiding both throughout the day and at night. Reports that she has tried some medications prescribed to her, but she stopped taking them. She has not seen her UroGyn in quite some time.     Gynecology  OB History          7    Para   5    Term                AB        Living             SAB        IAB        Ectopic        Multiple        Live Births                    Past Medical History:   Diagnosis Date    Hypertension     Papillary microcarcinoma of thyroid (T1a N0 M0) 2019    Diagnosed as an incidental finding at the time of a right hemithyroidectomy done in conjunction with total laryngectomy on 2019.    Prolapse of female pelvic organs     T3N0M0 supraglottic laryngeal squamous cell carcinoma 2019    T3 N0 M0 squamous cell carcinoma of the supraglottic larynx diagnosed initially on 2019. Treated with total laryngectomy, bilateral selective neck dissections (levels 2 through 4), right hemithyroidectomy (with incidental finding papillary thyroid microcarcinoma), primary TEP puncture with  cricopharyngeal myotomy on August 12, 2019.  Treated with postoperative radiation therapy.    Type 2 diabetes mellitus with unspecified diabetic retinopathy without macular edema       Past Surgical History:   Procedure Laterality Date    ceiol - cataract extraction and insertion of introcular lens      excision of papilloma      I&D of skin and subcutaneous tissue  05/31/2015    MULTIPLE TOOTH EXTRACTIONS Bilateral 09/06/2019    Full mouth extraction in preparation for radiation therapy.    PANENDOSCOPY N/A 07/08/2019    Initial diagnosis laryngeal squamous cell carcinoma.  Done in conjunction with an awake tracheostomy.    PERCUTANEOUS INSERTION OF GASTROSTOMY TUBE N/A 07/12/2019    SELECTIVE NECK DISSECTION Bilateral 08/12/2019    Done in conjunction with total laryngectomy for laryngeal squamous cell carcinoma.    THYROID LOBECTOMY Right 08/12/2019    Done in conjunction with total laryngectomy with incidental finding thyroid papillary microcarcinoma.    TOTAL LARYNGECTOMY N/A 08/12/2019    Total laryngectomy with bilateral selective neck dissections (levels 2 through 4) right hemithyroidectomy and primary tracheoesophageal puncture cricopharyngeal myotomy for treatment laryngeal squamous cell carcinoma.    TRACHEOSTOMY  07/08/2019    Done in conjunction with panendoscopy at initial diagnosis of laryngeal squamous cell carcinoma.    TRACHEOSTOMY TUBE CHANGE  07/14/2019    Done in conjunction with control of post-operative tracheostomy wound hemorrhage.      Current Outpatient Medications   Medication Instructions    amLODIPine (NORVASC) 10 mg, Oral, Daily    aspirin 81 mg, Oral, Daily    atorvastatin (LIPITOR) 40 mg, Oral, Daily    blood sugar diagnostic Strp To check BG 1 times daily, to use with insurance preferred meter    blood-glucose meter kit To check BG 1 times daily, to use with insurance preferred meter    carvediloL (COREG) 3.125 MG tablet TAKE 1 TABLET(3.125 MG) BY MOUTH TWICE DAILY     "cyclobenzaprine (FLEXERIL) 10 mg, Oral, Nightly    diclofenac sodium (VOLTAREN) 2 g, Topical (Top), 4 times daily, PRN for pain    docusate sodium (COLACE) 100 mg, Oral, 2 times daily    EScitalopram oxalate (LEXAPRO) 20 MG tablet TAKE 1 TABLET(20 MG) BY MOUTH DAILY    estradioL (ESTRACE) 0.5 g, Vaginal, Twice weekly    famotidine (PEPCID) 40 mg, Oral, 2 times daily    gabapentin (NEURONTIN) 400 MG capsule See Instructions, TAKE 1 CAPSULE BY MOUTH THREE TIMES DAILY, # 90 cap(s), 2 Refill(s), Pharmacy: [id number], 174, cm, Height/Length Dosing, 10/24/21 10:52:00 CDT, 89, kg, Weight Dosing, 10/24/21 10:52:00 CDT    HYDROcodone-acetaminophen (NORCO)  mg per tablet 1 tablet, Oral, Every 12 hours PRN    hydroxychloroquine (PLAQUENIL) 200 mg, Oral, 2 times daily, After food    lancets Misc Use qd    levothyroxine (SYNTHROID) 112 mcg, Oral, Before breakfast    lisinopriL (PRINIVIL,ZESTRIL) 40 mg, Oral    meclizine (ANTIVERT) 25 mg, Oral, As needed (PRN)    metFORMIN (GLUCOPHAGE) 500 mg, Oral, 2 times daily    neomycin-polymyxin-dexamethasone (DEXACINE) 3.5 mg/g-10,000 unit/g-0.1 % Oint     nitroGLYCERIN (NITROSTAT) 0.4 mg, Sublingual, As needed (PRN)    omeprazole (PRILOSEC) 40 mg, Oral    ondansetron (ZOFRAN-ODT) 4 mg, Oral, 3 times daily    OXcarbazepine (TRILEPTAL) 300 mg, Oral, 2 times daily     Social History     Tobacco Use    Smoking status: Former     Types: Cigarettes     Passive exposure: Past    Smokeless tobacco: Never    Tobacco comments:     Smoked from 3054-2409   Substance Use Topics    Alcohol use: Yes     Comment: occassionally    Drug use: Never       Review of Systems  Pertinent items are noted in HPI.     Objective:     /85 (BP Location: Left arm, Patient Position: Sitting, BP Method: Large (Automatic))   Pulse (!) 59   Temp 98.5 °F (36.9 °C) (Oral)   Resp 20   Ht 5' 7" (1.702 m)   Wt 99.8 kg (220 lb)   LMP  (LMP Unknown)   SpO2 98%   BMI 34.46 kg/m²   Physical " Exam:  Gen: Well-nourished, well-developed female appearing stated age. Alert, cooperative, in no acute distress.  CV: rrr, no murmurs/rubs/gallops  Chest: ctabl, no wheezes/rales/rhonchi  Abdomen: Soft, non-tender, no masses.  Extrem: Extremities normal, atraumatic, non-tender calves.    Pelvic exam deferred as patient doing well with pessary and not due for removal/cleaning at this time.     Assessment:       68 y.o.  here for with stage II anterior prolapse and stress urinary incontinence who presents for pessary check.  .       Plan:         Problem List Items Addressed This Visit          Renal/    Mixed incontinence    Relevant Orders    Urinalysis, Reflex to Urine Culture (Completed)    Urine Culture High Risk     Other Visit Diagnoses       Female genital prolapse, unspecified type    -  Primary    Pessary maintenance              Mixed Incontinence:  - Patient with history of mixed incontinence   - Previously seeing urogyn for management, but has not been to appointment there in some time  - UA and urine culture ordered to rule out infectious cause for incontinence  - Patient encouraged to re-present to care with UroGyn Dr. Andrew Hong, and phone number to his clinic given to patient.    Pessary maintenance/Female genital prolapse  - Patient doing okay with pessary at this time   - Previously with ring #5 with knob, now with ring #3 with knob. Will continue with current pessary as patient doing well.  - RTC for removal, cleaning, and replacement of pessary in 5 months.    RTC in 5 months.     Discussed patient and plan with .     Crystal Prather, DO  [employer] PGY-2

## 2024-01-05 LAB
BACTERIA UR CULT: ABNORMAL
BACTERIA UR CULT: ABNORMAL

## 2024-01-09 ENCOUNTER — TELEPHONE (OUTPATIENT)
Dept: GYNECOLOGY | Facility: CLINIC | Age: 69
End: 2024-01-09
Payer: MEDICARE

## 2024-01-09 DIAGNOSIS — N81.10 BLADDER PROLAPSE, FEMALE, ACQUIRED: ICD-10-CM

## 2024-01-09 DIAGNOSIS — F51.01 PRIMARY INSOMNIA: ICD-10-CM

## 2024-01-09 DIAGNOSIS — C32.9 LARYNGEAL SQUAMOUS CELL CARCINOMA: ICD-10-CM

## 2024-01-09 DIAGNOSIS — R35.1 NOCTURIA: ICD-10-CM

## 2024-01-09 DIAGNOSIS — M06.00 SERONEGATIVE RHEUMATOID ARTHRITIS: ICD-10-CM

## 2024-01-09 DIAGNOSIS — M79.7 FIBROMYALGIA SYNDROME: ICD-10-CM

## 2024-01-09 DIAGNOSIS — M19.90 INFLAMMATORY ARTHRITIS: ICD-10-CM

## 2024-01-09 DIAGNOSIS — E03.9 HYPOTHYROIDISM, UNSPECIFIED TYPE: ICD-10-CM

## 2024-01-09 DIAGNOSIS — C73 PAPILLARY MICROCARCINOMA OF THYROID: ICD-10-CM

## 2024-01-09 DIAGNOSIS — N39.46 MIXED INCONTINENCE: ICD-10-CM

## 2024-01-09 DIAGNOSIS — B18.2 HEPATITIS C VIRUS CARRIER STATE: ICD-10-CM

## 2024-01-09 DIAGNOSIS — M54.40 LOW BACK PAIN WITH SCIATICA, SCIATICA LATERALITY UNSPECIFIED, UNSPECIFIED BACK PAIN LATERALITY, UNSPECIFIED CHRONICITY: ICD-10-CM

## 2024-01-09 DIAGNOSIS — I10 HYPERTENSION, UNSPECIFIED TYPE: ICD-10-CM

## 2024-01-09 RX ORDER — NITROFURANTOIN 25; 75 MG/1; MG/1
100 CAPSULE ORAL 2 TIMES DAILY
Qty: 14 CAPSULE | Refills: 0 | Status: SHIPPED | OUTPATIENT
Start: 2024-01-09 | End: 2024-01-16

## 2024-01-09 RX ORDER — LISINOPRIL 40 MG/1
40 TABLET ORAL DAILY
Qty: 30 TABLET | Refills: 0 | Status: SHIPPED | OUTPATIENT
Start: 2024-01-09

## 2024-01-09 NOTE — TELEPHONE ENCOUNTER
Physician called patient to discuss urine culture positive for 10,000 CFU of E. Coli in the setting of urinary incontinence. Possible that culture was a contaminant, but given symptoms decision made to proceed with Macrobid tx BID x7 days. Discussed this with patient and she verbalized understanding.  Requesting refill of her lisinopril which is written for her by the family [facility].  Gave patient 1 month refill of her antihypertensive medication, but encouraged patient to call family Medicine Clinic for further refills.    Crystal Prather,   [employer] [facility] PGY-2
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
## 2024-02-02 ENCOUNTER — OFFICE VISIT (OUTPATIENT)
Dept: NEUROLOGY | Facility: CLINIC | Age: 69
End: 2024-02-02
Payer: MEDICARE

## 2024-02-02 VITALS
SYSTOLIC BLOOD PRESSURE: 121 MMHG | BODY MASS INDEX: 34.71 KG/M2 | HEIGHT: 68 IN | HEART RATE: 63 BPM | TEMPERATURE: 99 F | DIASTOLIC BLOOD PRESSURE: 66 MMHG | WEIGHT: 229 LBS | OXYGEN SATURATION: 92 %

## 2024-02-02 DIAGNOSIS — M54.81 OCCIPITAL NEURALGIA, UNSPECIFIED LATERALITY: ICD-10-CM

## 2024-02-02 DIAGNOSIS — G40.909 SEIZURE DISORDER: Primary | ICD-10-CM

## 2024-02-02 PROCEDURE — 99215 OFFICE O/P EST HI 40 MIN: CPT | Mod: PBBFAC | Performed by: PSYCHIATRY & NEUROLOGY

## 2024-02-02 PROCEDURE — G2211 COMPLEX E/M VISIT ADD ON: HCPCS | Mod: S$PBB,,, | Performed by: PSYCHIATRY & NEUROLOGY

## 2024-02-02 PROCEDURE — 99214 OFFICE O/P EST MOD 30 MIN: CPT | Mod: S$PBB,,, | Performed by: PSYCHIATRY & NEUROLOGY

## 2024-02-02 RX ORDER — OXCARBAZEPINE 300 MG/1
300 TABLET, FILM COATED ORAL 2 TIMES DAILY
Qty: 180 TABLET | Refills: 1 | Status: SHIPPED | OUTPATIENT
Start: 2024-02-02 | End: 2024-07-31

## 2024-03-05 ENCOUNTER — CLINICAL SUPPORT (OUTPATIENT)
Dept: REHABILITATION | Facility: HOSPITAL | Age: 69
End: 2024-03-05
Payer: MEDICARE

## 2024-03-05 DIAGNOSIS — C32.9 LARYNGEAL SQUAMOUS CELL CARCINOMA: ICD-10-CM

## 2024-03-05 DIAGNOSIS — Z90.02 H/O LARYNGECTOMY: Primary | ICD-10-CM

## 2024-03-05 PROCEDURE — 92597 ORAL SPEECH DEVICE EVAL: CPT

## 2024-03-05 PROCEDURE — L8509 TRACH-ESOPH VOICE PROS MD IN: HCPCS

## 2024-03-05 PROCEDURE — A7520 TRACH/LARYN TUBE NON-CUFFED: HCPCS

## 2024-03-05 NOTE — PLAN OF CARE
[facility]  Speech Therapy Evaluation   Laryngectomy  Date: 03/05/2024    Name: Laura Freeman   MRN: [id number]    Therapy Diagnosis:   Encounter Diagnoses   Name Primary?    Laryngeal squamous cell carcinoma     H/O laryngectomy Yes      Physician: Ivana Carrillo FNP    PATIENT IS A NECK-BREATHER - DO NOT ORALLY INTUBATE    Time In:  1010   Time Out:  1035     Procedure Min.   Prosthesis Evaluation  25   Total Untimed Units: 25  Charges Billed/# of units: 25/1    Precautions: Standard  Subjective      Chief Complaint: leaking and aphonia for 1 week    AFFECT: normal affect    Pain:   0/10  Pain Location / Description: NA  Current Medical History: Lauar Freeman is a 68 y.o. female referred by Dr. Carrillo for TEP management to maximize alaryngeal communication without respiratory compromise.    Past Medical History:   Past Medical History:   Diagnosis Date    Hypertension     Papillary microcarcinoma of thyroid (T1a N0 M0) 07/12/2019    Diagnosed as an incidental finding at the time of a right hemithyroidectomy done in conjunction with total laryngectomy on August 12, 2019.    Prolapse of female pelvic organs     T3N0M0 supraglottic laryngeal squamous cell carcinoma 07/08/2019    T3 N0 M0 squamous cell carcinoma of the supraglottic larynx diagnosed initially on July 8, 2019. Treated with total laryngectomy, bilateral selective neck dissections (levels 2 through 4), right hemithyroidectomy (with incidental finding papillary thyroid microcarcinoma), primary TEP puncture with cricopharyngeal myotomy on August 12, 2019.  Treated with postoperative radiation therapy.    Type 2 diabetes mellitus with unspecified diabetic retinopathy without macular edema     Laura Freeman  has a past surgical history that includes Multiple tooth extractions (Bilateral, 09/06/2019); excision of papilloma; ceiol - cataract extraction and insertion of introcular lens; I&D of skin and subcutaneous tissue (05/31/2015);  Tracheostomy (07/08/2019); Panendoscopy (N/A, 07/08/2019); Percutaneous insertion of gastrostomy tube (N/A, 07/12/2019); Tracheostomy tube change (07/14/2019); Thyroid lobectomy (Right, 08/12/2019); Total laryngectomy (N/A, 08/12/2019); and Selective neck dissection (Bilateral, 08/12/2019).   Active Ambulatory Problems     Diagnosis Date Noted    Mixed incontinence 05/20/2022    Hepatitis C virus infection 05/23/2022    Low back pain 02/17/2014    Laryngeal squamous cell carcinoma 06/28/2022    Papillary microcarcinoma of thyroid 06/28/2022    Nocturia 08/03/2022    Hypertension 09/06/2022    Hypothyroidism 09/06/2022    Bladder prolapse, female, acquired 10/05/2022    Postsurgical hypothyroidism 10/07/2022    Seronegative rheumatoid arthritis 01/17/2023    H/O laryngectomy 01/30/2023    Type 2 diabetes mellitus with diabetic neuropathy, without long-term current use of insulin 02/20/2023    Fibromyalgia syndrome 05/16/2023    Depression 05/22/2023    Seizure disorder 10/18/2023    Occipital neuralgia 10/18/2023    Drug-induced immunodeficiency 10/24/2023    Presence of tracheostomy 10/24/2023     Resolved Ambulatory Problems     Diagnosis Date Noted    No Resolved Ambulatory Problems     Past Medical History:   Diagnosis Date    Prolapse of female pelvic organs     Type 2 diabetes mellitus with unspecified diabetic retinopathy without macular edema       Medical Hx and Allergies: Laura has a current medication list which includes the following prescription(s): amlodipine, aspirin, atorvastatin, blood sugar diagnostic, blood-glucose meter, carvedilol, cyclobenzaprine, diclofenac sodium, docusate sodium, escitalopram oxalate, estradiol, famotidine, gabapentin, hydrocodone-acetaminophen, lancets, levothyroxine, lisinopril, meclizine, metformin, neomycin-polymyxin-dexamethasone, nitroglycerin, omeprazole, ondansetron, and oxcarbazepine, and the following Facility-Administered Medications: phenylephrine hcl 1%. Review of  patient's allergies indicates:  No Known Allergies    Current Voice Function: alaryngeal communication via voice prothesis, currently aphonic  Current Level of Swallow Function/Complaints:  no complaints currently  Prior Therapy:  05/02/2024    TEP ASSESSMENT     Initial Fitting: No    Re-fitting:Yes    TEP Initial Fitting Date: 08/12/2019    TEP Current Status: Mrs. Freeman is currently wearing a 17FR, 6mm Provox Johnson placed on 05/02/2024. Additionally, she wears a 10/36 fenestrated akhil tube with push to talk HMEs. She attempts to use a akhil button, however, complains of clogging due to increased mucus.     TEP Patient Complaints:leaking and aphonia for ~ 1 week    TEP Accessories: 10/36 fenestrated akhil tube with push to talk HMEs. She attempts to use a akhil button, however, complains of clogging due to increased mucus.     Stoma Size:  adequate     Lymphedema:  No    TEP Fitting/Re-sizing:     Removed current TEP: Yes tracheal edge embedded upon visualization. SLP able to grasp at 3 o'clock position to remove.     TEP Removed catheter : No     TEP Sizing:Yes 10mm    Puncture Dilation: No     TEP Prosthesis Placed:Yes 17FR, 10mm Provox Johnson    TEP Voicing with Open Tract:No     TEP Voicing with Prosthesis:Yes Good voicing noted immediately    TEP Leaking through Prothesis:No     TEP Leaking around Prosthesis:No       Education     Education: Plan of Care, role of SLP in care, scheduling/ cancellation policy, TEP and stomal care, visual assessment of TEP to ensure adequate placement, RRC placement incase of dislodgement were discussed with pt. Patient family members expressed understanding. SLP provide 14 FR RRC this date.     Barriers to Learning:  NA    Teaching Method: Verbal, Written, Demonstration  Assessment       LongTerm Goals:  Current Progress:   1.  Patient will utilize alaryngeal communication via TEP to express needs and desires without respiratory compromise >90% of the time.  Progressing towards goal        Short Term Goals:  Current Progress:   1. Patient will demonstrate care and use of HME system for maximum pulmonary benefit >90% of the time. Progressing towards goal   2.  Patient will demonstrate adequate care and use of TEP to maintain TEP voicing >90% of the time.  Progressing towards goal   3.  Patient will demonstrate adequate occlusion and cleaning of TEP to maintain voicing >90% of the time.  Progressing towards goal   4. Patient will increase laryngectomy tube usage daily or PRN to maintain and increase stomal patency.  Progressing towards goal     Mrs. 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  She is currently utilizing esophageal speech via a voice prosthesis for alaryngeal communication for functional communication with family, friends, medical providers, and others to perform daily life activities.  Mrs. Freeman requires the use of a laryngectomy tube at all times to keep the airway patent and prevent stomal stenosis. HMEs are required daily for mucus management and pulmonary optimization .     Plan     SLP intervention is warranted 1 times a month or PRN for communication needs for a minimum of 1 year due to the chronic nature of the impairment.     Additional Information     Mrs. Freeman entered and reported prosthesis leaking and aphonia for ~ 1 week. SLP assessed prothesis and was unable to fully visualized tracheal phalanges due to embedment. SLP able to grasp prosthesis at 3 o'clock position and remove phalange from tracheal tissue. Prothesis noted to have tight fit. SLP removed current prothesis and resized puncture at 10mm. 17FR, 10mm Provox Johnson placed. Good voicing noted immediately and no leaking noted through or around prothesis with thin liquids. SLP to follow up as warranted.     Lexy Price MS, CCC-SLP  3/5/2024

## 2024-03-06 DIAGNOSIS — Z86.73 HISTORY OF CVA (CEREBROVASCULAR ACCIDENT): ICD-10-CM

## 2024-03-06 RX ORDER — LEVOTHYROXINE SODIUM 112 UG/1
TABLET ORAL
Qty: 90 TABLET | Refills: 0 | Status: SHIPPED | OUTPATIENT
Start: 2024-03-06

## 2024-03-07 RX ORDER — ATORVASTATIN CALCIUM 40 MG/1
40 TABLET, FILM COATED ORAL
Qty: 90 TABLET | Refills: 1 | Status: SHIPPED | OUTPATIENT
Start: 2024-03-07

## 2024-03-26 NOTE — TELEPHONE ENCOUNTER
----- Message from Ladan Main sent at 3/26/2024  8:09 AM CDT -----  Regarding: Refill  Provider: Dr. Bouchra Treadwell  [facility]: [facility]  Last Visit:   Next Visit: 05/02/2024  Patient's Contact Number:     1. Name of Medication: carvediloL (COREG) 3.125 MG tablet  Dosage:   Comments:     2. Name of Medication:   Dosage:   Comments:     3. Name of Medication:   Dosage:   Comments     4. Name of Medication:   Dosage:   Comments:     5. Name of Medication:   Dosage:   Comments:

## 2024-03-27 RX ORDER — CARVEDILOL 3.12 MG/1
3.12 TABLET ORAL 2 TIMES DAILY
Qty: 180 TABLET | Refills: 0 | Status: SHIPPED | OUTPATIENT
Start: 2024-03-27 | End: 2024-05-02 | Stop reason: SDUPTHER

## 2024-04-04 ENCOUNTER — OFFICE VISIT (OUTPATIENT)
Dept: OTOLARYNGOLOGY | Facility: CLINIC | Age: 69
End: 2024-04-04
Payer: MEDICARE

## 2024-04-04 VITALS
SYSTOLIC BLOOD PRESSURE: 118 MMHG | WEIGHT: 225.63 LBS | DIASTOLIC BLOOD PRESSURE: 76 MMHG | HEIGHT: 68 IN | BODY MASS INDEX: 34.19 KG/M2 | HEART RATE: 88 BPM

## 2024-04-04 DIAGNOSIS — C32.9 LARYNGEAL SQUAMOUS CELL CARCINOMA: Primary | ICD-10-CM

## 2024-04-04 DIAGNOSIS — E89.0 POSTSURGICAL HYPOTHYROIDISM: ICD-10-CM

## 2024-04-04 DIAGNOSIS — Z90.02 S/P LARYNGECTOMY: ICD-10-CM

## 2024-04-04 DIAGNOSIS — C73 PAPILLARY MICROCARCINOMA OF THYROID: ICD-10-CM

## 2024-04-04 DIAGNOSIS — Z92.3 HISTORY OF RADIATION TO HEAD AND NECK REGION: ICD-10-CM

## 2024-04-04 PROCEDURE — 99212 OFFICE O/P EST SF 10 MIN: CPT | Mod: PBBFAC

## 2024-04-04 PROCEDURE — 31575 DIAGNOSTIC LARYNGOSCOPY: CPT | Mod: PBBFAC | Performed by: STUDENT IN AN ORGANIZED HEALTH CARE EDUCATION/TRAINING PROGRAM

## 2024-04-04 NOTE — PROGRESS NOTES
Patient Name: Laura Freeman   YOB: 1955     Chief Complaint   Patient presents with    Follow-up        History of Present Illness:  This is a 64-year-old female presenting from Dr. Dennis for evaluation of laryngeal mass. Patient notes she has had hoarseness starting shortly before Ever, dysphagia since evaluated with an EGD and MBSS (showing aspirations), 1 month of sore throat that has been treated with antibiotics. She endorses some weight loss however, denies any dyspnea. She has not noted any lymphadenopathy, otalgia, or odynophagia.    7/24/19: Here for first follow up after admission for awake trach, panendoscopy. 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  Recommendation:  - TB cites likely inferior outcomes (functional and oncologic) with CRT vs TL, thus TL is best option  - TB recommends continued education to pt and family regarding life after laryngectomy, as well as likely poor functional outcome with CRT (aspiration, trach and peg dependence, npo status)  - However if pt is completely against surgery, should offer CRT for treatment    Has been doing ok at home. Had one day she went to ER with plugged inner cannula.    7/31/19: Here after meeting with ST and a laryngectomy patient, would like to proceed with surgery. No issues at home since last visit. No changes in medical history.    8/12/19: Underwent TL, BND, CP myotomy, R thyroid lobectomy and TEP.    8/27/19: Here for first follow up since discharge. Still taking liquids PO, no issues at home. Has ST appointment scheduled for Sept 3rd. Has right UE weakness and pain but mobility is ok. Doing well with stoma care.  [facility] TB Recs:  Recommendations: NCCN guidelines state consider adjuvant XRT  - Will plan to discuss risks/benefits of radiation with patient in ENT follow  - Will do same in Radiation Oncology clinic    9/4/19: Here for follow up and also for follow-up after presenting to ED last night with concerns for nodule  below stoma. Has 1 pill of her antibiotic given at last visit left. She reports that the swelling appeared suddenly yesterday. Is very tender on palpation. causes pain extending toward her right shoulder [1]    9/11/19: hospitalization on 9/4 s/p I&D of right clavicular head and teeth extraction. Starting radiation on Thursday. Otherwise no issues. [1]    10/16/2019: Recent hospitalization for TIA sx w/o any sequela. MRI, echo pending. Discharged on levo and doxy for right clavicular head drainage. patient hasn't taken any. Notes still have lots of blood secretions but not any now. Eating per mouth also supplementing thru G-tube [1]    11/19/19: Pt here for follow up. She has completed CRT 2 weeks ago. Eating and drinking well. Maintaining weight. Feels her right sternoclavicular joint is unchanged. Finished antibiotics over 2 weeks ago. Feels it has not changed since finishing antibiotics, no drainage. Per patient finished all chemo and radiation treatments.    12/24/19: No c/o today. Sternoclavicular joint has resolved. Tolerating PO intake and maintaining weight. She would like to have PEG removed, states she has not used it in over 2 months. [1]    1/28/20: Here for follow up. PET ordered today by Dr. Kim. No dyspnea, dysphagia. noting shoulder stiffness/pain. Doing home PT exercises. Notes some numbness underneath chin. Got PEG removed by GS today. [1]    3/5/20: Here for cancer surveaillcen. Post-treatment PET done 2/2020 with some hypermetabolic activity in nasopharynx, no eviednce of persistence/recurrence in neck. No distant mets. Denies dysphagia. Weight stable. Denies dyspnea. Voicing well with TEP.    5/26/20: Here for cancer surveillance. No weight loss, dysphagia, odynophagia. Reports that the lymphedema machine was denied per a letter she received. She is reporting severe pain to bilateral cheeks and upper neck? Voicing with TEP, no issues.    7/28/20: Denies dysphagia/odyophagia/otalgia. Reports  "weight loss ~ 10lbs since last visit bc she does not have much of an appetite, but has begun drinking ensure to keep calories up. Frustrated that her ear lobes have paresthesias and she ripped her earrings out. Requesting additional norco. C/o intermittent tight pulling sensation in bilateral neck muscles that makes her feel as though she is choking. Reports taking a few days of leftover abx rx 2 weeks ago bc her secretions had turned green and she was afraid to go to the ED. Denies having fever, but reports she got a little SOB. Secretions are now clear.     9/29/2020: Presenting in follow-up for cancer surveillance. She denies any dysphasia, odynophagia, otalgia or unexpected weight loss at this time. She notes she has a knot on her left neck close to her stoma that is nontender. She noticed this about month and a half ago. 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  A CT scan of the neck showed the presence of a 1.9 cm left thyroid nodule with an interval increase in size. No cervical adenopathy was noted. Patient is to undergo needle biopsy of this lesion today.  Thyroid function studies done on September 29, 2020, showed her to be hypothyroid with a TSH level of 15.190 and a normal free T4 0.76. Previously the patient had been given a prescription for levothyroxine 75 mcg daily but this had never been filled and the patient had not been taking that medication. Patient does admit to some fatigue but she does not feel "bad". She does not have any other new complaints today.    11/11/20: Doing well. S/p FNA of left thyroid nodule 10/29/20. Path came back that sample was insufficient for diagnostic evaluation. Reports compliance with " levothyroxine.    1/7/21: Doing well. No complaints at this time. No weight changes, dysphagia, odynophagia, otalgia, new neck masses.    3/11/21: Here today for possible FNA of nodule, however when using ultrasound no concerning nodules seen to biopsy. Patient had adjustment of Synthroid at last visit, TSH elevated in January. Due for surveillance appointment.    5/6/21: Here today for cancer surveillance. Reports significant tenderness to right neck starting 1-2 weeks ago but no associated mass or swelling. She has tried her lortab but this has not helped. No issues with PO intake. Weight is stable. No dysphagia, odynophagia, otalgia.    6-16-21: Follow-up for cancer surveillance, no current complaints including no palpable neck mass or tenderness on either side, no blood from the trachea or blood from the mouth. Her weight is remaining stable and she is tolerating p.o. well. She overall functioning well and appears happy.    8/18/21: here today for surveillance. No complaints overall; a little bit of generic fullness over the left angle of mandible but nothing discrete. Tolerating PO well. No odynophagia, dysphagia, weight changes, otalgia, difficulty speaking via alaryngeal voice.    10/19/2021: Pt is here for surveillance with hx of right thyroid lobectomy and FNA of 2.0 cm left thyroid nodule. Path came back that sample was insufficient for diagnostic evaluation 10/2020. She states that she has been doing well. Pt says that her only complaint is inability to taste food every once and a while. No current complaints    1/26/2022: Follow-up for cancer surveillance, currently tolerating diet well, without neck swelling, pain, hemoptysis from the trach or new areas of swelling or tenderness in her neck or in her throat. Denies otalgia. CT of chest and neck unremarkable for recurrence or metastatic disease. Makes note of diminished thyroid nodule. Patient followed by Dr. Golden in endocrine for monitoring thyroid  function and markers.     4/26/2022: Patient presenting in follow-up. She is doing well with her diet. She continues to note some tightness around her neck for which she is doing stretches and she does find these helpful. She denies any odynophagia, dysphagia, otalgia. She notes her weight does tend to waver and endocrinology has been fixing her thyroid medication dosage recently. Most recently she did have some lab work done to measure her thyroid function.    June 28, 2022: 67-year-old female presents today for follow-up of her supraglottic laryngeal squamous cell carcinoma and her thyroid papillary microcarcinoma.  Patient is doing well at this time.  She has no complaints of any difficulty swallowing, neck pain, ear pain, or hemoptysis.  The area of swelling on the left side of her neck has not changed.  Endocrinology is following her for management of her postoperative hypothyroidism and thyroid carcinoma.  She has no other new complaints today. Of note she is scheduled to have a follow-up thyroid ultrasound and ultrasound of the neck in October 2022.    9/28/22: Here for follow up. Patient reports no issues. Denies ear pain. Stable neck stiffness. No new lumps/bumps, swelling on neck is stable. Swallowing fine, no weight loss. Recent thyroid US unremarkable.  No complaints.     1/30/23: Doing well. Denies dysphagia, dysphonia, SOB. States she gained weight over the holidays. She began having some pain in left neck a few days ago and has been stretching and using a muscle rub. This has helped some.     5/30/23: Here today for cancer surveillance. Reports she is doing well overall. Denies any dysphagia, odynophagia, new palpable lumps/bumps, no SOB. Wearing HME over akhil tube, no issues with TEP leaking.     10/31/2023: Patient here today for follow up.  Patient is overall doing very well today.  No lumps or bumps of the head or neck, dysphagia, issues with TEP, shortness of breath.  No further complaints.  Following with endocrine for thyroid.    4/4/2023:  Here today in follow-up.  Patient is doing very well with minimal issues.  She reports that after recent TP change she reports that she has been coughing more recently.  States the TPs not lying flush.  Otherwise eating and drinking well with no issues denies lumps or bumps in her neck.    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    T3N0M0 supraglottic laryngeal squamous cell carcinoma 07/08/2019    T3 N0 M0 squamous cell carcinoma of the supraglottic larynx diagnosed initially on July 8, 2019. Treated with total laryngectomy, bilateral selective neck dissections (levels 2 through 4), right hemithyroidectomy (with incidental finding papillary thyroid microcarcinoma), primary TEP puncture with cricopharyngeal myotomy on August 12, 2019.  Treated with postoperative radiation therapy.    Type 2 diabetes mellitus with unspecified diabetic retinopathy without macular edema      Past Surgical History:   Procedure Laterality Date    ceiol - cataract extraction and insertion of introcular lens      excision of papilloma      I&D of skin and subcutaneous tissue  05/31/2015    MULTIPLE TOOTH EXTRACTIONS Bilateral 09/06/2019    Full mouth extraction in preparation for radiation therapy.    PANENDOSCOPY N/A 07/08/2019    Initial diagnosis laryngeal squamous cell carcinoma.  Done in conjunction with an awake tracheostomy.    PERCUTANEOUS INSERTION OF GASTROSTOMY TUBE N/A 07/12/2019    SELECTIVE NECK DISSECTION Bilateral 08/12/2019    Done in conjunction with total laryngectomy for laryngeal squamous cell carcinoma.    THYROID LOBECTOMY Right 08/12/2019    Done in conjunction with total laryngectomy with incidental finding thyroid papillary microcarcinoma.     TOTAL LARYNGECTOMY N/A 08/12/2019    Total laryngectomy with bilateral selective neck dissections (levels 2 through 4) right hemithyroidectomy and primary tracheoesophageal puncture cricopharyngeal myotomy for treatment laryngeal squamous cell carcinoma.    TRACHEOSTOMY  07/08/2019    Done in conjunction with panendoscopy at initial diagnosis of laryngeal squamous cell carcinoma.    TRACHEOSTOMY TUBE CHANGE  07/14/2019    Done in conjunction with control of post-operative tracheostomy wound hemorrhage.       Review of Systems:  Unremarkable except as mentioned above.    Current Medications:  Current Outpatient Medications   Medication Sig    amLODIPine (NORVASC) 10 MG tablet Take 1 tablet (10 mg total) by mouth Daily.    aspirin 81 MG Chew Take 81 mg by mouth once daily.    blood sugar diagnostic Strp Use qd    blood sugar diagnostic Strp To check BG 1 times daily, to use with insurance preferred meter    blood-glucose meter kit To check BG 1 times daily, to use with insurance preferred meter    carvediloL (COREG) 3.125 MG tablet TAKE 1 TABLET BY MOUTH TWICE DAILY    cyclobenzaprine (FLEXERIL) 10 MG tablet Take 1 tablet (10 mg total) by mouth every evening.    EScitalopram oxalate (LEXAPRO) 10 MG tablet Take 10 mg by mouth Daily.    famotidine (PEPCID) 40 MG tablet Take 40 mg by mouth 2 (two) times daily.    FLUoxetine 40 MG capsule Take 40 mg by mouth Daily.    HYDROcodone-acetaminophen (NORCO)  mg per tablet Take 1 tablet by mouth every 12 (twelve) hours as needed for Pain.    hydrOXYchloroQUINE (PLAQUENIL) 200 mg tablet Take 1 tablet (200 mg total) by mouth 2 (two) times daily. After food    isosorbide mononitrate (IMDUR) 30 MG 24 hr tablet Take 30 mg by mouth every morning.    lancets Misc Use qd    lancets Misc To check BG 1 times daily, to use with insurance preferred meter    lancets Misc Use to test 1 qd    levothyroxine (SYNTHROID) 100 MCG tablet TAKE 1 TABLET BY MOUTH DAILY    lisinopriL  "(PRINIVIL,ZESTRIL) 40 MG tablet TAKE 1 TABLET BY MOUTH DAILY    meclizine (ANTIVERT) 25 mg tablet Take 25 mg by mouth 3 (three) times daily.    metFORMIN (GLUCOPHAGE) 500 MG tablet Take 1 tablet (500 mg total) by mouth 2 (two) times daily.    mirabegron (MYRBETRIQ) 50 mg Tb24 Take 1 tablet (50 mg total) by mouth once daily at 6am.    nitroGLYCERIN (NITROSTAT) 0.4 MG SL tablet Place 0.4 mg under the tongue as needed.    omeprazole (PRILOSEC) 40 MG capsule Take 1 capsule (40 mg total) by mouth once daily. Before lunch    ondansetron (ZOFRAN-ODT) 4 MG TbDL Take 4 mg by mouth 3 (three) times daily.    OXcarbazepine (TRILEPTAL) 300 MG Tab TAKE 1 TABLET BY MOUTH TWICE DAILY    OXcarbazepine (TRILEPTAL) 300 MG Tab TAKE 1 TABLET BY MOUTH TWICE DAILY    furosemide (LASIX) 40 MG tablet Take 40 mg by mouth daily as needed.    gabapentin (NEURONTIN) 600 MG tablet Take 1 tablet (600 mg total) by mouth 3 (three) times daily.    oxybutynin (DITROPAN-XL) 5 MG TR24 Take 1 tablet (5 mg total) by mouth once daily. (Patient not taking: Reported on 9/28/2022)     Current Facility-Administered Medications   Medication    LIDOcaine (PF) 40 mg/mL (4 %) injection 1 mL    phenylephrine HCL 1% nasal spray 1 spray        Allergies:  Review of patient's allergies indicates:  No Known Allergies     Physical Exam:  /76 (BP Location: Left arm, Patient Position: Sitting, BP Method: Large (Automatic))   Pulse 88   Ht 5' 8" (1.727 m)   Wt 102.3 kg (225 lb 9.6 oz)   LMP  (LMP Unknown)   BMI 34.30 kg/m²     General:  Well-developed well-nourished female in no acute distress.  Patient voices well with her TEP.  Head and face:  Normocephalic.  No facial lesions  Ears:  Right ear-auricle is normally developed.  External auditory canal is clear.  Tympanic membrane is nonerythematous.  No middle ear effusion.  Left ear-auricle is normally developed.  External auditory canal is clear.  Tympanic membrane is nonerythematous.  No middle ear " effusion.  Nose:  Nasal dorsum is unremarkable.  No significant septal deviation.  No significant intranasal congestion.  Secretions are clear.  Oral cavity and oropharynx:  Edentulous, Tongue and floor mouth are without lesions.  Mucosa is moist.  No pharyngeal erythema or exudates.  No oropharyngeal masses.  No tonsillar hypertrophy.  Neck:  Supple without of palpable adenopathy.  No masses in the thyroid bed or in the peristomal region.  Parotid and submandibular glands are normal to palpation.  Stoma is without granulation.  TEP is in place, but inferior edge digging into tracheal wall. Not flush with trachea.   Eyes:  Extraocular muscles intact.  No nystagmus.  No exophthalmos or enophthalmos.  Neurologic:  Alert and oriented.  Cranial nerves 2-12 are grossly normal.      Procedure note: Flexible laryngoscopy  After informed consent was obtained and the nose was prepped with 1% phenyleprine nasal spray and lidocaine topically. The flexible fiberoptic laryngoscope was introduced into the right nostril and advanced. Examination of the nose showed the above mentioned findings. Examination of the nasopharynx showed no nasopharyngeal masses or eustachian tube obstruction. Examination of the base of tongue showed no masses. Neopharynx is without lesions or obstruction.  Examination of the trachea through the trachea stoma showed TEP in place without leakage, no masses or lesions down to the level of the adrianna.    Imaging:     CT neck w/ contrast 10/17/22:     COMPARISON:  CT neck dated 11/03/2021     FINDINGS:  There are postoperative changes of the neck with changes of prior total laryngectomy.  There is no definite new or progressive focal enhancement.  There is no cervical lymphadenopathy.     The parotid glands, submandibular glands and thyroid gland are unremarkable.  The major vessels in the neck are patent with atherosclerotic changes of the carotid arteries.  There is no acute abnormality of the orbits or  visualized brain parenchyma.  There is no destructive bone lesion.  The lung apices are clear.     Impression:     Stable exam without significant interval change compared to 11/03/2021.    EXAMINATION:  XR CHEST PA AND LATERAL 1/30/23:     CLINICAL HISTORY:  Malignant neoplasm of larynx, unspecified     TECHNIQUE:  Two views of the chest     COMPARISON:  09/20/2021     FINDINGS:  No focal opacification, pleural effusion, or pneumothorax.     Linear subsegmental atelectatic changes of the bilateral bases.     The cardiomediastinal silhouette is within normal limits.     No acute osseous abnormality.     Impression:     No acute cardiopulmonary process.                 Component Ref Range & Units 5 mo ago  (11/1/23) 7 mo ago  (8/17/23) 1 yr ago  (12/20/22) 1 yr ago  (10/7/22) 1 yr ago  (5/26/22) 1 yr ago  (4/21/22) 2 yr ago  (3/11/22)   TSH 0.350 - 4.940 uIU/mL 0.116 Low                     Assessment/Plan:   67-year-old female with T3 N0 M0 squamous cell carcinoma of the supraglottic larynx status post total laryngectomy with bilateral selective neck dissections (levels 2 through 4), primary TEP puncture with cricopharyngeal myotomy, and right hemithyroidectomy with an incidental finding of a papillary microcarcinoma (T1a N0 M0) on August 12, 2019, with postoperative radiation therapy completed on November 5, 2019. CT neck 10/2022 [facility]. CXR without concerning findings. TFT WNL. No new CT    - She knows to call with any changes/issues for sooner appointment.   - Appt with SLP to adjust TEP  - Continue follow-up with Endocrinology: last communicated about recent TSH in 11/23.   - RTC 6 mo for surveillance  - Reschedule CT neck      Alexei Gaytan MD  Otolaryngology PGY-III

## 2024-04-04 NOTE — PROGRESS NOTES
I have reviewed and agree with the resident's findings, including all diagnostic interpretations and plans as written.     Ray Austin M.D.

## 2024-04-10 ENCOUNTER — CLINICAL SUPPORT (OUTPATIENT)
Dept: REHABILITATION | Facility: HOSPITAL | Age: 69
End: 2024-04-10
Payer: MEDICARE

## 2024-04-10 DIAGNOSIS — Z90.02 H/O LARYNGECTOMY: Primary | ICD-10-CM

## 2024-04-10 PROCEDURE — 92507 TX SP LANG VOICE COMM INDIV: CPT

## 2024-04-10 PROCEDURE — L8509 TRACH-ESOPH VOICE PROS MD IN: HCPCS

## 2024-04-10 NOTE — PROGRESS NOTES
"[facility]  Speech Language Pathology   Daily Documentation  Laryngectomy  Date: 4/10/2024     Name: Laura Freeman   MRN: [id number]    Therapy Diagnosis:   Encounter Diagnosis   Name Primary?    H/O laryngectomy Yes      Patient Active Problem List   Diagnosis    Mixed incontinence    Hepatitis C virus infection    Low back pain    Laryngeal squamous cell carcinoma    Papillary microcarcinoma of thyroid    Nocturia    Hypertension    Hypothyroidism    Bladder prolapse, female, acquired    Postsurgical hypothyroidism    Seronegative rheumatoid arthritis    H/O laryngectomy    Type 2 diabetes mellitus with diabetic neuropathy, without long-term current use of insulin    Fibromyalgia syndrome    Depression    Seizure disorder    Occipital neuralgia    Drug-induced immunodeficiency    Presence of tracheostomy     Physician: Ivana Carrillo FNP    PATIENT IS A NECK-BREATHER - DO NOT ORALLY INTUBATE    Time In:  1000   Time Out:  1025    Procedure Min.   Speech-Language Therapy  25   Total Untimed Units: 25  Charges Billed/# of units: 25/1    Precautions: Standard  Subjective    Chief Complaint: "it's hanging out". Pt reported  increased length as reported per ENT. Increased coughing recently. Intermittent leaking  reported.     AFFECT normal affect    Pain:   0/10  Pain Location / Description: NA  Current Medical History: Laura Freeman is a 69 y.o. female referred by Dr. Carrillo for TEP management to maximize alaryngeal communication without respiratory compromise.    Past Medical History:   Past Medical History:   Diagnosis Date    Hypertension     Papillary microcarcinoma of thyroid (T1a N0 M0) 07/12/2019    Diagnosed as an incidental finding at the time of a right hemithyroidectomy done in conjunction with total laryngectomy on August 12, 2019.    Prolapse of female pelvic organs     T3N0M0 supraglottic laryngeal squamous cell carcinoma 07/08/2019    T3 N0 M0 squamous cell carcinoma of the " supraglottic larynx diagnosed initially on July 8, 2019. Treated with total laryngectomy, bilateral selective neck dissections (levels 2 through 4), right hemithyroidectomy (with incidental finding papillary thyroid microcarcinoma), primary TEP puncture with cricopharyngeal myotomy on August 12, 2019.  Treated with postoperative radiation therapy.    Type 2 diabetes mellitus with unspecified diabetic retinopathy without macular edema     Laura Freeman  has a past surgical history that includes Multiple tooth extractions (Bilateral, 09/06/2019); excision of papilloma; ceiol - cataract extraction and insertion of introcular lens; I&D of skin and subcutaneous tissue (05/31/2015); Tracheostomy (07/08/2019); Panendoscopy (N/A, 07/08/2019); Percutaneous insertion of gastrostomy tube (N/A, 07/12/2019); Tracheostomy tube change (07/14/2019); Thyroid lobectomy (Right, 08/12/2019); Total laryngectomy (N/A, 08/12/2019); and Selective neck dissection (Bilateral, 08/12/2019).  Medical Hx and Allergies: Laura has a current medication list which includes the following prescription(s): amlodipine, aspirin, atorvastatin, blood sugar diagnostic, blood-glucose meter, carvedilol, cyclobenzaprine, diclofenac sodium, docusate sodium, escitalopram oxalate, estradiol, famotidine, gabapentin, hydrocodone-acetaminophen, lancets, levothyroxine, lisinopril, meclizine, metformin, neomycin-polymyxin-dexamethasone, nitroglycerin, omeprazole, ondansetron, and oxcarbazepine, and the following Facility-Administered Medications: phenylephrine hcl 1%. Review of patient's allergies indicates:  No Known Allergies    Current Voice Function: alaryngeal communication via voice prothesis    Current Level of Swallow Function/Complaints:  no complaints  Prior Therapy:  03/05/2024    TEP ASSESSMENT   Initial Fitting: no    Re-fitting:yes    TEP Initial Fitting Date: 08/12/2019    TEP Current Status:Ms. Freeman is currently wearing a 17FR, 10mm Provox Johnson placed  "on 03/05/2024. Additionally, she wears a 10/36 fenestrated Provox akhil tube with push to talk HMEs.     TEP Patient Complaints: "They (ENT) said it was long". Pt also reported intermittent leaking for a few weeks.      TEP Accessories: 10/36 fenestrated Provox akhil tube with push to talk HMEs    Stoma Size:  adequate     Lymphedema:  no    TEP Fitting/Re-sizing:     Removed current TEP: yes, minimal biofilm noted on esophageal phalange.     TEP Removed catheter : no     TEP Sizing:yes 8mm    Puncture Dilation: no     TEP Prosthesis Placed:yes 17FR, 8mm Provox Johnson    TEP Voicing with Open Tract:no     TEP Voicing with Prosthesis:yes good voicing noted immediately    TEP Leaking through Prothesis:no     TEP Leaking around Prosthesis:no       Treatment   Education: Plan of Care, role of SLP in care,TEP and stoma management were discussed with pt. SLP educated adequate size of prosthesis to allow for some space around at tracheal surface to reduce tissue damage. Return for re-sizing if increased length noted. Patient expressed understanding.     Assessment       LongTerm Goals:  Current Progress:   1.  Patient will utilize alaryngeal communication via TEP to express needs and desires without respiratory compromise >90% of the time.  Progressing towards goal       Short Term Goals:  Current Progress:   1. Patient will demonstrate care and use of HME system for maximum pulmonary benefit >90% of the time. Progressing towards goal   2.  Patient will demonstrate adequate care and use of TEP to maintain TEP voicing >90% of the time.  Progressing towards goal   3.  Patient will demonstrate adequate occlusion and cleaning of TEP to maintain voicing >90% of the time.  Progressing towards goal   4. Patient will increase laryngectomy tube usage daily or PRN to maintain and increase stomal patency.  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  She is currently utilizing esophageal " "speech via a voice prosthesis for alaryngeal communication for functional communication with her family, friends, medical providers, and others to perform her daily life activities.  Ms. Freeman requires the use of a laryngectomy tube at all times to keep the airway patent and prevent stomal stenosis. HMEs are required daily for mucus management and pulmonary optimization .     Plan     SLP intervention is warranted 1 time a month or PRN for communication needs for a minimum of 1 year due to the chronic nature of the impairment.     Additional Information     Ms. Freeman entered and stated "they (ENT) said it was long". She also reported intermittent leaking for a few weeks. SLP educated that current TEP placed after re-sizing from embedment of 6mm with instructions to return when increased length noted. SLP additionally educated that minimal length in warranted to avoid tracheal tissue damage, pt acknowledged. SLP assessed current TEP and noted increased length. SLP opted to reduce to 8mm at this time. Prosthesis changed without incident this date. Pt educated to return if increased length noted in upcoming weeks. Good voicing and no leaking observed through or around prosthesis with thin liquids. Ms. Freeman reported increased coughing and mucus recently despite compliance with HME and cloth stoma cover. SLP discussed recent increase in environmental allergens as to possible cause. However, follow up with physician if status worsens. SLP to follow up as warranted.       Lexy Price MS, CCC-SLP  4/10/2024   "

## 2024-04-13 DIAGNOSIS — I10 HYPERTENSION, UNSPECIFIED TYPE: ICD-10-CM

## 2024-04-16 RX ORDER — AMLODIPINE BESYLATE 10 MG/1
10 TABLET ORAL
Qty: 90 TABLET | Refills: 0 | Status: SHIPPED | OUTPATIENT
Start: 2024-04-16 | End: 2024-05-02 | Stop reason: SDUPTHER

## 2024-04-22 ENCOUNTER — HOSPITAL ENCOUNTER (OUTPATIENT)
Dept: RADIOLOGY | Facility: HOSPITAL | Age: 69
Discharge: HOME | End: 2024-04-22
Attending: STUDENT IN AN ORGANIZED HEALTH CARE EDUCATION/TRAINING PROGRAM
Payer: MEDICARE

## 2024-04-22 DIAGNOSIS — C32.9 LARYNGEAL SQUAMOUS CELL CARCINOMA: ICD-10-CM

## 2024-04-22 LAB
CREAT SERPL-MCNC: 0.87 MG/DL (ref 0.55–1.02)
GFR SERPLBLD CREATININE-BSD FMLA CKD-EPI: >60 MLS/MIN/1.73/M2

## 2024-04-22 PROCEDURE — 70491 CT SOFT TISSUE NECK W/DYE: CPT | Mod: TC

## 2024-04-22 PROCEDURE — 25500020 PHARM REV CODE 255

## 2024-04-22 PROCEDURE — 82565 ASSAY OF CREATININE: CPT | Performed by: STUDENT IN AN ORGANIZED HEALTH CARE EDUCATION/TRAINING PROGRAM

## 2024-04-22 RX ADMIN — IOHEXOL 100 ML: 350 INJECTION, SOLUTION INTRAVENOUS at 11:04

## 2024-05-02 ENCOUNTER — OFFICE VISIT (OUTPATIENT)
Dept: FAMILY MEDICINE | Facility: CLINIC | Age: 69
End: 2024-05-02
Payer: MEDICARE

## 2024-05-02 VITALS
BODY MASS INDEX: 34.86 KG/M2 | DIASTOLIC BLOOD PRESSURE: 75 MMHG | WEIGHT: 230 LBS | OXYGEN SATURATION: 95 % | HEART RATE: 83 BPM | HEIGHT: 68 IN | SYSTOLIC BLOOD PRESSURE: 113 MMHG | TEMPERATURE: 98 F | RESPIRATION RATE: 18 BRPM

## 2024-05-02 DIAGNOSIS — M06.00 SERONEGATIVE RHEUMATOID ARTHRITIS: ICD-10-CM

## 2024-05-02 DIAGNOSIS — E11.40 TYPE 2 DIABETES MELLITUS WITH DIABETIC NEUROPATHY, WITHOUT LONG-TERM CURRENT USE OF INSULIN: Primary | ICD-10-CM

## 2024-05-02 DIAGNOSIS — B18.2 HEPATITIS C VIRUS CARRIER STATE: ICD-10-CM

## 2024-05-02 DIAGNOSIS — E78.5 HYPERLIPIDEMIA, UNSPECIFIED HYPERLIPIDEMIA TYPE: ICD-10-CM

## 2024-05-02 DIAGNOSIS — I10 HYPERTENSION, UNSPECIFIED TYPE: ICD-10-CM

## 2024-05-02 DIAGNOSIS — D84.821 DRUG-INDUCED IMMUNODEFICIENCY: ICD-10-CM

## 2024-05-02 DIAGNOSIS — Z93.0 PRESENCE OF TRACHEOSTOMY: ICD-10-CM

## 2024-05-02 DIAGNOSIS — Z12.31 ENCOUNTER FOR SCREENING MAMMOGRAM FOR MALIGNANT NEOPLASM OF BREAST: ICD-10-CM

## 2024-05-02 DIAGNOSIS — Z79.899 DRUG-INDUCED IMMUNODEFICIENCY: ICD-10-CM

## 2024-05-02 PROCEDURE — 3074F SYST BP LT 130 MM HG: CPT | Mod: CPTII,,, | Performed by: FAMILY MEDICINE

## 2024-05-02 PROCEDURE — 3078F DIAST BP <80 MM HG: CPT | Mod: CPTII,,, | Performed by: FAMILY MEDICINE

## 2024-05-02 PROCEDURE — 1159F MED LIST DOCD IN RCRD: CPT | Mod: CPTII,,, | Performed by: FAMILY MEDICINE

## 2024-05-02 PROCEDURE — 4010F ACE/ARB THERAPY RXD/TAKEN: CPT | Mod: CPTII,,, | Performed by: FAMILY MEDICINE

## 2024-05-02 PROCEDURE — 99214 OFFICE O/P EST MOD 30 MIN: CPT | Mod: S$PBB,,, | Performed by: FAMILY MEDICINE

## 2024-05-02 PROCEDURE — 1160F RVW MEDS BY RX/DR IN RCRD: CPT | Mod: CPTII,,, | Performed by: FAMILY MEDICINE

## 2024-05-02 PROCEDURE — 3008F BODY MASS INDEX DOCD: CPT | Mod: CPTII,,, | Performed by: FAMILY MEDICINE

## 2024-05-02 PROCEDURE — 99214 OFFICE O/P EST MOD 30 MIN: CPT | Mod: PBBFAC | Performed by: FAMILY MEDICINE

## 2024-05-02 PROCEDURE — 1101F PT FALLS ASSESS-DOCD LE1/YR: CPT | Mod: CPTII,,, | Performed by: FAMILY MEDICINE

## 2024-05-02 PROCEDURE — 3288F FALL RISK ASSESSMENT DOCD: CPT | Mod: CPTII,,, | Performed by: FAMILY MEDICINE

## 2024-05-02 RX ORDER — CARVEDILOL 3.12 MG/1
3.12 TABLET ORAL 2 TIMES DAILY
Qty: 180 TABLET | Refills: 0 | Status: SHIPPED | OUTPATIENT
Start: 2024-05-02

## 2024-05-02 RX ORDER — METFORMIN HYDROCHLORIDE 500 MG/1
500 TABLET, EXTENDED RELEASE ORAL
Qty: 90 TABLET | Refills: 0 | Status: SHIPPED | OUTPATIENT
Start: 2024-05-02

## 2024-05-02 RX ORDER — AMLODIPINE BESYLATE 10 MG/1
10 TABLET ORAL DAILY
Qty: 90 TABLET | Refills: 0 | Status: SHIPPED | OUTPATIENT
Start: 2024-05-02

## 2024-05-02 NOTE — PROGRESS NOTES
"Patient Name: Laura Freeman   : 1955  MRN: [id number]     SUBJECTIVE:  Laura Freeman is a 69 y.o. female here for Follow-up (The patient states that she is having discomfort in her vaginal area from the device that is used to hold her bladder.)  .    HPI  Here for routine follow-up of hypertension, diabetes.  HTN well controlled. At home good readings. Compliant. Taking amlodipine and lisinopril 40 mg. Coreg 3.125 mg bid.  Had cardiac stress test in a year or so with Cardiology and was told it was normal.  Will request medical records.  Metformin 500 mg bid, but takes it once a day most of the times because it causes diarrhea. a1c 5.6%. well controlled. Normal readings as well.  Has an eye doctor and has had exams from them  TEP in place, following with ENT/endocrinology.  Also sees rheumatology for RA.   Seeing neurology for seizures.  None for almost 2 years.    Follows with gynecology for regular well-woman exams and genital prolapse.  She would pessary recheck and replaced in November. She had repeat exam in January and did not have any concerns at that time.  She is scheduled to have removal, cleaning and replacement of pessary next month but feels uncomfortable "like something rubbing inside". She was given phone number to follow up with the urogynecologist. Lost the phone number, gave the contact number again today and will call them to schedule appointment.     Was told hep c carrier long time ago. Unsure if treated. It was almost 20 years ago as per patient.  Discussed with patient that being estradiol and being hepatitis-C carrier, can worsen liver issues and she will discuss further with her gynecologist.      ALLERGIES: Review of patient's allergies indicates:  No Known Allergies      ROS:  Review of Systems   Constitutional:  Negative for chills and fever.   HENT:  Negative for congestion.    Eyes:  Negative for blurred vision.   Respiratory:  Negative for cough and shortness of breath.  " "  Cardiovascular:  Negative for chest pain, palpitations and leg swelling.   Gastrointestinal:  Negative for abdominal pain, blood in stool, diarrhea, nausea and vomiting.   Genitourinary:  Negative for dysuria and hematuria.   Neurological:  Negative for dizziness and headaches.   Psychiatric/Behavioral:  Negative for depression. The patient is not nervous/anxious.          OBJECTIVE:  Vital signs  Vitals:    05/02/24 1053   BP: 113/75   Pulse: 83   Resp: 18   Temp: 97.7 °F (36.5 °C)   TempSrc: Oral   SpO2: 95%   Weight: 104.3 kg (230 lb)   Height: 5' 8" (1.727 m)      Body mass index is 34.97 kg/m².    PHYSICAL EXAM:   Physical Exam  Vitals reviewed.   Constitutional:       General: She is not in acute distress.     Appearance: Normal appearance. She is not ill-appearing.   HENT:      Head: Normocephalic and atraumatic.      Right Ear: External ear normal.      Left Ear: External ear normal.      Nose: Nose normal. No rhinorrhea.      Mouth/Throat:      Mouth: Mucous membranes are moist.      Comments: Trach in place  Eyes:      General: No scleral icterus.        Right eye: No discharge.         Left eye: No discharge.      Conjunctiva/sclera: Conjunctivae normal.      Pupils: Pupils are equal, round, and reactive to light.   Cardiovascular:      Rate and Rhythm: Normal rate and regular rhythm.   Pulmonary:      Effort: Pulmonary effort is normal. No respiratory distress.      Breath sounds: No wheezing, rhonchi or rales.   Abdominal:      General: Bowel sounds are normal. There is no distension.      Palpations: Abdomen is soft.      Tenderness: There is no abdominal tenderness.   Musculoskeletal:      Cervical back: Normal range of motion and neck supple. No rigidity or tenderness.      Right lower leg: No edema.      Left lower leg: No edema.   Skin:     General: Skin is warm.      Coloration: Skin is not pale.      Findings: No rash.   Neurological:      General: No focal deficit present.      Mental Status: " She is alert and oriented to person, place, and time.   Psychiatric:         Mood and Affect: Mood normal.         Behavior: Behavior normal.          ASSESSMENT/PLAN:  1. Type 2 diabetes mellitus with diabetic neuropathy, without long-term current use of insulin  Well-controlled but will switch to long-acting metformin having side effects for some actin.  Start with 500 mg once a day and depending on repeat A1c will discuss further management.  -     metFORMIN (GLUCOPHAGE-XR) 500 MG ER 24hr tablet; Take 1 tablet (500 mg total) by mouth daily with breakfast.  Dispense: 90 tablet; Refill: 0  -     Hemoglobin A1C; Future; Expected date: 05/02/2024  -     Lipid Panel; Future; Expected date: 05/02/2024  -     Comprehensive Metabolic Panel; Future; Expected date: 05/02/2024  -     CBC Auto Differential; Future; Expected date: 05/02/2024    2. Hyperlipidemia, unspecified hyperlipidemia type  No lipid panel only year, slightly elevated cholesterol last time was checked.  Continue with Lipitor 40 mg daily and recheck lipid panel.  -     Lipid Panel; Future; Expected date: 05/02/2024    3. Hypertension, unspecified type  Well-controlled.  Continue with amlodipine 10 mg daily, Coreg b.i.d. and lisinopril 40 mg daily.  Recheck labs  -     carvediloL (COREG) 3.125 MG tablet; Take 1 tablet (3.125 mg total) by mouth 2 (two) times daily.  Dispense: 180 tablet; Refill: 0  -     amLODIPine (NORVASC) 10 MG tablet; Take 1 tablet (10 mg total) by mouth once daily.  Dispense: 90 tablet; Refill: 0  -     Hemoglobin A1C; Future; Expected date: 05/02/2024  -     Lipid Panel; Future; Expected date: 05/02/2024  -     Comprehensive Metabolic Panel; Future; Expected date: 05/02/2024  -     CBC Auto Differential; Future; Expected date: 05/02/2024    4. Hepatitis C virus carrier state  Recheck viral load.  Check liver ultrasound.  will consider referral to Infectious Disease/hepatology depending on the results  -     Hepatitis C Viral(HCV) RNA,  Quant Real-Time PCR w/Reflexs; Future; Expected date: 05/02/2024  -     US Abdomen Limited_Liver; Future; Expected date: 05/02/2024    5. Seronegative rheumatoid arthritis  Continue on Plaquenil.  Has eye doctor that she follows.  Continue to follow up with Rheumatology.    6. Drug-induced immunodeficiency  Continue on Plaquenil.  Has eye doctor that she follows.  Continue to follow up with Rheumatology.  No concern    7. Presence of tracheostomy  No concerns.  Continue to follow up with ENT regularly    8. Encounter for screening mammogram for malignant neoplasm of breast  -     Mammo Digital Screening Bilat w/ [facility]; Future; Expected date: 05/02/2024             Previous medical history/lab work/radiology reviewed and considered during medical management decisions.   Medication list reviewed and medication reconciliation performed.  Patient was provided  and care about his/her current diagnosis (es) and medications including risk/benefit and side effects/adverse events, over the counter medication uses/doses, home self-care and contact precautions,  and red flags and indications for when to seek immediate medical attention.   Patient was advised to continue compliance with current medication list and medical recommendations.  Recommended/ Advised continued compliance with recommended eating habits/ diets for medical conditions and exercise 150 minutes/ week (if possible) for medical condition (s).        RESULTS:  Recent Results (from the past 1008 hour(s))   Creatinine, Serum    Collection Time: 04/22/24 11:14 AM   Result Value Ref Range    Creatinine 0.87 0.55 - 1.02 mg/dL    eGFR >60 mls/min/1.73/m2         Follow Up:  Follow up in about 3 months (around 8/2/2024) for diabetes .         This note was created with the assistance of a voice recognition software or phone dictation. There may be transcription errors as a result of using this technology however minimal. Effort has been made to assure accuracy  of transcription but any obvious errors or omissions should be clarified with the author of the document

## 2024-05-23 ENCOUNTER — LAB VISIT (OUTPATIENT)
Dept: LAB | Facility: HOSPITAL | Age: 69
End: 2024-05-23
Attending: INTERNAL MEDICINE
Payer: MEDICARE

## 2024-05-23 ENCOUNTER — OFFICE VISIT (OUTPATIENT)
Dept: ENDOCRINOLOGY | Facility: CLINIC | Age: 69
End: 2024-05-23
Payer: MEDICARE

## 2024-05-23 VITALS
HEART RATE: 76 BPM | TEMPERATURE: 99 F | SYSTOLIC BLOOD PRESSURE: 128 MMHG | RESPIRATION RATE: 19 BRPM | DIASTOLIC BLOOD PRESSURE: 85 MMHG | HEIGHT: 67 IN | WEIGHT: 229.25 LBS | BODY MASS INDEX: 35.98 KG/M2

## 2024-05-23 DIAGNOSIS — E78.5 HYPERLIPIDEMIA, UNSPECIFIED HYPERLIPIDEMIA TYPE: ICD-10-CM

## 2024-05-23 DIAGNOSIS — I10 HYPERTENSION, UNSPECIFIED TYPE: ICD-10-CM

## 2024-05-23 DIAGNOSIS — E11.40 TYPE 2 DIABETES MELLITUS WITH DIABETIC NEUROPATHY, WITHOUT LONG-TERM CURRENT USE OF INSULIN: ICD-10-CM

## 2024-05-23 DIAGNOSIS — E89.0 POSTSURGICAL HYPOTHYROIDISM: ICD-10-CM

## 2024-05-23 DIAGNOSIS — B18.2 HEPATITIS C VIRUS CARRIER STATE: ICD-10-CM

## 2024-05-23 DIAGNOSIS — C73 THYROID CANCER: Primary | ICD-10-CM

## 2024-05-23 DIAGNOSIS — C73 THYROID CANCER: ICD-10-CM

## 2024-05-23 LAB
ALBUMIN SERPL-MCNC: 3.8 G/DL (ref 3.4–4.8)
ALBUMIN/GLOB SERPL: 1 RATIO (ref 1.1–2)
ALP SERPL-CCNC: 71 UNIT/L (ref 40–150)
ALT SERPL-CCNC: 13 UNIT/L (ref 0–55)
ANION GAP SERPL CALC-SCNC: 9 MEQ/L
AST SERPL-CCNC: 17 UNIT/L (ref 5–34)
BASOPHILS # BLD AUTO: 0.02 X10(3)/MCL
BASOPHILS NFR BLD AUTO: 0.5 %
BILIRUB SERPL-MCNC: 0.2 MG/DL
BUN SERPL-MCNC: 14.5 MG/DL (ref 9.8–20.1)
CALCIUM SERPL-MCNC: 9.6 MG/DL (ref 8.4–10.2)
CHLORIDE SERPL-SCNC: 109 MMOL/L (ref 98–107)
CHOLEST SERPL-MCNC: 206 MG/DL
CHOLEST/HDLC SERPL: 3 {RATIO} (ref 0–5)
CO2 SERPL-SCNC: 24 MMOL/L (ref 23–31)
CREAT SERPL-MCNC: 0.91 MG/DL (ref 0.55–1.02)
CREAT/UREA NIT SERPL: 16
EOSINOPHIL # BLD AUTO: 0.26 X10(3)/MCL (ref 0–0.9)
EOSINOPHIL NFR BLD AUTO: 6.3 %
ERYTHROCYTE [DISTWIDTH] IN BLOOD BY AUTOMATED COUNT: 14.6 % (ref 11.5–17)
EST. AVERAGE GLUCOSE BLD GHB EST-MCNC: 119.8 MG/DL
GFR SERPLBLD CREATININE-BSD FMLA CKD-EPI: >60 ML/MIN/1.73/M2
GLOBULIN SER-MCNC: 4 GM/DL (ref 2.4–3.5)
GLUCOSE SERPL-MCNC: 102 MG/DL (ref 82–115)
HBA1C MFR BLD: 5.8 %
HBA1C MFR BLD: 6.3 %
HCT VFR BLD AUTO: 42 % (ref 37–47)
HDLC SERPL-MCNC: 76 MG/DL (ref 35–60)
HGB BLD-MCNC: 13.6 G/DL (ref 12–16)
IMM GRANULOCYTES # BLD AUTO: 0.01 X10(3)/MCL (ref 0–0.04)
IMM GRANULOCYTES NFR BLD AUTO: 0.2 %
LDLC SERPL CALC-MCNC: 117 MG/DL (ref 50–140)
LYMPHOCYTES # BLD AUTO: 1.03 X10(3)/MCL (ref 0.6–4.6)
LYMPHOCYTES NFR BLD AUTO: 24.8 %
MCH RBC QN AUTO: 29 PG (ref 27–31)
MCHC RBC AUTO-ENTMCNC: 32.4 G/DL (ref 33–36)
MCV RBC AUTO: 89.6 FL (ref 80–94)
MONOCYTES # BLD AUTO: 0.42 X10(3)/MCL (ref 0.1–1.3)
MONOCYTES NFR BLD AUTO: 10.1 %
NEUTROPHILS # BLD AUTO: 2.42 X10(3)/MCL (ref 2.1–9.2)
NEUTROPHILS NFR BLD AUTO: 58.1 %
NRBC BLD AUTO-RTO: 0 %
PLATELET # BLD AUTO: 329 X10(3)/MCL (ref 130–400)
PMV BLD AUTO: 8.9 FL (ref 7.4–10.4)
POTASSIUM SERPL-SCNC: 4.3 MMOL/L (ref 3.5–5.1)
PROT SERPL-MCNC: 7.8 GM/DL (ref 5.8–7.6)
RBC # BLD AUTO: 4.69 X10(6)/MCL (ref 4.2–5.4)
SODIUM SERPL-SCNC: 142 MMOL/L (ref 136–145)
TRIGL SERPL-MCNC: 65 MG/DL (ref 37–140)
TSH SERPL-ACNC: 1.73 UIU/ML (ref 0.35–4.94)
VLDLC SERPL CALC-MCNC: 13 MG/DL
WBC # SPEC AUTO: 4.16 X10(3)/MCL (ref 4.5–11.5)

## 2024-05-23 PROCEDURE — 85025 COMPLETE CBC W/AUTO DIFF WBC: CPT

## 2024-05-23 PROCEDURE — 80053 COMPREHEN METABOLIC PANEL: CPT

## 2024-05-23 PROCEDURE — 86800 THYROGLOBULIN ANTIBODY: CPT

## 2024-05-23 PROCEDURE — 80061 LIPID PANEL: CPT

## 2024-05-23 PROCEDURE — 83036 HEMOGLOBIN GLYCOSYLATED A1C: CPT

## 2024-05-23 PROCEDURE — 36415 COLL VENOUS BLD VENIPUNCTURE: CPT

## 2024-05-23 PROCEDURE — 84443 ASSAY THYROID STIM HORMONE: CPT

## 2024-05-23 PROCEDURE — 87522 HEPATITIS C REVRS TRNSCRPJ: CPT

## 2024-05-23 PROCEDURE — 99215 OFFICE O/P EST HI 40 MIN: CPT | Mod: PBBFAC

## 2024-05-23 PROCEDURE — 83036 HEMOGLOBIN GLYCOSYLATED A1C: CPT | Mod: PBBFAC

## 2024-05-23 NOTE — PROGRESS NOTES
Subjective:      Patient ID: Laura Freeman is a 69 y.o. female.    Chief Complaint:  Hypothyroidism      History of Present Illness  Pt is a 70 y/o w/ Phx of laryngeal ca with incidental stage I microthyroid ca s/p stacie w/ residual MNG and postsurgical hypothyroidism.  Has comorbid DMII w/ neuropathy managed per PCP.    Today is doing well.  Admits recent CT for her laryngeal ca done a month ago per ENT w/ conitnued ENT f/u which report did not reveal and gross thyroid d/s.  Admits is taking and tolerating her LT4 w/o issues.        Objective:   Physical Exam  Vitals reviewed.   Constitutional:       Appearance: Normal appearance.   HENT:      Head: Normocephalic and atraumatic.   Neurological:      Mental Status: She is alert.         Lab Review:     Assessment:     1. Thyroid cancer  TSH    Thyroglobulin      2. Postsurgical hypothyroidism  TSH    Thyroglobulin      3. Type 2 diabetes mellitus with diabetic neuropathy, without long-term current use of insulin  POCT HEMOGLOBIN A1C           Plan:     Thyroid cancer  Regroup via labs  -     TSH; Future; Expected date: 05/23/2024  -     Thyroglobulin; Future; Expected date: 05/23/2024    Postsurgical hypothyroidism  Continue current dose of LT4  -     TSH; Future; Expected date: 05/23/2024  -     Thyroglobulin; Future; Expected date: 05/23/2024    Type 2 diabetes mellitus with diabetic neuropathy, without long-term current use of insulin  A1c was 6.3, continue primary mgmt per PCP.  No need to check hga1c again at future Endo visits.  F/u w/ ENT regarding CT results, counseled pt to stop by clinic and leave a message to have it reviewed by ENT team to sort next steps.  -     POCT HEMOGLOBIN A1C     Endo f/u in 6 months

## 2024-05-24 LAB
ENDOCRINOLOGIST REVIEW: ABNORMAL
THYROGLOB AB SERPL IA-ACNC: <1.8 IU/ML
THYROGLOB SERPL-MCNC: 2.2 NG/ML

## 2024-05-25 LAB — HCV RNA SERPL NAA+PROBE-ACNC: NORMAL IU/ML

## 2024-05-28 DIAGNOSIS — C73 THYROID CANCER: Primary | ICD-10-CM

## 2024-05-28 NOTE — TELEPHONE ENCOUNTER
Labs are back, tg is acceptable but TSH is above goal.  Would erx increased LT4 up from current 112mcg daily to 112 daily Mon-Sat, 1.5 tabs on Sundays, #100, no refills.    Plan for repeat TSH in 6-8 weeks.

## 2024-05-29 RX ORDER — LEVOTHYROXINE SODIUM 112 UG/1
TABLET ORAL
Qty: 100 TABLET | Refills: 0 | Status: SHIPPED | OUTPATIENT
Start: 2024-05-29

## 2024-06-13 ENCOUNTER — TELEPHONE (OUTPATIENT)
Dept: FAMILY MEDICINE | Facility: CLINIC | Age: 69
End: 2024-06-13
Payer: MEDICARE

## 2024-06-13 NOTE — TELEPHONE ENCOUNTER
LVM    ----- Message from Bouchra Treadwell MD sent at 6/13/2024 12:19 AM CDT -----  Labs overall stable except for persistent slightly elevated cholesterol.  Encourage compliance Lipitor and watching diet.  Her hepatitis-C viral load was undetected.

## 2024-06-14 DIAGNOSIS — F32.A DEPRESSION, UNSPECIFIED DEPRESSION TYPE: ICD-10-CM

## 2024-06-14 RX ORDER — ESCITALOPRAM OXALATE 20 MG/1
TABLET ORAL
Qty: 90 TABLET | Refills: 1 | Status: SHIPPED | OUTPATIENT
Start: 2024-06-14

## 2024-06-18 ENCOUNTER — TELEPHONE (OUTPATIENT)
Dept: FAMILY MEDICINE | Facility: CLINIC | Age: 69
End: 2024-06-18
Payer: MEDICARE

## 2024-06-18 NOTE — TELEPHONE ENCOUNTER
Called patient to give results. Patient verbalized understanding. No additional questions at this time.       ----- Message from Bouchra Treadwell MD sent at 6/13/2024 12:19 AM CDT -----  Labs overall stable except for persistent slightly elevated cholesterol.  Encourage compliance Lipitor and watching diet.  Her hepatitis-C viral load was undetected.

## 2024-08-01 PROBLEM — E66.01 SEVERE OBESITY (BMI 35.0-39.9) WITH COMORBIDITY: Status: ACTIVE | Noted: 2024-08-01

## 2024-08-28 DIAGNOSIS — C73 THYROID CANCER: ICD-10-CM

## 2024-08-28 RX ORDER — FAMOTIDINE 40 MG/1
40 TABLET, FILM COATED ORAL 2 TIMES DAILY
Qty: 60 TABLET | Refills: 6 | Status: SHIPPED | OUTPATIENT
Start: 2024-08-28

## 2024-08-28 NOTE — LETTER
August 30, 2024    Laura Anne Pete  [address] [facility] [address]             [facility]  [address] [facility] [address]  Phone: [phone] Ms. Freeman,      Our attempts to reach you by phone have been unsuccessful.    Dr. Pinto asked that we reach out to you to see if you have done your labs since the change in your levothyroxine dose.  I am enclosing labs orders for your convience.      Please contact our clinic by dialing  should you have questions.    Respectfully,    [facility]

## 2024-08-30 NOTE — TELEPHONE ENCOUNTER
Phoned patient at  ring twice then goes to busy signal times 3 attempts. Letter mailed along with lab request.

## 2024-09-03 RX ORDER — LEVOTHYROXINE SODIUM 112 UG/1
TABLET ORAL
Qty: 100 TABLET | Refills: 0 | OUTPATIENT
Start: 2024-09-03

## 2024-09-03 NOTE — TELEPHONE ENCOUNTER
Attempted to contact pt, no answer, left message for return call.  Attempted to contact pts daughter, no answer,

## 2024-09-10 DIAGNOSIS — E11.40 TYPE 2 DIABETES MELLITUS WITH DIABETIC NEUROPATHY, WITHOUT LONG-TERM CURRENT USE OF INSULIN: ICD-10-CM

## 2024-09-10 DIAGNOSIS — Z86.73 HISTORY OF CVA (CEREBROVASCULAR ACCIDENT): ICD-10-CM

## 2024-09-10 DIAGNOSIS — I10 HYPERTENSION, UNSPECIFIED TYPE: ICD-10-CM

## 2024-09-12 RX ORDER — ATORVASTATIN CALCIUM 40 MG/1
40 TABLET, FILM COATED ORAL
Qty: 90 TABLET | Refills: 1 | Status: SHIPPED | OUTPATIENT
Start: 2024-09-12

## 2024-09-12 RX ORDER — METFORMIN HYDROCHLORIDE 500 MG/1
500 TABLET, EXTENDED RELEASE ORAL
Qty: 90 TABLET | Refills: 0 | Status: SHIPPED | OUTPATIENT
Start: 2024-09-12

## 2024-09-12 RX ORDER — CARVEDILOL 3.12 MG/1
3.12 TABLET ORAL 2 TIMES DAILY
Qty: 180 TABLET | Refills: 0 | Status: SHIPPED | OUTPATIENT
Start: 2024-09-12

## 2024-09-16 ENCOUNTER — LAB VISIT (OUTPATIENT)
Dept: LAB | Facility: HOSPITAL | Age: 69
End: 2024-09-16
Attending: INTERNAL MEDICINE
Payer: MEDICARE

## 2024-09-16 DIAGNOSIS — I10 HYPERTENSION, UNSPECIFIED TYPE: ICD-10-CM

## 2024-09-16 DIAGNOSIS — C73 THYROID CANCER: ICD-10-CM

## 2024-09-16 DIAGNOSIS — F32.A DEPRESSION, UNSPECIFIED DEPRESSION TYPE: ICD-10-CM

## 2024-09-16 LAB
T4 FREE SERPL-MCNC: 1.15 NG/DL (ref 0.7–1.48)
TSH SERPL-ACNC: 0.06 UIU/ML (ref 0.35–4.94)

## 2024-09-16 PROCEDURE — 84443 ASSAY THYROID STIM HORMONE: CPT

## 2024-09-16 PROCEDURE — 36415 COLL VENOUS BLD VENIPUNCTURE: CPT

## 2024-09-16 PROCEDURE — 84439 ASSAY OF FREE THYROXINE: CPT

## 2024-09-18 RX ORDER — AMLODIPINE BESYLATE 10 MG/1
10 TABLET ORAL DAILY
Qty: 90 TABLET | Refills: 0 | Status: SHIPPED | OUTPATIENT
Start: 2024-09-18

## 2024-09-18 RX ORDER — ESCITALOPRAM OXALATE 20 MG/1
TABLET ORAL
Qty: 90 TABLET | Refills: 1 | Status: SHIPPED | OUTPATIENT
Start: 2024-09-18

## 2024-09-19 ENCOUNTER — OFFICE VISIT (OUTPATIENT)
Dept: ENDOCRINOLOGY | Facility: CLINIC | Age: 69
End: 2024-09-19
Payer: MEDICARE

## 2024-09-19 VITALS
HEIGHT: 67 IN | SYSTOLIC BLOOD PRESSURE: 129 MMHG | TEMPERATURE: 98 F | DIASTOLIC BLOOD PRESSURE: 80 MMHG | HEART RATE: 70 BPM | WEIGHT: 237 LBS | RESPIRATION RATE: 18 BRPM | BODY MASS INDEX: 37.2 KG/M2

## 2024-09-19 DIAGNOSIS — E04.2 MULTINODULAR GOITER: ICD-10-CM

## 2024-09-19 DIAGNOSIS — R63.5 WEIGHT GAIN: ICD-10-CM

## 2024-09-19 DIAGNOSIS — C73 PAPILLARY MICROCARCINOMA OF THYROID: Primary | ICD-10-CM

## 2024-09-19 DIAGNOSIS — E89.0 POSTSURGICAL HYPOTHYROIDISM: ICD-10-CM

## 2024-09-19 LAB — HBA1C MFR BLD: 6.7 %

## 2024-09-19 PROCEDURE — 99214 OFFICE O/P EST MOD 30 MIN: CPT | Mod: S$PBB,GC,, | Performed by: INTERNAL MEDICINE

## 2024-09-19 PROCEDURE — 1159F MED LIST DOCD IN RCRD: CPT | Mod: CPTII,,, | Performed by: INTERNAL MEDICINE

## 2024-09-19 PROCEDURE — 3288F FALL RISK ASSESSMENT DOCD: CPT | Mod: CPTII,,, | Performed by: INTERNAL MEDICINE

## 2024-09-19 PROCEDURE — 3008F BODY MASS INDEX DOCD: CPT | Mod: CPTII,,, | Performed by: INTERNAL MEDICINE

## 2024-09-19 PROCEDURE — 1160F RVW MEDS BY RX/DR IN RCRD: CPT | Mod: CPTII,,, | Performed by: INTERNAL MEDICINE

## 2024-09-19 PROCEDURE — 1101F PT FALLS ASSESS-DOCD LE1/YR: CPT | Mod: CPTII,,, | Performed by: INTERNAL MEDICINE

## 2024-09-19 PROCEDURE — 3079F DIAST BP 80-89 MM HG: CPT | Mod: CPTII,,, | Performed by: INTERNAL MEDICINE

## 2024-09-19 PROCEDURE — 3044F HG A1C LEVEL LT 7.0%: CPT | Mod: CPTII,,, | Performed by: INTERNAL MEDICINE

## 2024-09-19 PROCEDURE — 83036 HEMOGLOBIN GLYCOSYLATED A1C: CPT | Mod: PBBFAC

## 2024-09-19 PROCEDURE — 1126F AMNT PAIN NOTED NONE PRSNT: CPT | Mod: CPTII,,, | Performed by: INTERNAL MEDICINE

## 2024-09-19 PROCEDURE — 3074F SYST BP LT 130 MM HG: CPT | Mod: CPTII,,, | Performed by: INTERNAL MEDICINE

## 2024-09-19 PROCEDURE — 4010F ACE/ARB THERAPY RXD/TAKEN: CPT | Mod: CPTII,,, | Performed by: INTERNAL MEDICINE

## 2024-09-19 PROCEDURE — 99213 OFFICE O/P EST LOW 20 MIN: CPT | Mod: PBBFAC

## 2024-09-19 RX ORDER — LEVOTHYROXINE SODIUM 112 UG/1
112 TABLET ORAL
Qty: 100 TABLET | Refills: 1 | Status: SHIPPED | OUTPATIENT
Start: 2024-09-19 | End: 2025-04-07

## 2024-09-19 RX ORDER — HYDROXYCHLOROQUINE SULFATE 200 MG/1
200 TABLET, FILM COATED ORAL 2 TIMES DAILY
COMMUNITY
Start: 2024-07-16

## 2024-09-19 RX ORDER — CYCLOBENZAPRINE HCL 10 MG
10 TABLET ORAL NIGHTLY PRN
COMMUNITY
Start: 2024-09-13

## 2024-09-19 RX ORDER — SULFASALAZINE 500 MG/1
500 TABLET ORAL
COMMUNITY
Start: 2024-07-17

## 2024-09-19 NOTE — PROGRESS NOTES
Subjective:      Patient ID: Laura Freeman is a 69 y.o. female.    Chief Complaint:  Follow-up      History of Present Illness  Pt is a 68 y/o w/ Phx of laryngeal ca with incidental stage I microthyroid ca s/p stacie w/ residual MNG and postsurgical hypothyroidism.  Has comorbid DMII w/ neuropathy managed per PCP.    doing well today.  Reports doing well on levothyroxine. Denies any palpitations, heat intolerance, unintentional weight loss, multiple frequent loose bowel movements. No known polyuria, but does report occasional polydipsia. Does report some weight gain. Other wise, no acute complaints.        Objective:   Physical Exam  Vitals reviewed.   Constitutional:       Appearance: Normal appearance.   HENT:      Head: Normocephalic and atraumatic.      Mouth/Throat:      Comments: Trach in place  Neurological:      Mental Status: She is alert.         Lab Review:     Assessment:     1. Type 2 diabetes mellitus with diabetic neuropathy, without long-term current use of insulin  POCT HEMOGLOBIN A1C           Plan:     Thyroid cancer  Regroup via labs  -     approaching 5 year kati for thyroid surgery. Currently doing well, only issue is with weight gain  - Thyroglobulin Tumor Marker 2.2 on 5/23/24  - Will obtain US Thyroid, hopeful for it to be final without issue    Postsurgical hypothyroidism  Continue current dose of LT4  -     currently LT4 dosing is 112 mcg Monday through Saturday, and 1.5 tablets every Sunday  - At this time, TSH is 0.063, a little over-suppressed. Goal is 0.1-0.5  - With weight gain, will continue current dosing. Will re-check TSH in 6 months prior to clinic visit. Will make further decisions based on US Thyroid.     Type 2 diabetes mellitus with diabetic neuropathy, without long-term current use of insulin        - A1c was 6.7, continue primary mgmt per PCP. Will defer A1Cs to PCP, and will not be obtained during future endo clinic visits     Endo f/u in 6 months    DO EFRAÍN CliffordU  Internal Medicine, PGY-III

## 2024-09-21 DIAGNOSIS — F51.01 PRIMARY INSOMNIA: ICD-10-CM

## 2024-09-21 DIAGNOSIS — N39.46 MIXED INCONTINENCE: ICD-10-CM

## 2024-09-21 DIAGNOSIS — M79.7 FIBROMYALGIA SYNDROME: ICD-10-CM

## 2024-09-21 DIAGNOSIS — C32.9 LARYNGEAL SQUAMOUS CELL CARCINOMA: ICD-10-CM

## 2024-09-21 DIAGNOSIS — M54.40 LOW BACK PAIN WITH SCIATICA, SCIATICA LATERALITY UNSPECIFIED, UNSPECIFIED BACK PAIN LATERALITY, UNSPECIFIED CHRONICITY: ICD-10-CM

## 2024-09-21 DIAGNOSIS — R35.1 NOCTURIA: ICD-10-CM

## 2024-09-21 DIAGNOSIS — M06.00 SERONEGATIVE RHEUMATOID ARTHRITIS: ICD-10-CM

## 2024-09-21 DIAGNOSIS — C73 PAPILLARY MICROCARCINOMA OF THYROID: ICD-10-CM

## 2024-09-21 DIAGNOSIS — N81.10 BLADDER PROLAPSE, FEMALE, ACQUIRED: ICD-10-CM

## 2024-09-21 DIAGNOSIS — B18.2 HEPATITIS C VIRUS CARRIER STATE: ICD-10-CM

## 2024-09-21 DIAGNOSIS — I10 HYPERTENSION, UNSPECIFIED TYPE: ICD-10-CM

## 2024-09-21 DIAGNOSIS — M19.90 INFLAMMATORY ARTHRITIS: ICD-10-CM

## 2024-09-21 DIAGNOSIS — E03.9 HYPOTHYROIDISM, UNSPECIFIED TYPE: ICD-10-CM

## 2024-09-23 ENCOUNTER — HOSPITAL ENCOUNTER (OUTPATIENT)
Dept: RADIOLOGY | Facility: HOSPITAL | Age: 69
Discharge: HOME | End: 2024-09-23
Payer: MEDICARE

## 2024-09-23 DIAGNOSIS — E89.0 POSTSURGICAL HYPOTHYROIDISM: ICD-10-CM

## 2024-09-23 DIAGNOSIS — C73 PAPILLARY MICROCARCINOMA OF THYROID: ICD-10-CM

## 2024-09-23 PROCEDURE — 76536 US EXAM OF HEAD AND NECK: CPT | Mod: TC

## 2024-09-25 DIAGNOSIS — E03.9 HYPOTHYROIDISM, UNSPECIFIED TYPE: ICD-10-CM

## 2024-09-25 DIAGNOSIS — M79.7 FIBROMYALGIA SYNDROME: ICD-10-CM

## 2024-09-25 DIAGNOSIS — M54.40 LOW BACK PAIN WITH SCIATICA, SCIATICA LATERALITY UNSPECIFIED, UNSPECIFIED BACK PAIN LATERALITY, UNSPECIFIED CHRONICITY: ICD-10-CM

## 2024-09-25 DIAGNOSIS — F51.01 PRIMARY INSOMNIA: ICD-10-CM

## 2024-09-25 DIAGNOSIS — C73 PAPILLARY MICROCARCINOMA OF THYROID: ICD-10-CM

## 2024-09-25 DIAGNOSIS — M06.00 SERONEGATIVE RHEUMATOID ARTHRITIS: ICD-10-CM

## 2024-09-25 DIAGNOSIS — R35.1 NOCTURIA: ICD-10-CM

## 2024-09-25 DIAGNOSIS — N81.10 BLADDER PROLAPSE, FEMALE, ACQUIRED: ICD-10-CM

## 2024-09-25 DIAGNOSIS — M19.90 INFLAMMATORY ARTHRITIS: ICD-10-CM

## 2024-09-25 DIAGNOSIS — I10 HYPERTENSION, UNSPECIFIED TYPE: ICD-10-CM

## 2024-09-25 DIAGNOSIS — N39.46 MIXED INCONTINENCE: ICD-10-CM

## 2024-09-25 DIAGNOSIS — C32.9 LARYNGEAL SQUAMOUS CELL CARCINOMA: ICD-10-CM

## 2024-09-25 DIAGNOSIS — B18.2 HEPATITIS C VIRUS CARRIER STATE: ICD-10-CM

## 2024-09-25 RX ORDER — LISINOPRIL 40 MG/1
40 TABLET ORAL DAILY
Qty: 90 TABLET | Refills: 0 | Status: SHIPPED | OUTPATIENT
Start: 2024-09-25

## 2024-09-25 RX ORDER — LISINOPRIL 40 MG/1
40 TABLET ORAL
Qty: 30 TABLET | Refills: 0 | OUTPATIENT
Start: 2024-09-25

## 2024-09-30 ENCOUNTER — TELEPHONE (OUTPATIENT)
Dept: ENDOCRINOLOGY | Facility: CLINIC | Age: 69
End: 2024-09-30
Payer: MEDICARE

## 2024-09-30 DIAGNOSIS — E89.0 POSTSURGICAL HYPOTHYROIDISM: Primary | ICD-10-CM

## 2024-09-30 NOTE — TELEPHONE ENCOUNTER
U/s showed a stable MNG with hx of hemithyroidectomy.    Continue plan per visit, order TSH to be done prior to f/u.

## 2024-10-03 ENCOUNTER — CLINICAL SUPPORT (OUTPATIENT)
Dept: REHABILITATION | Facility: HOSPITAL | Age: 69
End: 2024-10-03
Payer: MEDICARE

## 2024-10-03 DIAGNOSIS — Z90.02 H/O LARYNGECTOMY: Primary | ICD-10-CM

## 2024-10-03 PROCEDURE — 92507 TX SP LANG VOICE COMM INDIV: CPT

## 2024-10-03 PROCEDURE — L8509 TRACH-ESOPH VOICE PROS MD IN: HCPCS

## 2024-10-03 NOTE — PROGRESS NOTES
[facility]  Speech Language Pathology   Daily Documentation  Laryngectomy  Date: 10/3/2024     Name: Laura Freeman   MRN: [id number]    Therapy Diagnosis:   Encounter Diagnosis   Name Primary?    H/O laryngectomy Yes      Patient Active Problem List   Diagnosis    Mixed incontinence    Hepatitis C virus infection    Low back pain    Laryngeal squamous cell carcinoma    Papillary microcarcinoma of thyroid    Nocturia    Hypertension    Hypothyroidism    Bladder prolapse, female, acquired    Postsurgical hypothyroidism    Seronegative rheumatoid arthritis    H/O laryngectomy    Type 2 diabetes mellitus with diabetic neuropathy, without long-term current use of insulin    Fibromyalgia syndrome    Depression    Seizure disorder    Occipital neuralgia    Drug-induced immunodeficiency    Presence of tracheostomy    Hepatitis C virus carrier state    Severe obesity (BMI 35.0-39.9) with comorbidity     Physician: MARQUISE Carrillo NP    PATIENT IS A NECK-BREATHER - DO NOT ORALLY INTUBATE    Time In:  0810   Time Out:  0830    Procedure Min.   Speech-Language Therapy  20   Total Untimed Units: 20  Charges Billed/# of units: 20/1    Precautions: Standard  Subjective    Chief Complaint: prosthesis leaking    AFFECT normal affect    Pain:   0/10  Pain Location / Description: NA  Current Medical History: Laura Freeman is a 69 y.o. female referred by MARQUISE Carrillo NP for management to maximize alaryngeal communication without respiratory compromise.    Past Medical History:   Past Medical History:   Diagnosis Date    Hypertension     Papillary microcarcinoma of thyroid (T1a N0 M0) 07/12/2019    Diagnosed as an incidental finding at the time of a right hemithyroidectomy done in conjunction with total laryngectomy on August 12, 2019.    Prolapse of female pelvic organs     T3N0M0 supraglottic laryngeal squamous cell carcinoma 07/08/2019    T3 N0 M0 squamous cell carcinoma of the supraglottic larynx diagnosed initially on  July 8, 2019. Treated with total laryngectomy, bilateral selective neck dissections (levels 2 through 4), right hemithyroidectomy (with incidental finding papillary thyroid microcarcinoma), primary TEP puncture with cricopharyngeal myotomy on August 12, 2019.  Treated with postoperative radiation therapy.    Type 2 diabetes mellitus with unspecified diabetic retinopathy without macular edema     Laura Freeman  has a past surgical history that includes Multiple tooth extractions (Bilateral, 09/06/2019); excision of papilloma; ceiol - cataract extraction and insertion of introcular lens; I&D of skin and subcutaneous tissue (05/31/2015); Tracheostomy (07/08/2019); Panendoscopy (N/A, 07/08/2019); Percutaneous insertion of gastrostomy tube (N/A, 07/12/2019); Tracheostomy tube change (07/14/2019); Thyroid lobectomy (Right, 08/12/2019); Total laryngectomy (N/A, 08/12/2019); and Selective neck dissection (Bilateral, 08/12/2019).  Medical Hx and Allergies: Laura has a current medication list which includes the following prescription(s): amlodipine, aspirin, atorvastatin, blood sugar diagnostic, blood-glucose meter, carvedilol, cyclobenzaprine, diclofenac sodium, docusate sodium, escitalopram oxalate, estradiol, famotidine, gabapentin, hydrocodone-acetaminophen, hydroxychloroquine, lancets, levothyroxine, lisinopril, meclizine, metformin, neomycin-polymyxin-dexamethasone, nitroglycerin, omeprazole, ondansetron, oxcarbazepine, and sulfasalazine, and the following Facility-Administered Medications: phenylephrine hcl 1%. Review of patient's allergies indicates:  No Known Allergies    Current Voice Function: alaryngeal communication via voice prothesis    Current Level of Swallow Function/Complaints:  no complaints  Prior Therapy:  04/10/2024    TEP ASSESSMENT   Initial Fitting: no    Re-fitting:yes    TEP Initial Fitting Date: 08/12/2019    TEP Current Status:Ms. Freeman is currently wearing a 17FR, 8mm Provox Johnson placed on  04/10/2024. Additionally, she wears a 10/36 fenestrated Provox akhil tube with push to talk HMEs.     TEP Patient Complaints: leaking for a few weeks.      TEP Accessories: 10/36 fenestrated Provox akhil tube with push to talk HMEs    Stoma Size:  adequate     Lymphedema:  no    TEP Fitting/Re-sizing:     Removed current TEP: yes, biofilm noted on esophageal phalange.     TEP Removed catheter : no     TEP Sizing:yes 8mm    Puncture Dilation: no     TEP Prosthesis Placed:yes 17FR, 8mm Provox Johnson    TEP Voicing with Open Tract:no     TEP Voicing with Prosthesis:yes good voicing noted immediately    TEP Leaking through Prothesis:no     TEP Leaking around Prosthesis:no       Treatment   Education: Plan of Care, role of SLP in care,TEP and stoma management were discussed with pt. SLP educated adequate size of prosthesis to allow for some space around at tracheal surface to reduce tissue damage. Return for re-sizing if increased length noted. Patient expressed understanding.     Assessment       LongTerm Goals:  Current Progress:   1.  Patient will utilize alaryngeal communication via TEP to express needs and desires without respiratory compromise >90% of the time.  Progressing towards goal       Short Term Goals:  Current Progress:   1. Patient will demonstrate care and use of HME system for maximum pulmonary benefit >90% of the time. Progressing towards goal   2.  Patient will demonstrate adequate care and use of TEP to maintain TEP voicing >90% of the time.  Progressing towards goal   3.  Patient will demonstrate adequate occlusion and cleaning of TEP to maintain voicing >90% of the time.  Progressing towards goal   4. Patient will increase laryngectomy tube usage daily or PRN to maintain and increase stomal patency.  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  She is currently utilizing esophageal speech via a voice prosthesis for alaryngeal communication for  functional communication with her family, friends, medical providers, and others to perform her daily life activities.  Ms. Freeman requires the use of a laryngectomy tube at all times to keep the airway patent and prevent stomal stenosis. HMEs are required daily for mucus management and pulmonary optimization .     Plan     SLP intervention is warranted 1 time a month or PRN for communication needs for a minimum of 1 year due to the chronic nature of the impairment.     Additional Information     Ms. Freeman entered and reported intermittent leaking for a few weeks. SLP assessed current TEP and noted adequate length, no central or peripheral leaking with thin liquids. Prosthesis changed with mild resistant due to soft puncture and overshoot of prosthesis. Good voicing and no leaking observed through or around prosthesis with thin liquids. Ms. Freeman reported increased coughing and mucus recently despite compliance with HME and cloth stoma cover. She questioned usage of hand free HME, however, excessive back pressure noted with breathing observed. SLP educated to increase adhesive usage and monitor breathing to reduce neck bellowing prior to attempting hands-free, pt in agreement.  SLP to follow up as warranted.       Lexy Price MS, CCC-SLP  10/3/2024

## 2024-10-10 ENCOUNTER — OFFICE VISIT (OUTPATIENT)
Dept: OTOLARYNGOLOGY | Facility: CLINIC | Age: 69
End: 2024-10-10
Payer: MEDICARE

## 2024-10-10 VITALS
RESPIRATION RATE: 20 BRPM | HEART RATE: 88 BPM | SYSTOLIC BLOOD PRESSURE: 122 MMHG | DIASTOLIC BLOOD PRESSURE: 71 MMHG | HEIGHT: 67 IN | BODY MASS INDEX: 37.2 KG/M2 | TEMPERATURE: 97 F | OXYGEN SATURATION: 99 % | WEIGHT: 237 LBS

## 2024-10-10 DIAGNOSIS — C32.9 LARYNGEAL SQUAMOUS CELL CARCINOMA: Primary | ICD-10-CM

## 2024-10-10 DIAGNOSIS — Z92.3 HISTORY OF RADIATION TO HEAD AND NECK REGION: ICD-10-CM

## 2024-10-10 PROCEDURE — 99215 OFFICE O/P EST HI 40 MIN: CPT | Mod: PBBFAC

## 2024-10-10 NOTE — PROGRESS NOTES
Patient Name: Laura Freeman   YOB: 1955     Chief Complaint   Patient presents with    Follow-up        History of Present Illness:  This is a 64-year-old female presenting from Dr. Dennis for evaluation of laryngeal mass. Patient notes she has had hoarseness starting shortly before Ever, dysphagia since evaluated with an EGD and MBSS (showing aspirations), 1 month of sore throat that has been treated with antibiotics. She endorses some weight loss however, denies any dyspnea. She has not noted any lymphadenopathy, otalgia, or odynophagia.    7/24/19: Here for first follow up after admission for awake trach, panendoscopy. 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  Recommendation:  - TB cites likely inferior outcomes (functional and oncologic) with CRT vs TL, thus TL is best option  - TB recommends continued education to pt and family regarding life after laryngectomy, as well as likely poor functional outcome with CRT (aspiration, trach and peg dependence, npo status)  - However if pt is completely against surgery, should offer CRT for treatment    Has been doing ok at home. Had one day she went to ER with plugged inner cannula.    7/31/19: Here after meeting with ST and a laryngectomy patient, would like to proceed with surgery. No issues at home since last visit. No changes in medical history.    8/12/19: Underwent TL, BND, CP myotomy, R thyroid lobectomy and TEP.    8/27/19: Here for first follow up since discharge. Still taking liquids PO, no issues at home. Has ST appointment scheduled for Sept 3rd. Has right UE weakness and pain but mobility is ok. Doing well with stoma care.  [facility] TB Recs:  Recommendations: NCCN guidelines state consider adjuvant XRT  - Will plan to discuss risks/benefits of radiation with patient in ENT follow  - Will do same in Radiation Oncology clinic    9/4/19: Here for follow up and also for follow-up after presenting to ED last night with concerns for nodule  below stoma. Has 1 pill of her antibiotic given at last visit left. She reports that the swelling appeared suddenly yesterday. Is very tender on palpation. causes pain extending toward her right shoulder [1]    9/11/19: hospitalization on 9/4 s/p I&D of right clavicular head and teeth extraction. Starting radiation on Thursday. Otherwise no issues. [1]    10/16/2019: Recent hospitalization for TIA sx w/o any sequela. MRI, echo pending. Discharged on levo and doxy for right clavicular head drainage. patient hasn't taken any. Notes still have lots of blood secretions but not any now. Eating per mouth also supplementing thru G-tube [1]    11/19/19: Pt here for follow up. She has completed CRT 2 weeks ago. Eating and drinking well. Maintaining weight. Feels her right sternoclavicular joint is unchanged. Finished antibiotics over 2 weeks ago. Feels it has not changed since finishing antibiotics, no drainage. Per patient finished all chemo and radiation treatments.    12/24/19: No c/o today. Sternoclavicular joint has resolved. Tolerating PO intake and maintaining weight. She would like to have PEG removed, states she has not used it in over 2 months. [1]    1/28/20: Here for follow up. PET ordered today by Dr. Kim. No dyspnea, dysphagia. noting shoulder stiffness/pain. Doing home PT exercises. Notes some numbness underneath chin. Got PEG removed by GS today. [1]    3/5/20: Here for cancer surveaillcen. Post-treatment PET done 2/2020 with some hypermetabolic activity in nasopharynx, no eviednce of persistence/recurrence in neck. No distant mets. Denies dysphagia. Weight stable. Denies dyspnea. Voicing well with TEP.    5/26/20: Here for cancer surveillance. No weight loss, dysphagia, odynophagia. Reports that the lymphedema machine was denied per a letter she received. She is reporting severe pain to bilateral cheeks and upper neck? Voicing with TEP, no issues.    7/28/20: Denies dysphagia/odyophagia/otalgia. Reports  "weight loss ~ 10lbs since last visit bc she does not have much of an appetite, but has begun drinking ensure to keep calories up. Frustrated that her ear lobes have paresthesias and she ripped her earrings out. Requesting additional norco. C/o intermittent tight pulling sensation in bilateral neck muscles that makes her feel as though she is choking. Reports taking a few days of leftover abx rx 2 weeks ago bc her secretions had turned green and she was afraid to go to the ED. Denies having fever, but reports she got a little SOB. Secretions are now clear.     9/29/2020: Presenting in follow-up for cancer surveillance. She denies any dysphasia, odynophagia, otalgia or unexpected weight loss at this time. She notes she has a knot on her left neck close to her stoma that is nontender. She noticed this about month and a half ago. 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  A CT scan of the neck showed the presence of a 1.9 cm left thyroid nodule with an interval increase in size. No cervical adenopathy was noted. Patient is to undergo needle biopsy of this lesion today.  Thyroid function studies done on September 29, 2020, showed her to be hypothyroid with a TSH level of 15.190 and a normal free T4 0.76. Previously the patient had been given a prescription for levothyroxine 75 mcg daily but this had never been filled and the patient had not been taking that medication. Patient does admit to some fatigue but she does not feel "bad". She does not have any other new complaints today.    11/11/20: Doing well. S/p FNA of left thyroid nodule 10/29/20. Path came back that sample was insufficient for diagnostic evaluation. Reports compliance with " levothyroxine.    1/7/21: Doing well. No complaints at this time. No weight changes, dysphagia, odynophagia, otalgia, new neck masses.    3/11/21: Here today for possible FNA of nodule, however when using ultrasound no concerning nodules seen to biopsy. Patient had adjustment of Synthroid at last visit, TSH elevated in January. Due for surveillance appointment.    5/6/21: Here today for cancer surveillance. Reports significant tenderness to right neck starting 1-2 weeks ago but no associated mass or swelling. She has tried her lortab but this has not helped. No issues with PO intake. Weight is stable. No dysphagia, odynophagia, otalgia.    6-16-21: Follow-up for cancer surveillance, no current complaints including no palpable neck mass or tenderness on either side, no blood from the trachea or blood from the mouth. Her weight is remaining stable and she is tolerating p.o. well. She overall functioning well and appears happy.    8/18/21: here today for surveillance. No complaints overall; a little bit of generic fullness over the left angle of mandible but nothing discrete. Tolerating PO well. No odynophagia, dysphagia, weight changes, otalgia, difficulty speaking via alaryngeal voice.    10/19/2021: Pt is here for surveillance with hx of right thyroid lobectomy and FNA of 2.0 cm left thyroid nodule. Path came back that sample was insufficient for diagnostic evaluation 10/2020. She states that she has been doing well. Pt says that her only complaint is inability to taste food every once and a while. No current complaints    1/26/2022: Follow-up for cancer surveillance, currently tolerating diet well, without neck swelling, pain, hemoptysis from the trach or new areas of swelling or tenderness in her neck or in her throat. Denies otalgia. CT of chest and neck unremarkable for recurrence or metastatic disease. Makes note of diminished thyroid nodule. Patient followed by Dr. Golden in endocrine for monitoring thyroid  function and markers.     4/26/2022: Patient presenting in follow-up. She is doing well with her diet. She continues to note some tightness around her neck for which she is doing stretches and she does find these helpful. She denies any odynophagia, dysphagia, otalgia. She notes her weight does tend to waver and endocrinology has been fixing her thyroid medication dosage recently. Most recently she did have some lab work done to measure her thyroid function.    June 28, 2022: 67-year-old female presents today for follow-up of her supraglottic laryngeal squamous cell carcinoma and her thyroid papillary microcarcinoma.  Patient is doing well at this time.  She has no complaints of any difficulty swallowing, neck pain, ear pain, or hemoptysis.  The area of swelling on the left side of her neck has not changed.  Endocrinology is following her for management of her postoperative hypothyroidism and thyroid carcinoma.  She has no other new complaints today. Of note she is scheduled to have a follow-up thyroid ultrasound and ultrasound of the neck in October 2022.    9/28/22: Here for follow up. Patient reports no issues. Denies ear pain. Stable neck stiffness. No new lumps/bumps, swelling on neck is stable. Swallowing fine, no weight loss. Recent thyroid US unremarkable.  No complaints.     1/30/23: Doing well. Denies dysphagia, dysphonia, SOB. States she gained weight over the holidays. She began having some pain in left neck a few days ago and has been stretching and using a muscle rub. This has helped some.     5/30/23: Here today for cancer surveillance. Reports she is doing well overall. Denies any dysphagia, odynophagia, new palpable lumps/bumps, no SOB. Wearing HME over akhil tube, no issues with TEP leaking.     10/31/2023: Patient here today for follow up.  Patient is overall doing very well today.  No lumps or bumps of the head or neck, dysphagia, issues with TEP, shortness of breath.  No further complaints.  Following with endocrine for thyroid.    4/4/2023:  Here today in follow-up.  Patient is doing very well with minimal issues.  She reports that after recent TP change she reports that she has been coughing more recently.  States the TPs not lying flush.  Otherwise eating and drinking well with no issues denies lumps or bumps in her neck.    10/10/24: Pt presents today for follow up. She reports she is doing well, without issue. She reports no new lumps or bumps. No new complaints. She reports her TEP is in place and was just replaced on Friday.     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    T3N0M0 supraglottic laryngeal squamous cell carcinoma 07/08/2019    T3 N0 M0 squamous cell carcinoma of the supraglottic larynx diagnosed initially on July 8, 2019. Treated with total laryngectomy, bilateral selective neck dissections (levels 2 through 4), right hemithyroidectomy (with incidental finding papillary thyroid microcarcinoma), primary TEP puncture with cricopharyngeal myotomy on August 12, 2019.  Treated with postoperative radiation therapy.    Type 2 diabetes mellitus with unspecified diabetic retinopathy without macular edema      Past Surgical History:   Procedure Laterality Date    ceiol - cataract extraction and insertion of introcular lens      excision of papilloma      I&D of skin and subcutaneous tissue  05/31/2015    MULTIPLE TOOTH EXTRACTIONS Bilateral 09/06/2019    Full mouth extraction in preparation for radiation therapy.    PANENDOSCOPY N/A 07/08/2019    Initial diagnosis laryngeal squamous cell carcinoma.  Done in conjunction with an awake tracheostomy.    PERCUTANEOUS INSERTION OF GASTROSTOMY TUBE N/A 07/12/2019    SELECTIVE NECK DISSECTION Bilateral 08/12/2019    Done in conjunction  with total laryngectomy for laryngeal squamous cell carcinoma.    THYROID LOBECTOMY Right 08/12/2019    Done in conjunction with total laryngectomy with incidental finding thyroid papillary microcarcinoma.    TOTAL LARYNGECTOMY N/A 08/12/2019    Total laryngectomy with bilateral selective neck dissections (levels 2 through 4) right hemithyroidectomy and primary tracheoesophageal puncture cricopharyngeal myotomy for treatment laryngeal squamous cell carcinoma.    TRACHEOSTOMY  07/08/2019    Done in conjunction with panendoscopy at initial diagnosis of laryngeal squamous cell carcinoma.    TRACHEOSTOMY TUBE CHANGE  07/14/2019    Done in conjunction with control of post-operative tracheostomy wound hemorrhage.       Review of Systems:  Unremarkable except as mentioned above.    Current Medications:  Current Outpatient Medications   Medication Sig    amLODIPine (NORVASC) 10 MG tablet Take 1 tablet (10 mg total) by mouth Daily.    aspirin 81 MG Chew Take 81 mg by mouth once daily.    blood sugar diagnostic Strp Use qd    blood sugar diagnostic Strp To check BG 1 times daily, to use with insurance preferred meter    blood-glucose meter kit To check BG 1 times daily, to use with insurance preferred meter    carvediloL (COREG) 3.125 MG tablet TAKE 1 TABLET BY MOUTH TWICE DAILY    cyclobenzaprine (FLEXERIL) 10 MG tablet Take 1 tablet (10 mg total) by mouth every evening.    EScitalopram oxalate (LEXAPRO) 10 MG tablet Take 10 mg by mouth Daily.    famotidine (PEPCID) 40 MG tablet Take 40 mg by mouth 2 (two) times daily.    FLUoxetine 40 MG capsule Take 40 mg by mouth Daily.    HYDROcodone-acetaminophen (NORCO)  mg per tablet Take 1 tablet by mouth every 12 (twelve) hours as needed for Pain.    hydrOXYchloroQUINE (PLAQUENIL) 200 mg tablet Take 1 tablet (200 mg total) by mouth 2 (two) times daily. After food    isosorbide mononitrate (IMDUR) 30 MG 24 hr tablet Take 30 mg by mouth every morning.    lancets Misc Use qd     "lancets Misc To check BG 1 times daily, to use with insurance preferred meter    lancets Misc Use to test 1 qd    levothyroxine (SYNTHROID) 100 MCG tablet TAKE 1 TABLET BY MOUTH DAILY    lisinopriL (PRINIVIL,ZESTRIL) 40 MG tablet TAKE 1 TABLET BY MOUTH DAILY    meclizine (ANTIVERT) 25 mg tablet Take 25 mg by mouth 3 (three) times daily.    metFORMIN (GLUCOPHAGE) 500 MG tablet Take 1 tablet (500 mg total) by mouth 2 (two) times daily.    mirabegron (MYRBETRIQ) 50 mg Tb24 Take 1 tablet (50 mg total) by mouth once daily at 6am.    nitroGLYCERIN (NITROSTAT) 0.4 MG SL tablet Place 0.4 mg under the tongue as needed.    omeprazole (PRILOSEC) 40 MG capsule Take 1 capsule (40 mg total) by mouth once daily. Before lunch    ondansetron (ZOFRAN-ODT) 4 MG TbDL Take 4 mg by mouth 3 (three) times daily.    OXcarbazepine (TRILEPTAL) 300 MG Tab TAKE 1 TABLET BY MOUTH TWICE DAILY    OXcarbazepine (TRILEPTAL) 300 MG Tab TAKE 1 TABLET BY MOUTH TWICE DAILY    furosemide (LASIX) 40 MG tablet Take 40 mg by mouth daily as needed.    gabapentin (NEURONTIN) 600 MG tablet Take 1 tablet (600 mg total) by mouth 3 (three) times daily.    oxybutynin (DITROPAN-XL) 5 MG TR24 Take 1 tablet (5 mg total) by mouth once daily. (Patient not taking: Reported on 9/28/2022)     Current Facility-Administered Medications   Medication    LIDOcaine (PF) 40 mg/mL (4 %) injection 1 mL    phenylephrine HCL 1% nasal spray 1 spray        Allergies:  Review of patient's allergies indicates:  No Known Allergies     Physical Exam:  /71   Pulse 88   Temp 96.8 °F (36 °C) (Oral)   Resp 20   Ht 5' 7" (1.702 m)   Wt 107.5 kg (236 lb 15.9 oz)   LMP  (LMP Unknown)   SpO2 99%   BMI 37.12 kg/m²     General:  Well-developed well-nourished female in no acute distress.  Patient voices well with her TEP.  Head and face:  Normocephalic.  No facial lesions  Ears:  Right ear-auricle is normally developed.  External auditory canal is clear.  Tympanic membrane is " nonerythematous.  No middle ear effusion.  Left ear-auricle is normally developed.  External auditory canal is clear.  Tympanic membrane is nonerythematous.  No middle ear effusion.  Nose:  Nasal dorsum is unremarkable.  No significant septal deviation.  No significant intranasal congestion.  Secretions are clear.  Oral cavity and oropharynx:  Edentulous, Tongue and floor mouth are without lesions.  Mucosa is moist.  No pharyngeal erythema or exudates.  No oropharyngeal masses.  No tonsillar hypertrophy.  Neck:  Supple without of palpable adenopathy.  No masses in the thyroid bed or in the peristomal region.  Parotid and submandibular glands are normal to palpation.  Stoma is without granulation.  TEP is in place and flush, without any leaking  Eyes:  Extraocular muscles intact.  No nystagmus.  No exophthalmos or enophthalmos.  Neurologic:  Alert and oriented.  Cranial nerves 2-12 are grossly normal.      Procedure note: Flexible laryngoscopy  After informed consent was obtained and the nose was prepped with 1% phenyleprine nasal spray and lidocaine topically. The flexible fiberoptic laryngoscope was introduced into the right nostril and advanced. Examination of the nose showed the above mentioned findings. Examination of the nasopharynx showed no nasopharyngeal masses or eustachian tube obstruction. Examination of the base of tongue showed no masses. Neopharynx is without lesions or obstruction.  Examination of the trachea through the trachea stoma showed TEP in place without leakage, no masses or lesions down to the level of the adrianna.    Imaging:   CT Neck 2024: Stable exam, ARCHIE    LABS: 9/16/24 TSH 0.063, T4 1.05    Assessment/Plan:   67-year-old female with T3 N0 M0 squamous cell carcinoma of the supraglottic larynx status post total laryngectomy with bilateral selective neck dissections (levels 2 through 4), primary TEP puncture with cricopharyngeal myotomy, and right hemithyroidectomy with an incidental  finding of a papillary microcarcinoma (T1a N0 M0) on August 12, 2019, with postoperative radiation therapy completed on November 5, 2019. CT neck 4/2024 ARCHIE. Thyroid US without any concerning findings.     - She knows to call with any changes/issues for sooner appointment.   - Continue follow-up with Endocrinology:recent thyroid US showed stable MNG with hx of hemithyroidectomy, fu TSH prior to next visit  - RTC 1 year for surveillance with CT at time of visit    Dori Gudino MD  Otolaryngology PGY-III

## 2024-10-10 NOTE — PROGRESS NOTES
The scope used for the exam was:  Flexible scope ENF-P4  Serial Number:  1)    [id number]    []   2)    [id number]    []   3)    [id number]    []   4)    [id number]    [x]   5)    [id number]    []   6)    [id number]    []       The scope used for the exam was:  Rigid scope   Serial Number:  1)   [id number]    []   2)   [id number]    []   3)   7330    []   4)    3384   []   5)    0824   []   6)    5554   []     7)   7425    []   8)   2240    []   9)   1109    []

## 2024-10-17 DIAGNOSIS — M54.81 OCCIPITAL NEURALGIA, UNSPECIFIED LATERALITY: ICD-10-CM

## 2024-10-17 RX ORDER — OXCARBAZEPINE 300 MG/1
300 TABLET, FILM COATED ORAL 2 TIMES DAILY
Qty: 60 TABLET | Refills: 4 | Status: SHIPPED | OUTPATIENT
Start: 2024-10-17

## 2024-10-23 ENCOUNTER — HOSPITAL ENCOUNTER (OUTPATIENT)
Dept: RADIOLOGY | Facility: HOSPITAL | Age: 69
Discharge: HOME | End: 2024-10-23
Payer: MEDICARE

## 2024-10-23 ENCOUNTER — OFFICE VISIT (OUTPATIENT)
Dept: URGENT CARE | Facility: CLINIC | Age: 69
End: 2024-10-23
Payer: MEDICARE

## 2024-10-23 VITALS
BODY MASS INDEX: 37.3 KG/M2 | OXYGEN SATURATION: 96 % | RESPIRATION RATE: 15 BRPM | SYSTOLIC BLOOD PRESSURE: 136 MMHG | TEMPERATURE: 98 F | DIASTOLIC BLOOD PRESSURE: 89 MMHG | WEIGHT: 237.69 LBS | HEIGHT: 67 IN | HEART RATE: 62 BPM

## 2024-10-23 DIAGNOSIS — S70.12XA CONTUSION OF LEFT THIGH, INITIAL ENCOUNTER: Primary | ICD-10-CM

## 2024-10-23 DIAGNOSIS — M79.652 LEFT THIGH PAIN: ICD-10-CM
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
## 2024-10-23 PROCEDURE — 99215 OFFICE O/P EST HI 40 MIN: CPT | Mod: PBBFAC,25

## 2024-10-23 PROCEDURE — 73552 X-RAY EXAM OF FEMUR 2/>: CPT | Mod: TC,LT

## 2024-10-23 PROCEDURE — 99214 OFFICE O/P EST MOD 30 MIN: CPT | Mod: S$PBB,,,

## 2024-10-23 RX ORDER — NAPROXEN 500 MG/1
500 TABLET ORAL 2 TIMES DAILY PRN
Qty: 14 TABLET | Refills: 0 | Status: SHIPPED | OUTPATIENT
Start: 2024-10-23

## 2024-10-23 NOTE — PATIENT INSTRUCTIONS
Rest.  Apply ice to affected area 2 to 3 times a day for 5-10 minutes.  May alternate with warm compress. Naproxen as needed for discomfort.  Elevate. Follow up with PCP in 1-2 weeks.  The clinic will call if radiology interpretation differs.  Go to the nearest ER for worsening symptoms, such as severe pain, chest pain, shortness of breath, palpitations, numbness/tingling, etc.

## 2024-10-23 NOTE — PROGRESS NOTES
"Subjective:      Patient ID: Laura Freeman is a 69 y.o. female.    Vitals:  height is 5' 7" (1.702 m) and weight is 107.8 kg (237 lb 11.2 oz). Her oral temperature is 98.3 °F (36.8 °C). Her blood pressure is 136/89 and her pulse is 62. Her respiration is 15 and oxygen saturation is 96%.     Chief Complaint: Fall (X1 week ago pt fell on LT leg that causes bruising. Pt reports soreness and pain)    CC as above.  States she fell off the porch and hit the steps.  States bruising has improved, but she has a lump that is causing her some pain.  She has been taking Norco for pain.  She is ambulatory.    Fall  The accident occurred 5 to 7 days ago. The fall occurred while standing. She fell from a height of 1 to 2 ft. She landed on [place]. There was no blood loss. Point of impact: left thigh. Pain location: left thigh. The pain is moderate. The symptoms are aggravated by pressure on injury. Pertinent negatives include no fever, numbness or tingling.       Constitution: Negative. Negative for fever.   Cardiovascular: Negative.    Respiratory: Negative.          Trach   Musculoskeletal:  Positive for pain and trauma.   Skin:  Positive for bruising. Negative for erythema.   Neurological: Negative.  Negative for numbness.      Objective:     Physical Exam   Constitutional: She is oriented to person, place, and time.  Non-toxic appearance. No distress.   HENT:   Head: Normocephalic and atraumatic.   Nose: Nose normal.   Eyes: Conjunctivae are normal.   Neck: Neck supple.   Cardiovascular: Normal rate, regular rhythm, normal heart sounds and normal pulses.   Pulmonary/Chest: Effort normal and breath sounds normal. No respiratory distress.   Abdominal: Normal appearance.   Musculoskeletal:         General: Tenderness and signs of injury present.      Left lower leg: No edema.   Neurological: no focal deficit. She is alert and oriented to person, place, and time.   Skin: Skin is warm, dry, intact and not diaphoretic. Capillary " refill takes 2 to 3 seconds. bruising No erythema        Psychiatric: Her behavior is normal. Mood normal.   Nursing note and vitals reviewed.      Assessment:     1. Contusion of left thigh, initial encounter    2. Left thigh pain    3. Fall, initial encounter        Plan:       Contusion of left thigh, initial encounter  -     naproxen (NAPROSYN) 500 MG tablet; Take 1 tablet (500 mg total) by mouth 2 (two) times daily as needed (pain). Take with food  Dispense: 14 tablet; Refill: 0  -     XR FEMUR 2 VIEW LEFT    Left thigh pain  -     naproxen (NAPROSYN) 500 MG tablet; Take 1 tablet (500 mg total) by mouth 2 (two) times daily as needed (pain). Take with food  Dispense: 14 tablet; Refill: 0  -     XR FEMUR 2 VIEW LEFT    Fall, initial encounter  -     XR FEMUR 2 VIEW LEFT    Rest.  Apply ice to affected area 2 to 3 times a day for 5-10 minutes.  May alternate with warm compress. Naproxen as needed for discomfort.  Elevate. Follow up with PCP in 1-2 weeks.  The clinic will call if radiology interpretation differs.  ER precautions provided.

## 2024-12-04 DIAGNOSIS — F51.01 PRIMARY INSOMNIA: ICD-10-CM

## 2024-12-04 DIAGNOSIS — N39.46 MIXED INCONTINENCE: ICD-10-CM

## 2024-12-04 DIAGNOSIS — M19.90 INFLAMMATORY ARTHRITIS: ICD-10-CM

## 2024-12-04 DIAGNOSIS — C32.9 LARYNGEAL SQUAMOUS CELL CARCINOMA: ICD-10-CM

## 2024-12-04 DIAGNOSIS — B18.2 HEPATITIS C VIRUS CARRIER STATE: ICD-10-CM

## 2024-12-04 DIAGNOSIS — N81.10 BLADDER PROLAPSE, FEMALE, ACQUIRED: ICD-10-CM

## 2024-12-04 DIAGNOSIS — C73 PAPILLARY MICROCARCINOMA OF THYROID: ICD-10-CM

## 2024-12-04 DIAGNOSIS — E03.9 HYPOTHYROIDISM, UNSPECIFIED TYPE: ICD-10-CM

## 2024-12-04 DIAGNOSIS — I10 HYPERTENSION, UNSPECIFIED TYPE: ICD-10-CM

## 2024-12-04 DIAGNOSIS — R35.1 NOCTURIA: ICD-10-CM

## 2024-12-04 DIAGNOSIS — M06.00 SERONEGATIVE RHEUMATOID ARTHRITIS: ICD-10-CM

## 2024-12-04 DIAGNOSIS — M54.40 LOW BACK PAIN WITH SCIATICA, SCIATICA LATERALITY UNSPECIFIED, UNSPECIFIED BACK PAIN LATERALITY, UNSPECIFIED CHRONICITY: ICD-10-CM

## 2024-12-04 DIAGNOSIS — M79.7 FIBROMYALGIA SYNDROME: ICD-10-CM

## 2024-12-05 ENCOUNTER — OFFICE VISIT (OUTPATIENT)
Dept: FAMILY MEDICINE | Facility: CLINIC | Age: 69
End: 2024-12-05
Payer: MEDICARE

## 2024-12-05 ENCOUNTER — LAB VISIT (OUTPATIENT)
Dept: LAB | Facility: HOSPITAL | Age: 69
End: 2024-12-05
Attending: FAMILY MEDICINE
Payer: MEDICARE

## 2024-12-05 VITALS
HEIGHT: 67 IN | DIASTOLIC BLOOD PRESSURE: 83 MMHG | WEIGHT: 237 LBS | RESPIRATION RATE: 18 BRPM | BODY MASS INDEX: 37.2 KG/M2 | OXYGEN SATURATION: 95 % | HEART RATE: 70 BPM | SYSTOLIC BLOOD PRESSURE: 115 MMHG | TEMPERATURE: 99 F

## 2024-12-05 DIAGNOSIS — Z23 IMMUNIZATION DUE: ICD-10-CM

## 2024-12-05 DIAGNOSIS — I10 HYPERTENSION, UNSPECIFIED TYPE: ICD-10-CM

## 2024-12-05 DIAGNOSIS — E11.40 TYPE 2 DIABETES MELLITUS WITH DIABETIC NEUROPATHY, WITHOUT LONG-TERM CURRENT USE OF INSULIN: ICD-10-CM

## 2024-12-05 DIAGNOSIS — I10 HYPERTENSION, UNSPECIFIED TYPE: Primary | ICD-10-CM

## 2024-12-05 DIAGNOSIS — K21.9 GASTROESOPHAGEAL REFLUX DISEASE, UNSPECIFIED WHETHER ESOPHAGITIS PRESENT: ICD-10-CM

## 2024-12-05 LAB
ALBUMIN SERPL-MCNC: 3.9 G/DL (ref 3.4–4.8)
ALBUMIN/GLOB SERPL: 1 RATIO (ref 1.1–2)
ALP SERPL-CCNC: 78 UNIT/L (ref 40–150)
ALT SERPL-CCNC: 17 UNIT/L (ref 0–55)
ANION GAP SERPL CALC-SCNC: 9 MEQ/L
AST SERPL-CCNC: 19 UNIT/L (ref 5–34)
BILIRUB SERPL-MCNC: 0.4 MG/DL
BUN SERPL-MCNC: 20.2 MG/DL (ref 9.8–20.1)
CALCIUM SERPL-MCNC: 9.6 MG/DL (ref 8.4–10.2)
CHLORIDE SERPL-SCNC: 108 MMOL/L (ref 98–107)
CHOLEST SERPL-MCNC: 168 MG/DL
CHOLEST/HDLC SERPL: 3 {RATIO} (ref 0–5)
CO2 SERPL-SCNC: 24 MMOL/L (ref 23–31)
CREAT SERPL-MCNC: 0.98 MG/DL (ref 0.55–1.02)
CREAT UR-MCNC: 283.3 MG/DL (ref 45–106)
CREAT/UREA NIT SERPL: 21
EST. AVERAGE GLUCOSE BLD GHB EST-MCNC: 122.6 MG/DL
GFR SERPLBLD CREATININE-BSD FMLA CKD-EPI: >60 ML/MIN/1.73/M2
GLOBULIN SER-MCNC: 4 GM/DL (ref 2.4–3.5)
GLUCOSE SERPL-MCNC: 93 MG/DL (ref 82–115)
HBA1C MFR BLD: 5.9 %
HDLC SERPL-MCNC: 67 MG/DL (ref 35–60)
LDLC SERPL CALC-MCNC: 84 MG/DL (ref 50–140)
MICROALBUMIN UR-MCNC: 14.9 UG/ML
MICROALBUMIN/CREAT RATIO PNL UR: 5.3 MG/GM CR (ref 0–30)
POTASSIUM SERPL-SCNC: 4.4 MMOL/L (ref 3.5–5.1)
PROT SERPL-MCNC: 7.9 GM/DL (ref 5.8–7.6)
SODIUM SERPL-SCNC: 141 MMOL/L (ref 136–145)
TRIGL SERPL-MCNC: 83 MG/DL (ref 37–140)
VLDLC SERPL CALC-MCNC: 17 MG/DL

## 2024-12-05 PROCEDURE — 90653 IIV ADJUVANT VACCINE IM: CPT | Mod: PBBFAC

## 2024-12-05 PROCEDURE — 4010F ACE/ARB THERAPY RXD/TAKEN: CPT | Mod: CPTII,,, | Performed by: FAMILY MEDICINE

## 2024-12-05 PROCEDURE — 83036 HEMOGLOBIN GLYCOSYLATED A1C: CPT

## 2024-12-05 PROCEDURE — 3066F NEPHROPATHY DOC TX: CPT | Mod: CPTII,,, | Performed by: FAMILY MEDICINE

## 2024-12-05 PROCEDURE — 99214 OFFICE O/P EST MOD 30 MIN: CPT | Mod: S$PBB,,, | Performed by: FAMILY MEDICINE

## 2024-12-05 PROCEDURE — 1159F MED LIST DOCD IN RCRD: CPT | Mod: CPTII,,, | Performed by: FAMILY MEDICINE

## 2024-12-05 PROCEDURE — 3079F DIAST BP 80-89 MM HG: CPT | Mod: CPTII,,, | Performed by: FAMILY MEDICINE

## 2024-12-05 PROCEDURE — 3008F BODY MASS INDEX DOCD: CPT | Mod: CPTII,,, | Performed by: FAMILY MEDICINE

## 2024-12-05 PROCEDURE — 80061 LIPID PANEL: CPT

## 2024-12-05 PROCEDURE — 1160F RVW MEDS BY RX/DR IN RCRD: CPT | Mod: CPTII,,, | Performed by: FAMILY MEDICINE

## 2024-12-05 PROCEDURE — 3074F SYST BP LT 130 MM HG: CPT | Mod: CPTII,,, | Performed by: FAMILY MEDICINE

## 2024-12-05 PROCEDURE — 80053 COMPREHEN METABOLIC PANEL: CPT

## 2024-12-05 PROCEDURE — 82570 ASSAY OF URINE CREATININE: CPT

## 2024-12-05 PROCEDURE — 36415 COLL VENOUS BLD VENIPUNCTURE: CPT

## 2024-12-05 PROCEDURE — 99215 OFFICE O/P EST HI 40 MIN: CPT | Mod: PBBFAC | Performed by: FAMILY MEDICINE

## 2024-12-05 PROCEDURE — 3061F NEG MICROALBUMINURIA REV: CPT | Mod: CPTII,,, | Performed by: FAMILY MEDICINE

## 2024-12-05 PROCEDURE — 1101F PT FALLS ASSESS-DOCD LE1/YR: CPT | Mod: CPTII,,, | Performed by: FAMILY MEDICINE

## 2024-12-05 PROCEDURE — 3044F HG A1C LEVEL LT 7.0%: CPT | Mod: CPTII,,, | Performed by: FAMILY MEDICINE

## 2024-12-05 PROCEDURE — 3288F FALL RISK ASSESSMENT DOCD: CPT | Mod: CPTII,,, | Performed by: FAMILY MEDICINE

## 2024-12-05 PROCEDURE — G0008 ADMIN INFLUENZA VIRUS VAC: HCPCS | Mod: PBBFAC

## 2024-12-05 RX ORDER — LISINOPRIL 40 MG/1
40 TABLET ORAL
Qty: 90 TABLET | Refills: 0 | OUTPATIENT
Start: 2024-12-05

## 2024-12-05 RX ORDER — AMLODIPINE BESYLATE 10 MG/1
10 TABLET ORAL DAILY
Qty: 90 TABLET | Refills: 0 | Status: SHIPPED | OUTPATIENT
Start: 2024-12-05

## 2024-12-05 RX ORDER — OMEPRAZOLE 40 MG/1
40 CAPSULE, DELAYED RELEASE ORAL EVERY MORNING
Qty: 90 CAPSULE | Refills: 1 | Status: SHIPPED | OUTPATIENT
Start: 2024-12-05

## 2024-12-05 RX ORDER — LISINOPRIL 40 MG/1
40 TABLET ORAL DAILY
Qty: 90 TABLET | Refills: 0 | Status: SHIPPED | OUTPATIENT
Start: 2024-12-05

## 2024-12-05 RX ORDER — CARVEDILOL 3.12 MG/1
3.12 TABLET ORAL 2 TIMES DAILY
Qty: 180 TABLET | Refills: 0 | Status: SHIPPED | OUTPATIENT
Start: 2024-12-05

## 2024-12-05 RX ORDER — CARVEDILOL 3.12 MG/1
3.12 TABLET ORAL 2 TIMES DAILY
Qty: 180 TABLET | Refills: 0 | OUTPATIENT
Start: 2024-12-05

## 2024-12-05 RX ADMIN — INFLUENZA A VIRUS A/VICTORIA/4897/2022 IVR-238 (H1N1) ANTIGEN (FORMALDEHYDE INACTIVATED), INFLUENZA A VIRUS A/THAILAND/8/2022 IVR-237 (H3N2) ANTIGEN (FORMALDEHYDE INACTIVATED), INFLUENZA B VIRUS B/AUSTRIA/1359417/2021 BVR-26 ANTIGEN (FORMALDEHYDE INACTIVATED) 0.5 ML: 15; 15; 15 INJECTION, SUSPENSION INTRAMUSCULAR at 02:12

## 2024-12-05 NOTE — PROGRESS NOTES
Patient Name: Laura Freeman   : 1955  MRN: [id number]     SUBJECTIVE:  Laura Freeman is a 69 y.o. female here for Follow-up (The patient states that she fell in September  went to urgent care in October and hurt her left inner thigh of her leg and it has a lump/dent in it. She was told to see her primary care but could never get an appointment.)  .    HPI  Here for routine follow-up of hypertension, diabetes.  HTN well controlled. At home good readings. Compliant. Taking amlodipine and lisinopril 40 mg. Coreg 3.125 mg bid.      Regarding diabetes, last visit decreased amount of metformin to once a day and long-acting 500 mg.  No diarrhea with it.  Last hemoglobin A1c 6.7%  Has an eye doctor and has had exams from them. On plaquenil. Follows with rheumatology.    Regarding hyperlipidemia, last lipid panel LDL was not at goal.  Patient on Lipitor 40 mg daily.   The 10-year ASCVD risk score (Nishant FONSECA, et al., 2019) is: 12.2%    Values used to calculate the score:      Age: 69 years      Sex: Female      Is Non- : No      Diabetic: Yes      Tobacco smoker: No      Systolic Blood Pressure: 115 mmHg      Is BP treated: Yes      HDL Cholesterol: 67 mg/dL      Total Cholesterol: 168 mg/dL    More heartburn lately. Not sure if eating more spicy food. Used to be on  omeprazole but felt better and switched to pepcid. Pepcid not working anymore like it used to. No melena or hematochezia. Would like to go back to omeprazole.    Went to urgent care in October after falling had some hematoma there.  It had improved since then though.    Seeing neurology for seizures.  None for almost 2 years.  Follows with gynecology for regular well-woman exams and genital prolapse.  She is also lately following with urogynecologist for the uterovaginal prolapse, had the pessary done.  Follows with endocrinology for history of thyroid cancer.  Follows with ENT, history of laryngeal cancer, history of  "laryngectomy.        ALLERGIES: Review of patient's allergies indicates:  No Known Allergies      ROS:  Review of Systems   Constitutional:  Negative for chills, fever and weight loss.   Respiratory:  Negative for cough and shortness of breath.    Cardiovascular:  Negative for chest pain and leg swelling.   Gastrointestinal:  Positive for heartburn. Negative for abdominal pain, blood in stool, melena, nausea and vomiting.   Genitourinary:  Negative for dysuria and hematuria.   Neurological:  Negative for dizziness and headaches.   Psychiatric/Behavioral:  Negative for depression. The patient is not nervous/anxious.          OBJECTIVE:  Vital signs  Vitals:    12/05/24 1414   BP: 115/83   Pulse: 70   Resp: 18   Temp: 98.6 °F (37 °C)   TempSrc: Oral   SpO2: 95%   Weight: 107.5 kg (237 lb)   Height: 5' 7" (1.702 m)      Body mass index is 37.12 kg/m².    PHYSICAL EXAM:   Physical Exam  Vitals reviewed.   Constitutional:       General: She is not in acute distress.     Appearance: Normal appearance. She is not ill-appearing.   HENT:      Head: Normocephalic and atraumatic.      Nose: Nose normal. No rhinorrhea.      Mouth/Throat:      Mouth: Mucous membranes are moist.   Eyes:      General: No scleral icterus.        Right eye: No discharge.         Left eye: No discharge.   Neck:      Comments: TEP in place  Cardiovascular:      Rate and Rhythm: Normal rate and regular rhythm.   Pulmonary:      Effort: No respiratory distress.      Breath sounds: No wheezing, rhonchi or rales.   Abdominal:      General: There is no distension.      Palpations: Abdomen is soft.      Tenderness: There is no abdominal tenderness.   Musculoskeletal:      Cervical back: Normal range of motion and neck supple. No rigidity or tenderness.      Right lower leg: No edema.      Left lower leg: No edema.   Skin:     General: Skin is warm.      Findings: No rash.   Neurological:      General: No focal deficit present.      Mental Status: She is " alert and oriented to person, place, and time.   Psychiatric:         Mood and Affect: Mood normal.         Behavior: Behavior normal.          ASSESSMENT/PLAN:  1. Hypertension, unspecified type  Well-controlled.  Continue same.  -     Comprehensive Metabolic Panel; Future; Expected date: 12/05/2024  -     Hemoglobin A1C; Future; Expected date: 12/05/2024  -     amLODIPine (NORVASC) 10 MG tablet; Take 1 tablet (10 mg total) by mouth once daily.  Dispense: 90 tablet; Refill: 0  -     carvediloL (COREG) 3.125 MG tablet; Take 1 tablet (3.125 mg total) by mouth 2 (two) times daily.  Dispense: 180 tablet; Refill: 0  -     lisinopriL (PRINIVIL,ZESTRIL) 40 MG tablet; Take 1 tablet (40 mg total) by mouth once daily.  Dispense: 90 tablet; Refill: 0    2. Type 2 diabetes mellitus with diabetic neuropathy, without long-term current use of insulin  Stable.  Continue with metformin 100 mg daily recheck labs.  -     Lipid Panel; Future; Expected date: 12/05/2024  -     Hemoglobin A1C; Future; Expected date: 12/05/2024  -     Microalbumin/Creatinine Ratio, Urine; Future; Expected date: 12/05/2024    3. Gastroesophageal reflux disease, unspecified whether esophagitis present  Uncontrolled since being off omeprazole.  Resume it.  Need to see GI if no improvement with omeprazole either.  -     omeprazole (PRILOSEC) 40 MG capsule; Take 1 capsule (40 mg total) by mouth every morning.  Dispense: 90 capsule; Refill: 1    4. Immunization due  -     influenza (adjuvanted) (Fluad) 45 mcg/0.5 mL IM vaccine (> or = 66 yo) 0.5 mL             Previous medical history/lab work/radiology reviewed and considered during medical management decisions.   Medication list reviewed and medication reconciliation performed.  Patient was provided  and care about his/her current diagnosis (es) and medications including risk/benefit and side effects/adverse events, over the counter medication uses/doses, home self-care and contact precautions,  and  red flags and indications for when to seek immediate medical attention.   Patient was advised to continue compliance with current medication list and medical recommendations.  Recommended/ Advised continued compliance with recommended eating habits/ diets for medical conditions and exercise 150 minutes/ week (if possible) for medical condition (s).        RESULTS:  Recent Results (from the past 6 weeks)   Lipid Panel    Collection Time: 12/05/24  3:08 PM   Result Value Ref Range    Cholesterol Total 168 <=200 mg/dL    HDL Cholesterol 67 (H) 35 - 60 mg/dL    Triglyceride 83 37 - 140 mg/dL    Cholesterol/HDL Ratio 3 0 - 5    Very Low Density Lipoprotein 17     LDL Cholesterol 84.00 50.00 - 140.00 mg/dL   Comprehensive Metabolic Panel    Collection Time: 12/05/24  3:08 PM   Result Value Ref Range    Sodium 141 136 - 145 mmol/L    Potassium 4.4 3.5 - 5.1 mmol/L    Chloride 108 (H) 98 - 107 mmol/L    CO2 24 23 - 31 mmol/L    Glucose 93 82 - 115 mg/dL    Blood Urea Nitrogen 20.2 (H) 9.8 - 20.1 mg/dL    Creatinine 0.98 0.55 - 1.02 mg/dL    Calcium 9.6 8.4 - 10.2 mg/dL    Protein Total 7.9 (H) 5.8 - 7.6 gm/dL    Albumin 3.9 3.4 - 4.8 g/dL    Globulin 4.0 (H) 2.4 - 3.5 gm/dL    Albumin/Globulin Ratio 1.0 (L) 1.1 - 2.0 ratio    Bilirubin Total 0.4 <=1.5 mg/dL    ALP 78 40 - 150 unit/L    ALT 17 0 - 55 unit/L    AST 19 5 - 34 unit/L    eGFR >60 mL/min/1.73/m2    Anion Gap 9.0 mEq/L    BUN/Creatinine Ratio 21    Hemoglobin A1C    Collection Time: 12/05/24  3:08 PM   Result Value Ref Range    Hemoglobin A1c 5.9 <=7.0 %    Estimated Average Glucose 122.6 mg/dL   Microalbumin/Creatinine Ratio, Urine    Collection Time: 12/05/24  3:19 PM   Result Value Ref Range    Urine Microalbumin 14.9 <=30.0 ug/mL    Urine Creatinine 283.3 (H) 45.0 - 106.0 mg/dL    Microalbumin Creatinine Ratio 5.3 0.0 - 30.0 mg/gm Cr         Follow Up:  Follow up in about 6 months (around 6/5/2025).     [unfilled]    This note was created with  the assistance of a voice recognition software or phone dictation. There may be transcription errors as a result of using this technology however minimal. Effort has been made to assure accuracy of transcription but any obvious errors or omissions should be clarified with the author of the document

## 2024-12-09 DIAGNOSIS — E11.40 TYPE 2 DIABETES MELLITUS WITH DIABETIC NEUROPATHY, WITHOUT LONG-TERM CURRENT USE OF INSULIN: ICD-10-CM

## 2024-12-09 RX ORDER — METFORMIN HYDROCHLORIDE 500 MG/1
500 TABLET, EXTENDED RELEASE ORAL
Qty: 90 TABLET | Refills: 0 | Status: SHIPPED | OUTPATIENT
Start: 2024-12-09

## 2024-12-13 ENCOUNTER — TELEPHONE (OUTPATIENT)
Dept: FAMILY MEDICINE | Facility: CLINIC | Age: 69
End: 2024-12-13
Payer: MEDICARE

## 2024-12-13 NOTE — TELEPHONE ENCOUNTER
LVM    ----- Message from Bouchra Treadwell MD sent at 12/13/2024 10:16 AM CST -----  Please let the patient know that diabetes remains very well-controlled.  Stable labs, improved lipid panel/normalized.

## 2024-12-16 ENCOUNTER — TELEPHONE (OUTPATIENT)
Dept: FAMILY MEDICINE | Facility: CLINIC | Age: 69
End: 2024-12-16
Payer: MEDICARE

## 2024-12-31 ENCOUNTER — HOSPITAL ENCOUNTER (OUTPATIENT)
Dept: RADIOLOGY | Facility: HOSPITAL | Age: 69
Discharge: HOME | End: 2024-12-31
Attending: FAMILY MEDICINE
Payer: MEDICARE

## 2024-12-31 ENCOUNTER — HOSPITAL ENCOUNTER (OUTPATIENT)
Dept: RADIOLOGY | Facility: HOSPITAL | Age: 69
Discharge: HOME | End: 2024-12-31
Payer: MEDICARE

## 2024-12-31 DIAGNOSIS — Z92.3 HISTORY OF RADIATION TO HEAD AND NECK REGION: ICD-10-CM

## 2024-12-31 DIAGNOSIS — Z12.31 ENCOUNTER FOR SCREENING MAMMOGRAM FOR MALIGNANT NEOPLASM OF BREAST: ICD-10-CM

## 2024-12-31 DIAGNOSIS — C32.9 LARYNGEAL SQUAMOUS CELL CARCINOMA: ICD-10-CM

## 2024-12-31 DIAGNOSIS — B18.2 HEPATITIS C VIRUS CARRIER STATE: ICD-10-CM

## 2024-12-31 PROCEDURE — 25500020 PHARM REV CODE 255

## 2024-12-31 PROCEDURE — 77067 SCR MAMMO BI INCL CAD: CPT | Mod: 26,,, | Performed by: RADIOLOGY

## 2024-12-31 PROCEDURE — 70491 CT SOFT TISSUE NECK W/DYE: CPT | Mod: TC

## 2024-12-31 PROCEDURE — 77067 SCR MAMMO BI INCL CAD: CPT | Mod: TC

## 2024-12-31 PROCEDURE — 77063 BREAST TOMOSYNTHESIS BI: CPT | Mod: 26,,, | Performed by: RADIOLOGY

## 2024-12-31 PROCEDURE — 76705 ECHO EXAM OF ABDOMEN: CPT | Mod: TC

## 2024-12-31 RX ADMIN — IOHEXOL 100 ML: 350 INJECTION, SOLUTION INTRAVENOUS at 11:12

## 2025-01-03 NOTE — PROGRESS NOTES
"Please inform Laura Freeman of the following:    Liver US reveals "hepatic steatosis" which means a fatty liver. This is reversible.    Fatty liver education to provide to patient:  Please decrease the amount of saturated fats in your diet.    Choose a diet that emphasizes vegetables and avoids too much sugar and alcohol. Maintain a healthy weight: Those who are overweight or obese should reduce their daily calorie intake and exercise more.     For any questions, please let me know.  Thank you,    Laura Pagan, BERRYC "

## 2025-01-06 ENCOUNTER — TELEPHONE (OUTPATIENT)
Dept: FAMILY MEDICINE | Facility: CLINIC | Age: 70
End: 2025-01-06
Payer: MEDICARE

## 2025-01-06 NOTE — TELEPHONE ENCOUNTER
"Attempted to call patient. Not available.    ----- Message from ARACELI Krause sent at 1/3/2025 11:22 AM CST -----  Please inform Laura Freeman of the following:    Liver US reveals "hepatic steatosis" which means a fatty liver. This is reversible.    Fatty liver education to provide to patient:  Please decrease the amount of saturated fats in your diet.    Choose a diet that emphasizes vegetables and avoids too much sugar and alcohol. Maintain a healthy weight: Those who are overweight or obese should reduce their daily calorie intake and exercise more.     For any questions, please let me know.  Thank you,    ORLIN Abdullahi   "

## 2025-01-08 ENCOUNTER — TELEPHONE (OUTPATIENT)
Dept: FAMILY MEDICINE | Facility: CLINIC | Age: 70
End: 2025-01-08
Payer: MEDICARE

## 2025-01-08 NOTE — TELEPHONE ENCOUNTER
"LVM    ----- Message from ARACELI Krause sent at 1/3/2025 11:22 AM CST -----  Please inform Laura Freeman of the following:    Liver US reveals "hepatic steatosis" which means a fatty liver. This is reversible.    Fatty liver education to provide to patient:  Please decrease the amount of saturated fats in your diet.    Choose a diet that emphasizes vegetables and avoids too much sugar and alcohol. Maintain a healthy weight: Those who are overweight or obese should reduce their daily calorie intake and exercise more.     For any questions, please let me know.  Thank you,    ORLIN Abdullahi   "

## 2025-01-10 ENCOUNTER — TELEPHONE (OUTPATIENT)
Dept: FAMILY MEDICINE | Facility: CLINIC | Age: 70
End: 2025-01-10
Payer: MEDICARE

## 2025-01-10 NOTE — TELEPHONE ENCOUNTER
"Called patient to give results. Patient verbalized understanding. No additional questions at this time.       ----- Message from ARACELI Krause sent at 1/3/2025 11:22 AM CST -----  Please inform Laura Freeman of the following:    Liver US reveals "hepatic steatosis" which means a fatty liver. This is reversible.    Fatty liver education to provide to patient:  Please decrease the amount of saturated fats in your diet.    Choose a diet that emphasizes vegetables and avoids too much sugar and alcohol. Maintain a healthy weight: Those who are overweight or obese should reduce their daily calorie intake and exercise more.     For any questions, please let me know.  Thank you,    BERRY AbdullahiC   "

## 2025-03-09 DIAGNOSIS — I10 HYPERTENSION, UNSPECIFIED TYPE: ICD-10-CM

## 2025-03-09 DIAGNOSIS — Z86.73 HISTORY OF CVA (CEREBROVASCULAR ACCIDENT): ICD-10-CM

## 2025-03-10 RX ORDER — ATORVASTATIN CALCIUM 40 MG/1
40 TABLET, FILM COATED ORAL NIGHTLY
Qty: 90 TABLET | Refills: 3 | Status: SHIPPED | OUTPATIENT
Start: 2025-03-10 | End: 2026-03-10

## 2025-03-10 RX ORDER — CARVEDILOL 3.12 MG/1
3.12 TABLET ORAL 2 TIMES DAILY
Qty: 180 TABLET | Refills: 2 | Status: SHIPPED | OUTPATIENT
Start: 2025-03-10 | End: 2026-03-10

## 2025-03-12 DIAGNOSIS — I10 HYPERTENSION, UNSPECIFIED TYPE: ICD-10-CM

## 2025-03-14 RX ORDER — LISINOPRIL 40 MG/1
40 TABLET ORAL DAILY
Qty: 90 TABLET | Refills: 2 | Status: SHIPPED | OUTPATIENT
Start: 2025-03-14 | End: 2026-03-14

## 2025-03-28 DIAGNOSIS — E89.0 POSTSURGICAL HYPOTHYROIDISM: ICD-10-CM

## 2025-03-28 DIAGNOSIS — C73 PAPILLARY MICROCARCINOMA OF THYROID: ICD-10-CM

## 2025-03-28 RX ORDER — LEVOTHYROXINE SODIUM 112 UG/1
112 TABLET ORAL
Qty: 100 TABLET | Refills: 0 | Status: SHIPPED | OUTPATIENT
Start: 2025-03-28 | End: 2025-10-14

## 2025-04-08 ENCOUNTER — LAB VISIT (OUTPATIENT)
Dept: LAB | Facility: HOSPITAL | Age: 70
End: 2025-04-08
Attending: INTERNAL MEDICINE
Payer: MEDICARE

## 2025-04-08 ENCOUNTER — OFFICE VISIT (OUTPATIENT)
Dept: ENDOCRINOLOGY | Facility: CLINIC | Age: 70
End: 2025-04-08
Payer: MEDICARE

## 2025-04-08 VITALS
DIASTOLIC BLOOD PRESSURE: 76 MMHG | HEART RATE: 72 BPM | SYSTOLIC BLOOD PRESSURE: 126 MMHG | TEMPERATURE: 99 F | BODY MASS INDEX: 37.37 KG/M2 | RESPIRATION RATE: 14 BRPM | WEIGHT: 238.13 LBS | HEIGHT: 67 IN

## 2025-04-08 DIAGNOSIS — E89.0 POSTSURGICAL HYPOTHYROIDISM: ICD-10-CM

## 2025-04-08 DIAGNOSIS — E04.2 MULTINODULAR GOITER: ICD-10-CM

## 2025-04-08 DIAGNOSIS — C73 THYROID CANCER: ICD-10-CM

## 2025-04-08 DIAGNOSIS — C73 THYROID CANCER: Primary | ICD-10-CM

## 2025-04-08 LAB — TSH SERPL-ACNC: 1.06 UIU/ML (ref 0.35–4.94)

## 2025-04-08 PROCEDURE — 36415 COLL VENOUS BLD VENIPUNCTURE: CPT

## 2025-04-08 PROCEDURE — 86800 THYROGLOBULIN ANTIBODY: CPT

## 2025-04-08 PROCEDURE — 84443 ASSAY THYROID STIM HORMONE: CPT

## 2025-04-08 PROCEDURE — 99215 OFFICE O/P EST HI 40 MIN: CPT | Mod: PBBFAC

## 2025-04-08 NOTE — PROGRESS NOTES
Subjective:      Patient ID: Laura Freeman is a 70 y.o. female.    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  Has comorbid DMII w/ neuropathy managed per PCP.    doing well today.  Reports doing well on levothyroxine. Denies any palpitations, heat intolerance, unintentional weight loss, multiple frequent loose bowel movements. No known polyuria, but does report occasional polydipsia. Does report some weight gain. Other wise, no acute complaints.        Objective:   Physical Exam  Vitals reviewed.   Constitutional:       Appearance: Normal appearance. She is not toxic-appearing.   HENT:      Head: Normocephalic and atraumatic.      Mouth/Throat:      Comments: Trach in place  Pulmonary:      Effort: Pulmonary effort is normal. No respiratory distress.   Neurological:      Mental Status: She is alert.         Lab Review:     Assessment:     1. Thyroid cancer        2. Multinodular goiter        3. Postsurgical hypothyroidism             Plan:     Thyroid cancer  Regroup via labs  -     5 year kati for thyroid surgery. Currently doing well . Weight gain has stabilized.   - Thyroglobulin Tumor Marker 2.2 on 5/23/24  - U/s showed a stable MNG with hx of hemithyroidectomy.    Postsurgical hypothyroidism  Continue current dose of LT4  -     currently LT4 dosing is 112 mcg Monday through Saturday, and 1.5 tablets every Sunday  - At this time, TSH is 0.063, a little over-suppressed. Goal is 0.1-0.5  -   No more weight gains... repeat TSH, TG today    Type 2 diabetes mellitus with diabetic neuropathy, without long-term current use of insulin        - Last A1c was 5.9, continue primary mgmt per PCP. Will defer A1Cs to PCP, and will not be obtained during future endo clinic visits     Endo f/u in 6 months      Silvano Jerome DO  [facility], [facility]

## 2025-04-09 LAB
ENDOCRINOLOGIST REVIEW: NORMAL
THYROGLOB AB SERPL IA-ACNC: <1.8 IU/ML
THYROGLOB SERPL-MCNC: 2.9 NG/ML

## 2025-04-11 ENCOUNTER — TELEPHONE (OUTPATIENT)
Dept: ENDOCRINOLOGY | Facility: CLINIC | Age: 70
End: 2025-04-11
Payer: MEDICARE

## 2025-04-11 NOTE — TELEPHONE ENCOUNTER
Labs were acceptable given prior u/s findings and tg trends.    Continue current dose of LT4.    Keep f/u as scheduled with TSH and tg panel prior.

## 2025-05-13 ENCOUNTER — OFFICE VISIT (OUTPATIENT)
Dept: URGENT CARE | Facility: CLINIC | Age: 70
End: 2025-05-13
Payer: MEDICARE

## 2025-05-13 ENCOUNTER — HOSPITAL ENCOUNTER (OUTPATIENT)
Dept: RADIOLOGY | Facility: HOSPITAL | Age: 70
Discharge: HOME | End: 2025-05-13
Attending: FAMILY MEDICINE
Payer: MEDICARE

## 2025-05-13 VITALS
WEIGHT: 242.19 LBS | TEMPERATURE: 99 F | BODY MASS INDEX: 38.01 KG/M2 | OXYGEN SATURATION: 98 % | DIASTOLIC BLOOD PRESSURE: 81 MMHG | HEART RATE: 67 BPM | HEIGHT: 67 IN | RESPIRATION RATE: 20 BRPM | SYSTOLIC BLOOD PRESSURE: 120 MMHG

## 2025-05-13 DIAGNOSIS — J20.9 ACUTE BRONCHITIS, UNSPECIFIED ORGANISM: Primary | ICD-10-CM

## 2025-05-13 DIAGNOSIS — J20.9 ACUTE BRONCHITIS, UNSPECIFIED ORGANISM: ICD-10-CM

## 2025-05-13 PROCEDURE — 71046 X-RAY EXAM CHEST 2 VIEWS: CPT | Mod: TC

## 2025-05-13 PROCEDURE — 99214 OFFICE O/P EST MOD 30 MIN: CPT | Mod: PBBFAC,25 | Performed by: FAMILY MEDICINE

## 2025-05-13 PROCEDURE — 99214 OFFICE O/P EST MOD 30 MIN: CPT | Mod: S$PBB,,, | Performed by: FAMILY MEDICINE

## 2025-05-13 RX ORDER — MELOXICAM 7.5 MG/1
7.5 TABLET ORAL
COMMUNITY
Start: 2025-04-15

## 2025-05-13 RX ORDER — LANOLIN ALCOHOL/MO/W.PET/CERES
1 CREAM (GRAM) TOPICAL NIGHTLY
COMMUNITY
Start: 2025-04-15

## 2025-05-13 RX ORDER — DOXYCYCLINE HYCLATE 100 MG
100 TABLET ORAL 2 TIMES DAILY
Qty: 14 TABLET | Refills: 0 | Status: SHIPPED | OUTPATIENT
Start: 2025-05-13 | End: 2025-05-20

## 2025-05-13 RX ORDER — PROMETHAZINE HYDROCHLORIDE AND DEXTROMETHORPHAN HYDROBROMIDE 6.25; 15 MG/5ML; MG/5ML
5 SYRUP ORAL
Qty: 120 ML | Refills: 0 | Status: SHIPPED | OUTPATIENT
Start: 2025-05-13

## 2025-05-13 NOTE — PROGRESS NOTES
"Subjective:       Patient ID: Laura Freeman is a 70 y.o. female.    Vitals:  height is 5' 7" (1.702 m) and weight is 109.9 kg (242 lb 3.2 oz). Her oral temperature is 98.7 °F (37.1 °C). Her blood pressure is 120/81 and her pulse is 67. Her respiration is 20 and oxygen saturation is 98%.     Chief Complaint: Cough (Cough, congestion, mucus x 1 week. States took OTC meds with mild relief.)    Patient with one-week of worsening cough, possible purulent sputum.  No fever.  No ENT symptoms.  Denies wheezing or chest discomfort.    All other systems are negative    Chart reviewed    Objective:   Physical Exam   Constitutional: She appears well-developed.  Non-toxic appearance. She does not appear ill. No distress.   HENT:   Head: Atraumatic.   Nose: No purulent discharge. Right sinus exhibits no maxillary sinus tenderness and no frontal sinus tenderness. Left sinus exhibits no maxillary sinus tenderness and no frontal sinus tenderness.   Mouth/Throat: Uvula is midline.   Eyes: Right eye exhibits no discharge. Left eye exhibits no discharge. Extraocular movement intact   Neck: Neck supple.      Comments: Trach No neck rigidity present.   Cardiovascular: Regular rhythm.   Pulmonary/Chest: Effort normal and breath sounds normal. No stridor. No respiratory distress. She has no wheezes. She has no rhonchi. She has no rales.   Lymphadenopathy:     She has no cervical adenopathy.   Neurological: She is alert.   Skin: Skin is warm, dry and not diaphoretic.   Psychiatric: Her behavior is normal.   Nursing note and vitals reviewed.  XR CHEST PA AND LATERAL  Result Date: 5/13/2025  EXAMINATION: XR CHEST PA AND LATERAL CLINICAL HISTORY: Acute bronchitis, unspecified TECHNIQUE: PA and lateral views of the chest were performed. COMPARISON: 01/30/2023 FINDINGS: There is some scarring in the left lower lobe which is stable since the prior examination.  The heart is normal appearance.  The pulmonary vascularity is unremarkable.  The aorta " is slightly ectatic.  Bones and joints show no acute abnormality.     Stable chronic findings seen no acute process Electronically signed by: Swapnil Lane Date:    05/13/2025 Time:    18:30        Assessment:     1. Acute bronchitis, unspecified organism            Plan:   Secondary to prior lung history and worsening symptoms along with purulent sputum, we will give a course of doxycycline.  Phenergan DM with side effect precautions.  Encouraged PCP follow-up.  Discussed signs and symptoms that should prompt an ER evaluation.      Acute bronchitis, unspecified organism        Portions of this report were dictated using voice recognition software. Content is subject to voice recognition errors

## 2025-05-29 ENCOUNTER — CLINICAL SUPPORT (OUTPATIENT)
Dept: REHABILITATION | Facility: HOSPITAL | Age: 70
End: 2025-05-29
Payer: MEDICARE

## 2025-05-29 DIAGNOSIS — Z90.02 H/O LARYNGECTOMY: Primary | ICD-10-CM

## 2025-05-29 PROCEDURE — 92597 ORAL SPEECH DEVICE EVAL: CPT | Mod: KX

## 2025-05-29 PROCEDURE — L8509 TRACH-ESOPH VOICE PROS MD IN: HCPCS | Mod: KX

## 2025-05-29 NOTE — PROGRESS NOTES
Outpatient Rehab    Speech-Language Pathology Evaluation    Patient Name: Laura Freeman  MRN: [id number]  YOB: 1955  Encounter Date: 5/29/2025    Therapy Diagnosis:   Encounter Diagnosis   Name Primary?    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    Laura Freeman  has a past surgical history that includes Multiple tooth extractions (Bilateral, 09/06/2019); excision of papilloma; ceiol - cataract extraction and insertion of introcular lens; I&D of skin and subcutaneous tissue (05/31/2015); Tracheostomy (07/08/2019); Panendoscopy (N/A, 07/08/2019); Percutaneous insertion of gastrostomy tube (N/A, 07/12/2019); Tracheostomy tube change (07/14/2019); Thyroid lobectomy (Right, 08/12/2019); Total laryngectomy (N/A, 08/12/2019); and Selective neck dissection (Bilateral, 08/12/2019).    Laura has a current medication list which includes the following prescription(s): amlodipine, aspirin, atorvastatin, blood sugar diagnostic, blood-glucose meter, carvedilol, cyclobenzaprine, diclofenac sodium, docusate sodium, escitalopram oxalate, estradiol, famotidine, gabapentin, hydrocodone-acetaminophen, hydroxychloroquine, lancets, levothyroxine, lisinopril, magnesium oxide, meloxicam, metformin, neomycin-polymyxin-dexamethasone, nitroglycerin,  "omeprazole, ondansetron, oxcarbazepine, promethazine-dextromethorphan, and sulfasalazine, and the following Facility-Administered Medications: phenylephrine hcl 1%.    Review of patient's allergies indicates:  No Known Allergies     Objective      Laryngectomy  History  Laryngectomy surgery occurred on 08/12/19. Tracheoesophageal voice prosthesis was last changed on 10/03/24.     Reason for Visit: Routine voice prosthesis refitting/modification and Prosthesis leaking  Puncture Presentation: Prosthesis in place  Antimicrobial Used: NA.    Mrs Freeman is a 70-year-old female with T3 N0 M0 squamous cell carcinoma of the supraglottic larynx status post total laryngectomy with bilateral selective neck dissections (levels 2 through 4), primary TEP puncture with cricopharyngeal myotomy, and right hemithyroidectomy with an incidental finding of a papillary microcarcinoma (T1a N0 M0) on August 12, 2019, with postoperative radiation therapy completed on November 5, 2019. CT neck 4/2024 [facility]. Thyroid US without any concerning findings.     Tracheoesophageal Voice Prosthesis  Prosthesis Type: [employer] Johnson. Diameter: 17 Fr. Length: 8 mm  Examination: Leaks through     New Tracheoesophageal Voice Prosthesis Fitting/Placement? Yes     New Prosthesis Type: [employer] Johnson. Diameter: 17 Fr. Length: 8 mm    Treatment  Patient entered reporting leaking for ~ 1 week. She is interested in hands-free. However, excessive respiratory pressure and "bull frogging" of stomal area noted. She additionally reported minimal use of adhesives. SLP educated to work on ease of breathing to reduce effort and "bull frogging" effect. SLP assessed current voice prosthesis, size remains adequate, central leaking noted immediately. Prosthesis changed without incident. SLP to follow up as warranted.     Assessment & Plan   Assessment   Laura presents with symptoms that are Stable.  Will Comorbidities Impact Care: No       Diagnosis and Impressions: Ms. 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  She is currently utilizing esophageal speech via a voice prosthesis for alaryngeal communication for functional communication with her family, friends, medical providers, and others to perform her daily life activities.  Ms. Freeman requires the use of a laryngectomy tube at all times to keep the airway patent and prevent stomal stenosis. HMEs are required daily for mucus management and pulmonary optimization .         Evaluation/Treatment Response: Patient responded to treatment well     Patient Goal for Therapy (SLP): maintain alaryngeal communcation  Prognosis: Good       Laryngectomy Education  Education provided: Care, use, and cleaning of voice prosthesis    Tracheostomy/Laryngectomy Education Details: TEP management and stomal care    Plan  From a speech language pathology perspective, the patient would benefit from: Skilled Rehab Services  Planned therapy interventions and modalities include: Speech/language therapy and Patient/family education.    Planned evaluations to include: Speaking valve/voice prosthetic device evaluation.        This plan was discussed with Patient.   Plan details: Continue speech and language therapy 1 time a month or PRN to address alaryngeal communication, patient, and family education.      The patient's spiritual, cultural, and educational needs were considered, and the patient is agreeable to the plan of care and goals.     Goals:   Active       Long Term Goal       Patient will utilize alaryngeal communication via TEP to express needs and desires without respiratory compromise >90% of the time       Start:  05/29/25    Expected End:  06/03/25               Short Term Goals        Patient will demonstrate care and use of HME system for maximum pulmonary benefit >90% of the time.       Start:  05/29/25    Expected End:  06/03/25            Patient will demonstrate adequate care and use of TEP to maintain TEP voicing >90% of the time.        Start:  05/29/25    Expected End:  06/03/25             Patient will demonstrate adequate occlusion and cleaning of TEP to maintain voicing >90% of the time.       Start:  05/29/25    Expected End:  06/03/25            Patient will increase laryngectomy tube usage daily or PRN to maintain and increase stomal patency.       Start:  05/29/25    Expected End:  06/03/25                Lexy Price CCC-SLP  05/29/2025

## 2025-07-11 ENCOUNTER — TELEPHONE (OUTPATIENT)
Dept: ENDOCRINOLOGY | Facility: CLINIC | Age: 70
End: 2025-07-11
Payer: MEDICARE

## 2025-07-11 DIAGNOSIS — I10 PRIMARY HYPERTENSION: Primary | ICD-10-CM

## 2025-07-11 DIAGNOSIS — Z90.02 H/O LARYNGECTOMY: ICD-10-CM

## 2025-07-11 DIAGNOSIS — E89.0 POSTOPERATIVE HYPOTHYROIDISM: ICD-10-CM

## 2025-07-11 DIAGNOSIS — K21.9 GASTROESOPHAGEAL REFLUX DISEASE, UNSPECIFIED WHETHER ESOPHAGITIS PRESENT: ICD-10-CM

## 2025-07-11 DIAGNOSIS — C73 PAPILLARY MICROCARCINOMA OF THYROID: ICD-10-CM

## 2025-07-11 DIAGNOSIS — E89.0 POSTSURGICAL HYPOTHYROIDISM: ICD-10-CM

## 2025-07-11 RX ORDER — LEVOTHYROXINE SODIUM 112 UG/1
112 TABLET ORAL
Qty: 100 TABLET | Refills: 0 | Status: SHIPPED | OUTPATIENT
Start: 2025-07-11 | End: 2026-01-27

## 2025-07-11 NOTE — TELEPHONE ENCOUNTER
Last visit in [facility]: 4/8/2025    Patient's next visit in [facility]: 10/9/2025     Requesting refill on levothyroxine

## 2025-07-14 RX ORDER — OMEPRAZOLE 40 MG/1
40 CAPSULE, DELAYED RELEASE ORAL EVERY MORNING
Qty: 90 CAPSULE | Refills: 1 | Status: SHIPPED | OUTPATIENT
Start: 2025-07-14

## 2025-07-18 DIAGNOSIS — I10 HYPERTENSION, UNSPECIFIED TYPE: ICD-10-CM

## 2025-07-18 RX ORDER — AMLODIPINE BESYLATE 10 MG/1
10 TABLET ORAL DAILY
Qty: 90 TABLET | Refills: 0 | Status: SHIPPED | OUTPATIENT
Start: 2025-07-18

## 2025-07-24 DIAGNOSIS — Z00.00 PREVENTATIVE HEALTH CARE: Primary | ICD-10-CM

## 2025-07-24 DIAGNOSIS — I73.9 PERIPHERAL ARTERIAL DISEASE: ICD-10-CM

## 2025-07-29 ENCOUNTER — HOSPITAL ENCOUNTER (OUTPATIENT)
Dept: RADIOLOGY | Facility: HOSPITAL | Age: 70
Discharge: HOME | End: 2025-07-29
Attending: FAMILY MEDICINE
Payer: MEDICARE

## 2025-07-29 DIAGNOSIS — Z78.0 MENOPAUSE: ICD-10-CM

## 2025-07-29 DIAGNOSIS — I73.9 PERIPHERAL ARTERIAL DISEASE: ICD-10-CM

## 2025-07-29 DIAGNOSIS — Z00.00 PREVENTATIVE HEALTH CARE: ICD-10-CM

## 2025-07-29 LAB
LEFT ABI: 0.64
LEFT ARM BP: 162 MMHG
LEFT CFA PSV: 133 CM/S
LEFT DORSALIS PEDIS PSV: 33 CM/S
LEFT DORSALIS PEDIS: 137 MMHG
LEFT POPLITEAL PSV: 68 CM/S
LEFT POST TIBIAL SYS PSV: 48 CM/S
LEFT POSTERIOR TIBIAL: 149 MMHG
LEFT PROFUNDA SYS PSV: 193 CM/S
LEFT SUPER FEMORAL DIST SYS PSV: 137 CM/S
LEFT SUPER FEMORAL MID SYS PSV: 113 CM/S
LEFT SUPER FEMORAL PROX SYS PSV: 74 CM/S
OHS CV LEFT LOWER EXTREMITY ABI (NO CALC): 0.64
OHS CV RIGHT ABI LOWER EXTREMITY (NO CALC): 0.72
RIGHT ABI: 0.72
RIGHT ARM BP: 233 MMHG
RIGHT CFA PSV: 147 CM/S
RIGHT DORSALIS PEDIS PSV: 55 CM/S
RIGHT DORSALIS PEDIS: 167 MMHG
RIGHT PERONEAL SYS PSV: 61 CM/S
RIGHT POPLITEAL PSV: 50 CM/S
RIGHT POST TIBIAL SYS PSV: 27 CM/S
RIGHT POSTERIOR TIBIAL: 146 MMHG
RIGHT PROFUNDA SYS PSV: 83 CM/S
RIGHT SUPER FEMORAL DIST SYS PSV: 86 CM/S
RIGHT SUPER FEMORAL MID SYS PSV: 126 CM/S
RIGHT SUPER FEMORAL PROX SYS PSV: 99 CM/S

## 2025-07-29 PROCEDURE — 93925 LOWER EXTREMITY STUDY: CPT

## 2025-07-29 PROCEDURE — 77080 DXA BONE DENSITY AXIAL: CPT | Mod: TC

## 2025-07-29 PROCEDURE — 93922 UPR/L XTREMITY ART 2 LEVELS: CPT
